# Patient Record
Sex: MALE | Race: BLACK OR AFRICAN AMERICAN | Employment: OTHER | URBAN - METROPOLITAN AREA
[De-identification: names, ages, dates, MRNs, and addresses within clinical notes are randomized per-mention and may not be internally consistent; named-entity substitution may affect disease eponyms.]

---

## 2017-03-11 ENCOUNTER — HOSPITAL ENCOUNTER (EMERGENCY)
Age: 72
Discharge: HOME | End: 2017-03-11
Attending: EMERGENCY MEDICINE
Payer: MEDICARE

## 2017-03-11 VITALS
HEIGHT: 70 IN | TEMPERATURE: 97.7 F | DIASTOLIC BLOOD PRESSURE: 82 MMHG | RESPIRATION RATE: 18 BRPM | BODY MASS INDEX: 30.35 KG/M2 | OXYGEN SATURATION: 100 % | SYSTOLIC BLOOD PRESSURE: 166 MMHG | WEIGHT: 212 LBS | HEART RATE: 73 BPM

## 2017-03-11 DIAGNOSIS — S80.811A ABRASION OF RIGHT LOWER LEG, INITIAL ENCOUNTER: Primary | ICD-10-CM

## 2017-03-11 PROCEDURE — 6370000000 HC RX 637 (ALT 250 FOR IP): Performed by: EMERGENCY MEDICINE

## 2017-03-11 PROCEDURE — 99282 EMERGENCY DEPT VISIT SF MDM: CPT

## 2017-03-11 RX ORDER — CEPHALEXIN 500 MG/1
500 CAPSULE ORAL 4 TIMES DAILY
Qty: 28 CAPSULE | Refills: 0 | Status: ON HOLD | OUTPATIENT
Start: 2017-03-11 | End: 2017-10-06

## 2017-03-11 RX ORDER — GINSENG 100 MG
CAPSULE ORAL ONCE
Status: COMPLETED | OUTPATIENT
Start: 2017-03-11 | End: 2017-03-11

## 2017-03-11 RX ORDER — CEPHALEXIN 500 MG/1
500 CAPSULE ORAL ONCE
Status: COMPLETED | OUTPATIENT
Start: 2017-03-11 | End: 2017-03-11

## 2017-03-11 RX ADMIN — CEPHALEXIN 500 MG: 500 CAPSULE ORAL at 02:55

## 2017-03-11 RX ADMIN — BACITRACIN: 500 OINTMENT TOPICAL at 02:54

## 2017-06-17 LAB
ALBUMIN SERPL-MCNC: 4.5 G/DL (ref 3.9–4.9)
ALP BLD-CCNC: 95 U/L (ref 35–104)
ALT SERPL-CCNC: 28 U/L (ref 0–41)
ANION GAP SERPL CALCULATED.3IONS-SCNC: 12 MEQ/L (ref 7–13)
AST SERPL-CCNC: 20 U/L (ref 0–40)
BILIRUB SERPL-MCNC: 0.4 MG/DL (ref 0–1.2)
BUN BLDV-MCNC: 22 MG/DL (ref 8–23)
CALCIUM SERPL-MCNC: 8.8 MG/DL (ref 8.6–10.2)
CHLORIDE BLD-SCNC: 104 MEQ/L (ref 98–107)
CO2: 23 MEQ/L (ref 22–29)
CREAT SERPL-MCNC: 1.06 MG/DL (ref 0.7–1.2)
GFR AFRICAN AMERICAN: >60
GFR NON-AFRICAN AMERICAN: >60
GLOBULIN: 2.6 G/DL (ref 2.3–3.5)
GLUCOSE BLD-MCNC: 121 MG/DL (ref 74–109)
HCT VFR BLD CALC: 36.8 % (ref 42–52)
HEMOGLOBIN: 11.9 G/DL (ref 14–18)
MCH RBC QN AUTO: 28.3 PG (ref 27–31.3)
MCHC RBC AUTO-ENTMCNC: 32.4 % (ref 33–37)
MCV RBC AUTO: 87.4 FL (ref 80–100)
PDW BLD-RTO: 16.9 % (ref 11.5–14.5)
PLATELET # BLD: 128 K/UL (ref 130–400)
POTASSIUM SERPL-SCNC: 4.4 MEQ/L (ref 3.5–5.1)
RBC # BLD: 4.21 M/UL (ref 4.7–6.1)
SODIUM BLD-SCNC: 139 MEQ/L (ref 132–144)
T4 FREE: 0.86 NG/DL (ref 0.93–1.7)
TOTAL CK: 169 U/L (ref 0–190)
TOTAL PROTEIN: 7.1 G/DL (ref 6.4–8.1)
TROPONIN: <0.01 NG/ML (ref 0–0.01)
TSH SERPL DL<=0.05 MIU/L-ACNC: 1.57 UIU/ML (ref 0.27–4.2)
VITAMIN B-12: 363 PG/ML (ref 211–946)
WBC # BLD: 3.9 K/UL (ref 4.8–10.8)

## 2017-06-19 ENCOUNTER — HOSPITAL ENCOUNTER (OUTPATIENT)
Dept: NON INVASIVE DIAGNOSTICS | Age: 72
Discharge: HOME | End: 2017-06-19
Payer: MEDICARE

## 2017-06-19 PROCEDURE — 93005 ELECTROCARDIOGRAM TRACING: CPT

## 2017-06-21 LAB
EKG ATRIAL RATE: 66 BPM
EKG P AXIS: 40 DEGREES
EKG P-R INTERVAL: 156 MS
EKG Q-T INTERVAL: 408 MS
EKG QRS DURATION: 104 MS
EKG QTC CALCULATION (BAZETT): 427 MS
EKG R AXIS: 23 DEGREES
EKG T AXIS: 16 DEGREES
EKG VENTRICULAR RATE: 66 BPM

## 2017-06-27 ENCOUNTER — OFFICE VISIT (OUTPATIENT)
Dept: CARDIOLOGY | Age: 72
End: 2017-06-27

## 2017-06-27 VITALS
OXYGEN SATURATION: 99 % | HEIGHT: 70 IN | RESPIRATION RATE: 18 BRPM | BODY MASS INDEX: 29.78 KG/M2 | SYSTOLIC BLOOD PRESSURE: 124 MMHG | HEART RATE: 68 BPM | DIASTOLIC BLOOD PRESSURE: 72 MMHG | WEIGHT: 208 LBS | TEMPERATURE: 98.3 F

## 2017-06-27 DIAGNOSIS — I73.9 PERIPHERAL VASCULAR DISEASE (HCC): ICD-10-CM

## 2017-06-27 DIAGNOSIS — I25.119 CORONARY ARTERY DISEASE INVOLVING NATIVE CORONARY ARTERY OF NATIVE HEART WITH ANGINA PECTORIS (HCC): ICD-10-CM

## 2017-06-27 DIAGNOSIS — Z95.5 HISTORY OF CORONARY ARTERY STENT PLACEMENT: ICD-10-CM

## 2017-06-27 DIAGNOSIS — R07.89 OTHER CHEST PAIN: Primary | ICD-10-CM

## 2017-06-27 PROCEDURE — 99214 OFFICE O/P EST MOD 30 MIN: CPT | Performed by: INTERNAL MEDICINE

## 2017-06-27 PROCEDURE — 93000 ELECTROCARDIOGRAM COMPLETE: CPT | Performed by: INTERNAL MEDICINE

## 2017-06-28 ENCOUNTER — HOSPITAL ENCOUNTER (OUTPATIENT)
Dept: NUCLEAR MEDICINE | Age: 72
Discharge: HOME | End: 2017-06-28
Payer: MEDICARE

## 2017-06-28 DIAGNOSIS — I25.119 CORONARY ARTERY DISEASE INVOLVING NATIVE CORONARY ARTERY OF NATIVE HEART WITH ANGINA PECTORIS (HCC): ICD-10-CM

## 2017-06-28 DIAGNOSIS — R07.89 OTHER CHEST PAIN: ICD-10-CM

## 2017-06-28 DIAGNOSIS — Z95.5 HISTORY OF CORONARY ARTERY STENT PLACEMENT: ICD-10-CM

## 2017-06-28 PROCEDURE — 2580000003 HC RX 258: Performed by: INTERNAL MEDICINE

## 2017-06-28 PROCEDURE — 3430000000 HC RX DIAGNOSTIC RADIOPHARMACEUTICAL: Performed by: INTERNAL MEDICINE

## 2017-06-28 PROCEDURE — A9502 TC99M TETROFOSMIN: HCPCS | Performed by: INTERNAL MEDICINE

## 2017-06-28 PROCEDURE — 93017 CV STRESS TEST TRACING ONLY: CPT

## 2017-06-28 PROCEDURE — 78452 HT MUSCLE IMAGE SPECT MULT: CPT

## 2017-06-28 PROCEDURE — 6360000002 HC RX W HCPCS: Performed by: INTERNAL MEDICINE

## 2017-06-28 RX ORDER — SODIUM CHLORIDE 0.9 % (FLUSH) 0.9 %
10 SYRINGE (ML) INJECTION PRN
Status: DISCONTINUED | OUTPATIENT
Start: 2017-06-28 | End: 2017-07-01 | Stop reason: HOSPADM

## 2017-06-28 RX ADMIN — Medication 20 ML: at 11:10

## 2017-06-28 RX ADMIN — Medication 10 ML: at 09:35

## 2017-06-28 RX ADMIN — REGADENOSON 0.4 MG: 0.08 INJECTION, SOLUTION INTRAVENOUS at 11:10

## 2017-06-28 RX ADMIN — TETROFOSMIN 11.1 MILLICURIE: 0.23 INJECTION, POWDER, LYOPHILIZED, FOR SOLUTION INTRAVENOUS at 09:35

## 2017-06-28 RX ADMIN — TETROFOSMIN 28.4 MILLICURIE: 0.23 INJECTION, POWDER, LYOPHILIZED, FOR SOLUTION INTRAVENOUS at 11:10

## 2017-07-24 ASSESSMENT — ENCOUNTER SYMPTOMS
ALLERGIC/IMMUNOLOGIC NEGATIVE: 1
GASTROINTESTINAL NEGATIVE: 1
SHORTNESS OF BREATH: 1
EYES NEGATIVE: 1
CHEST TIGHTNESS: 0

## 2017-09-02 ENCOUNTER — HOSPITAL ENCOUNTER (OUTPATIENT)
Dept: GENERAL RADIOLOGY | Age: 72
Discharge: HOME | End: 2017-09-02
Payer: MEDICARE

## 2017-09-02 DIAGNOSIS — M54.5 LOW BACK PAIN, UNSPECIFIED BACK PAIN LATERALITY, UNSPECIFIED CHRONICITY, WITH SCIATICA PRESENCE UNSPECIFIED: ICD-10-CM

## 2017-09-02 PROCEDURE — 72170 X-RAY EXAM OF PELVIS: CPT

## 2017-09-02 PROCEDURE — 72110 X-RAY EXAM L-2 SPINE 4/>VWS: CPT

## 2017-10-02 ENCOUNTER — HOSPITAL ENCOUNTER (OUTPATIENT)
Dept: PREADMISSION TESTING | Age: 72
Discharge: HOME | End: 2017-10-02
Payer: MEDICARE

## 2017-10-02 VITALS
HEART RATE: 76 BPM | HEIGHT: 69 IN | OXYGEN SATURATION: 98 % | DIASTOLIC BLOOD PRESSURE: 50 MMHG | BODY MASS INDEX: 31.01 KG/M2 | WEIGHT: 209.4 LBS | TEMPERATURE: 99.3 F | SYSTOLIC BLOOD PRESSURE: 114 MMHG | RESPIRATION RATE: 18 BRPM

## 2017-10-02 DIAGNOSIS — I70.209 OCCLUSIVE DISEASE OF ARTERY OF LOWER EXTREMITY (HCC): Chronic | ICD-10-CM

## 2017-10-02 PROBLEM — M10.9 GOUT: Chronic | Status: ACTIVE | Noted: 2017-10-02

## 2017-10-02 PROBLEM — I25.10 CAD (CORONARY ARTERY DISEASE): Chronic | Status: ACTIVE | Noted: 2017-10-02

## 2017-10-02 LAB
ABO/RH: NORMAL
ALBUMIN SERPL-MCNC: 4.4 G/DL (ref 3.9–4.9)
ALP BLD-CCNC: 90 U/L (ref 35–104)
ALT SERPL-CCNC: 34 U/L (ref 0–41)
ANION GAP SERPL CALCULATED.3IONS-SCNC: 16 MEQ/L (ref 7–13)
ANTIBODY SCREEN: NORMAL
AST SERPL-CCNC: 27 U/L (ref 0–40)
BILIRUB SERPL-MCNC: 0.5 MG/DL (ref 0–1.2)
BUN BLDV-MCNC: 18 MG/DL (ref 8–23)
CALCIUM SERPL-MCNC: 9.1 MG/DL (ref 8.6–10.2)
CHLORIDE BLD-SCNC: 104 MEQ/L (ref 98–107)
CO2: 24 MEQ/L (ref 22–29)
CREAT SERPL-MCNC: 1.09 MG/DL (ref 0.7–1.2)
GFR AFRICAN AMERICAN: >60
GFR NON-AFRICAN AMERICAN: >60
GLOBULIN: 3 G/DL (ref 2.3–3.5)
GLUCOSE BLD-MCNC: 135 MG/DL (ref 74–109)
HCT VFR BLD CALC: 36.2 % (ref 42–52)
HEMOGLOBIN: 12 G/DL (ref 14–18)
MCH RBC QN AUTO: 28.7 PG (ref 27–31.3)
MCHC RBC AUTO-ENTMCNC: 33.1 % (ref 33–37)
MCV RBC AUTO: 86.6 FL (ref 80–100)
PDW BLD-RTO: 17 % (ref 11.5–14.5)
PLATELET # BLD: 153 K/UL (ref 130–400)
POTASSIUM SERPL-SCNC: 5 MEQ/L (ref 3.5–5.1)
RBC # BLD: 4.18 M/UL (ref 4.7–6.1)
SODIUM BLD-SCNC: 144 MEQ/L (ref 132–144)
TOTAL PROTEIN: 7.4 G/DL (ref 6.4–8.1)
WBC # BLD: 5.2 K/UL (ref 4.8–10.8)

## 2017-10-02 PROCEDURE — 86850 RBC ANTIBODY SCREEN: CPT

## 2017-10-02 PROCEDURE — 86900 BLOOD TYPING SEROLOGIC ABO: CPT

## 2017-10-02 PROCEDURE — 85027 COMPLETE CBC AUTOMATED: CPT

## 2017-10-02 PROCEDURE — 86901 BLOOD TYPING SEROLOGIC RH(D): CPT

## 2017-10-02 PROCEDURE — 80053 COMPREHEN METABOLIC PANEL: CPT

## 2017-10-02 RX ORDER — LIDOCAINE HYDROCHLORIDE 10 MG/ML
1 INJECTION, SOLUTION EPIDURAL; INFILTRATION; INTRACAUDAL; PERINEURAL
Status: CANCELLED | OUTPATIENT
Start: 2017-10-02 | End: 2017-10-02

## 2017-10-02 RX ORDER — SODIUM CHLORIDE 0.9 % (FLUSH) 0.9 %
10 SYRINGE (ML) INJECTION PRN
Status: CANCELLED | OUTPATIENT
Start: 2017-10-02

## 2017-10-02 RX ORDER — TIZANIDINE 2 MG/1
2 TABLET ORAL EVERY 6 HOURS PRN
COMMUNITY

## 2017-10-02 RX ORDER — SODIUM CHLORIDE 0.9 % (FLUSH) 0.9 %
10 SYRINGE (ML) INJECTION EVERY 12 HOURS SCHEDULED
Status: CANCELLED | OUTPATIENT
Start: 2017-10-02

## 2017-10-02 RX ORDER — SODIUM CHLORIDE, SODIUM LACTATE, POTASSIUM CHLORIDE, CALCIUM CHLORIDE 600; 310; 30; 20 MG/100ML; MG/100ML; MG/100ML; MG/100ML
INJECTION, SOLUTION INTRAVENOUS CONTINUOUS
Status: CANCELLED | OUTPATIENT
Start: 2017-10-02

## 2017-10-02 RX ORDER — NITROGLYCERIN 0.4 MG/1
0.4 TABLET SUBLINGUAL EVERY 5 MIN PRN
COMMUNITY
End: 2019-02-13 | Stop reason: SDUPTHER

## 2017-10-02 RX ORDER — INDOMETHACIN 50 MG/1
50 CAPSULE ORAL 2 TIMES DAILY WITH MEALS
Status: ON HOLD | COMMUNITY
End: 2017-10-29 | Stop reason: HOSPADM

## 2017-10-06 ENCOUNTER — ANESTHESIA EVENT (OUTPATIENT)
Dept: OPERATING ROOM | Age: 72
End: 2017-10-06
Payer: MEDICARE

## 2017-10-06 ENCOUNTER — ANESTHESIA (OUTPATIENT)
Dept: OPERATING ROOM | Age: 72
End: 2017-10-06
Payer: MEDICARE

## 2017-10-06 ENCOUNTER — HOSPITAL ENCOUNTER (OUTPATIENT)
Age: 72
Setting detail: OUTPATIENT SURGERY
Discharge: HOME | End: 2017-10-06
Attending: THORACIC SURGERY (CARDIOTHORACIC VASCULAR SURGERY) | Admitting: THORACIC SURGERY (CARDIOTHORACIC VASCULAR SURGERY)
Payer: MEDICARE

## 2017-10-06 ENCOUNTER — HOSPITAL ENCOUNTER (OUTPATIENT)
Dept: GENERAL RADIOLOGY | Age: 72
Setting detail: OUTPATIENT SURGERY
Discharge: HOME | End: 2017-10-06
Attending: THORACIC SURGERY (CARDIOTHORACIC VASCULAR SURGERY)
Payer: MEDICARE

## 2017-10-06 VITALS
DIASTOLIC BLOOD PRESSURE: 77 MMHG | HEIGHT: 71 IN | TEMPERATURE: 97 F | RESPIRATION RATE: 16 BRPM | SYSTOLIC BLOOD PRESSURE: 159 MMHG | WEIGHT: 209 LBS | HEART RATE: 51 BPM | OXYGEN SATURATION: 100 % | BODY MASS INDEX: 29.26 KG/M2

## 2017-10-06 VITALS
OXYGEN SATURATION: 100 % | RESPIRATION RATE: 11 BRPM | DIASTOLIC BLOOD PRESSURE: 63 MMHG | SYSTOLIC BLOOD PRESSURE: 114 MMHG

## 2017-10-06 DIAGNOSIS — R52 PAIN: ICD-10-CM

## 2017-10-06 LAB
GLUCOSE BLD-MCNC: 112 MG/DL (ref 60–115)
GLUCOSE BLD-MCNC: 133 MG/DL (ref 60–115)
PERFORMED ON: ABNORMAL
PERFORMED ON: NORMAL

## 2017-10-06 PROCEDURE — 3700000001 HC ADD 15 MINUTES (ANESTHESIA): Performed by: THORACIC SURGERY (CARDIOTHORACIC VASCULAR SURGERY)

## 2017-10-06 PROCEDURE — 7100000011 HC PHASE II RECOVERY - ADDTL 15 MIN: Performed by: THORACIC SURGERY (CARDIOTHORACIC VASCULAR SURGERY)

## 2017-10-06 PROCEDURE — 6360000002 HC RX W HCPCS: Performed by: THORACIC SURGERY (CARDIOTHORACIC VASCULAR SURGERY)

## 2017-10-06 PROCEDURE — 2500000003 HC RX 250 WO HCPCS: Performed by: NURSE ANESTHETIST, CERTIFIED REGISTERED

## 2017-10-06 PROCEDURE — C1894 INTRO/SHEATH, NON-LASER: HCPCS | Performed by: THORACIC SURGERY (CARDIOTHORACIC VASCULAR SURGERY)

## 2017-10-06 PROCEDURE — 6360000002 HC RX W HCPCS: Performed by: NURSE ANESTHETIST, CERTIFIED REGISTERED

## 2017-10-06 PROCEDURE — 7100000000 HC PACU RECOVERY - FIRST 15 MIN: Performed by: THORACIC SURGERY (CARDIOTHORACIC VASCULAR SURGERY)

## 2017-10-06 PROCEDURE — 6360000004 HC RX CONTRAST MEDICATION: Performed by: THORACIC SURGERY (CARDIOTHORACIC VASCULAR SURGERY)

## 2017-10-06 PROCEDURE — 6370000000 HC RX 637 (ALT 250 FOR IP): Performed by: STUDENT IN AN ORGANIZED HEALTH CARE EDUCATION/TRAINING PROGRAM

## 2017-10-06 PROCEDURE — 76000 FLUOROSCOPY <1 HR PHYS/QHP: CPT

## 2017-10-06 PROCEDURE — 7100000001 HC PACU RECOVERY - ADDTL 15 MIN: Performed by: THORACIC SURGERY (CARDIOTHORACIC VASCULAR SURGERY)

## 2017-10-06 PROCEDURE — 75710 ARTERY X-RAYS ARM/LEG: CPT

## 2017-10-06 PROCEDURE — 2500000003 HC RX 250 WO HCPCS: Performed by: THORACIC SURGERY (CARDIOTHORACIC VASCULAR SURGERY)

## 2017-10-06 PROCEDURE — 2580000003 HC RX 258: Performed by: STUDENT IN AN ORGANIZED HEALTH CARE EDUCATION/TRAINING PROGRAM

## 2017-10-06 PROCEDURE — 3600000002 HC SURGERY LEVEL 2 BASE: Performed by: THORACIC SURGERY (CARDIOTHORACIC VASCULAR SURGERY)

## 2017-10-06 PROCEDURE — 75625 CONTRAST EXAM ABDOMINL AORTA: CPT

## 2017-10-06 PROCEDURE — 3700000000 HC ANESTHESIA ATTENDED CARE: Performed by: THORACIC SURGERY (CARDIOTHORACIC VASCULAR SURGERY)

## 2017-10-06 PROCEDURE — 2580000003 HC RX 258: Performed by: THORACIC SURGERY (CARDIOTHORACIC VASCULAR SURGERY)

## 2017-10-06 PROCEDURE — C1769 GUIDE WIRE: HCPCS | Performed by: THORACIC SURGERY (CARDIOTHORACIC VASCULAR SURGERY)

## 2017-10-06 PROCEDURE — 3600000012 HC SURGERY LEVEL 2 ADDTL 15MIN: Performed by: THORACIC SURGERY (CARDIOTHORACIC VASCULAR SURGERY)

## 2017-10-06 PROCEDURE — 2720000010 HC SURG SUPPLY STERILE: Performed by: THORACIC SURGERY (CARDIOTHORACIC VASCULAR SURGERY)

## 2017-10-06 PROCEDURE — 2500000003 HC RX 250 WO HCPCS: Performed by: STUDENT IN AN ORGANIZED HEALTH CARE EDUCATION/TRAINING PROGRAM

## 2017-10-06 PROCEDURE — 7100000010 HC PHASE II RECOVERY - FIRST 15 MIN: Performed by: THORACIC SURGERY (CARDIOTHORACIC VASCULAR SURGERY)

## 2017-10-06 RX ORDER — HYDROCODONE BITARTRATE AND ACETAMINOPHEN 5; 325 MG/1; MG/1
1 TABLET ORAL PRN
Status: COMPLETED | OUTPATIENT
Start: 2017-10-06 | End: 2017-10-06

## 2017-10-06 RX ORDER — MEPERIDINE HYDROCHLORIDE 25 MG/ML
12.5 INJECTION INTRAMUSCULAR; INTRAVENOUS; SUBCUTANEOUS EVERY 5 MIN PRN
Status: DISCONTINUED | OUTPATIENT
Start: 2017-10-06 | End: 2017-10-06 | Stop reason: HOSPADM

## 2017-10-06 RX ORDER — HYDROCODONE BITARTRATE AND ACETAMINOPHEN 5; 325 MG/1; MG/1
2 TABLET ORAL PRN
Status: COMPLETED | OUTPATIENT
Start: 2017-10-06 | End: 2017-10-06

## 2017-10-06 RX ORDER — ACETAMINOPHEN 325 MG/1
650 TABLET ORAL EVERY 4 HOURS PRN
Status: DISCONTINUED | OUTPATIENT
Start: 2017-10-06 | End: 2017-10-06 | Stop reason: HOSPADM

## 2017-10-06 RX ORDER — METOCLOPRAMIDE HYDROCHLORIDE 5 MG/ML
10 INJECTION INTRAMUSCULAR; INTRAVENOUS
Status: DISCONTINUED | OUTPATIENT
Start: 2017-10-06 | End: 2017-10-06 | Stop reason: HOSPADM

## 2017-10-06 RX ORDER — SODIUM CHLORIDE, SODIUM LACTATE, POTASSIUM CHLORIDE, CALCIUM CHLORIDE 600; 310; 30; 20 MG/100ML; MG/100ML; MG/100ML; MG/100ML
INJECTION, SOLUTION INTRAVENOUS CONTINUOUS
Status: DISCONTINUED | OUTPATIENT
Start: 2017-10-06 | End: 2017-10-06 | Stop reason: HOSPADM

## 2017-10-06 RX ORDER — ONDANSETRON 2 MG/ML
INJECTION INTRAMUSCULAR; INTRAVENOUS PRN
Status: DISCONTINUED | OUTPATIENT
Start: 2017-10-06 | End: 2017-10-06 | Stop reason: SDUPTHER

## 2017-10-06 RX ORDER — DIPHENHYDRAMINE HYDROCHLORIDE 50 MG/ML
12.5 INJECTION INTRAMUSCULAR; INTRAVENOUS
Status: DISCONTINUED | OUTPATIENT
Start: 2017-10-06 | End: 2017-10-06 | Stop reason: HOSPADM

## 2017-10-06 RX ORDER — ONDANSETRON 2 MG/ML
4 INJECTION INTRAMUSCULAR; INTRAVENOUS EVERY 6 HOURS PRN
Status: DISCONTINUED | OUTPATIENT
Start: 2017-10-06 | End: 2017-10-06 | Stop reason: HOSPADM

## 2017-10-06 RX ORDER — LIDOCAINE HYDROCHLORIDE 10 MG/ML
1 INJECTION, SOLUTION EPIDURAL; INFILTRATION; INTRACAUDAL; PERINEURAL
Status: COMPLETED | OUTPATIENT
Start: 2017-10-06 | End: 2017-10-06

## 2017-10-06 RX ORDER — FENTANYL CITRATE 50 UG/ML
INJECTION, SOLUTION INTRAMUSCULAR; INTRAVENOUS PRN
Status: DISCONTINUED | OUTPATIENT
Start: 2017-10-06 | End: 2017-10-06 | Stop reason: SDUPTHER

## 2017-10-06 RX ORDER — SODIUM CHLORIDE 450 MG/100ML
INJECTION, SOLUTION INTRAVENOUS CONTINUOUS
Status: DISCONTINUED | OUTPATIENT
Start: 2017-10-06 | End: 2017-10-06 | Stop reason: HOSPADM

## 2017-10-06 RX ORDER — SODIUM CHLORIDE 0.9 % (FLUSH) 0.9 %
10 SYRINGE (ML) INJECTION EVERY 12 HOURS SCHEDULED
Status: DISCONTINUED | OUTPATIENT
Start: 2017-10-06 | End: 2017-10-06 | Stop reason: HOSPADM

## 2017-10-06 RX ORDER — MIDAZOLAM HYDROCHLORIDE 1 MG/ML
INJECTION INTRAMUSCULAR; INTRAVENOUS PRN
Status: DISCONTINUED | OUTPATIENT
Start: 2017-10-06 | End: 2017-10-06 | Stop reason: SDUPTHER

## 2017-10-06 RX ORDER — SODIUM CHLORIDE 0.9 % (FLUSH) 0.9 %
10 SYRINGE (ML) INJECTION PRN
Status: DISCONTINUED | OUTPATIENT
Start: 2017-10-06 | End: 2017-10-06 | Stop reason: HOSPADM

## 2017-10-06 RX ORDER — PROPOFOL 10 MG/ML
INJECTION, EMULSION INTRAVENOUS PRN
Status: DISCONTINUED | OUTPATIENT
Start: 2017-10-06 | End: 2017-10-06 | Stop reason: SDUPTHER

## 2017-10-06 RX ORDER — FENTANYL CITRATE 50 UG/ML
50 INJECTION, SOLUTION INTRAMUSCULAR; INTRAVENOUS EVERY 10 MIN PRN
Status: DISCONTINUED | OUTPATIENT
Start: 2017-10-06 | End: 2017-10-06 | Stop reason: HOSPADM

## 2017-10-06 RX ORDER — PROPOFOL 10 MG/ML
INJECTION, EMULSION INTRAVENOUS CONTINUOUS PRN
Status: DISCONTINUED | OUTPATIENT
Start: 2017-10-06 | End: 2017-10-06 | Stop reason: SDUPTHER

## 2017-10-06 RX ORDER — ONDANSETRON 2 MG/ML
4 INJECTION INTRAMUSCULAR; INTRAVENOUS
Status: DISCONTINUED | OUTPATIENT
Start: 2017-10-06 | End: 2017-10-06 | Stop reason: HOSPADM

## 2017-10-06 RX ORDER — LIDOCAINE HYDROCHLORIDE 20 MG/ML
INJECTION, SOLUTION INFILTRATION; PERINEURAL PRN
Status: DISCONTINUED | OUTPATIENT
Start: 2017-10-06 | End: 2017-10-06 | Stop reason: SDUPTHER

## 2017-10-06 RX ORDER — MAGNESIUM HYDROXIDE 1200 MG/15ML
LIQUID ORAL CONTINUOUS PRN
Status: DISCONTINUED | OUTPATIENT
Start: 2017-10-06 | End: 2017-10-06 | Stop reason: HOSPADM

## 2017-10-06 RX ADMIN — FENTANYL CITRATE 25 MCG: 50 INJECTION, SOLUTION INTRAMUSCULAR; INTRAVENOUS at 12:35

## 2017-10-06 RX ADMIN — ONDANSETRON 4 MG: 2 INJECTION INTRAMUSCULAR; INTRAVENOUS at 12:26

## 2017-10-06 RX ADMIN — SODIUM CHLORIDE, POTASSIUM CHLORIDE, SODIUM LACTATE AND CALCIUM CHLORIDE: 600; 310; 30; 20 INJECTION, SOLUTION INTRAVENOUS at 10:00

## 2017-10-06 RX ADMIN — PROPOFOL 30 MG: 10 INJECTION, EMULSION INTRAVENOUS at 11:08

## 2017-10-06 RX ADMIN — CEFAZOLIN SODIUM 2 G: 2 SOLUTION INTRAVENOUS at 11:02

## 2017-10-06 RX ADMIN — LIDOCAINE HYDROCHLORIDE 0.1 ML: 10 INJECTION, SOLUTION EPIDURAL; INFILTRATION; INTRACAUDAL; PERINEURAL at 10:00

## 2017-10-06 RX ADMIN — MIDAZOLAM HYDROCHLORIDE 1 MG: 1 INJECTION, SOLUTION INTRAMUSCULAR; INTRAVENOUS at 11:02

## 2017-10-06 RX ADMIN — MIDAZOLAM HYDROCHLORIDE 1 MG: 1 INJECTION, SOLUTION INTRAMUSCULAR; INTRAVENOUS at 11:01

## 2017-10-06 RX ADMIN — LIDOCAINE HYDROCHLORIDE 40 MG: 20 INJECTION, SOLUTION INFILTRATION; PERINEURAL at 11:08

## 2017-10-06 RX ADMIN — PROPOFOL 100 MCG/KG/MIN: 10 INJECTION, EMULSION INTRAVENOUS at 11:08

## 2017-10-06 RX ADMIN — FENTANYL CITRATE 25 MCG: 50 INJECTION, SOLUTION INTRAMUSCULAR; INTRAVENOUS at 12:25

## 2017-10-06 RX ADMIN — PROPOFOL 30 MG: 10 INJECTION, EMULSION INTRAVENOUS at 11:20

## 2017-10-06 RX ADMIN — FENTANYL CITRATE 50 MCG: 50 INJECTION, SOLUTION INTRAMUSCULAR; INTRAVENOUS at 11:21

## 2017-10-06 RX ADMIN — HYDROCODONE BITARTRATE AND ACETAMINOPHEN 1 TABLET: 5; 325 TABLET ORAL at 15:08

## 2017-10-06 ASSESSMENT — PAIN SCALES - GENERAL
PAINLEVEL_OUTOF10: 4
PAINLEVEL_OUTOF10: 4

## 2017-10-06 ASSESSMENT — PAIN DESCRIPTION - DESCRIPTORS: DESCRIPTORS: ACHING

## 2017-10-06 ASSESSMENT — PAIN - FUNCTIONAL ASSESSMENT: PAIN_FUNCTIONAL_ASSESSMENT: 0-10

## 2017-10-06 NOTE — ANESTHESIA POSTPROCEDURE EVALUATION
Department of Anesthesiology  Postprocedure Note    Patient: Lenore Orozco  MRN: [id number]  YOB: 1945  Date of evaluation: 10/6/2017  Time:  12:48 PM     Procedure Summary     Date Anesthesia Start Anesthesia Stop Room / Location    10/06/17 1102 [id number] [id number] [facility] / Vivek Guzman OR       Procedure Diagnosis Surgeon Responsible Provider    RIGHT ARM AORTO-FEMORAL DIGITAL SUBTRACTION ARTERIOGRAPHY (Right ) (OCCLUSIVE DISEASE) Tayler Pryor MD [facility],           Anesthesia Type: MAC    Raymundo Phase I:      Raymundo Phase II:      Last vitals:  BP  130/71(95)   Temp   97.2   Pulse 55   Resp 20   SpO2 100% 6L     Anesthesia Post Evaluation    Patient location during evaluation: bedside  Patient participation: complete - patient participated  Level of consciousness: sleepy but conscious  Pain score: 0  Airway patency: patent  Nausea & Vomiting: no nausea and no vomiting  Complications: no  Cardiovascular status: blood pressure returned to baseline and hemodynamically stable  Respiratory status: acceptable  Hydration status: euvolemic

## 2017-10-27 ENCOUNTER — HOSPITAL ENCOUNTER (OUTPATIENT)
Dept: GENERAL RADIOLOGY | Age: 72
Setting detail: SURGERY ADMIT
Discharge: HOME | DRG: 253 | End: 2017-10-27
Attending: THORACIC SURGERY (CARDIOTHORACIC VASCULAR SURGERY)
Payer: MEDICARE

## 2017-10-27 ENCOUNTER — ANESTHESIA EVENT (OUTPATIENT)
Dept: OPERATING ROOM | Age: 72
DRG: 253 | End: 2017-10-27
Payer: MEDICARE

## 2017-10-27 ENCOUNTER — HOSPITAL ENCOUNTER (INPATIENT)
Age: 72
LOS: 2 days | Discharge: HOME | DRG: 253 | End: 2017-10-29
Attending: THORACIC SURGERY (CARDIOTHORACIC VASCULAR SURGERY) | Admitting: THORACIC SURGERY (CARDIOTHORACIC VASCULAR SURGERY)
Payer: MEDICARE

## 2017-10-27 ENCOUNTER — ANESTHESIA (OUTPATIENT)
Dept: OPERATING ROOM | Age: 72
DRG: 253 | End: 2017-10-27
Payer: MEDICARE

## 2017-10-27 VITALS — TEMPERATURE: 95.9 F | OXYGEN SATURATION: 100 %

## 2017-10-27 DIAGNOSIS — Z98.890 S/P POPLITEAL-DISTAL BYPASS: ICD-10-CM

## 2017-10-27 LAB
ABO/RH: NORMAL
ANTIBODY SCREEN: NORMAL
GLUCOSE BLD-MCNC: 124 MG/DL (ref 60–115)
GLUCOSE BLD-MCNC: 228 MG/DL (ref 60–115)
HCT VFR BLD CALC: 28.5 % (ref 42–52)
HEMOGLOBIN: 9.2 G/DL (ref 14–18)
MCH RBC QN AUTO: 28.2 PG (ref 27–31.3)
MCHC RBC AUTO-ENTMCNC: 32.1 % (ref 33–37)
MCV RBC AUTO: 87.8 FL (ref 80–100)
PDW BLD-RTO: 16.8 % (ref 11.5–14.5)
PERFORMED ON: ABNORMAL
PERFORMED ON: ABNORMAL
PLATELET # BLD: 132 K/UL (ref 130–400)
RBC # BLD: 3.25 M/UL (ref 4.7–6.1)
WBC # BLD: 7.9 K/UL (ref 4.8–10.8)

## 2017-10-27 PROCEDURE — 2000000000 HC ICU R&B

## 2017-10-27 PROCEDURE — 6370000000 HC RX 637 (ALT 250 FOR IP): Performed by: INTERNAL MEDICINE

## 2017-10-27 PROCEDURE — 36415 COLL VENOUS BLD VENIPUNCTURE: CPT

## 2017-10-27 PROCEDURE — 2580000003 HC RX 258: Performed by: ANESTHESIOLOGY

## 2017-10-27 PROCEDURE — 2580000003 HC RX 258: Performed by: THORACIC SURGERY (CARDIOTHORACIC VASCULAR SURGERY)

## 2017-10-27 PROCEDURE — 6360000004 HC RX CONTRAST MEDICATION: Performed by: THORACIC SURGERY (CARDIOTHORACIC VASCULAR SURGERY)

## 2017-10-27 PROCEDURE — 047N0DZ DILATION OF LEFT POPLITEAL ARTERY WITH INTRALUMINAL DEVICE, OPEN APPROACH: ICD-10-PCS | Performed by: THORACIC SURGERY (CARDIOTHORACIC VASCULAR SURGERY)

## 2017-10-27 PROCEDURE — C1757 CATH, THROMBECTOMY/EMBOLECT: HCPCS | Performed by: THORACIC SURGERY (CARDIOTHORACIC VASCULAR SURGERY)

## 2017-10-27 PROCEDURE — C1725 CATH, TRANSLUMIN NON-LASER: HCPCS | Performed by: THORACIC SURGERY (CARDIOTHORACIC VASCULAR SURGERY)

## 2017-10-27 PROCEDURE — 041L0JL BYPASS LEFT FEMORAL ARTERY TO POPLITEAL ARTERY WITH SYNTHETIC SUBSTITUTE, OPEN APPROACH: ICD-10-PCS | Performed by: THORACIC SURGERY (CARDIOTHORACIC VASCULAR SURGERY)

## 2017-10-27 PROCEDURE — 6360000002 HC RX W HCPCS: Performed by: NURSE ANESTHETIST, CERTIFIED REGISTERED

## 2017-10-27 PROCEDURE — 6360000002 HC RX W HCPCS: Performed by: THORACIC SURGERY (CARDIOTHORACIC VASCULAR SURGERY)

## 2017-10-27 PROCEDURE — 6370000000 HC RX 637 (ALT 250 FOR IP): Performed by: THORACIC SURGERY (CARDIOTHORACIC VASCULAR SURGERY)

## 2017-10-27 PROCEDURE — 6360000002 HC RX W HCPCS: Performed by: ANESTHESIOLOGY

## 2017-10-27 PROCEDURE — 88304 TISSUE EXAM BY PATHOLOGIST: CPT

## 2017-10-27 PROCEDURE — C1894 INTRO/SHEATH, NON-LASER: HCPCS | Performed by: THORACIC SURGERY (CARDIOTHORACIC VASCULAR SURGERY)

## 2017-10-27 PROCEDURE — C1876 STENT, NON-COA/NON-COV W/DEL: HCPCS | Performed by: THORACIC SURGERY (CARDIOTHORACIC VASCULAR SURGERY)

## 2017-10-27 PROCEDURE — 85027 COMPLETE CBC AUTOMATED: CPT

## 2017-10-27 PROCEDURE — C1769 GUIDE WIRE: HCPCS | Performed by: THORACIC SURGERY (CARDIOTHORACIC VASCULAR SURGERY)

## 2017-10-27 PROCEDURE — C1768 GRAFT, VASCULAR: HCPCS | Performed by: THORACIC SURGERY (CARDIOTHORACIC VASCULAR SURGERY)

## 2017-10-27 PROCEDURE — 04CN0ZZ EXTIRPATION OF MATTER FROM LEFT POPLITEAL ARTERY, OPEN APPROACH: ICD-10-PCS | Performed by: THORACIC SURGERY (CARDIOTHORACIC VASCULAR SURGERY)

## 2017-10-27 PROCEDURE — 2580000003 HC RX 258: Performed by: STUDENT IN AN ORGANIZED HEALTH CARE EDUCATION/TRAINING PROGRAM

## 2017-10-27 PROCEDURE — 86900 BLOOD TYPING SEROLOGIC ABO: CPT

## 2017-10-27 PROCEDURE — 76000 FLUOROSCOPY <1 HR PHYS/QHP: CPT

## 2017-10-27 PROCEDURE — 2500000003 HC RX 250 WO HCPCS: Performed by: NURSE ANESTHETIST, CERTIFIED REGISTERED

## 2017-10-27 PROCEDURE — 3700000000 HC ANESTHESIA ATTENDED CARE: Performed by: THORACIC SURGERY (CARDIOTHORACIC VASCULAR SURGERY)

## 2017-10-27 PROCEDURE — 86901 BLOOD TYPING SEROLOGIC RH(D): CPT

## 2017-10-27 PROCEDURE — 3600000015 HC SURGERY LEVEL 5 ADDTL 15MIN: Performed by: THORACIC SURGERY (CARDIOTHORACIC VASCULAR SURGERY)

## 2017-10-27 PROCEDURE — 3700000001 HC ADD 15 MINUTES (ANESTHESIA): Performed by: THORACIC SURGERY (CARDIOTHORACIC VASCULAR SURGERY)

## 2017-10-27 PROCEDURE — 3600000005 HC SURGERY LEVEL 5 BASE: Performed by: THORACIC SURGERY (CARDIOTHORACIC VASCULAR SURGERY)

## 2017-10-27 PROCEDURE — 86850 RBC ANTIBODY SCREEN: CPT

## 2017-10-27 DEVICE — GRAFT VASC L50CM DIA6MM PTFE CBAS HEP SURF THN WALLED REM: Type: IMPLANTABLE DEVICE | Site: LEG | Status: FUNCTIONAL

## 2017-10-27 DEVICE — SELF-EXPANDING STENT SYSTEM
Type: IMPLANTABLE DEVICE | Site: LEG | Status: FUNCTIONAL
Brand: INNOVA™ VASCULAR

## 2017-10-27 RX ORDER — MEPERIDINE HYDROCHLORIDE 25 MG/ML
12.5 INJECTION INTRAMUSCULAR; INTRAVENOUS; SUBCUTANEOUS EVERY 5 MIN PRN
Status: DISCONTINUED | OUTPATIENT
Start: 2017-10-27 | End: 2017-10-27 | Stop reason: HOSPADM

## 2017-10-27 RX ORDER — SODIUM CHLORIDE 0.9 % (FLUSH) 0.9 %
10 SYRINGE (ML) INJECTION EVERY 12 HOURS SCHEDULED
Status: DISCONTINUED | OUTPATIENT
Start: 2017-10-27 | End: 2017-10-27 | Stop reason: HOSPADM

## 2017-10-27 RX ORDER — DEXTROSE AND SODIUM CHLORIDE 5; .45 G/100ML; G/100ML
INJECTION, SOLUTION INTRAVENOUS CONTINUOUS
Status: DISCONTINUED | OUTPATIENT
Start: 2017-10-27 | End: 2017-10-28

## 2017-10-27 RX ORDER — DEXAMETHASONE SODIUM PHOSPHATE 4 MG/ML
INJECTION, SOLUTION INTRA-ARTICULAR; INTRALESIONAL; INTRAMUSCULAR; INTRAVENOUS; SOFT TISSUE PRN
Status: DISCONTINUED | OUTPATIENT
Start: 2017-10-27 | End: 2017-10-27 | Stop reason: SDUPTHER

## 2017-10-27 RX ORDER — PROPOFOL 10 MG/ML
INJECTION, EMULSION INTRAVENOUS PRN
Status: DISCONTINUED | OUTPATIENT
Start: 2017-10-27 | End: 2017-10-27 | Stop reason: SDUPTHER

## 2017-10-27 RX ORDER — SODIUM CHLORIDE, SODIUM LACTATE, POTASSIUM CHLORIDE, CALCIUM CHLORIDE 600; 310; 30; 20 MG/100ML; MG/100ML; MG/100ML; MG/100ML
INJECTION, SOLUTION INTRAVENOUS CONTINUOUS
Status: DISCONTINUED | OUTPATIENT
Start: 2017-10-27 | End: 2017-10-27

## 2017-10-27 RX ORDER — ONDANSETRON 2 MG/ML
4 INJECTION INTRAMUSCULAR; INTRAVENOUS
Status: DISCONTINUED | OUTPATIENT
Start: 2017-10-27 | End: 2017-10-27 | Stop reason: HOSPADM

## 2017-10-27 RX ORDER — DEXTROSE MONOHYDRATE 50 MG/ML
100 INJECTION, SOLUTION INTRAVENOUS PRN
Status: DISCONTINUED | OUTPATIENT
Start: 2017-10-27 | End: 2017-10-29 | Stop reason: HOSPADM

## 2017-10-27 RX ORDER — ONDANSETRON 2 MG/ML
4 INJECTION INTRAMUSCULAR; INTRAVENOUS EVERY 6 HOURS PRN
Status: DISCONTINUED | OUTPATIENT
Start: 2017-10-27 | End: 2017-10-29 | Stop reason: HOSPADM

## 2017-10-27 RX ORDER — ONDANSETRON 2 MG/ML
INJECTION INTRAMUSCULAR; INTRAVENOUS PRN
Status: DISCONTINUED | OUTPATIENT
Start: 2017-10-27 | End: 2017-10-27 | Stop reason: SDUPTHER

## 2017-10-27 RX ORDER — ROCURONIUM BROMIDE 10 MG/ML
INJECTION, SOLUTION INTRAVENOUS PRN
Status: DISCONTINUED | OUTPATIENT
Start: 2017-10-27 | End: 2017-10-27 | Stop reason: SDUPTHER

## 2017-10-27 RX ORDER — DOCUSATE SODIUM 100 MG/1
100 CAPSULE, LIQUID FILLED ORAL 2 TIMES DAILY
Status: DISCONTINUED | OUTPATIENT
Start: 2017-10-27 | End: 2017-10-29 | Stop reason: HOSPADM

## 2017-10-27 RX ORDER — MORPHINE SULFATE 2 MG/ML
2 INJECTION, SOLUTION INTRAMUSCULAR; INTRAVENOUS
Status: DISCONTINUED | OUTPATIENT
Start: 2017-10-27 | End: 2017-10-28

## 2017-10-27 RX ORDER — 0.9 % SODIUM CHLORIDE 0.9 %
500 INTRAVENOUS SOLUTION INTRAVENOUS ONCE
Status: DISCONTINUED | OUTPATIENT
Start: 2017-10-27 | End: 2017-10-27

## 2017-10-27 RX ORDER — EPHEDRINE SULFATE 50 MG/ML
INJECTION, SOLUTION INTRAVENOUS PRN
Status: DISCONTINUED | OUTPATIENT
Start: 2017-10-27 | End: 2017-10-27 | Stop reason: SDUPTHER

## 2017-10-27 RX ORDER — MORPHINE SULFATE 4 MG/ML
4 INJECTION, SOLUTION INTRAMUSCULAR; INTRAVENOUS
Status: DISCONTINUED | OUTPATIENT
Start: 2017-10-27 | End: 2017-10-28

## 2017-10-27 RX ORDER — METOCLOPRAMIDE HYDROCHLORIDE 5 MG/ML
10 INJECTION INTRAMUSCULAR; INTRAVENOUS
Status: DISCONTINUED | OUTPATIENT
Start: 2017-10-27 | End: 2017-10-27 | Stop reason: HOSPADM

## 2017-10-27 RX ORDER — FENTANYL CITRATE 50 UG/ML
INJECTION, SOLUTION INTRAMUSCULAR; INTRAVENOUS PRN
Status: DISCONTINUED | OUTPATIENT
Start: 2017-10-27 | End: 2017-10-27 | Stop reason: SDUPTHER

## 2017-10-27 RX ORDER — LIDOCAINE HYDROCHLORIDE 20 MG/ML
INJECTION, SOLUTION INFILTRATION; PERINEURAL PRN
Status: DISCONTINUED | OUTPATIENT
Start: 2017-10-27 | End: 2017-10-27 | Stop reason: SDUPTHER

## 2017-10-27 RX ORDER — HEPARIN SODIUM 5000 [USP'U]/ML
INJECTION, SOLUTION INTRAVENOUS; SUBCUTANEOUS PRN
Status: DISCONTINUED | OUTPATIENT
Start: 2017-10-27 | End: 2017-10-27 | Stop reason: SDUPTHER

## 2017-10-27 RX ORDER — DIPHENHYDRAMINE HYDROCHLORIDE 50 MG/ML
12.5 INJECTION INTRAMUSCULAR; INTRAVENOUS
Status: DISCONTINUED | OUTPATIENT
Start: 2017-10-27 | End: 2017-10-27 | Stop reason: HOSPADM

## 2017-10-27 RX ORDER — HYDROCODONE BITARTRATE AND ACETAMINOPHEN 5; 325 MG/1; MG/1
1 TABLET ORAL PRN
Status: DISCONTINUED | OUTPATIENT
Start: 2017-10-27 | End: 2017-10-27 | Stop reason: HOSPADM

## 2017-10-27 RX ORDER — MIDAZOLAM HYDROCHLORIDE 1 MG/ML
INJECTION INTRAMUSCULAR; INTRAVENOUS PRN
Status: DISCONTINUED | OUTPATIENT
Start: 2017-10-27 | End: 2017-10-27 | Stop reason: SDUPTHER

## 2017-10-27 RX ORDER — FENTANYL CITRATE 50 UG/ML
50 INJECTION, SOLUTION INTRAMUSCULAR; INTRAVENOUS EVERY 10 MIN PRN
Status: DISCONTINUED | OUTPATIENT
Start: 2017-10-27 | End: 2017-10-27 | Stop reason: HOSPADM

## 2017-10-27 RX ORDER — GLYCOPYRROLATE 0.2 MG/ML
INJECTION INTRAMUSCULAR; INTRAVENOUS PRN
Status: DISCONTINUED | OUTPATIENT
Start: 2017-10-27 | End: 2017-10-27 | Stop reason: SDUPTHER

## 2017-10-27 RX ORDER — HYDROCODONE BITARTRATE AND ACETAMINOPHEN 5; 325 MG/1; MG/1
2 TABLET ORAL PRN
Status: DISCONTINUED | OUTPATIENT
Start: 2017-10-27 | End: 2017-10-27 | Stop reason: HOSPADM

## 2017-10-27 RX ORDER — LIDOCAINE HYDROCHLORIDE 10 MG/ML
1 INJECTION, SOLUTION EPIDURAL; INFILTRATION; INTRACAUDAL; PERINEURAL
Status: DISCONTINUED | OUTPATIENT
Start: 2017-10-27 | End: 2017-10-27 | Stop reason: HOSPADM

## 2017-10-27 RX ORDER — NICOTINE POLACRILEX 4 MG
15 LOZENGE BUCCAL PRN
Status: DISCONTINUED | OUTPATIENT
Start: 2017-10-27 | End: 2017-10-29 | Stop reason: HOSPADM

## 2017-10-27 RX ORDER — SODIUM CHLORIDE 0.9 % (FLUSH) 0.9 %
10 SYRINGE (ML) INJECTION PRN
Status: DISCONTINUED | OUTPATIENT
Start: 2017-10-27 | End: 2017-10-27 | Stop reason: HOSPADM

## 2017-10-27 RX ORDER — MAGNESIUM HYDROXIDE 1200 MG/15ML
LIQUID ORAL CONTINUOUS PRN
Status: DISCONTINUED | OUTPATIENT
Start: 2017-10-27 | End: 2017-10-27 | Stop reason: HOSPADM

## 2017-10-27 RX ORDER — DEXTROSE MONOHYDRATE 25 G/50ML
12.5 INJECTION, SOLUTION INTRAVENOUS PRN
Status: DISCONTINUED | OUTPATIENT
Start: 2017-10-27 | End: 2017-10-29 | Stop reason: HOSPADM

## 2017-10-27 RX ADMIN — EPHEDRINE SULFATE 5 MG: 50 INJECTION, SOLUTION INTRAMUSCULAR; INTRAVENOUS; SUBCUTANEOUS at 12:10

## 2017-10-27 RX ADMIN — EPHEDRINE SULFATE 5 MG: 50 INJECTION, SOLUTION INTRAMUSCULAR; INTRAVENOUS; SUBCUTANEOUS at 14:38

## 2017-10-27 RX ADMIN — DEXAMETHASONE SODIUM PHOSPHATE 4 MG: 4 INJECTION INTRA-ARTICULAR; INTRALESIONAL; INTRAMUSCULAR; INTRAVENOUS; SOFT TISSUE at 12:25

## 2017-10-27 RX ADMIN — EPHEDRINE SULFATE 5 MG: 50 INJECTION, SOLUTION INTRAMUSCULAR; INTRAVENOUS; SUBCUTANEOUS at 12:04

## 2017-10-27 RX ADMIN — EPHEDRINE SULFATE 5 MG: 50 INJECTION, SOLUTION INTRAMUSCULAR; INTRAVENOUS; SUBCUTANEOUS at 14:40

## 2017-10-27 RX ADMIN — FENTANYL CITRATE 25 MCG: 50 INJECTION, SOLUTION INTRAMUSCULAR; INTRAVENOUS at 15:21

## 2017-10-27 RX ADMIN — ROCURONIUM BROMIDE 50 MG: 10 INJECTION, SOLUTION INTRAVENOUS at 11:53

## 2017-10-27 RX ADMIN — INSULIN LISPRO 2 UNITS: 100 INJECTION, SOLUTION INTRAVENOUS; SUBCUTANEOUS at 23:16

## 2017-10-27 RX ADMIN — LIDOCAINE HYDROCHLORIDE 60 MG: 20 INJECTION, SOLUTION INFILTRATION; PERINEURAL at 11:53

## 2017-10-27 RX ADMIN — EPHEDRINE SULFATE 2.5 MG: 50 INJECTION, SOLUTION INTRAMUSCULAR; INTRAVENOUS; SUBCUTANEOUS at 14:10

## 2017-10-27 RX ADMIN — GLYCOPYRROLATE 0.2 MG: 0.2 INJECTION INTRAMUSCULAR; INTRAVENOUS at 12:05

## 2017-10-27 RX ADMIN — MIDAZOLAM HYDROCHLORIDE 3 MG: 1 INJECTION, SOLUTION INTRAMUSCULAR; INTRAVENOUS at 09:50

## 2017-10-27 RX ADMIN — EPHEDRINE SULFATE 2.5 MG: 50 INJECTION, SOLUTION INTRAMUSCULAR; INTRAVENOUS; SUBCUTANEOUS at 14:13

## 2017-10-27 RX ADMIN — CEFAZOLIN SODIUM 2 G: 2 SOLUTION INTRAVENOUS at 12:01

## 2017-10-27 RX ADMIN — FENTANYL CITRATE 50 MCG: 50 INJECTION, SOLUTION INTRAMUSCULAR; INTRAVENOUS at 11:53

## 2017-10-27 RX ADMIN — EPHEDRINE SULFATE 5 MG: 50 INJECTION, SOLUTION INTRAMUSCULAR; INTRAVENOUS; SUBCUTANEOUS at 12:08

## 2017-10-27 RX ADMIN — SODIUM CHLORIDE, POTASSIUM CHLORIDE, SODIUM LACTATE AND CALCIUM CHLORIDE: 600; 310; 30; 20 INJECTION, SOLUTION INTRAVENOUS at 10:10

## 2017-10-27 RX ADMIN — EPHEDRINE SULFATE 5 MG: 50 INJECTION, SOLUTION INTRAMUSCULAR; INTRAVENOUS; SUBCUTANEOUS at 13:55

## 2017-10-27 RX ADMIN — ROCURONIUM BROMIDE 10 MG: 10 INJECTION, SOLUTION INTRAVENOUS at 14:06

## 2017-10-27 RX ADMIN — EPHEDRINE SULFATE 5 MG: 50 INJECTION, SOLUTION INTRAMUSCULAR; INTRAVENOUS; SUBCUTANEOUS at 14:42

## 2017-10-27 RX ADMIN — SUGAMMADEX 190 MG: 100 INJECTION, SOLUTION INTRAVENOUS at 15:01

## 2017-10-27 RX ADMIN — Medication 4 MG: at 21:21

## 2017-10-27 RX ADMIN — DEXTROSE AND SODIUM CHLORIDE: 5; 450 INJECTION, SOLUTION INTRAVENOUS at 16:28

## 2017-10-27 RX ADMIN — EPHEDRINE SULFATE 5 MG: 50 INJECTION, SOLUTION INTRAMUSCULAR; INTRAVENOUS; SUBCUTANEOUS at 12:06

## 2017-10-27 RX ADMIN — ROCURONIUM BROMIDE 10 MG: 10 INJECTION, SOLUTION INTRAVENOUS at 13:11

## 2017-10-27 RX ADMIN — FENTANYL CITRATE 25 MCG: 50 INJECTION, SOLUTION INTRAMUSCULAR; INTRAVENOUS at 15:25

## 2017-10-27 RX ADMIN — ONDANSETRON 4 MG: 2 INJECTION INTRAMUSCULAR; INTRAVENOUS at 14:34

## 2017-10-27 RX ADMIN — PROPOFOL 150 MG: 10 INJECTION, EMULSION INTRAVENOUS at 11:53

## 2017-10-27 RX ADMIN — HEPARIN SODIUM 7500 UNITS: 5000 INJECTION, SOLUTION INTRAVENOUS; SUBCUTANEOUS at 12:50

## 2017-10-27 RX ADMIN — DOCUSATE SODIUM 100 MG: 100 CAPSULE, LIQUID FILLED ORAL at 21:20

## 2017-10-27 RX ADMIN — EPHEDRINE SULFATE 2.5 MG: 50 INJECTION, SOLUTION INTRAMUSCULAR; INTRAVENOUS; SUBCUTANEOUS at 13:02

## 2017-10-27 RX ADMIN — EPHEDRINE SULFATE 2.5 MG: 50 INJECTION, SOLUTION INTRAMUSCULAR; INTRAVENOUS; SUBCUTANEOUS at 13:04

## 2017-10-27 RX ADMIN — SODIUM CHLORIDE, POTASSIUM CHLORIDE, SODIUM LACTATE AND CALCIUM CHLORIDE: 600; 310; 30; 20 INJECTION, SOLUTION INTRAVENOUS at 09:35

## 2017-10-27 RX ADMIN — Medication 2 MG: at 16:33

## 2017-10-27 ASSESSMENT — PAIN DESCRIPTION - PROGRESSION
CLINICAL_PROGRESSION: GRADUALLY WORSENING
CLINICAL_PROGRESSION: GRADUALLY IMPROVING
CLINICAL_PROGRESSION: GRADUALLY IMPROVING

## 2017-10-27 ASSESSMENT — PAIN SCALES - GENERAL
PAINLEVEL_OUTOF10: 0
PAINLEVEL_OUTOF10: 7
PAINLEVEL_OUTOF10: 0
PAINLEVEL_OUTOF10: 5
PAINLEVEL_OUTOF10: 0
PAINLEVEL_OUTOF10: 0
PAINLEVEL_OUTOF10: 7
PAINLEVEL_OUTOF10: 8

## 2017-10-27 ASSESSMENT — PAIN DESCRIPTION - ORIENTATION
ORIENTATION: LEFT

## 2017-10-27 ASSESSMENT — PAIN - FUNCTIONAL ASSESSMENT: PAIN_FUNCTIONAL_ASSESSMENT: 0-10

## 2017-10-27 ASSESSMENT — PAIN DESCRIPTION - LOCATION
LOCATION: LEG

## 2017-10-27 ASSESSMENT — PAIN DESCRIPTION - PAIN TYPE
TYPE: SURGICAL PAIN

## 2017-10-27 NOTE — ANESTHESIA PRE PROCEDURE
Department of Anesthesiology  Preprocedure Note       Name:  Maria De Jesus Montero   Age:  67 y.o.  :  1945                                          MRN:  [id number]         Date:  10/27/2017      Surgeon: July Garza):  Anamika Smith MD    Procedure: Procedure(s):  LEFT FEMORAL DISTAL BYPASS, (PAT DONE 10-2-17)    Medications prior to admission:   Prior to Admission medications    Medication Sig Start Date End Date Taking? Authorizing Provider   nitroGLYCERIN (NITROSTAT) 0.4 MG SL tablet Place 0.4 mg under the tongue every 5 minutes as needed for Chest pain up to max of 3 total doses. If no relief after 1 dose, call 911. Historical Provider, MD   NAPROXEN EX Apply 1 tablet topically    Historical Provider, MD   tiZANidine (ZANAFLEX) 2 MG tablet Take 2 mg by mouth every 6 hours as needed    Historical Provider, MD   indomethacin (INDOCIN) 50 MG capsule Take 50 mg by mouth 2 times daily (with meals)    Historical Provider, MD   atenolol (TENORMIN) 50 MG tablet TAKE ONE TABLET BY MOUTH TWICE DAILY 10/26/15   Briana Smith MD   aspirin 81 MG tablet Take one(1) tablet daily. 03   Historical Provider, MD   Urea (CARMOL) 40 % cream AS DIRECTED 10/20/03   Historical Provider, MD   clopidogrel (PLAVIX) 75 MG tablet Take 75 mg by mouth 3/6/13   Historical Provider, MD   ferrous sulfate 325 (65 FE) MG tablet Take 325 mg by mouth 3/6/13   Historical Provider, MD   gabapentin (NEURONTIN) 300 MG capsule Take 300 mg by mouth 3/6/13   Historical Provider, MD   triamcinolone (KENALOG) 0.1 % cream Use as directed.  03   Historical Provider, MD   pioglitazone (ACTOS) 45 MG tablet Take 45 mg by mouth daily Takes 1/2 tab daily 3/6/13   Historical Provider, MD   pitavastatin (LIVALO) 2 MG TABS tablet Take by mouth 3/6/13   Historical Provider, MD   amLODIPine-benazepril (LOTREL) 10-20 MG per capsule  5/11/15   Historical Provider, MD   ULORIC 80 MG TABS  5/11/15   Historical Provider, MD   potassium chloride (MICRO-K) 10 MEQ GRAFT      HERNIA REPAIR      as child       Social History:    Social History   Substance Use Topics    Smoking status: Former Smoker     Start date: 8/11/1960     Quit date: 8/11/2000    Smokeless tobacco: Not on file    Alcohol use No                                Counseling given: Not Answered      Vital Signs (Current): There were no vitals filed for this visit. BP Readings from Last 3 Encounters:   10/06/17 (!) 159/77   10/06/17 114/63   10/02/17 (!) 114/50       NPO Status:                                                                                 BMI:   Wt Readings from Last 3 Encounters:   10/06/17 209 lb (94.8 kg)   10/02/17 209 lb 6.4 oz (95 kg)   06/27/17 208 lb (94.3 kg)     There is no height or weight on file to calculate BMI.    CBC:   Lab Results   Component Value Date    WBC 5.2 10/02/2017    RBC 4.18 10/02/2017    RBC 4.07 02/03/2012    HGB 12.0 10/02/2017    HCT 36.2 10/02/2017    MCV 86.6 10/02/2017    RDW 17.0 10/02/2017     10/02/2017       CMP:   Lab Results   Component Value Date     10/02/2017    K 5.0 10/02/2017     10/02/2017    CO2 24 10/02/2017    BUN 18 10/02/2017    CREATININE 1.09 10/02/2017    GFRAA >60.0 10/02/2017    LABGLOM >60.0 10/02/2017    GLUCOSE 135 10/02/2017    GLUCOSE 111 11/08/2011    PROT 7.4 10/02/2017    CALCIUM 9.1 10/02/2017    BILITOT 0.5 10/02/2017    ALKPHOS 90 10/02/2017    AST 27 10/02/2017    ALT 34 10/02/2017       POC Tests: No results for input(s): POCGLU, POCNA, POCK, POCCL, POCBUN, POCHEMO, POCHCT in the last 72 hours.     Coags:   Lab Results   Component Value Date    PROTIME 10.3 09/19/2015    PROTIME 10.7 11/04/2011    INR 1.0 09/19/2015    APTT 29.5 09/19/2015       HCG (If Applicable): No results found for: PREGTESTUR, PREGSERUM, HCG, HCGQUANT     ABGs: No results found for: PHART, PO2ART, WBZ7IGH, FKS5NSV, BEART, O1WTISHJ     Type & Screen (If Applicable):  No results found for: [facility]    Anesthesia Evaluation  Patient summary reviewed and Nursing notes reviewed no history of anesthetic complications:   Airway: Mallampati: II  TM distance: >3 FB   Neck ROM: full  Mouth opening: > = 3 FB Dental:    (+) upper dentures      Pulmonary:negative ROS                           ROS comment: Reformed smoker   Cardiovascular:    (+) hypertension:, CAD:, CABG/stent:, hyperlipidemia      ECG reviewed               Beta Blocker:  Dose within 24 Hrs      ROS comment: Multiple PCIs     Neuro/Psych:   {neg ROS              GI/Hepatic/Renal: neg ROS            Endo/Other: negative ROS   (+) blood dyscrasia: anemia:. Comments: LLE occlusive disease   Abdominal:           Vascular:   + PVD, aortic or cerebral, . Anesthesia Plan      general     ASA 3       Induction: intravenous. arterial line  MIPS: Postoperative opioids intended and Prophylactic antiemetics administered. Anesthetic plan and risks discussed with patient. Plan discussed with CRNA.     Attending anesthesiologist reviewed and agrees with Noelle Colvin MD   10/27/2017

## 2017-10-27 NOTE — BRIEF OP NOTE
Brief Postoperative Note  ______________________________________________________________    Patient: Wild Hyman  YOB: 1945  MRN: [id number]  Date of Procedure: 10/27/2017    Pre-Op Diagnosis: OCCLUSIVE DISEASE     Post-Op Diagnosis: Same       Procedure(s):  LEFT FEMORAL DISTAL BYPASS, (PAT DONE 10-2-17)    Anesthesia: General    Surgeon(s):  Hien Torres MD    Staff:  First Assistant: Alicia Sesay  Scrub Person First: Tegan Barahona  Scrub Person Second: Carrie Lawrence     Estimated Blood Loss: 600 cc approx    Complications: none    Specimens:   ID Type Source Tests Collected by Time Destination   A : LEFT POPLITEAL PLAQUE Tissue Leg SURGICAL PATHOLOGY Hien Torres MD 10/27/2017 1303        Implants:    Implant Name Type Inv.  Item Serial No.  Lot No. LRB No. Used   GRAFT VASC PROPATEN THN WALLED REMOV [id number] - [id number] Vascular/Graft/Patch/Filter GRAFT VASC PROPATEN THN WALLED REMOV [id number]  [employer]  Left 1   IMPL STENT INNOVA [id number] Stent:Coronary:Bare Metal IMPL STENT INNOVA [id number]   [place] SCI: INTERVENTIONAL CARDIO [id number] Left 1         Drains:      Findings: dictated    Hien Torres MD  Date: 10/27/2017  Time: 2:57 PM

## 2017-10-27 NOTE — ANESTHESIA POSTPROCEDURE EVALUATION
Department of Anesthesiology  Postprocedure Note    Patient: Christine Nevarez  MRN: [id number]  YOB: 1945  Date of evaluation: 10/27/2017  Time:  3:48 PM     Procedure Summary     Date:  10/27/17 Room / Location:  [address] OR    Anesthesia Start:  8602 Anesthesia Stop:  5683    Procedure:  LEFT FEMORAL DISTAL BYPASS, (PAT DONE 10-2-17) (Left Leg Upper) Diagnosis:  (OCCLUSIVE DISEASE )    Surgeon:  Leandro Del Cid MD Responsible Provider:  Rich Lipscomb MD    Anesthesia Type:  general ASA Status:  3          Anesthesia Type: general    Raymundo Phase I:      Raymundo Phase II:      Last vitals: Reviewed and per EMR flowsheets.        Anesthesia Post Evaluation    Patient location during evaluation: ICU  Patient participation: complete - patient participated  Level of consciousness: awake and awake and alert  Airway patency: patent  Nausea & Vomiting: no nausea and no vomiting  Complications: no  Cardiovascular status: blood pressure returned to baseline and hemodynamically stable (137/68, hr 62, rr16, spo2 100%)  Respiratory status: acceptable  Hydration status: euvolemic

## 2017-10-27 NOTE — ANESTHESIA PROCEDURE NOTES
Arterial Line:    An arterial line was placed using surface landmarks, in the holding area for the following indication(s): continuous blood pressure monitoring and blood sampling needed. A 20 gauge (size), 12 cm (length), Arrow (type) catheter was placed, Seldinger technique used, into the left brachial artery, secured by tape and Tegaderm. Anesthesia type: Local  Local infiltration: Injection    Events:  patient tolerated procedure well with no complications and attempts X2 via L radial approach, unable to advance wire. Site aborted. First attempt successful via L brachial approach.   10/27/2017 9:50 AM10/27/2017 10:08 AM  Anesthesiologist: Max Turner  Performed: Anesthesiologist   Preanesthetic Checklist  Completed: patient identified, site marked, surgical consent, pre-op evaluation, timeout performed, IV checked, risks and benefits discussed, monitors and equipment checked, anesthesia consent given, oxygen available and patient being monitored

## 2017-10-27 NOTE — H&P
[address]                     [address], [address] [facility]                             HISTORY AND PHYSICAL    PATIENT NAME: Randy Dave                     :             1945  MED REC NO:   [id number]                           ROOM:  ACCOUNT NO:   [de-identified]                          ADMISSION DATE:  10/27/2017  PROVIDER:     Kevin Soulier, MD    HISTORY OF PRESENT ILLNESS:  Left femoral distal bypass is planned for  this 39-year-old pleasant gentleman. He had undergone a cryoplasty of  his left femoral artery in 2010, followed by the angioplasty and  stenting of the right femoral artery in 2011. He returns now to  the operating room following evaluation, which has included an  ultrasound testing performed in July as well as a right arm approach  AFDSA, which was performed on 10/06/2017. He is in need of a left  femoral to distal bypass to obviate the [place] Class IV disease  that he has in his lower extremity. He is status post a remotely  performed femoral-femoral bypass graft many years ago by another  surgeon. The patient is still active, golfing daily, and doing  activities of daily living, but this issue is now impacting the  quality of life he enjoys. Both he and his wife are in agreement that  this needs to be completed. 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 REVIEW OF SYSTEMS:  The 12-point review of systems is unremarkable for  other issues other than what has been stated above. PAST SURGICAL HISTORY:  He did undergo cardiac stenting in 2013. ALLERGIES:  He has no known allergies. MEDICATIONS:  His anticoagulation was stopped on the . He also  takes 81 mg of aspirin. PHYSICAL EXAMINATION:  GENERAL:  He is a very pleasant, jovial gentleman, who 5 feet 10  inches tall, 208 pounds.   VITAL SIGNS:  His pulse is 68, he is afebrile,

## 2017-10-28 LAB
ANION GAP SERPL CALCULATED.3IONS-SCNC: 12 MEQ/L (ref 7–13)
BUN BLDV-MCNC: 18 MG/DL (ref 8–23)
CALCIUM SERPL-MCNC: 8.1 MG/DL (ref 8.6–10.2)
CHLORIDE BLD-SCNC: 100 MEQ/L (ref 98–107)
CO2: 23 MEQ/L (ref 22–29)
CREAT SERPL-MCNC: 0.99 MG/DL (ref 0.7–1.2)
GFR AFRICAN AMERICAN: >60
GFR NON-AFRICAN AMERICAN: >60
GLUCOSE BLD-MCNC: 149 MG/DL (ref 60–115)
GLUCOSE BLD-MCNC: 162 MG/DL (ref 60–115)
GLUCOSE BLD-MCNC: 182 MG/DL (ref 60–115)
GLUCOSE BLD-MCNC: 189 MG/DL (ref 60–115)
GLUCOSE BLD-MCNC: 191 MG/DL (ref 74–109)
HCT VFR BLD CALC: 24.6 % (ref 42–52)
HEMOGLOBIN: 8.2 G/DL (ref 14–18)
PERFORMED ON: ABNORMAL
POTASSIUM SERPL-SCNC: 4.6 MEQ/L (ref 3.5–5.1)
SODIUM BLD-SCNC: 135 MEQ/L (ref 132–144)

## 2017-10-28 PROCEDURE — 2580000003 HC RX 258: Performed by: THORACIC SURGERY (CARDIOTHORACIC VASCULAR SURGERY)

## 2017-10-28 PROCEDURE — 2000000000 HC ICU R&B

## 2017-10-28 PROCEDURE — 6370000000 HC RX 637 (ALT 250 FOR IP): Performed by: THORACIC SURGERY (CARDIOTHORACIC VASCULAR SURGERY)

## 2017-10-28 PROCEDURE — 2700000000 HC OXYGEN THERAPY PER DAY

## 2017-10-28 PROCEDURE — 85018 HEMOGLOBIN: CPT

## 2017-10-28 PROCEDURE — 80048 BASIC METABOLIC PNL TOTAL CA: CPT

## 2017-10-28 PROCEDURE — 36415 COLL VENOUS BLD VENIPUNCTURE: CPT

## 2017-10-28 PROCEDURE — 85014 HEMATOCRIT: CPT

## 2017-10-28 PROCEDURE — 6370000000 HC RX 637 (ALT 250 FOR IP): Performed by: INTERNAL MEDICINE

## 2017-10-28 PROCEDURE — 6360000002 HC RX W HCPCS: Performed by: THORACIC SURGERY (CARDIOTHORACIC VASCULAR SURGERY)

## 2017-10-28 RX ORDER — ATENOLOL 50 MG/1
50 TABLET ORAL 2 TIMES DAILY
Status: DISCONTINUED | OUTPATIENT
Start: 2017-10-28 | End: 2017-10-29 | Stop reason: HOSPADM

## 2017-10-28 RX ORDER — SIMVASTATIN 20 MG
20 TABLET ORAL NIGHTLY
Status: DISCONTINUED | OUTPATIENT
Start: 2017-10-28 | End: 2017-10-29 | Stop reason: HOSPADM

## 2017-10-28 RX ORDER — FERROUS SULFATE 325(65) MG
325 TABLET ORAL
Status: DISCONTINUED | OUTPATIENT
Start: 2017-10-28 | End: 2017-10-29 | Stop reason: HOSPADM

## 2017-10-28 RX ORDER — OXYCODONE AND ACETAMINOPHEN 10; 325 MG/1; MG/1
1 TABLET ORAL EVERY 4 HOURS PRN
Status: DISCONTINUED | OUTPATIENT
Start: 2017-10-28 | End: 2017-10-29 | Stop reason: HOSPADM

## 2017-10-28 RX ADMIN — DEXTROSE AND SODIUM CHLORIDE: 5; 450 INJECTION, SOLUTION INTRAVENOUS at 00:19

## 2017-10-28 RX ADMIN — ATENOLOL 50 MG: 50 TABLET ORAL at 21:05

## 2017-10-28 RX ADMIN — INSULIN LISPRO 2 UNITS: 100 INJECTION, SOLUTION INTRAVENOUS; SUBCUTANEOUS at 21:08

## 2017-10-28 RX ADMIN — DOCUSATE SODIUM 100 MG: 100 CAPSULE, LIQUID FILLED ORAL at 10:19

## 2017-10-28 RX ADMIN — SIMVASTATIN 20 MG: 20 TABLET, FILM COATED ORAL at 21:06

## 2017-10-28 RX ADMIN — DOCUSATE SODIUM 100 MG: 100 CAPSULE, LIQUID FILLED ORAL at 21:05

## 2017-10-28 RX ADMIN — DEXTROSE AND SODIUM CHLORIDE: 5; 450 INJECTION, SOLUTION INTRAVENOUS at 08:27

## 2017-10-28 RX ADMIN — OXYCODONE HYDROCHLORIDE AND ACETAMINOPHEN 1 TABLET: 10; 325 TABLET ORAL at 21:07

## 2017-10-28 RX ADMIN — OXYCODONE HYDROCHLORIDE AND ACETAMINOPHEN 1 TABLET: 10; 325 TABLET ORAL at 15:35

## 2017-10-28 RX ADMIN — Medication 2 MG: at 05:30

## 2017-10-28 RX ADMIN — ATENOLOL 50 MG: 50 TABLET ORAL at 10:19

## 2017-10-28 RX ADMIN — FERROUS SULFATE TAB 325 MG (65 MG ELEMENTAL FE) 325 MG: 325 (65 FE) TAB at 10:19

## 2017-10-28 ASSESSMENT — PAIN SCALES - GENERAL
PAINLEVEL_OUTOF10: 7
PAINLEVEL_OUTOF10: 5
PAINLEVEL_OUTOF10: 0
PAINLEVEL_OUTOF10: 8
PAINLEVEL_OUTOF10: 0
PAINLEVEL_OUTOF10: 2
PAINLEVEL_OUTOF10: 5
PAINLEVEL_OUTOF10: 0
PAINLEVEL_OUTOF10: 7
PAINLEVEL_OUTOF10: 0

## 2017-10-28 ASSESSMENT — ENCOUNTER SYMPTOMS
COUGH: 0
HEARTBURN: 0
BLURRED VISION: 0

## 2017-10-28 ASSESSMENT — PAIN DESCRIPTION - LOCATION
LOCATION: LEG

## 2017-10-28 ASSESSMENT — PAIN DESCRIPTION - PAIN TYPE
TYPE: SURGICAL PAIN

## 2017-10-28 ASSESSMENT — PAIN DESCRIPTION - ORIENTATION
ORIENTATION: LEFT

## 2017-10-28 ASSESSMENT — PAIN DESCRIPTION - PROGRESSION
CLINICAL_PROGRESSION: GRADUALLY WORSENING

## 2017-10-28 NOTE — PROGRESS NOTES
Spoke with Dr. Malik Benedict. Aware of new consult and orders to be called tonight for continued hematuria. Aware of report from previous RN and that hematuria improving from previous shift. No new orders at this time.   Electronically signed by Elaine Glez RN on 19/66/0102 at 9:03 PM

## 2017-10-28 NOTE — PROGRESS NOTES
VascularProgress Note    POD # 1    Patient is Leah Mcmahan. Subjective:\" my leg feels better than before surgery\"    Objective: Doppler signals over graft in thigh. No overt hematoma. hgb is 8.2    Pulses: Strong dopplers on PT and DP    Incisions: Clean dry and intact. No hematoma or bleeding noted.   Assessment: Satisfactory left LE status post revasc      Plan: 1) D/C art line and moreau 2) recheck HGB in am 3) Progressive ambulation    Electronically signed by Timothy Nicole MD on 10/28/2017 at 1:32 PM

## 2017-10-28 NOTE — PROGRESS NOTES
Urology  Hematuria secondary to cath trauma  Urine tigre colored clearing  OK to remove moreau if OK with primary service  Will continue to monitor  Anne Avila MD

## 2017-10-28 NOTE — CONSULTS
Consults   Pt was seen and evalauted, vascular surgeon consulted me for medical management for his medical conditions. Pt denies any pain, tolerated the surgery well, s/p left femoral distal bypass    Past Medical History:   Diagnosis Date    CAD (coronary artery disease)     multiple PCI    Diabetes mellitus ([facility]     Gout 10/2/2017    Hyperlipidemia     Hypertension     Occlusive disease of artery of lower extremity ([facility] 10/2/2017     Past Surgical History:   Procedure Laterality Date    ARTERIOGRAM Right 10/6/2017    RIGHT ARM AORTO-FEMORAL DIGITAL SUBTRACTION ARTERIOGRAPHY performed by Anamika Smith MD at [address] N/A 11/7/11    xience stent to RAC and CIRC    CORONARY ANGIOPLASTY WITH STENT PLACEMENT Left 4/19/13    xience stent to LAD    CORONARY ANGIOPLASTY WITH STENT PLACEMENT Right 12/22/13    Promus stent to RCA    FEMORAL-FEMORAL BYPASS GRAFT      HERNIA REPAIR      as child    AL VEIN BYPASS GRAFT,FEM-POP Left 10/27/2017    LEFT FEMORAL DISTAL BYPASS, (PAT DONE 10-2-17) performed by Anamika Smith MD at [place] [address] Marital status:      Spouse name: N/A    Number of children: N/A    Years of education: N/A     Occupational History    Not on file. Social History Main Topics    Smoking status: Former Smoker     Start date: 8/11/1960     Quit date: 8/11/2000    Smokeless tobacco: Not on file    Alcohol use No    Drug use: No    Sexual activity: Not on file     Other Topics Concern    Not on file     Social History Narrative    No narrative on file     Family History   Problem Relation Age of Onset    Diabetes Mother     Heart Disease Father     Heart Attack Father     No Known Problems Son      Social History     Social History    Marital status:      Spouse name: N/A    Number of children: N/A    Years of education: N/A     Occupational History    Not on file.      Social History Main Topics    Smoking status: Former Smoker     Start date: 8/11/1960     Quit date: 8/11/2000    Smokeless tobacco: Not on file    Alcohol use No    Drug use: No    Sexual activity: Not on file     Other Topics Concern    Not on file     Social History Narrative    No narrative on file     Review of Systems   Constitutional: Negative for chills and fever. HENT: Negative for hearing loss. Eyes: Negative for blurred vision. Respiratory: Negative for cough. Cardiovascular: Negative for chest pain. Gastrointestinal: Negative for heartburn. Genitourinary: Negative for dysuria. Musculoskeletal: Negative for myalgias. Skin: Negative for rash. Neurological: Negative for dizziness, tingling and headaches. Endo/Heme/Allergies: Does not bruise/bleed easily. Physical Exam   Constitutional: He is oriented to person, place, and time. He appears well-developed and well-nourished. No distress. HENT:   Head: Normocephalic and atraumatic. Eyes: Pupils are equal, round, and reactive to light. Right eye exhibits no discharge. Left eye exhibits no discharge. Neck: No thyromegaly present. Cardiovascular: Normal rate and normal heart sounds. Exam reveals no friction rub. Pulmonary/Chest: No respiratory distress. Abdominal: He exhibits no distension. Musculoskeletal: Normal range of motion. Neurological: He is alert and oriented to person, place, and time. Skin: Skin is warm and dry. He is not diaphoretic. No erythema.      Lab Results   Component Value Date    WBC 7.9 10/27/2017    HGB 8.2 (L) 10/28/2017    HCT 24.6 (L) 10/28/2017    MCV 87.8 10/27/2017     10/27/2017     Lab Results   Component Value Date     10/28/2017    K 4.6 10/28/2017     10/28/2017    CO2 23 10/28/2017    BUN 18 10/28/2017    CREATININE 0.99 10/28/2017    GLUCOSE 191 10/28/2017    GLUCOSE 111 11/08/2011    CALCIUM 8.1 10/28/2017      1) acute post op anemia  Monitor, no bleeding

## 2017-10-28 NOTE — PROGRESS NOTES
PHARMACY NOTE  Sherwin Fontenot was ordered Livalo 2mg. Per the [address], this medication is non-formulary and not stocked by pharmacy. Zocor 20mg was substituted. The medication can be reordered at discharge.

## 2017-10-28 NOTE — CARE COORDINATION
MET WITH PATIENT AND WIFE KARLI. HE IS INDEPENDENT, AND DENIES ANY NEEDS AT DISCHARGE. PROBABLE D/C HOME TOMORROW OR Monday.   Electronically signed by Ant Bridges RN on 10/28/17 at 3:57 PM

## 2017-10-28 NOTE — OP NOTE
[address]                     [address], [address] [facility]                               OPERATIVE REPORT    PATIENT NAME: Alondra Ro                     :             1945  MED REC NO:   [id number]                           ROOM:           01  ACCOUNT NO:   [de-identified]                          ADMISSION DATE:  10/27/2017  PROVIDER:     Nate Conrad MD    DATE OF PROCEDURE:  10/27/2017    PREOPERATIVE DIAGNOSIS:  [place] class IV PAD, left lower  extremity, secondary to multilevel PAOD. POSTOPERATIVE DIAGNOSIS:  [employer] class IV PAD, left lower  extremity, secondary to multilevel PAOD. PROCEDURE:  1.  A left femoral above-the-knee popliteal bypass utilizing a 6 mm  PROPATEN reinforced graft. 2.  Left popliteal endarterectomy. 3.  Left popliteal angioplasty and stenting. 4.  Left lower extremity DSA with supervision and interpretation. SURGEON:  Nate Conrad MD    ANESTHESIA:  General.    FINDINGS:  A 67-year-old active gentleman who has been experiencing  considerable amount of pain with ambulation and at progressively  shorter distances. He had a remotely performed fem-fem bypass graft  as well as prior angioplasty and stenting of his lower extremities  years ago. Following ultrasonography in the office and then an AFDSA  performed on 10/06/2017, he was advised to undergo this procedure for  potential functional improvement. This was done in the method  described below restoring inline flow to the left leg and foot  manifested postoperatively by strong Doppler signals to the left  posterior tibial and dorsalis pedis areas. Surgery is as follows.     OPERATIVE PROCEDURE:  With the patient properly identified and  prepared, we opened the left groin to expose an area on intense scar  which we then avoided and dissected down more distally to the level of  the native femoral artery which we cannulated with our micropuncture  catheter system and used the first of our 100 mL of Optiray 320  contrast to delineate appropriate flow in this portion of the vessel. We then opened the medial distal left thigh to expose the popliteal  trunk which we then cannulated and performed a DSA study showing a  filling defect in the popliteal artery just above the knee. However,  the lumen proximal to this appeared to be a satisfactory quality. We  then gave the patient 7500 units of heparin in a systemic bolus and  then we controlled the popliteal artery from above and below with  vessel loops and in between, we opened with the 11 blade scalpel the  artery. There was a significant amount of atherosclerotic disease and  a partially obstructed lumen which we endarterectomized both  proximally and distally as well as within the opened arteriotomy space  and there was vigorous antegrade and retrograde blood flow. After the  endarterectomy was performed, we spatulated one end of our PROPATEN 6  mm graft and used parachute technique and sutured the graft spatulated  end to arteriotomy with 5-0 Prolene suture. We occluded both the  proximal and distal artery with thrombectomy catheters, inflated to  prevent blood loss and when we completed the anastomosis and removed  the catheters, flow readily filled the graft. We then performed a DSA  study down the graft showing an excellent flow at the anastomotic  area. We tunneled our graft into the left groin and measured it and  divided it appropriate to the length needed for the arteriotomy we had  made in the femoral artery between vascular clamps. We then sutured  the spatulated end of the graft here to the open femoral artery and  the groin. When this was completed and the clamps were released, we  now had flow down our newly created access.   We performed a DSA study  which showed a filling defect with residual plaque within the lumen of  the popliteal artery below the anastomosis and not wanting to accept  this as a final point, we then performed, using the liu of our graft  as the entrance site, placement of a guidewire, 0.035 inch, and using  a 6-Kaz[place] sheath, we angioplasties this native popliteal artery with  a [employer] balloon, inflating 6 mm balloon with a 40  mm deployment chamber for an inflation time of 1 minute at 19  atmospheres of pressure. This was followed by the placement of a  [employer] vascular stent, 7 mm in diameter x 40 mm in  length, and we deployed this across the area which was before  irregular in quality. This now looked like a very satisfactory  technical appearance with no residual stenosis. We removed the sheath  from the liu of our graft and with a pledgeted stitch, we secured  hemostasis. The fluoroscopy time was 3 minutes 52 seconds. The  wounds were irrigated and checked for hemostasis and then closed in  layers. The counts were correct x2. The estimated blood loss  approximated 600 mL and the patient, who was stable throughout, was  transferred directly from OR-2 to room 1 of CVICU. Of note, his urine  became hematuric after placement of the Aponte catheter at the  beginning of the case and when we spoke with his wife after completion  of the surgery in the consultation room and informed her of this, we  asked if there were any prior problems with his prostate and hearing  none, we told her we would obtain a Urology consult in the  postoperative period, although we felt that this hematuria would be  transient. The patient tolerated this procedure well.         Toño Andersen MD    D: 10/27/2017 15:37:54       T: 10/27/2017 19:59:10     [id number]  Job#: [id number]     Doc#: [id number]

## 2017-10-28 NOTE — PROGRESS NOTES
0800AM  A&0X3 VSS. L LEG DRESSINGS DRY AND INTACT. L GROIN SWELLING. SITE SOFT. NO HEMATOMA THRILL BRUIT. DOPPLED PULSES. L BRACHIAL A-LINE SITE DRY AND INTACT. NO COMPLAINTS AT THIS TIME.  STRICT BEDREST MAINTAINED

## 2017-10-29 VITALS
WEIGHT: 211.42 LBS | OXYGEN SATURATION: 99 % | TEMPERATURE: 96 F | RESPIRATION RATE: 16 BRPM | BODY MASS INDEX: 29.49 KG/M2 | HEART RATE: 74 BPM | SYSTOLIC BLOOD PRESSURE: 132 MMHG | DIASTOLIC BLOOD PRESSURE: 109 MMHG

## 2017-10-29 LAB
GLUCOSE BLD-MCNC: 134 MG/DL (ref 60–115)
GLUCOSE BLD-MCNC: 175 MG/DL (ref 60–115)
HCT VFR BLD CALC: 24.9 % (ref 42–52)
HEMOGLOBIN: 8.2 G/DL (ref 14–18)
MCH RBC QN AUTO: 28.8 PG (ref 27–31.3)
MCHC RBC AUTO-ENTMCNC: 33.1 % (ref 33–37)
MCV RBC AUTO: 87 FL (ref 80–100)
PDW BLD-RTO: 17.2 % (ref 11.5–14.5)
PERFORMED ON: ABNORMAL
PERFORMED ON: ABNORMAL
PLATELET # BLD: 124 K/UL (ref 130–400)
RBC # BLD: 2.86 M/UL (ref 4.7–6.1)
WBC # BLD: 8.3 K/UL (ref 4.8–10.8)

## 2017-10-29 PROCEDURE — 6370000000 HC RX 637 (ALT 250 FOR IP): Performed by: THORACIC SURGERY (CARDIOTHORACIC VASCULAR SURGERY)

## 2017-10-29 PROCEDURE — 85027 COMPLETE CBC AUTOMATED: CPT

## 2017-10-29 PROCEDURE — 6370000000 HC RX 637 (ALT 250 FOR IP): Performed by: INTERNAL MEDICINE

## 2017-10-29 PROCEDURE — 36415 COLL VENOUS BLD VENIPUNCTURE: CPT

## 2017-10-29 RX ORDER — LISINOPRIL 10 MG/1
10 TABLET ORAL DAILY
Status: DISCONTINUED | OUTPATIENT
Start: 2017-10-29 | End: 2017-10-29 | Stop reason: HOSPADM

## 2017-10-29 RX ADMIN — DOCUSATE SODIUM 100 MG: 100 CAPSULE, LIQUID FILLED ORAL at 07:20

## 2017-10-29 RX ADMIN — ATENOLOL 50 MG: 50 TABLET ORAL at 07:20

## 2017-10-29 RX ADMIN — OXYCODONE HYDROCHLORIDE AND ACETAMINOPHEN 1 TABLET: 10; 325 TABLET ORAL at 07:20

## 2017-10-29 RX ADMIN — FERROUS SULFATE TAB 325 MG (65 MG ELEMENTAL FE) 325 MG: 325 (65 FE) TAB at 07:20

## 2017-10-29 ASSESSMENT — PAIN DESCRIPTION - ORIENTATION
ORIENTATION: LEFT

## 2017-10-29 ASSESSMENT — PAIN SCALES - GENERAL
PAINLEVEL_OUTOF10: 0
PAINLEVEL_OUTOF10: 0
PAINLEVEL_OUTOF10: 7
PAINLEVEL_OUTOF10: 0

## 2017-10-29 ASSESSMENT — PAIN DESCRIPTION - LOCATION
LOCATION: LEG

## 2017-10-29 ASSESSMENT — PAIN DESCRIPTION - PAIN TYPE
TYPE: SURGICAL PAIN

## 2017-10-29 ASSESSMENT — ENCOUNTER SYMPTOMS
COUGH: 0
SHORTNESS OF BREATH: 0
VOMITING: 0
NAUSEA: 0
DIARRHEA: 0

## 2017-10-29 NOTE — PROGRESS NOTES
0800AM A&0X3 VSS MP-SR L LEG DRESSINGS DRY AND INTACT. DOPPLED PULSES. L GORIN SLIGHTLY EDEMATOUS. SOFT NONTENDER. NO THRILL BRUIT HEMATOMA.  AMBULATING WITH STEADY GAIT

## 2017-10-29 NOTE — PROGRESS NOTES
Orquidea Mccormick is a 67 y.o. male patient. Pt was seen and evaluated, no new complaints, no overnight events    Current Facility-Administered Medications   Medication Dose Route Frequency Provider Last Rate Last Dose    lisinopril (PRINIVIL;ZESTRIL) tablet 10 mg  10 mg Oral Daily Tyrese Guzman MD        ferrous sulfate tablet 325 mg  325 mg Oral Daily with breakfast Tyrese Guzman MD   325 mg at 10/29/17 0720    atenolol (TENORMIN) tablet 50 mg  50 mg Oral BID Tyrese Guzman MD   50 mg at 10/29/17 0720    simvastatin (ZOCOR) tablet 20 mg  20 mg Oral Nightly Tyrese Guzman MD   20 mg at 10/28/17 2106    oxyCODONE-acetaminophen (PERCOCET)  MG per tablet 1 tablet  1 tablet Oral Q4H PRN Tino Pompa MD   1 tablet at 10/29/17 0720    ondansetron (ZOFRAN) injection 4 mg  4 mg Intravenous Q6H PRN Tino Pompa MD        magnesium hydroxide (MILK OF MAGNESIA) 400 MG/5ML suspension 30 mL  30 mL Oral Daily PRN Tino Pompa MD        docusate sodium (COLACE) capsule 100 mg  100 mg Oral BID Tino Pompa MD   100 mg at 10/29/17 0720    insulin lispro (HUMALOG) injection vial 0-12 Units  0-12 Units Subcutaneous TID WC Rene Holt MD   2 Units at 10/29/17 1126    insulin lispro (HUMALOG) injection vial 0-6 Units  0-6 Units Subcutaneous Nightly Rene Holt MD   2 Units at 10/28/17 2108    glucose (GLUTOSE) 40 % oral gel 15 g  15 g Oral PRN Rene Holt MD        dextrose 50 % solution 12.5 g  12.5 g Intravenous PRN Rene Holt MD        glucagon (rDNA) injection 1 mg  1 mg Intramuscular PRN Rene Holt MD        dextrose 5 % solution  100 mL/hr Intravenous PRN Rene Holt MD         No Known Allergies  Active Problems:    * No active hospital problems. *    Blood pressure (!) 142/60, pulse 77, temperature 96 °F (35.6 °C), temperature source Temporal, resp. rate 15, weight 211 lb 6.7 oz (95.9 kg), SpO2 98 %.     Subjective:  Symptoms:  No shortness of breath, malaise, cough, chest pain,

## 2017-10-29 NOTE — PROGRESS NOTES
VascularProgress Note    POD # 2    Patient is Nino Orlando. Subjective: Wants to go home    Objective: Satisfactory Dopplers    Pulses: Via Doppler positive    Incisions: Clean dry and intact. No hematoma or bleeding noted.   Assessment: Ready for discharge      Plan: 1) Rx Ultram 2) Discharge    Electronically signed by Promise Mack MD on 10/29/2017 at 3:59 PM

## 2017-10-29 NOTE — PROGRESS NOTES
16:20pm a&0x3 VS stable. Discharge instructions given to patient and wife.  Verbalized understanding

## 2017-10-29 NOTE — PROGRESS NOTES
Rn shift assessment done. Patient got self back to bed with out assist. Patient on room air. Voids per urinal. Side rails up times 2. Call bell within reach.

## 2017-10-29 NOTE — PROGRESS NOTES
Patient up in room, to [facility] had bowel movement. Back to bed with no assist. Voices no complaints. Watching tv. Percocet one tab given for complaints of incisional pain to left leg. Pulses to Left and right leg easily doppled.

## 2017-10-29 NOTE — PLAN OF CARE
Problem: Risk for Impaired Skin Integrity  Goal: Tissue integrity - skin and mucous membranes  Structural intactness and normal physiological function of skin and  mucous membranes.    Outcome: Completed Date Met: 10/29/17

## 2017-10-29 NOTE — PROGRESS NOTES
VascularProgress Note    POD # 2    Patient is Sowmya Watt. Subjective: Asleep in chair    Objective:    Pulses: intact    Incisions: Clean dry and intact. No hematoma or bleeding noted.   Assessment:      Plan: Discharge    Electronically signed by Lissette Ingram MD on 10/29/2017 at 2:00 PM

## 2017-10-30 NOTE — DISCHARGE SUMMARY
number  with no refills, one to be taken every 6 hours p.r.n. for left lower  extremity postoperative pain. His condition on discharge is  satisfactory and prognosis is good.         Candace Dolan MD    D: 10/29/2017 16:06:50       T: 10/29/2017 21:18:12     [id number]  Job#: [id number]     Doc#: [id number]

## 2017-11-30 ENCOUNTER — HOSPITAL ENCOUNTER (OUTPATIENT)
Age: 72
Setting detail: OUTPATIENT SURGERY
Discharge: HOME | End: 2017-11-30
Attending: SPECIALIST | Admitting: SPECIALIST
Payer: MEDICARE

## 2017-11-30 ENCOUNTER — ANESTHESIA EVENT (OUTPATIENT)
Dept: ENDOSCOPY | Age: 72
End: 2017-11-30
Payer: MEDICARE

## 2017-11-30 ENCOUNTER — ANESTHESIA (OUTPATIENT)
Dept: ENDOSCOPY | Age: 72
End: 2017-11-30
Payer: MEDICARE

## 2017-11-30 VITALS
HEART RATE: 63 BPM | RESPIRATION RATE: 16 BRPM | DIASTOLIC BLOOD PRESSURE: 52 MMHG | TEMPERATURE: 97.8 F | SYSTOLIC BLOOD PRESSURE: 105 MMHG | WEIGHT: 209 LBS | BODY MASS INDEX: 29.26 KG/M2 | OXYGEN SATURATION: 98 % | HEIGHT: 71 IN

## 2017-11-30 VITALS
DIASTOLIC BLOOD PRESSURE: 64 MMHG | RESPIRATION RATE: 15 BRPM | SYSTOLIC BLOOD PRESSURE: 148 MMHG | OXYGEN SATURATION: 97 %

## 2017-11-30 PROCEDURE — 2580000003 HC RX 258: Performed by: SPECIALIST

## 2017-11-30 PROCEDURE — 7100000010 HC PHASE II RECOVERY - FIRST 15 MIN: Performed by: SPECIALIST

## 2017-11-30 PROCEDURE — 3700000000 HC ANESTHESIA ATTENDED CARE: Performed by: SPECIALIST

## 2017-11-30 PROCEDURE — 88305 TISSUE EXAM BY PATHOLOGIST: CPT

## 2017-11-30 PROCEDURE — 3609017100 HC EGD: Performed by: SPECIALIST

## 2017-11-30 PROCEDURE — 6360000002 HC RX W HCPCS: Performed by: NURSE ANESTHETIST, CERTIFIED REGISTERED

## 2017-11-30 PROCEDURE — 88313 SPECIAL STAINS GROUP 2: CPT

## 2017-11-30 PROCEDURE — 2500000003 HC RX 250 WO HCPCS: Performed by: NURSE ANESTHETIST, CERTIFIED REGISTERED

## 2017-11-30 RX ORDER — SODIUM CHLORIDE 9 MG/ML
INJECTION, SOLUTION INTRAVENOUS CONTINUOUS
Status: DISCONTINUED | OUTPATIENT
Start: 2017-11-30 | End: 2017-11-30 | Stop reason: HOSPADM

## 2017-11-30 RX ORDER — INDOMETHACIN 50 MG/1
50 CAPSULE ORAL 2 TIMES DAILY WITH MEALS
COMMUNITY
End: 2018-06-14

## 2017-11-30 RX ORDER — DEXTROSE MONOHYDRATE 50 MG/ML
INJECTION, SOLUTION INTRAVENOUS CONTINUOUS
Status: DISCONTINUED | OUTPATIENT
Start: 2017-11-30 | End: 2017-11-30

## 2017-11-30 RX ORDER — SODIUM CHLORIDE 0.9 % (FLUSH) 0.9 %
10 SYRINGE (ML) INJECTION PRN
Status: DISCONTINUED | OUTPATIENT
Start: 2017-11-30 | End: 2017-11-30 | Stop reason: HOSPADM

## 2017-11-30 RX ORDER — SODIUM CHLORIDE 0.9 % (FLUSH) 0.9 %
10 SYRINGE (ML) INJECTION EVERY 12 HOURS SCHEDULED
Status: DISCONTINUED | OUTPATIENT
Start: 2017-11-30 | End: 2017-11-30 | Stop reason: HOSPADM

## 2017-11-30 RX ORDER — PROPOFOL 10 MG/ML
INJECTION, EMULSION INTRAVENOUS PRN
Status: DISCONTINUED | OUTPATIENT
Start: 2017-11-30 | End: 2017-11-30 | Stop reason: SDUPTHER

## 2017-11-30 RX ORDER — HYDROCHLOROTHIAZIDE 25 MG/1
25 TABLET ORAL DAILY
COMMUNITY
End: 2019-02-13 | Stop reason: ALTCHOICE

## 2017-11-30 RX ORDER — LIDOCAINE HYDROCHLORIDE 20 MG/ML
INJECTION, SOLUTION INFILTRATION; PERINEURAL PRN
Status: DISCONTINUED | OUTPATIENT
Start: 2017-11-30 | End: 2017-11-30 | Stop reason: SDUPTHER

## 2017-11-30 RX ORDER — LIDOCAINE HYDROCHLORIDE 10 MG/ML
1 INJECTION, SOLUTION EPIDURAL; INFILTRATION; INTRACAUDAL; PERINEURAL
Status: DISCONTINUED | OUTPATIENT
Start: 2017-11-30 | End: 2017-11-30 | Stop reason: HOSPADM

## 2017-11-30 RX ADMIN — PROPOFOL 30 MG: 10 INJECTION, EMULSION INTRAVENOUS at 13:12

## 2017-11-30 RX ADMIN — PROPOFOL 20 MG: 10 INJECTION, EMULSION INTRAVENOUS at 13:15

## 2017-11-30 RX ADMIN — LIDOCAINE HYDROCHLORIDE 40 MG: 20 INJECTION, SOLUTION INFILTRATION; PERINEURAL at 13:09

## 2017-11-30 RX ADMIN — PROPOFOL 20 MG: 10 INJECTION, EMULSION INTRAVENOUS at 13:10

## 2017-11-30 RX ADMIN — PROPOFOL 80 MG: 10 INJECTION, EMULSION INTRAVENOUS at 13:09

## 2017-11-30 RX ADMIN — SODIUM CHLORIDE: 900 INJECTION, SOLUTION INTRAVENOUS at 12:55

## 2017-11-30 ASSESSMENT — PAIN - FUNCTIONAL ASSESSMENT: PAIN_FUNCTIONAL_ASSESSMENT: 0-10

## 2017-11-30 NOTE — ANESTHESIA PRE PROCEDURE
Results   Component Value Date    PROTIME 10.3 09/19/2015    PROTIME 10.7 11/04/2011    INR 1.0 09/19/2015    APTT 29.5 09/19/2015       HCG (If Applicable): No results found for: PREGTESTUR, PREGSERUM, HCG, HCGQUANT     ABGs: No results found for: PHART, PO2ART, YOH8TIQ, DAZ1TVP, BEART, Q3ORGFKA     Type & Screen (If Applicable):  No results found for: LABABO, [address]    Anesthesia Evaluation  Patient summary reviewed and Nursing notes reviewed  Airway: Mallampati: II  TM distance: >3 FB   Neck ROM: full  Mouth opening: > = 3 FB Dental: normal exam         Pulmonary: breath sounds clear to auscultation            Patient did not smoke on day of surgery. Cardiovascular:  Exercise tolerance: no interval change,   (+) hypertension: no interval change, CAD: no interval change and obstructive, CABG/stent:, hyperlipidemia      NYHA Classification: III    Rhythm: regular  Rate: normal           Beta Blocker:  Dose within 24 Hrs         Neuro/Psych:               GI/Hepatic/Renal:            ROS comment: Dysphagia, NPO>8HR. Endo/Other:    (+) Type II DM, no interval change, , .          Pt had no PAT visit       Abdominal:           Vascular:   + PVD, aortic or cerebral, . Anesthesia Plan      MAC     ASA 3             Anesthetic plan and risks discussed with patient.       Plan discussed with surgical team.                  Buck Rhodes CRNA   11/30/2017

## 2018-06-14 ENCOUNTER — OFFICE VISIT (OUTPATIENT)
Dept: CARDIOLOGY CLINIC | Age: 73
End: 2018-06-14
Payer: MEDICARE

## 2018-06-14 VITALS
HEIGHT: 71 IN | BODY MASS INDEX: 29.82 KG/M2 | SYSTOLIC BLOOD PRESSURE: 128 MMHG | RESPIRATION RATE: 16 BRPM | DIASTOLIC BLOOD PRESSURE: 60 MMHG | WEIGHT: 213 LBS | HEART RATE: 64 BPM | OXYGEN SATURATION: 98 %

## 2018-06-14 DIAGNOSIS — I20.9 ANGINA, CLASS III (HCC): Primary | ICD-10-CM

## 2018-06-14 DIAGNOSIS — I10 ESSENTIAL HYPERTENSION: ICD-10-CM

## 2018-06-14 DIAGNOSIS — I73.9 PERIPHERAL VASCULAR DISEASE (HCC): ICD-10-CM

## 2018-06-14 DIAGNOSIS — I70.209 OCCLUSIVE DISEASE OF ARTERY OF LOWER EXTREMITY (HCC): Chronic | ICD-10-CM

## 2018-06-14 DIAGNOSIS — I25.10 CORONARY ARTERY DISEASE INVOLVING NATIVE CORONARY ARTERY OF NATIVE HEART WITHOUT ANGINA PECTORIS: Chronic | ICD-10-CM

## 2018-06-14 DIAGNOSIS — E78.5 HYPERLIPIDEMIA, UNSPECIFIED HYPERLIPIDEMIA TYPE: ICD-10-CM

## 2018-06-14 LAB
ANION GAP SERPL CALCULATED.3IONS-SCNC: 13 MEQ/L (ref 7–13)
BUN BLDV-MCNC: 18 MG/DL (ref 8–23)
CALCIUM SERPL-MCNC: 9.2 MG/DL (ref 8.6–10.2)
CHLORIDE BLD-SCNC: 102 MEQ/L (ref 98–107)
CO2: 26 MEQ/L (ref 22–29)
CREAT SERPL-MCNC: 1.11 MG/DL (ref 0.7–1.2)
GFR AFRICAN AMERICAN: >60
GFR NON-AFRICAN AMERICAN: >60
GLUCOSE BLD-MCNC: 97 MG/DL (ref 74–109)
HCT VFR BLD CALC: 34.5 % (ref 42–52)
HEMOGLOBIN: 11.2 G/DL (ref 14–18)
MCH RBC QN AUTO: 28.7 PG (ref 27–31.3)
MCHC RBC AUTO-ENTMCNC: 32.6 % (ref 33–37)
MCV RBC AUTO: 87.9 FL (ref 80–100)
PDW BLD-RTO: 17.3 % (ref 11.5–14.5)
PLATELET # BLD: 151 K/UL (ref 130–400)
POTASSIUM SERPL-SCNC: 4 MEQ/L (ref 3.5–5.1)
RBC # BLD: 3.92 M/UL (ref 4.7–6.1)
SODIUM BLD-SCNC: 141 MEQ/L (ref 132–144)
WBC # BLD: 4.8 K/UL (ref 4.8–10.8)

## 2018-06-14 PROCEDURE — 93000 ELECTROCARDIOGRAM COMPLETE: CPT | Performed by: INTERNAL MEDICINE

## 2018-06-14 PROCEDURE — 99215 OFFICE O/P EST HI 40 MIN: CPT | Performed by: INTERNAL MEDICINE

## 2018-06-14 ASSESSMENT — ENCOUNTER SYMPTOMS
CHEST TIGHTNESS: 1
BLOOD IN STOOL: 0
WHEEZING: 0
NAUSEA: 0
STRIDOR: 0
SHORTNESS OF BREATH: 1
COUGH: 0
EYES NEGATIVE: 1
GASTROINTESTINAL NEGATIVE: 1

## 2018-06-19 ENCOUNTER — HOSPITAL ENCOUNTER (OUTPATIENT)
Dept: CARDIAC CATH/INVASIVE PROCEDURES | Age: 73
Setting detail: OUTPATIENT SURGERY
Discharge: HOME | End: 2018-06-19
Attending: INTERNAL MEDICINE | Admitting: INTERNAL MEDICINE
Payer: MEDICARE

## 2018-06-19 VITALS
SYSTOLIC BLOOD PRESSURE: 133 MMHG | DIASTOLIC BLOOD PRESSURE: 76 MMHG | RESPIRATION RATE: 13 BRPM | OXYGEN SATURATION: 96 % | HEART RATE: 58 BPM

## 2018-06-19 PROCEDURE — 93458 L HRT ARTERY/VENTRICLE ANGIO: CPT | Performed by: INTERNAL MEDICINE

## 2018-06-19 PROCEDURE — C1725 CATH, TRANSLUMIN NON-LASER: HCPCS

## 2018-06-19 PROCEDURE — 6360000002 HC RX W HCPCS

## 2018-06-19 PROCEDURE — 2500000003 HC RX 250 WO HCPCS

## 2018-06-19 PROCEDURE — C1894 INTRO/SHEATH, NON-LASER: HCPCS

## 2018-06-19 PROCEDURE — C1769 GUIDE WIRE: HCPCS

## 2018-06-19 RX ORDER — DIPHENHYDRAMINE HYDROCHLORIDE 50 MG/ML
50 INJECTION INTRAMUSCULAR; INTRAVENOUS ONCE
Status: DISCONTINUED | OUTPATIENT
Start: 2018-06-19 | End: 2018-06-19 | Stop reason: HOSPADM

## 2018-06-19 RX ORDER — ONDANSETRON 2 MG/ML
4 INJECTION INTRAMUSCULAR; INTRAVENOUS EVERY 6 HOURS PRN
Status: DISCONTINUED | OUTPATIENT
Start: 2018-06-19 | End: 2018-06-19 | Stop reason: HOSPADM

## 2018-06-19 RX ORDER — SODIUM CHLORIDE 450 MG/100ML
INJECTION, SOLUTION INTRAVENOUS CONTINUOUS
Status: DISCONTINUED | OUTPATIENT
Start: 2018-06-19 | End: 2018-06-19 | Stop reason: HOSPADM

## 2018-06-19 RX ORDER — ACETAMINOPHEN 325 MG/1
650 TABLET ORAL EVERY 4 HOURS PRN
Status: DISCONTINUED | OUTPATIENT
Start: 2018-06-19 | End: 2018-06-19 | Stop reason: HOSPADM

## 2018-06-19 RX ORDER — SODIUM CHLORIDE 0.9 % (FLUSH) 0.9 %
10 SYRINGE (ML) INJECTION EVERY 12 HOURS SCHEDULED
Status: DISCONTINUED | OUTPATIENT
Start: 2018-06-19 | End: 2018-06-19 | Stop reason: HOSPADM

## 2018-06-19 RX ORDER — NITROGLYCERIN 0.4 MG/1
0.4 TABLET SUBLINGUAL EVERY 5 MIN PRN
Status: DISCONTINUED | OUTPATIENT
Start: 2018-06-19 | End: 2018-06-19 | Stop reason: HOSPADM

## 2018-06-19 RX ORDER — SODIUM CHLORIDE 0.9 % (FLUSH) 0.9 %
10 SYRINGE (ML) INJECTION EVERY 12 HOURS SCHEDULED
Status: DISCONTINUED | OUTPATIENT
Start: 2018-06-19 | End: 2018-06-19 | Stop reason: SDUPTHER

## 2018-06-19 RX ORDER — SODIUM CHLORIDE 0.9 % (FLUSH) 0.9 %
10 SYRINGE (ML) INJECTION PRN
Status: DISCONTINUED | OUTPATIENT
Start: 2018-06-19 | End: 2018-06-19 | Stop reason: HOSPADM

## 2018-06-19 RX ORDER — SODIUM CHLORIDE 0.9 % (FLUSH) 0.9 %
10 SYRINGE (ML) INJECTION PRN
Status: DISCONTINUED | OUTPATIENT
Start: 2018-06-19 | End: 2018-06-19 | Stop reason: SDUPTHER

## 2018-06-19 RX ORDER — ONDANSETRON 2 MG/ML
4 INJECTION INTRAMUSCULAR; INTRAVENOUS EVERY 6 HOURS PRN
Status: DISCONTINUED | OUTPATIENT
Start: 2018-06-19 | End: 2018-06-19 | Stop reason: SDUPTHER

## 2018-06-19 RX ORDER — SODIUM CHLORIDE 450 MG/100ML
75 INJECTION, SOLUTION INTRAVENOUS CONTINUOUS
Status: DISCONTINUED | OUTPATIENT
Start: 2018-06-19 | End: 2018-06-19 | Stop reason: HOSPADM

## 2018-07-18 ENCOUNTER — OFFICE VISIT (OUTPATIENT)
Dept: CARDIOLOGY CLINIC | Age: 73
End: 2018-07-18
Payer: MEDICARE

## 2018-07-18 VITALS
HEART RATE: 55 BPM | RESPIRATION RATE: 16 BRPM | OXYGEN SATURATION: 99 % | SYSTOLIC BLOOD PRESSURE: 128 MMHG | WEIGHT: 213 LBS | BODY MASS INDEX: 29.82 KG/M2 | DIASTOLIC BLOOD PRESSURE: 63 MMHG | HEIGHT: 71 IN

## 2018-07-18 DIAGNOSIS — I20.9 ANGINA, CLASS III (HCC): ICD-10-CM

## 2018-07-18 DIAGNOSIS — E78.5 HYPERLIPIDEMIA, UNSPECIFIED HYPERLIPIDEMIA TYPE: ICD-10-CM

## 2018-07-18 DIAGNOSIS — I10 ESSENTIAL HYPERTENSION: ICD-10-CM

## 2018-07-18 DIAGNOSIS — I73.9 PERIPHERAL VASCULAR DISEASE (HCC): ICD-10-CM

## 2018-07-18 DIAGNOSIS — I25.10 CORONARY ARTERY DISEASE INVOLVING NATIVE CORONARY ARTERY OF NATIVE HEART WITHOUT ANGINA PECTORIS: Chronic | ICD-10-CM

## 2018-07-18 PROCEDURE — 99214 OFFICE O/P EST MOD 30 MIN: CPT | Performed by: INTERNAL MEDICINE

## 2018-07-18 ASSESSMENT — ENCOUNTER SYMPTOMS
RESPIRATORY NEGATIVE: 1
GASTROINTESTINAL NEGATIVE: 1
STRIDOR: 0
WHEEZING: 0
CHEST TIGHTNESS: 0
COUGH: 0
BLOOD IN STOOL: 0
SHORTNESS OF BREATH: 0
EYES NEGATIVE: 1
NAUSEA: 0

## 2018-07-18 NOTE — PROGRESS NOTES
Subsequent Progress Note  Patient: Jose Sierra  YOB: 1945  MRN: [id number]    Chief Complaint: CAD   Chief Complaint   Patient presents with    Coronary Artery Disease     f/u Post Cath    Hypertension       CV Data:  CAD 5-6 stents in total  10/2017 Left Fem Distal bypass- Dr. Juan Carlos Kent  Remote- RIGHT  LE bypass  6/19/2018 Cath:  LAD, CXand RCA stents all patent with Mild ISR EF 55  8/2015 CUS DARIEL 50-69- followed by Dr. Juan Carlos Kent  Subjective/HPI: no cp breathing good.  No bleed no falls      EKG:    Past Medical History:   Diagnosis Date    CAD (coronary artery disease)     multiple PCI    Diabetes mellitus ([facility]     Gout 10/2/2017    Hyperlipidemia     Hypertension     Occlusive disease of artery of lower extremity ([facility] 10/2/2017       Past Surgical History:   Procedure Laterality Date    ARTERIOGRAM Right 10/6/2017    RIGHT ARM AORTO-FEMORAL DIGITAL SUBTRACTION ARTERIOGRAPHY performed by Duglas Graves MD at [address] [facility] N/A 11/7/11    xience stent to RAC and CIRC    CORONARY ANGIOPLASTY WITH STENT PLACEMENT Left 4/19/13    xience stent to LAD    CORONARY ANGIOPLASTY WITH STENT PLACEMENT Right 12/22/13    Promus stent to RCA    FEMORAL-FEMORAL BYPASS GRAFT      HERNIA REPAIR      as child    MA ESOPHAGOGASTRODUODENOSCOPY TRANSORAL DIAGNOSTIC N/A 11/30/2017    EGD ESOPHAGOGASTRODUODENOSCOPY with biopsy performed by Jayleen Horowitz MD at [address] Left 10/27/2017    LEFT FEMORAL DISTAL BYPASS, (PAT DONE 10-2-17) performed by Duglas Graves MD at [address] [facility] History   Problem Relation Age of Onset    Diabetes Mother     Heart Disease Father     Heart Attack Father     No Known Problems Son        Social History     Social History    Marital status:      Spouse name: N/A    Number of children: N/A    Years of education: N/A     Social History Main Topics    Smoking status: Former Smoker

## 2018-08-02 RX ORDER — ATENOLOL 50 MG/1
TABLET ORAL
Qty: 180 TABLET | Refills: 0 | Status: SHIPPED | OUTPATIENT
Start: 2018-08-02 | End: 2019-01-31 | Stop reason: SDUPTHER

## 2018-09-26 PROBLEM — D46.0 REFRACTORY ANEMIA WITHOUT RING SIDEROBLASTS, SO STATED (HCC): Status: ACTIVE | Noted: 2018-09-26

## 2018-10-11 ENCOUNTER — HOSPITAL ENCOUNTER (OUTPATIENT)
Dept: ULTRASOUND IMAGING | Age: 73
Discharge: HOME | End: 2018-10-13
Payer: MEDICARE

## 2018-10-11 DIAGNOSIS — R10.11 RIGHT UPPER QUADRANT ABDOMINAL PAIN: ICD-10-CM

## 2018-10-11 PROCEDURE — 76705 ECHO EXAM OF ABDOMEN: CPT

## 2018-12-19 DIAGNOSIS — D46.0 REFRACTORY ANEMIA WITHOUT RING SIDEROBLASTS, SO STATED (HCC): ICD-10-CM

## 2018-12-19 LAB
ALBUMIN SERPL-MCNC: 4.3 G/DL (ref 3.9–4.9)
ALP BLD-CCNC: 101 U/L (ref 35–104)
ALT SERPL-CCNC: 19 U/L (ref 0–41)
ANION GAP SERPL CALCULATED.3IONS-SCNC: 14 MEQ/L (ref 7–13)
AST SERPL-CCNC: 29 U/L (ref 0–40)
BILIRUB SERPL-MCNC: 0.5 MG/DL (ref 0–1.2)
BUN BLDV-MCNC: 14 MG/DL (ref 8–23)
CALCIUM SERPL-MCNC: 8.7 MG/DL (ref 8.6–10.2)
CHLORIDE BLD-SCNC: 103 MEQ/L (ref 98–107)
CO2: 24 MEQ/L (ref 22–29)
CREAT SERPL-MCNC: 1.02 MG/DL (ref 0.7–1.2)
FERRITIN: 614.9 NG/ML (ref 30–400)
GFR AFRICAN AMERICAN: >60
GFR NON-AFRICAN AMERICAN: >60
GLOBULIN: 3 G/DL (ref 2.3–3.5)
GLUCOSE BLD-MCNC: 114 MG/DL (ref 74–109)
IRON SATURATION: 31 % (ref 11–46)
IRON: 89 UG/DL (ref 59–158)
POTASSIUM SERPL-SCNC: 4.3 MEQ/L (ref 3.5–5.1)
SODIUM BLD-SCNC: 141 MEQ/L (ref 132–144)
TOTAL IRON BINDING CAPACITY: 283 UG/DL (ref 178–450)
TOTAL PROTEIN: 7.3 G/DL (ref 6.4–8.1)

## 2019-01-12 ENCOUNTER — HOSPITAL ENCOUNTER (EMERGENCY)
Age: 74
Discharge: HOME | End: 2019-01-12
Payer: MEDICARE

## 2019-01-12 ENCOUNTER — APPOINTMENT (OUTPATIENT)
Dept: CT IMAGING | Age: 74
End: 2019-01-12
Payer: MEDICARE

## 2019-01-12 VITALS
OXYGEN SATURATION: 98 % | TEMPERATURE: 97.9 F | HEIGHT: 70 IN | DIASTOLIC BLOOD PRESSURE: 83 MMHG | BODY MASS INDEX: 29.92 KG/M2 | WEIGHT: 209 LBS | HEART RATE: 61 BPM | SYSTOLIC BLOOD PRESSURE: 146 MMHG | RESPIRATION RATE: 18 BRPM

## 2019-01-12 DIAGNOSIS — I10 ESSENTIAL HYPERTENSION: Primary | ICD-10-CM

## 2019-01-12 DIAGNOSIS — R51.9 ACUTE NONINTRACTABLE HEADACHE, UNSPECIFIED HEADACHE TYPE: ICD-10-CM

## 2019-01-12 LAB
ANION GAP SERPL CALCULATED.3IONS-SCNC: 9 MEQ/L (ref 7–13)
BASOPHILS ABSOLUTE: 0.1 K/UL (ref 0–0.2)
BASOPHILS RELATIVE PERCENT: 1.3 %
BUN BLDV-MCNC: 16 MG/DL (ref 8–23)
CALCIUM SERPL-MCNC: 8.8 MG/DL (ref 8.6–10.2)
CHLORIDE BLD-SCNC: 103 MEQ/L (ref 98–107)
CO2: 27 MEQ/L (ref 22–29)
CREAT SERPL-MCNC: 0.87 MG/DL (ref 0.7–1.2)
EOSINOPHILS ABSOLUTE: 0.2 K/UL (ref 0–0.7)
EOSINOPHILS RELATIVE PERCENT: 5.5 %
GFR AFRICAN AMERICAN: >60
GFR NON-AFRICAN AMERICAN: >60
GLUCOSE BLD-MCNC: 124 MG/DL (ref 74–109)
HCT VFR BLD CALC: 34.2 % (ref 42–52)
HEMOGLOBIN: 11.8 G/DL (ref 14–18)
LYMPHOCYTES ABSOLUTE: 1.4 K/UL (ref 1–4.8)
LYMPHOCYTES RELATIVE PERCENT: 32.8 %
MCH RBC QN AUTO: 29.7 PG (ref 27–31.3)
MCHC RBC AUTO-ENTMCNC: 34.5 % (ref 33–37)
MCV RBC AUTO: 86.1 FL (ref 80–100)
MONOCYTES ABSOLUTE: 0.4 K/UL (ref 0.2–0.8)
MONOCYTES RELATIVE PERCENT: 9.3 %
NEUTROPHILS ABSOLUTE: 2.2 K/UL (ref 1.4–6.5)
NEUTROPHILS RELATIVE PERCENT: 51.1 %
PDW BLD-RTO: 17.9 % (ref 11.5–14.5)
PLATELET # BLD: 167 K/UL (ref 130–400)
PLATELET SLIDE REVIEW: ADEQUATE
POTASSIUM SERPL-SCNC: 5.3 MEQ/L (ref 3.5–5.1)
RBC # BLD: 3.98 M/UL (ref 4.7–6.1)
SLIDE REVIEW: ABNORMAL
SODIUM BLD-SCNC: 139 MEQ/L (ref 132–144)
WBC # BLD: 4.3 K/UL (ref 4.8–10.8)

## 2019-01-12 PROCEDURE — 96376 TX/PRO/DX INJ SAME DRUG ADON: CPT

## 2019-01-12 PROCEDURE — 85025 COMPLETE CBC W/AUTO DIFF WBC: CPT

## 2019-01-12 PROCEDURE — 6370000000 HC RX 637 (ALT 250 FOR IP): Performed by: PERSONAL EMERGENCY RESPONSE ATTENDANT

## 2019-01-12 PROCEDURE — 96374 THER/PROPH/DIAG INJ IV PUSH: CPT

## 2019-01-12 PROCEDURE — 2500000003 HC RX 250 WO HCPCS: Performed by: PERSONAL EMERGENCY RESPONSE ATTENDANT

## 2019-01-12 PROCEDURE — 70450 CT HEAD/BRAIN W/O DYE: CPT

## 2019-01-12 PROCEDURE — 99284 EMERGENCY DEPT VISIT MOD MDM: CPT

## 2019-01-12 PROCEDURE — 36415 COLL VENOUS BLD VENIPUNCTURE: CPT

## 2019-01-12 PROCEDURE — 80048 BASIC METABOLIC PNL TOTAL CA: CPT

## 2019-01-12 RX ORDER — LABETALOL HYDROCHLORIDE 5 MG/ML
20 INJECTION, SOLUTION INTRAVENOUS ONCE
Status: COMPLETED | OUTPATIENT
Start: 2019-01-12 | End: 2019-01-12

## 2019-01-12 RX ORDER — LABETALOL HYDROCHLORIDE 5 MG/ML
40 INJECTION, SOLUTION INTRAVENOUS ONCE
Status: COMPLETED | OUTPATIENT
Start: 2019-01-12 | End: 2019-01-12

## 2019-01-12 RX ORDER — ACETAMINOPHEN 500 MG
1000 TABLET ORAL ONCE
Status: COMPLETED | OUTPATIENT
Start: 2019-01-12 | End: 2019-01-12

## 2019-01-12 RX ADMIN — ACETAMINOPHEN 1000 MG: 500 TABLET ORAL at 04:18

## 2019-01-12 RX ADMIN — LABETALOL HYDROCHLORIDE 20 MG: 5 INJECTION, SOLUTION INTRAVENOUS at 04:27

## 2019-01-12 RX ADMIN — LABETALOL HYDROCHLORIDE 40 MG: 5 INJECTION, SOLUTION INTRAVENOUS at 05:26

## 2019-01-12 ASSESSMENT — PAIN DESCRIPTION - DESCRIPTORS
DESCRIPTORS: HEADACHE
DESCRIPTORS: THROBBING

## 2019-01-12 ASSESSMENT — PAIN DESCRIPTION - ORIENTATION: ORIENTATION: LEFT;UPPER

## 2019-01-12 ASSESSMENT — ENCOUNTER SYMPTOMS
RHINORRHEA: 0
BLOOD IN STOOL: 0
COLOR CHANGE: 0
DIARRHEA: 0
COUGH: 0
SORE THROAT: 0
ABDOMINAL PAIN: 0
NAUSEA: 0
SHORTNESS OF BREATH: 0
VOMITING: 0

## 2019-01-12 ASSESSMENT — PAIN SCALES - GENERAL
PAINLEVEL_OUTOF10: 8
PAINLEVEL_OUTOF10: 3

## 2019-01-12 ASSESSMENT — PAIN DESCRIPTION - PAIN TYPE
TYPE: ACUTE PAIN
TYPE: ACUTE PAIN

## 2019-01-12 ASSESSMENT — PAIN DESCRIPTION - LOCATION
LOCATION: HEAD
LOCATION: HEAD

## 2019-01-31 ENCOUNTER — OFFICE VISIT (OUTPATIENT)
Dept: CARDIOLOGY CLINIC | Age: 74
End: 2019-01-31
Payer: MEDICARE

## 2019-01-31 VITALS
RESPIRATION RATE: 16 BRPM | OXYGEN SATURATION: 100 % | BODY MASS INDEX: 30.35 KG/M2 | HEART RATE: 62 BPM | SYSTOLIC BLOOD PRESSURE: 112 MMHG | WEIGHT: 212 LBS | HEIGHT: 70 IN | DIASTOLIC BLOOD PRESSURE: 60 MMHG

## 2019-01-31 DIAGNOSIS — I10 ESSENTIAL HYPERTENSION: ICD-10-CM

## 2019-01-31 DIAGNOSIS — I20.9 ANGINA, CLASS III (HCC): ICD-10-CM

## 2019-01-31 DIAGNOSIS — I73.9 PERIPHERAL VASCULAR DISEASE (HCC): ICD-10-CM

## 2019-01-31 DIAGNOSIS — E78.5 HYPERLIPIDEMIA, UNSPECIFIED HYPERLIPIDEMIA TYPE: ICD-10-CM

## 2019-01-31 DIAGNOSIS — I25.10 CORONARY ARTERY DISEASE INVOLVING NATIVE CORONARY ARTERY OF NATIVE HEART WITHOUT ANGINA PECTORIS: Chronic | ICD-10-CM

## 2019-01-31 DIAGNOSIS — I65.23 BILATERAL CAROTID ARTERY STENOSIS: Primary | ICD-10-CM

## 2019-01-31 DIAGNOSIS — I70.209 OCCLUSIVE DISEASE OF ARTERY OF LOWER EXTREMITY (HCC): Chronic | ICD-10-CM

## 2019-01-31 PROCEDURE — 99214 OFFICE O/P EST MOD 30 MIN: CPT | Performed by: INTERNAL MEDICINE

## 2019-01-31 RX ORDER — ATENOLOL 50 MG/1
TABLET ORAL
Qty: 180 TABLET | Refills: 2 | Status: SHIPPED | OUTPATIENT
Start: 2019-01-31 | End: 2019-09-27 | Stop reason: SDUPTHER

## 2019-01-31 RX ORDER — ATENOLOL 50 MG/1
TABLET ORAL
Qty: 180 TABLET | Refills: 2 | Status: SHIPPED | OUTPATIENT
Start: 2019-01-31 | End: 2019-01-31 | Stop reason: SDUPTHER

## 2019-01-31 ASSESSMENT — ENCOUNTER SYMPTOMS
GASTROINTESTINAL NEGATIVE: 1
SHORTNESS OF BREATH: 0
EYES NEGATIVE: 1
WHEEZING: 0
COUGH: 0
STRIDOR: 0
NAUSEA: 0
BLOOD IN STOOL: 0
RESPIRATORY NEGATIVE: 1
CHEST TIGHTNESS: 0

## 2019-02-08 ENCOUNTER — HOSPITAL ENCOUNTER (OUTPATIENT)
Dept: NON INVASIVE DIAGNOSTICS | Age: 74
Discharge: HOME | End: 2019-02-08
Payer: MEDICARE

## 2019-02-08 ENCOUNTER — HOSPITAL ENCOUNTER (OUTPATIENT)
Dept: NUCLEAR MEDICINE | Age: 74
Discharge: HOME | End: 2019-02-10
Payer: MEDICARE

## 2019-02-08 DIAGNOSIS — I20.9 ANGINA, CLASS III (HCC): ICD-10-CM

## 2019-02-08 PROCEDURE — A9502 TC99M TETROFOSMIN: HCPCS | Performed by: INTERNAL MEDICINE

## 2019-02-08 PROCEDURE — 93306 TTE W/DOPPLER COMPLETE: CPT

## 2019-02-08 PROCEDURE — 2580000003 HC RX 258: Performed by: INTERNAL MEDICINE

## 2019-02-08 PROCEDURE — 78452 HT MUSCLE IMAGE SPECT MULT: CPT

## 2019-02-08 PROCEDURE — 93017 CV STRESS TEST TRACING ONLY: CPT

## 2019-02-08 PROCEDURE — 93306 TTE W/DOPPLER COMPLETE: CPT | Performed by: INTERNAL MEDICINE

## 2019-02-08 PROCEDURE — 6360000002 HC RX W HCPCS: Performed by: INTERNAL MEDICINE

## 2019-02-08 PROCEDURE — 3430000000 HC RX DIAGNOSTIC RADIOPHARMACEUTICAL: Performed by: INTERNAL MEDICINE

## 2019-02-08 RX ORDER — SODIUM CHLORIDE 0.9 % (FLUSH) 0.9 %
10 SYRINGE (ML) INJECTION PRN
Status: COMPLETED | OUTPATIENT
Start: 2019-02-08 | End: 2019-02-08

## 2019-02-08 RX ADMIN — TETROFOSMIN 11.9 MILLICURIE: 1.38 INJECTION, POWDER, LYOPHILIZED, FOR SOLUTION INTRAVENOUS at 08:00

## 2019-02-08 RX ADMIN — TETROFOSMIN 30.5 MILLICURIE: 1.38 INJECTION, POWDER, LYOPHILIZED, FOR SOLUTION INTRAVENOUS at 09:50

## 2019-02-08 RX ADMIN — Medication 10 ML: at 09:49

## 2019-02-08 RX ADMIN — Medication 10 ML: at 08:00

## 2019-02-08 RX ADMIN — REGADENOSON 0.4 MG: 0.08 INJECTION, SOLUTION INTRAVENOUS at 09:49

## 2019-02-08 RX ADMIN — Medication 10 ML: at 09:50

## 2019-02-13 ENCOUNTER — OFFICE VISIT (OUTPATIENT)
Dept: CARDIOLOGY CLINIC | Age: 74
End: 2019-02-13
Payer: MEDICARE

## 2019-02-13 VITALS
OXYGEN SATURATION: 98 % | RESPIRATION RATE: 16 BRPM | DIASTOLIC BLOOD PRESSURE: 66 MMHG | HEART RATE: 51 BPM | BODY MASS INDEX: 30.35 KG/M2 | WEIGHT: 212 LBS | HEIGHT: 70 IN | SYSTOLIC BLOOD PRESSURE: 128 MMHG

## 2019-02-13 DIAGNOSIS — I70.209 OCCLUSIVE DISEASE OF ARTERY OF LOWER EXTREMITY (HCC): Chronic | ICD-10-CM

## 2019-02-13 DIAGNOSIS — I10 ESSENTIAL HYPERTENSION: ICD-10-CM

## 2019-02-13 DIAGNOSIS — I73.9 PERIPHERAL VASCULAR DISEASE (HCC): ICD-10-CM

## 2019-02-13 DIAGNOSIS — I20.9 ANGINA, CLASS III (HCC): ICD-10-CM

## 2019-02-13 DIAGNOSIS — I25.10 CORONARY ARTERY DISEASE INVOLVING NATIVE CORONARY ARTERY OF NATIVE HEART WITHOUT ANGINA PECTORIS: Chronic | ICD-10-CM

## 2019-02-13 DIAGNOSIS — I65.23 BILATERAL CAROTID ARTERY STENOSIS: ICD-10-CM

## 2019-02-13 DIAGNOSIS — E78.5 HYPERLIPIDEMIA, UNSPECIFIED HYPERLIPIDEMIA TYPE: Primary | ICD-10-CM

## 2019-02-13 PROCEDURE — 99214 OFFICE O/P EST MOD 30 MIN: CPT | Performed by: INTERNAL MEDICINE

## 2019-02-13 RX ORDER — NITROGLYCERIN 0.4 MG/1
0.4 TABLET SUBLINGUAL EVERY 5 MIN PRN
Qty: 25 TABLET | Refills: 1 | Status: SHIPPED | OUTPATIENT
Start: 2019-02-13 | End: 2021-08-06 | Stop reason: SDUPTHER

## 2019-02-13 RX ORDER — ISOSORBIDE MONONITRATE 60 MG/1
60 TABLET, EXTENDED RELEASE ORAL DAILY
Qty: 90 TABLET | Refills: 3 | Status: SHIPPED | OUTPATIENT
Start: 2019-02-13 | End: 2019-04-04

## 2019-02-13 RX ORDER — ISOSORBIDE MONONITRATE 60 MG/1
60 TABLET, EXTENDED RELEASE ORAL DAILY
Qty: 30 TABLET | Refills: 3 | Status: SHIPPED | OUTPATIENT
Start: 2019-02-13 | End: 2019-02-13 | Stop reason: SDUPTHER

## 2019-02-13 ASSESSMENT — ENCOUNTER SYMPTOMS
STRIDOR: 0
NAUSEA: 0
SHORTNESS OF BREATH: 1
WHEEZING: 0
GASTROINTESTINAL NEGATIVE: 1
COUGH: 0
CHEST TIGHTNESS: 1
BLOOD IN STOOL: 0
EYES NEGATIVE: 1

## 2019-04-04 ENCOUNTER — OFFICE VISIT (OUTPATIENT)
Dept: CARDIOLOGY CLINIC | Age: 74
End: 2019-04-04
Payer: MEDICARE

## 2019-04-04 VITALS
BODY MASS INDEX: 30.78 KG/M2 | HEART RATE: 69 BPM | WEIGHT: 215 LBS | OXYGEN SATURATION: 96 % | SYSTOLIC BLOOD PRESSURE: 128 MMHG | RESPIRATION RATE: 16 BRPM | DIASTOLIC BLOOD PRESSURE: 64 MMHG | HEIGHT: 70 IN

## 2019-04-04 DIAGNOSIS — I65.23 BILATERAL CAROTID ARTERY STENOSIS: ICD-10-CM

## 2019-04-04 DIAGNOSIS — I73.9 PERIPHERAL VASCULAR DISEASE (HCC): ICD-10-CM

## 2019-04-04 DIAGNOSIS — I70.209 OCCLUSIVE DISEASE OF ARTERY OF LOWER EXTREMITY (HCC): Primary | Chronic | ICD-10-CM

## 2019-04-04 DIAGNOSIS — I25.10 CORONARY ARTERY DISEASE INVOLVING NATIVE CORONARY ARTERY OF NATIVE HEART WITHOUT ANGINA PECTORIS: Chronic | ICD-10-CM

## 2019-04-04 DIAGNOSIS — E78.5 HYPERLIPIDEMIA, UNSPECIFIED HYPERLIPIDEMIA TYPE: ICD-10-CM

## 2019-04-04 DIAGNOSIS — I10 ESSENTIAL HYPERTENSION: ICD-10-CM

## 2019-04-04 DIAGNOSIS — I20.9 ANGINA, CLASS III (HCC): ICD-10-CM

## 2019-04-04 PROCEDURE — 99214 OFFICE O/P EST MOD 30 MIN: CPT | Performed by: INTERNAL MEDICINE

## 2019-04-04 RX ORDER — RANOLAZINE 500 MG/1
500 TABLET, EXTENDED RELEASE ORAL 2 TIMES DAILY
Qty: 60 TABLET | Refills: 3 | Status: SHIPPED | OUTPATIENT
Start: 2019-04-04 | End: 2019-04-04 | Stop reason: SDUPTHER

## 2019-04-04 ASSESSMENT — ENCOUNTER SYMPTOMS
STRIDOR: 0
CHEST TIGHTNESS: 1
NAUSEA: 0
SHORTNESS OF BREATH: 1
EYES NEGATIVE: 1
COUGH: 0
WHEEZING: 0
BLOOD IN STOOL: 0
GASTROINTESTINAL NEGATIVE: 1

## 2019-04-04 NOTE — PROGRESS NOTES
Subsequent Progress Note  Patient: Shen Smith  YOB: 1945  MRN: [id number]    Chief Complaint: CAD HTn   Chief Complaint   Patient presents with    Chest Pain     1 m f/u    Coronary Artery Disease    Hypertension       CV Data:  CAD 5-6 stents in total  10/2017 Left Fem Distal bypass- Dr. Merlinda Babe  Remote- RIGHT  LE bypass  6/19/2018 Cath: Cherry Ivan RCA stents all patent with Mild ISR EF 55  8/2015 CUS DARIEL 50-69- followed by Dr. Merlinda Babe  2/2018 echo ef 65  2/2019 spect negative    Subjective/HPI: could not take Imdur. Stopped after 2 days. Did not te4ll us. Had to take 1 SL NTG since last encounter for angina.       Works on [employer]    EKG:    Past Medical History:   Diagnosis Date    Anemia, normocytic normochromic     CAD (coronary artery disease)     multiple PCI    Diabetes mellitus ([facility]     Gout 10/2/2017    Hyperlipidemia     Hypertension     Occlusive disease of artery of lower extremity ([facility] 10/2/2017       Past Surgical History:   Procedure Laterality Date    ARTERIOGRAM Right 10/6/2017    RIGHT ARM AORTO-FEMORAL DIGITAL SUBTRACTION ARTERIOGRAPHY performed by Ruben Saldivar MD at [address] [facility] N/A 11/7/11    xience stent to RAC and CIRC    CORONARY ANGIOPLASTY WITH STENT PLACEMENT Left 4/19/13    xience stent to LAD    CORONARY ANGIOPLASTY WITH STENT PLACEMENT Right 12/22/13    Promus stent to RCA    FEMORAL-FEMORAL BYPASS GRAFT      HERNIA REPAIR      as child    DC ESOPHAGOGASTRODUODENOSCOPY TRANSORAL DIAGNOSTIC N/A 11/30/2017    EGD ESOPHAGOGASTRODUODENOSCOPY with biopsy performed by Micha Watkins MD at [address] Left 10/27/2017    LEFT FEMORAL DISTAL BYPASS, (PAT DONE 10-2-17) performed by Ruben Saldivar MD at [address] [facility] History   Problem Relation Age of Onset    Diabetes Mother     Heart Disease Father     Heart Attack Father     No Known Problems Son    [facility] Piles Cancer Other         lung    Prostate Cancer Other        Social History     Socioeconomic History    Marital status:      Spouse name: None    Number of children: None    Years of education: None    Highest education level: None   Occupational History    None   Social Needs    Financial resource strain: None    Food insecurity:     Worry: None     Inability: None    Transportation needs:     Medical: None     Non-medical: None   Tobacco Use    Smoking status: Former Smoker     Start date: 1960     Last attempt to quit:      Years since quittin.2    Smokeless tobacco: Never Used   Substance and Sexual Activity    Alcohol use: No     Alcohol/week: 0.0 oz    Drug use: No    Sexual activity: None   Lifestyle    Physical activity:     Days per week: None     Minutes per session: None    Stress: None   Relationships    Social connections:     Talks on phone: None     Gets together: None     Attends Baptism service: None     Active member of club or organization: None     Attends meetings of clubs or organizations: None     Relationship status: None    Intimate partner violence:     Fear of current or ex partner: None     Emotionally abused: None     Physically abused: None     Forced sexual activity: None   Other Topics Concern    None   Social History Narrative    None       No Known Allergies    Current Outpatient Medications   Medication Sig Dispense Refill    ranolazine (RANEXA) 500 MG extended release tablet Take 1 tablet by mouth 2 times daily 60 tablet 3    nitroGLYCERIN (NITROSTAT) 0.4 MG SL tablet Place 1 tablet under the tongue every 5 minutes as needed for Chest pain up to max of 3 total doses.  If no relief after 1 dose, call 911. 25 tablet 1    atenolol (TENORMIN) 50 MG tablet TAKE ONE TABLET BY MOUTH TWICE DAILY 180 tablet 2    ammonium lactate (LAC-HYDRIN) 12 % lotion 1 applicator      aspirin 81 MG EC tablet Take 81 mg by mouth      clopidogrel (PLAVIX) 75 MG tablet Take 75 mg by mouth      gabapentin (NEURONTIN) 100 MG capsule Take 300 mg by mouth 2 times daily. Parag Motto  tiZANidine (ZANAFLEX) 2 MG tablet Take 2 mg by mouth every 6 hours as needed      ferrous sulfate 325 (65 FE) MG tablet Take 325 mg by mouth      pioglitazone (ACTOS) 45 MG tablet Take 45 mg by mouth daily       amLODIPine-benazepril (LOTREL) 10-20 MG per capsule Take 1 capsule by mouth daily       ULORIC 80 MG TABS Take 80 mg by mouth daily        No current facility-administered medications for this visit. Review of Systems:   Review of Systems   Constitutional: Negative. Negative for diaphoresis and fatigue. HENT: Negative. Eyes: Negative. Respiratory: Positive for chest tightness and shortness of breath. Negative for cough, wheezing and stridor. Cardiovascular: Negative. Negative for chest pain, palpitations and leg swelling. Gastrointestinal: Negative. Negative for blood in stool and nausea. Genitourinary: Negative. Musculoskeletal: Negative. Skin: Negative. Neurological: Negative. Negative for dizziness, syncope, weakness and light-headedness. Hematological: Negative. Psychiatric/Behavioral: Negative. Physical Examination:    /64 (Site: Left Upper Arm, Position: Sitting, Cuff Size: Large Adult)   Pulse 69   Resp 16   Ht 5' 10\" (1.778 m)   Wt 215 lb (97.5 kg)   SpO2 96%   BMI 30.85 kg/m²    Physical Exam   Constitutional: No distress. He appears chronically ill. HENT:   Normal cephalic and Atraumatic   Eyes: Pupils are equal, round, and reactive to light. Neck: Normal range of motion and thyroid normal. Neck supple. No JVD present. No neck adenopathy. No thyromegaly present. Cardiovascular: Normal rate and regular rhythm. Exam reveals decreased pulses. Murmur heard. Pulses:       Carotid pulses are on the right side with bruit, and on the left side with bruit.   Pulmonary/Chest: Effort normal and breath sounds normal. He has 01/12/2019    CALCIUM 8.8 01/12/2019    GFRAA >60.0 01/12/2019    LABGLOM >60.0 01/12/2019    GLUCOSE 124 01/12/2019    GLUCOSE 111 11/08/2011     Magnesium:    Lab Results   Component Value Date    MG 2.1 09/19/2015    MG 2.5 11/04/2011     TSH:  Lab Results   Component Value Date    TSH 1.570 06/17/2017       Patient Active Problem List   Diagnosis    Hyperlipidemia    Peripheral vascular disease ([facility]    Essential hypertension    Occlusive disease of artery of lower extremity (HCC)    Gout    CAD (coronary artery disease)    Angina, class III (HCC)    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 Occlusive disease of artery of lower extremity (HCC)   see Dr. Bertrand Winslow     2. Coronary artery disease involving native coronary artery of native heart without angina pectoris  Still has angina- will add Ranexa     3. Hyperlipidemia, unspecified hyperlipidemia type   statin     4. Peripheral vascular disease (HCC)   Stable clausidcation     5. Essential hypertension   stable     6. Angina, class III ([facility]       7. Bilateral carotid artery stenosis   f/u Dr. Bertrand Winslow        Counseling:  Heart Healthy Lifestyle, Low Salt Diet, Take Precautions to Prevent Falls and Walk Daily    Return in about 2 months (around 6/4/2019) for Cardiovascular care. .      Electronically signed by Miranda Salmeron MD on 4/4/2019 at 4:20 PM

## 2019-04-05 RX ORDER — RANOLAZINE 500 MG/1
TABLET, EXTENDED RELEASE ORAL
Qty: 180 TABLET | Refills: 3 | Status: SHIPPED | OUTPATIENT
Start: 2019-04-05 | End: 2021-08-06

## 2019-06-05 ENCOUNTER — OFFICE VISIT (OUTPATIENT)
Dept: CARDIOLOGY CLINIC | Age: 74
End: 2019-06-05
Payer: MEDICARE

## 2019-06-05 VITALS
SYSTOLIC BLOOD PRESSURE: 116 MMHG | TEMPERATURE: 98.6 F | RESPIRATION RATE: 18 BRPM | OXYGEN SATURATION: 98 % | WEIGHT: 210.4 LBS | HEART RATE: 59 BPM | HEIGHT: 70 IN | DIASTOLIC BLOOD PRESSURE: 62 MMHG | BODY MASS INDEX: 30.12 KG/M2

## 2019-06-05 DIAGNOSIS — I10 ESSENTIAL HYPERTENSION: ICD-10-CM

## 2019-06-05 DIAGNOSIS — I25.10 CORONARY ARTERY DISEASE INVOLVING NATIVE CORONARY ARTERY OF NATIVE HEART WITHOUT ANGINA PECTORIS: Primary | Chronic | ICD-10-CM

## 2019-06-05 DIAGNOSIS — E78.5 HYPERLIPIDEMIA, UNSPECIFIED HYPERLIPIDEMIA TYPE: ICD-10-CM

## 2019-06-05 DIAGNOSIS — I70.209 OCCLUSIVE DISEASE OF ARTERY OF LOWER EXTREMITY (HCC): Chronic | ICD-10-CM

## 2019-06-05 DIAGNOSIS — I73.9 PERIPHERAL VASCULAR DISEASE (HCC): ICD-10-CM

## 2019-06-05 DIAGNOSIS — I65.23 BILATERAL CAROTID ARTERY STENOSIS: ICD-10-CM

## 2019-06-05 PROCEDURE — 99214 OFFICE O/P EST MOD 30 MIN: CPT | Performed by: INTERNAL MEDICINE

## 2019-06-05 PROCEDURE — 93000 ELECTROCARDIOGRAM COMPLETE: CPT | Performed by: INTERNAL MEDICINE

## 2019-06-05 RX ORDER — ATORVASTATIN CALCIUM 20 MG/1
20 TABLET, FILM COATED ORAL DAILY
Qty: 30 TABLET | Refills: 5 | Status: SHIPPED | OUTPATIENT
Start: 2019-06-05 | End: 2019-06-05 | Stop reason: SDUPTHER

## 2019-06-05 ASSESSMENT — ENCOUNTER SYMPTOMS
GASTROINTESTINAL NEGATIVE: 1
RESPIRATORY NEGATIVE: 1
WHEEZING: 0
NAUSEA: 0
EYES NEGATIVE: 1
BLOOD IN STOOL: 0
SHORTNESS OF BREATH: 0
CHEST TIGHTNESS: 0
STRIDOR: 0
COUGH: 0

## 2019-06-05 NOTE — PROGRESS NOTES
Subsequent Progress Note  Patient: Yumiko Gilbert  YOB: 1945  MRN: [id number]    Chief Complaint:  Chief Complaint   Patient presents with    Coronary Artery Disease     HISTORY OF PCI     Hypertension    Claudication     S/P LE BYPASS        CV Data:  CAD 5-6 stents in total  10/2017 Left Fem Distal bypass- Dr. Arsalan Albright  Remote- RIGHT  LE bypass  6/19/2018 Cath: Nevillestephanie Freirezing RCA stents all patent with Mild ISR EF 55  8/2015 CUS DARIEL 50-69- followed by Dr. Arsalan Albright  2/2018 echo ef 65  2/2019 spect negative    Subjective/HPI:no cp no sob no falls no bleed + claudication, takes meds.      Works on [email or web address]    EKG:    Past Medical History:   Diagnosis Date    Anemia, normocytic normochromic     CAD (coronary artery disease)     multiple PCI    Diabetes mellitus ([facility]     Gout 10/2/2017    Hyperlipidemia     Hypertension     Occlusive disease of artery of lower extremity ([facility] 10/2/2017       Past Surgical History:   Procedure Laterality Date    ARTERIOGRAM Right 10/6/2017    RIGHT ARM AORTO-FEMORAL DIGITAL SUBTRACTION ARTERIOGRAPHY performed by Dwain Street MD at [address] [facility] N/A 11/7/11    xience stent to RAC and CIRC    CORONARY ANGIOPLASTY WITH STENT PLACEMENT Left 4/19/13    xience stent to LAD    CORONARY ANGIOPLASTY WITH STENT PLACEMENT Right 12/22/13    Promus stent to RCA    FEMORAL-FEMORAL BYPASS GRAFT      HERNIA REPAIR      as child    PA ESOPHAGOGASTRODUODENOSCOPY TRANSORAL DIAGNOSTIC N/A 11/30/2017    EGD ESOPHAGOGASTRODUODENOSCOPY with biopsy performed by Amish Comer MD at [address] Left 10/27/2017    LEFT FEMORAL DISTAL BYPASS, (PAT DONE 10-2-17) performed by Dwain Street MD at [address] [facility] History   Problem Relation Age of Onset    Diabetes Mother     Heart Disease Father     Heart Attack Father     No Known Problems Son     Cancer Other         lung    Prostate Cancer (NEURONTIN) 100 MG capsule Take 300 mg by mouth 2 times daily. Sandra Lu  tiZANidine (ZANAFLEX) 2 MG tablet Take 2 mg by mouth every 6 hours as needed      ferrous sulfate 325 (65 FE) MG tablet Take 325 mg by mouth      pioglitazone (ACTOS) 45 MG tablet Take 45 mg by mouth daily       amLODIPine-benazepril (LOTREL) 10-20 MG per capsule Take 1 capsule by mouth daily       ULORIC 80 MG TABS Take 80 mg by mouth daily        No current facility-administered medications for this visit. Review of Systems:   Review of Systems   Constitutional: Negative. Negative for diaphoresis and fatigue. HENT: Negative. Eyes: Negative. Respiratory: Negative. Negative for cough, chest tightness, shortness of breath, wheezing and stridor. Cardiovascular: Negative. Negative for chest pain, palpitations and leg swelling. Gastrointestinal: Negative. Negative for blood in stool and nausea. Genitourinary: Negative. Musculoskeletal: Negative. Skin: Negative. Neurological: Negative. Negative for dizziness, syncope, weakness and light-headedness. Hematological: Negative. Psychiatric/Behavioral: Negative. Physical Examination:    /62 (Site: Left Upper Arm, Position: Sitting, Cuff Size: Large Adult)   Pulse 59   Temp 98.6 °F (37 °C)   Resp 18   Ht 5' 10\" (1.778 m)   Wt 210 lb 6.4 oz (95.4 kg)   SpO2 98%   BMI 30.19 kg/m²    Physical Exam   Constitutional: He appears healthy. No distress. HENT:   Normal cephalic and Atraumatic   Eyes: Pupils are equal, round, and reactive to light. Neck: Normal range of motion and thyroid normal. Neck supple. No JVD present. No neck adenopathy. No thyromegaly present. Cardiovascular: Normal rate and regular rhythm. Exam reveals decreased pulses. Murmur heard. Pulmonary/Chest: Effort normal and breath sounds normal. He has no wheezes. He has no rales. He exhibits no tenderness. Abdominal: Soft. Bowel sounds are normal. There is no tenderness. Musculoskeletal: Normal range of motion. He exhibits no edema or tenderness. Neurological: He is alert and oriented to person, place, and time. Skin: Skin is warm. No cyanosis. Nails show no clubbing.        LABS:  CBC:   Lab Results   Component Value Date    WBC 5.0 05/30/2019    RBC 3.80 05/30/2019    RBC 4.07 02/03/2012    HGB 11.3 05/30/2019    HCT 33.7 05/30/2019    MCV 88.6 05/30/2019    MCH 29.8 05/30/2019    MCHC 33.6 05/30/2019    RDW 18.1 05/30/2019     05/30/2019    MPV 10.4 09/24/2015     Lipids:  Lab Results   Component Value Date    CHOL 191 05/30/2019    CHOL 178 03/11/2015    CHOL 198 12/09/2014     Lab Results   Component Value Date    TRIG 84 05/30/2019    TRIG 85 03/11/2015    TRIG 227 (H) 12/09/2014     Lab Results   Component Value Date    HDL 61 (H) 05/30/2019    HDL 71 (H) 03/11/2015    HDL 60 (H) 12/09/2014     Lab Results   Component Value Date    LDLCALC 113 05/30/2019    LDLCALC 90 03/11/2015    LDLCALC 93 12/09/2014     No results found for: LABVLDL, VLDL  Lab Results   Component Value Date    CHOLHDLRATIO 3.4 03/05/2013    CHOLHDLRATIO 3.1 11/04/2011     CMP:    Lab Results   Component Value Date     05/30/2019    K 4.6 05/30/2019     05/30/2019    CO2 25 05/30/2019    BUN 20 05/30/2019    CREATININE 1.28 05/30/2019    GFRAA >60.0 05/30/2019    LABGLOM 54.8 05/30/2019    GLUCOSE 95 05/30/2019    GLUCOSE 111 11/08/2011    PROT 7.5 05/30/2019    LABALBU 4.4 05/30/2019    LABALBU 5.0 11/04/2011    CALCIUM 9.2 05/30/2019    BILITOT 0.5 05/30/2019    ALKPHOS 90 05/30/2019    AST 26 05/30/2019    ALT 17 05/30/2019     BMP:    Lab Results   Component Value Date     05/30/2019    K 4.6 05/30/2019     05/30/2019    CO2 25 05/30/2019    BUN 20 05/30/2019    LABALBU 4.4 05/30/2019    LABALBU 5.0 11/04/2011    CREATININE 1.28 05/30/2019    CALCIUM 9.2 05/30/2019    GFRAA >60.0 05/30/2019    LABGLOM 54.8 05/30/2019    GLUCOSE 95 05/30/2019    GLUCOSE 111 11/08/2011

## 2019-06-06 RX ORDER — ATORVASTATIN CALCIUM 20 MG/1
20 TABLET, FILM COATED ORAL DAILY
Qty: 90 TABLET | Refills: 1 | Status: SHIPPED | OUTPATIENT
Start: 2019-06-06 | End: 2019-09-13

## 2019-09-13 ENCOUNTER — OFFICE VISIT (OUTPATIENT)
Dept: CARDIOLOGY CLINIC | Age: 74
End: 2019-09-13
Payer: MEDICARE

## 2019-09-13 VITALS
WEIGHT: 208.4 LBS | DIASTOLIC BLOOD PRESSURE: 60 MMHG | SYSTOLIC BLOOD PRESSURE: 118 MMHG | RESPIRATION RATE: 16 BRPM | HEART RATE: 63 BPM | OXYGEN SATURATION: 98 % | BODY MASS INDEX: 29.9 KG/M2

## 2019-09-13 DIAGNOSIS — E78.5 DYSLIPIDEMIA: ICD-10-CM

## 2019-09-13 DIAGNOSIS — I25.10 CORONARY ARTERY DISEASE INVOLVING NATIVE CORONARY ARTERY OF NATIVE HEART WITHOUT ANGINA PECTORIS: Primary | Chronic | ICD-10-CM

## 2019-09-13 DIAGNOSIS — I20.9 ANGINA, CLASS III (HCC): ICD-10-CM

## 2019-09-13 DIAGNOSIS — I70.209 OCCLUSIVE DISEASE OF ARTERY OF LOWER EXTREMITY (HCC): ICD-10-CM

## 2019-09-13 DIAGNOSIS — I73.9 PERIPHERAL VASCULAR DISEASE (HCC): ICD-10-CM

## 2019-09-13 DIAGNOSIS — I10 ESSENTIAL HYPERTENSION: ICD-10-CM

## 2019-09-13 DIAGNOSIS — I20.9 ANGINA, CLASS III (HCC): Primary | ICD-10-CM

## 2019-09-13 DIAGNOSIS — I65.23 BILATERAL CAROTID ARTERY STENOSIS: ICD-10-CM

## 2019-09-13 DIAGNOSIS — E78.5 DYSLIPIDEMIA: Primary | ICD-10-CM

## 2019-09-13 LAB
ANION GAP SERPL CALCULATED.3IONS-SCNC: 11 MEQ/L (ref 9–15)
BUN BLDV-MCNC: 16 MG/DL (ref 8–23)
CALCIUM SERPL-MCNC: 9.2 MG/DL (ref 8.5–9.9)
CHLORIDE BLD-SCNC: 101 MEQ/L (ref 95–107)
CO2: 27 MEQ/L (ref 20–31)
CREAT SERPL-MCNC: 1.63 MG/DL (ref 0.7–1.2)
GFR AFRICAN AMERICAN: 50.2
GFR NON-AFRICAN AMERICAN: 41.5
GLUCOSE BLD-MCNC: 117 MG/DL (ref 70–99)
HCT VFR BLD CALC: 34.1 % (ref 42–52)
HEMOGLOBIN: 11.4 G/DL (ref 14–18)
MCH RBC QN AUTO: 29.5 PG (ref 27–31.3)
MCHC RBC AUTO-ENTMCNC: 33.3 % (ref 33–37)
MCV RBC AUTO: 88.7 FL (ref 80–100)
PDW BLD-RTO: 18.3 % (ref 11.5–14.5)
PLATELET # BLD: 168 K/UL (ref 130–400)
POTASSIUM SERPL-SCNC: 4 MEQ/L (ref 3.4–4.9)
RBC # BLD: 3.85 M/UL (ref 4.7–6.1)
SODIUM BLD-SCNC: 139 MEQ/L (ref 135–144)
WBC # BLD: 4.6 K/UL (ref 4.8–10.8)

## 2019-09-13 PROCEDURE — 99215 OFFICE O/P EST HI 40 MIN: CPT | Performed by: INTERNAL MEDICINE

## 2019-09-13 RX ORDER — ATORVASTATIN CALCIUM 40 MG/1
40 TABLET, FILM COATED ORAL DAILY
Qty: 30 TABLET | Refills: 5 | Status: SHIPPED | OUTPATIENT
Start: 2019-09-13 | End: 2019-09-13 | Stop reason: SDUPTHER

## 2019-09-13 RX ORDER — ATORVASTATIN CALCIUM 40 MG/1
40 TABLET, FILM COATED ORAL DAILY
Qty: 90 TABLET | Refills: 2 | Status: SHIPPED | OUTPATIENT
Start: 2019-09-13 | End: 2019-09-27 | Stop reason: SDUPTHER

## 2019-09-13 ASSESSMENT — ENCOUNTER SYMPTOMS
NAUSEA: 0
GASTROINTESTINAL NEGATIVE: 1
SHORTNESS OF BREATH: 0
STRIDOR: 0
BLOOD IN STOOL: 0
WHEEZING: 0
COUGH: 0
EYES NEGATIVE: 1
CHEST TIGHTNESS: 1

## 2019-09-13 NOTE — PROGRESS NOTES
reveals decreased pulses. Murmur heard. Pulmonary/Chest: Effort normal and breath sounds normal. He has no wheezes. He has no rales. He exhibits no tenderness. Abdominal: Soft. Bowel sounds are normal. There is no tenderness. Musculoskeletal: Normal range of motion. He exhibits no edema or tenderness. Neurological: He is alert and oriented to person, place, and time. Skin: Skin is warm. No cyanosis. Nails show no clubbing.        LABS:  CBC:   Lab Results   Component Value Date    WBC 4.1 06/19/2019    RBC 3.80 05/30/2019    RBC 4.07 02/03/2012    HGB 11.1 06/19/2019    HCT 33.9 06/19/2019    MCV 88.6 05/30/2019    MCH 29.8 05/30/2019    MCHC 33.6 05/30/2019    RDW 18.1 05/30/2019     06/19/2019    MPV 10.4 09/24/2015     Lipids:  Lab Results   Component Value Date    CHOL 191 05/30/2019    CHOL 178 03/11/2015    CHOL 198 12/09/2014     Lab Results   Component Value Date    TRIG 84 05/30/2019    TRIG 85 03/11/2015    TRIG 227 (H) 12/09/2014     Lab Results   Component Value Date    HDL 61 (H) 05/30/2019    HDL 71 (H) 03/11/2015    HDL 60 (H) 12/09/2014     Lab Results   Component Value Date    LDLCALC 113 05/30/2019    LDLCALC 90 03/11/2015    LDLCALC 93 12/09/2014     No results found for: LABVLDL, VLDL  Lab Results   Component Value Date    CHOLHDLRATIO 3.4 03/05/2013    CHOLHDLRATIO 3.1 11/04/2011     CMP:    Lab Results   Component Value Date     05/30/2019    K 4.6 05/30/2019     05/30/2019    CO2 25 05/30/2019    BUN 20 05/30/2019    CREATININE 1.28 05/30/2019    GFRAA >60.0 05/30/2019    LABGLOM 54.8 05/30/2019    GLUCOSE 95 05/30/2019    GLUCOSE 111 11/08/2011    PROT 7.5 05/30/2019    LABALBU 4.4 05/30/2019    LABALBU 5.0 11/04/2011    CALCIUM 9.2 05/30/2019    BILITOT 0.5 05/30/2019    ALKPHOS 90 05/30/2019    AST 26 05/30/2019    ALT 17 05/30/2019     BMP:    Lab Results   Component Value Date     05/30/2019    K 4.6 05/30/2019     05/30/2019    CO2 25 05/30/2019

## 2019-09-19 DIAGNOSIS — I73.9 PERIPHERAL VASCULAR DISEASE (HCC): ICD-10-CM

## 2019-09-19 DIAGNOSIS — I10 ESSENTIAL HYPERTENSION: ICD-10-CM

## 2019-09-19 DIAGNOSIS — I20.9 ANGINA, CLASS III (HCC): Primary | ICD-10-CM

## 2019-09-19 DIAGNOSIS — E78.49 OTHER HYPERLIPIDEMIA: ICD-10-CM

## 2019-09-19 DIAGNOSIS — I25.119 CORONARY ARTERY DISEASE INVOLVING NATIVE CORONARY ARTERY OF NATIVE HEART WITH ANGINA PECTORIS (HCC): Chronic | ICD-10-CM

## 2019-09-19 DIAGNOSIS — I70.209 OCCLUSIVE DISEASE OF ARTERY OF LOWER EXTREMITY (HCC): Chronic | ICD-10-CM

## 2019-09-20 ENCOUNTER — HOSPITAL ENCOUNTER (OUTPATIENT)
Dept: CARDIAC CATH/INVASIVE PROCEDURES | Age: 74
Discharge: HOME | End: 2019-09-20
Attending: INTERNAL MEDICINE | Admitting: INTERNAL MEDICINE
Payer: MEDICARE

## 2019-09-20 VITALS
TEMPERATURE: 33.8 F | SYSTOLIC BLOOD PRESSURE: 125 MMHG | RESPIRATION RATE: 15 BRPM | HEART RATE: 48 BPM | DIASTOLIC BLOOD PRESSURE: 65 MMHG | OXYGEN SATURATION: 99 %

## 2019-09-20 LAB
ANION GAP SERPL CALCULATED.3IONS-SCNC: 9 MEQ/L (ref 9–15)
BUN BLDV-MCNC: 13 MG/DL (ref 8–23)
CALCIUM SERPL-MCNC: 9.1 MG/DL (ref 8.5–9.9)
CHLORIDE BLD-SCNC: 106 MEQ/L (ref 95–107)
CO2: 26 MEQ/L (ref 20–31)
CREAT SERPL-MCNC: 0.92 MG/DL (ref 0.7–1.2)
EKG ATRIAL RATE: 60 BPM
EKG P AXIS: 40 DEGREES
EKG P-R INTERVAL: 182 MS
EKG Q-T INTERVAL: 436 MS
EKG QRS DURATION: 110 MS
EKG QTC CALCULATION (BAZETT): 436 MS
EKG R AXIS: 14 DEGREES
EKG T AXIS: 11 DEGREES
EKG VENTRICULAR RATE: 60 BPM
GFR AFRICAN AMERICAN: >60
GFR NON-AFRICAN AMERICAN: >60
GLUCOSE BLD-MCNC: 126 MG/DL (ref 70–99)
POTASSIUM SERPL-SCNC: 5 MEQ/L (ref 3.4–4.9)
SODIUM BLD-SCNC: 141 MEQ/L (ref 135–144)

## 2019-09-20 PROCEDURE — 93005 ELECTROCARDIOGRAM TRACING: CPT | Performed by: INTERNAL MEDICINE

## 2019-09-20 PROCEDURE — 6360000002 HC RX W HCPCS

## 2019-09-20 PROCEDURE — 2500000003 HC RX 250 WO HCPCS

## 2019-09-20 PROCEDURE — C1769 GUIDE WIRE: HCPCS

## 2019-09-20 PROCEDURE — C1725 CATH, TRANSLUMIN NON-LASER: HCPCS

## 2019-09-20 PROCEDURE — 93458 L HRT ARTERY/VENTRICLE ANGIO: CPT | Performed by: INTERNAL MEDICINE

## 2019-09-20 PROCEDURE — 2709999900 HC NON-CHARGEABLE SUPPLY

## 2019-09-20 PROCEDURE — C1894 INTRO/SHEATH, NON-LASER: HCPCS

## 2019-09-20 PROCEDURE — 6360000004 HC RX CONTRAST MEDICATION: Performed by: INTERNAL MEDICINE

## 2019-09-20 PROCEDURE — 2580000003 HC RX 258: Performed by: INTERNAL MEDICINE

## 2019-09-20 PROCEDURE — 2580000003 HC RX 258

## 2019-09-20 PROCEDURE — 80048 BASIC METABOLIC PNL TOTAL CA: CPT

## 2019-09-20 PROCEDURE — C1887 CATHETER, GUIDING: HCPCS

## 2019-09-20 RX ORDER — SODIUM CHLORIDE 450 MG/100ML
INJECTION, SOLUTION INTRAVENOUS CONTINUOUS
Status: CANCELLED | OUTPATIENT
Start: 2019-09-20

## 2019-09-20 RX ORDER — ACETAMINOPHEN 325 MG/1
650 TABLET ORAL EVERY 4 HOURS PRN
Status: CANCELLED | OUTPATIENT
Start: 2019-09-20

## 2019-09-20 RX ORDER — SODIUM CHLORIDE 0.9 % (FLUSH) 0.9 %
10 SYRINGE (ML) INJECTION EVERY 12 HOURS SCHEDULED
Status: CANCELLED | OUTPATIENT
Start: 2019-09-20

## 2019-09-20 RX ORDER — SODIUM CHLORIDE 9 MG/ML
INJECTION, SOLUTION INTRAVENOUS CONTINUOUS
Status: DISCONTINUED | OUTPATIENT
Start: 2019-09-20 | End: 2019-09-20 | Stop reason: HOSPADM

## 2019-09-20 RX ORDER — SODIUM CHLORIDE 0.9 % (FLUSH) 0.9 %
10 SYRINGE (ML) INJECTION EVERY 12 HOURS SCHEDULED
Status: DISCONTINUED | OUTPATIENT
Start: 2019-09-20 | End: 2019-09-20 | Stop reason: HOSPADM

## 2019-09-20 RX ORDER — SODIUM CHLORIDE 0.9 % (FLUSH) 0.9 %
10 SYRINGE (ML) INJECTION PRN
Status: CANCELLED | OUTPATIENT
Start: 2019-09-20

## 2019-09-20 RX ORDER — MECLIZINE HCL 12.5 MG/1
12.5 TABLET ORAL 3 TIMES DAILY PRN
COMMUNITY

## 2019-09-20 RX ORDER — ONDANSETRON 2 MG/ML
4 INJECTION INTRAMUSCULAR; INTRAVENOUS EVERY 6 HOURS PRN
Status: CANCELLED | OUTPATIENT
Start: 2019-09-20

## 2019-09-20 RX ORDER — SODIUM CHLORIDE 0.9 % (FLUSH) 0.9 %
10 SYRINGE (ML) INJECTION PRN
Status: DISCONTINUED | OUTPATIENT
Start: 2019-09-20 | End: 2019-09-20 | Stop reason: HOSPADM

## 2019-09-20 RX ADMIN — IOPAMIDOL 105 ML: 612 INJECTION, SOLUTION INTRAVENOUS at 11:44

## 2019-09-20 RX ADMIN — SODIUM CHLORIDE 75 ML/HR: 9 INJECTION, SOLUTION INTRAVENOUS at 10:00

## 2019-09-20 NOTE — PROGRESS NOTES
Patient discharged in a wheelchair to wife in a private car. Patient had all belongings and survey along with discharge instructions.

## 2019-09-22 PROCEDURE — 93010 ELECTROCARDIOGRAM REPORT: CPT | Performed by: INTERNAL MEDICINE

## 2019-09-27 ENCOUNTER — OFFICE VISIT (OUTPATIENT)
Dept: CARDIOLOGY CLINIC | Age: 74
End: 2019-09-27
Payer: MEDICARE

## 2019-09-27 VITALS
BODY MASS INDEX: 29.9 KG/M2 | WEIGHT: 208.4 LBS | HEART RATE: 56 BPM | DIASTOLIC BLOOD PRESSURE: 60 MMHG | SYSTOLIC BLOOD PRESSURE: 128 MMHG | RESPIRATION RATE: 14 BRPM | OXYGEN SATURATION: 98 %

## 2019-09-27 DIAGNOSIS — I73.9 PERIPHERAL VASCULAR DISEASE (HCC): ICD-10-CM

## 2019-09-27 DIAGNOSIS — E78.5 DYSLIPIDEMIA: ICD-10-CM

## 2019-09-27 DIAGNOSIS — I47.29 NSVT (NONSUSTAINED VENTRICULAR TACHYCARDIA): ICD-10-CM

## 2019-09-27 DIAGNOSIS — I65.23 BILATERAL CAROTID ARTERY STENOSIS: ICD-10-CM

## 2019-09-27 DIAGNOSIS — I25.119 CORONARY ARTERY DISEASE INVOLVING NATIVE CORONARY ARTERY OF NATIVE HEART WITH ANGINA PECTORIS (HCC): Primary | Chronic | ICD-10-CM

## 2019-09-27 DIAGNOSIS — I10 ESSENTIAL HYPERTENSION: ICD-10-CM

## 2019-09-27 PROCEDURE — 99214 OFFICE O/P EST MOD 30 MIN: CPT | Performed by: INTERNAL MEDICINE

## 2019-09-27 RX ORDER — ATENOLOL 50 MG/1
TABLET ORAL
Qty: 180 TABLET | Refills: 2 | Status: SHIPPED | OUTPATIENT
Start: 2019-09-27 | End: 2020-07-13

## 2019-09-27 RX ORDER — ATORVASTATIN CALCIUM 40 MG/1
40 TABLET, FILM COATED ORAL DAILY
Qty: 90 TABLET | Refills: 2 | Status: SHIPPED | OUTPATIENT
Start: 2019-09-27

## 2019-09-27 ASSESSMENT — ENCOUNTER SYMPTOMS
COUGH: 0
CHEST TIGHTNESS: 1
GASTROINTESTINAL NEGATIVE: 1
SHORTNESS OF BREATH: 0
STRIDOR: 0
WHEEZING: 0
NAUSEA: 0
BLOOD IN STOOL: 0
EYES NEGATIVE: 1

## 2019-09-27 NOTE — PROGRESS NOTES
Subsequent Progress Note  Patient: Seema Begum  YOB: 1945  MRN: [id number]    Chief Complaint: CP cad pad htn Dizzy   Chief Complaint   Patient presents with    Follow Up After Procedure     S/P CATH 9/20/19    Coronary Artery Disease    Hypertension       CV Data:  CAD 5-6 stents in total  10/2017 Left Fem Distal bypass- Dr. Leonides Cooper  Remote- RIGHT  LE bypass  6/19/2018 Cath: Radha Click RCA stents all patent with Mild ISR EF 55  8/2015 CUS DARIEL 50-69- followed by Dr. Leonides Cooper  2/2018 echo ef 65  2/2019 spect negative  9/20/2019: cath LAD CX and RCA prior stents mild ISR but patent. Septal  occluded and fills from RCA. EF 60 EDP 9    Subjective/HPI: recently had 2 angina ( chest pressure heavy no radiation no sob but + diaphoresis moderate) took NTG w relief. Also having dizzy spells     9/27/2019: no cp no osb no falls no bleed takes meds.  Had Pig tail catheter induced VT causing CP during cath      Works on [employer]    EKG:    Past Medical History:   Diagnosis Date    Anemia, normocytic normochromic     CAD (coronary artery disease)     multiple PCI    Diabetes mellitus ([facility]     Gout 10/2/2017    Hyperlipidemia     Hypertension     Occlusive disease of artery of lower extremity ([facility] 10/2/2017       Past Surgical History:   Procedure Laterality Date    ARTERIOGRAM Right 10/6/2017    RIGHT ARM AORTO-FEMORAL DIGITAL SUBTRACTION ARTERIOGRAPHY performed by Mark Barnett MD at [address] [facility] N/A 11/7/11    xience stent to RAC and CIRC    CORONARY ANGIOPLASTY WITH STENT PLACEMENT Left 4/19/13    xience stent to LAD    CORONARY ANGIOPLASTY WITH STENT PLACEMENT Right 12/22/13    Promus stent to RCA    DIAGNOSTIC CARDIAC CATH LAB PROCEDURE  09/20/2019    FEMORAL-FEMORAL BYPASS GRAFT      HERNIA REPAIR      as child    [place] ESOPHAGOGASTRODUODENOSCOPY TRANSORAL DIAGNOSTIC N/A 11/30/2017    EGD ESOPHAGOGASTRODUODENOSCOPY with biopsy performed by Claudetta Fielding, MD at [address] [place] Left 10/27/2017    LEFT FEMORAL DISTAL BYPASS, (PAT DONE 10-2-17) performed by Connie Kilgore MD at [address] [facility] History   Problem Relation Age of Onset    Diabetes Mother     Heart Disease Father     Heart Attack Father     No Known Problems Son     Cancer Other         lung    Prostate Cancer Other        Social History     Socioeconomic History    Marital status:      Spouse name: None    Number of children: None    Years of education: None    Highest education level: None   Occupational History    None   Social Needs    Financial resource strain: None    Food insecurity:     Worry: None     Inability: None    Transportation needs:     Medical: None     Non-medical: None   Tobacco Use    Smoking status: Former Smoker     Start date: 1960     Last attempt to quit:      Years since quittin.7    Smokeless tobacco: Never Used   Substance and Sexual Activity    Alcohol use: No     Alcohol/week: 0.0 standard drinks    Drug use: No    Sexual activity: None   Lifestyle    Physical activity:     Days per week: None     Minutes per session: None    Stress: None   Relationships    Social connections:     Talks on phone: None     Gets together: None     Attends Hindu service: None     Active member of club or organization: None     Attends meetings of clubs or organizations: None     Relationship status: None    Intimate partner violence:     Fear of current or ex partner: None     Emotionally abused: None     Physically abused: None     Forced sexual activity: None   Other Topics Concern    None   Social History Narrative    None       No Known Allergies    Current Outpatient Medications   Medication Sig Dispense Refill    atenolol (TENORMIN) 50 MG tablet TAKE ONE TABLET BY MOUTH TWICE DAILY 180 tablet 2    atorvastatin (LIPITOR) 40 MG tablet Take 1 tablet by mouth daily 90

## 2019-11-08 ENCOUNTER — HOSPITAL ENCOUNTER (OUTPATIENT)
Dept: NON INVASIVE DIAGNOSTICS | Age: 74
Discharge: HOME | End: 2019-11-08
Payer: MEDICARE

## 2019-11-08 DIAGNOSIS — I47.29 NSVT (NONSUSTAINED VENTRICULAR TACHYCARDIA): ICD-10-CM

## 2019-11-08 PROCEDURE — 93226 XTRNL ECG REC<48 HR SCAN A/R: CPT

## 2019-11-08 PROCEDURE — 93225 XTRNL ECG REC<48 HRS REC: CPT

## 2019-12-30 ENCOUNTER — OFFICE VISIT (OUTPATIENT)
Dept: CARDIOLOGY CLINIC | Age: 74
End: 2019-12-30
Payer: MEDICARE

## 2019-12-30 VITALS
HEART RATE: 71 BPM | BODY MASS INDEX: 30.64 KG/M2 | WEIGHT: 214 LBS | OXYGEN SATURATION: 99 % | SYSTOLIC BLOOD PRESSURE: 125 MMHG | DIASTOLIC BLOOD PRESSURE: 62 MMHG | HEIGHT: 70 IN | RESPIRATION RATE: 18 BRPM

## 2019-12-30 DIAGNOSIS — I10 ESSENTIAL HYPERTENSION: ICD-10-CM

## 2019-12-30 DIAGNOSIS — E78.49 OTHER HYPERLIPIDEMIA: ICD-10-CM

## 2019-12-30 DIAGNOSIS — I73.9 PERIPHERAL VASCULAR DISEASE (HCC): ICD-10-CM

## 2019-12-30 DIAGNOSIS — I25.119 CORONARY ARTERY DISEASE INVOLVING NATIVE CORONARY ARTERY OF NATIVE HEART WITH ANGINA PECTORIS (HCC): Primary | Chronic | ICD-10-CM

## 2019-12-30 PROCEDURE — 99214 OFFICE O/P EST MOD 30 MIN: CPT | Performed by: INTERNAL MEDICINE

## 2019-12-30 RX ORDER — HYDROCHLOROTHIAZIDE 25 MG/1
25 TABLET ORAL DAILY
COMMUNITY

## 2019-12-30 ASSESSMENT — ENCOUNTER SYMPTOMS
WHEEZING: 0
NAUSEA: 0
GASTROINTESTINAL NEGATIVE: 1
BLOOD IN STOOL: 0
STRIDOR: 0
CHEST TIGHTNESS: 1
COUGH: 0
EYES NEGATIVE: 1
SHORTNESS OF BREATH: 0

## 2020-07-10 ENCOUNTER — OFFICE VISIT (OUTPATIENT)
Dept: CARDIOLOGY CLINIC | Age: 75
End: 2020-07-10
Payer: MEDICARE

## 2020-07-10 VITALS
RESPIRATION RATE: 16 BRPM | OXYGEN SATURATION: 99 % | SYSTOLIC BLOOD PRESSURE: 122 MMHG | DIASTOLIC BLOOD PRESSURE: 62 MMHG | HEART RATE: 58 BPM | BODY MASS INDEX: 30.56 KG/M2 | WEIGHT: 213 LBS

## 2020-07-10 PROBLEM — I25.119 CORONARY ARTERY DISEASE INVOLVING NATIVE CORONARY ARTERY OF NATIVE HEART WITH ANGINA PECTORIS (HCC): Status: ACTIVE | Noted: 2020-07-10

## 2020-07-10 PROBLEM — I25.10 CORONARY ARTERY DISEASE INVOLVING NATIVE CORONARY ARTERY OF NATIVE HEART WITHOUT ANGINA PECTORIS: Status: ACTIVE | Noted: 2020-07-10

## 2020-07-10 PROBLEM — E78.49 OTHER HYPERLIPIDEMIA: Status: ACTIVE | Noted: 2020-07-10

## 2020-07-10 PROCEDURE — 99214 OFFICE O/P EST MOD 30 MIN: CPT | Performed by: INTERNAL MEDICINE

## 2020-07-10 ASSESSMENT — ENCOUNTER SYMPTOMS
CHEST TIGHTNESS: 1
BLOOD IN STOOL: 0
COUGH: 0
WHEEZING: 0
STRIDOR: 0
EYES NEGATIVE: 1
NAUSEA: 0
SHORTNESS OF BREATH: 0
GASTROINTESTINAL NEGATIVE: 1

## 2020-07-10 NOTE — PROGRESS NOTES
 DIAGNOSTIC CARDIAC CATH LAB PROCEDURE  2019    FEMORAL-FEMORAL BYPASS GRAFT      HERNIA REPAIR      as child    [place] ESOPHAGOGASTRODUODENOSCOPY TRANSORAL DIAGNOSTIC N/A 2017    EGD ESOPHAGOGASTRODUODENOSCOPY with biopsy performed by Christiano Butler MD at [address] Left 10/27/2017    LEFT FEMORAL DISTAL BYPASS, (PAT DONE 10-2-17) performed by Moises Guerrero MD at [address] [facility] History   Problem Relation Age of Onset    Diabetes Mother     Heart Disease Father     Heart Attack Father     No Known Problems Son     Cancer Other         lung    Prostate Cancer Other        Social History     Socioeconomic History    Marital status:      Spouse name: None    Number of children: None    Years of education: None    Highest education level: None   Occupational History    None   Social Needs    Financial resource strain: None    Food insecurity     Worry: None     Inability: None    Transportation needs     Medical: None     Non-medical: None   Tobacco Use    Smoking status: Former Smoker     Start date: 1960     Last attempt to quit:      Years since quittin.5    Smokeless tobacco: Never Used   Substance and Sexual Activity    Alcohol use: No     Alcohol/week: 0.0 standard drinks    Drug use: No    Sexual activity: None   Lifestyle    Physical activity     Days per week: None     Minutes per session: None    Stress: None   Relationships    Social connections     Talks on phone: None     Gets together: None     Attends Denominational service: None     Active member of club or organization: None     Attends meetings of clubs or organizations: None     Relationship status: None    Intimate partner violence     Fear of current or ex partner: None     Emotionally abused: None     Physically abused: None     Forced sexual activity: None   Other Topics Concern    None   Social History Narrative    None       No Known Allergies    Current Outpatient Medications   Medication Sig Dispense Refill    hydrochlorothiazide (HYDRODIURIL) 25 MG tablet Take 25 mg by mouth daily      atenolol (TENORMIN) 50 MG tablet TAKE ONE TABLET BY MOUTH TWICE DAILY 180 tablet 2    atorvastatin (LIPITOR) 40 MG tablet Take 1 tablet by mouth daily 90 tablet 2    meclizine (ANTIVERT) 12.5 MG tablet Take 12.5 mg by mouth 3 times daily as needed      ranolazine (RANEXA) 500 MG extended release tablet TAKE 1 TABLET BY MOUTH TWICE DAILY 180 tablet 3    nitroGLYCERIN (NITROSTAT) 0.4 MG SL tablet Place 1 tablet under the tongue every 5 minutes as needed for Chest pain up to max of 3 total doses. If no relief after 1 dose, call 911. 25 tablet 1    ammonium lactate (LAC-HYDRIN) 12 % lotion 1 applicator      aspirin 81 MG EC tablet Take 81 mg by mouth      clopidogrel (PLAVIX) 75 MG tablet Take 75 mg by mouth      gabapentin (NEURONTIN) 100 MG capsule Take 300 mg by mouth 2 times daily. Carolynn Messing  tiZANidine (ZANAFLEX) 2 MG tablet Take 2 mg by mouth every 6 hours as needed      ferrous sulfate 325 (65 FE) MG tablet Take 325 mg by mouth 2 times daily       pioglitazone (ACTOS) 45 MG tablet Take 45 mg by mouth daily       amLODIPine-benazepril (LOTREL) 10-20 MG per capsule Take 1 capsule by mouth daily       ULORIC 80 MG TABS Take 80 mg by mouth daily        No current facility-administered medications for this visit. Review of Systems:   Review of Systems   Constitutional: Negative. Negative for diaphoresis and fatigue. HENT: Negative. Eyes: Negative. Respiratory: Positive for chest tightness. Negative for cough, shortness of breath, wheezing and stridor. Cardiovascular: Negative. Negative for chest pain, palpitations and leg swelling. Gastrointestinal: Negative. Negative for blood in stool and nausea. Genitourinary: Negative. Musculoskeletal: Negative. Skin: Negative. Neurological: Positive for dizziness.  Negative for 03/05/2013    CHOLHDLRATIO 3.1 11/04/2011     CMP:    Lab Results   Component Value Date     09/20/2019    K 5.0 09/20/2019     09/20/2019    CO2 26 09/20/2019    BUN 13 09/20/2019    CREATININE 0.92 09/20/2019    GFRAA >60.0 09/20/2019    LABGLOM >60.0 09/20/2019    GLUCOSE 126 09/20/2019    GLUCOSE 111 11/08/2011    PROT 7.5 05/30/2019    LABALBU 4.4 05/30/2019    LABALBU 5.0 11/04/2011    CALCIUM 9.1 09/20/2019    BILITOT 0.5 05/30/2019    ALKPHOS 90 05/30/2019    AST 26 05/30/2019    ALT 17 05/30/2019     BMP:    Lab Results   Component Value Date     09/20/2019    K 5.0 09/20/2019     09/20/2019    CO2 26 09/20/2019    BUN 13 09/20/2019    LABALBU 4.4 05/30/2019    LABALBU 5.0 11/04/2011    CREATININE 0.92 09/20/2019    CALCIUM 9.1 09/20/2019    GFRAA >60.0 09/20/2019    LABGLOM >60.0 09/20/2019    GLUCOSE 126 09/20/2019    GLUCOSE 111 11/08/2011     Magnesium:    Lab Results   Component Value Date    MG 2.2 05/30/2019    MG 2.5 11/04/2011     TSH:  Lab Results   Component Value Date    TSH 3.490 05/30/2019       Patient Active Problem List   Diagnosis    Peripheral vascular disease ([facility]    Essential hypertension    Occlusive disease of artery of lower extremity (HCC)    Gout    CAD (coronary artery disease)    [facility], [facility]    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 Modified Medications    No medications on file       No orders of the defined types were placed in this encounter. Assessment/Plan:    1. Coronary artery disease involving native coronary artery of native heart without angina pectoris    2. Essential hypertension   stble     3. Peripheral vascular disease ([facility]   Dr. Lainey Whyte     4. Angina, class III ([address]    5.  Bilateral carotid artery stenosis    Erica     6. Occlusive disease of artery of lower extremity ([address]     7. Dyslipidemia  Advance Atorva to 40     8. NSVT ( cath related casuing CP) - HM negative. 9. LE Edema- probably related to him sleeping in recliner as his legs are  Free of edema when he sleeps in bed. Counseling:  Heart Healthy Lifestyle, Low Salt Diet, Take Precautions to Prevent Falls and Walk Daily    Return in about 6 months (around 1/10/2021) for Cardiovascular care. .      Electronically signed by Tyra Quinonez MD on 7/10/2020 at 2:30 PM

## 2020-07-13 RX ORDER — ATENOLOL 50 MG/1
TABLET ORAL
Qty: 180 TABLET | Refills: 2 | Status: SHIPPED | OUTPATIENT
Start: 2020-07-13

## 2020-07-27 ENCOUNTER — HOSPITAL ENCOUNTER (OUTPATIENT)
Dept: ULTRASOUND IMAGING | Age: 75
Discharge: HOME | End: 2020-07-29
Payer: MEDICARE

## 2020-07-27 ENCOUNTER — HOSPITAL ENCOUNTER (OUTPATIENT)
Dept: GENERAL RADIOLOGY | Age: 75
Discharge: HOME | End: 2020-07-29
Payer: MEDICARE

## 2020-07-27 PROCEDURE — 72050 X-RAY EXAM NECK SPINE 4/5VWS: CPT

## 2020-07-27 PROCEDURE — 93880 EXTRACRANIAL BILAT STUDY: CPT

## 2020-08-24 ENCOUNTER — OFFICE VISIT (OUTPATIENT)
Dept: NEUROLOGY | Age: 75
End: 2020-08-24
Payer: MEDICARE

## 2020-08-24 VITALS
BODY MASS INDEX: 29.77 KG/M2 | SYSTOLIC BLOOD PRESSURE: 115 MMHG | HEART RATE: 72 BPM | DIASTOLIC BLOOD PRESSURE: 53 MMHG | WEIGHT: 207.5 LBS

## 2020-08-24 PROBLEM — R55 SYNCOPE AND COLLAPSE: Status: ACTIVE | Noted: 2018-09-30

## 2020-08-24 PROBLEM — D64.9 ANEMIA OF UNKNOWN ETIOLOGY: Status: ACTIVE | Noted: 2018-09-30

## 2020-08-24 PROBLEM — I65.22 STENOSIS OF LEFT CAROTID ARTERY: Status: ACTIVE | Noted: 2019-01-31

## 2020-08-24 PROBLEM — M17.11 UNILATERAL PRIMARY OSTEOARTHRITIS, RIGHT KNEE: Status: ACTIVE | Noted: 2018-06-20

## 2020-08-24 PROBLEM — M25.561 PAIN IN RIGHT KNEE: Status: ACTIVE | Noted: 2018-06-20

## 2020-08-24 PROBLEM — R00.1 BRADYCARDIA: Status: ACTIVE | Noted: 2018-09-30

## 2020-08-24 PROBLEM — R10.9 RIGHT FLANK PAIN: Status: ACTIVE | Noted: 2018-09-30

## 2020-08-24 PROCEDURE — 99204 OFFICE O/P NEW MOD 45 MIN: CPT | Performed by: PSYCHIATRY & NEUROLOGY

## 2020-08-24 RX ORDER — GABAPENTIN 300 MG/1
300 CAPSULE ORAL 2 TIMES DAILY
COMMUNITY
Start: 2020-08-07

## 2020-08-24 RX ORDER — BLOOD-GLUCOSE METER
KIT MISCELLANEOUS
COMMUNITY
Start: 2020-08-08 | End: 2022-03-05

## 2020-08-24 RX ORDER — APIXABAN 5 MG/1
5 TABLET, FILM COATED ORAL 2 TIMES DAILY
Status: ON HOLD | COMMUNITY
Start: 2020-08-13 | End: 2022-03-06

## 2020-08-24 ASSESSMENT — ENCOUNTER SYMPTOMS
NAUSEA: 0
PHOTOPHOBIA: 0
SHORTNESS OF BREATH: 0
TROUBLE SWALLOWING: 0
CHOKING: 0
BACK PAIN: 0
VOMITING: 0
COLOR CHANGE: 0

## 2020-08-24 NOTE — PROGRESS NOTES
Subjective:      Patient ID: Fay Milton is a 75 y.o. male who presents today for:  Chief Complaint   Patient presents with    New Patient     Patient is in today to get medical clearance for surgery on his carotid artery. Patient does not have surgery scheduled yet has to go back to the drTremaine on the 31st of this month.  Results     patient had US done here at [facility] of the carotid artery. HPI 75 right-handed gentleman who we are seeing for carotid stenosis patient was referred to us by Dr. Mitesh Brenner. Patient is a 70% stenosis in the internal carotid artery which is progressed since 2015  Is here for preop assessment. W gone through his details and is never had a TIA or strokelike symptoms. He has significant peripheral vascular and cardiovascular disease amounting to 12-13 stents. Patient appears to be asymptomatic at this time. He denies any falls injuries trauma no bleeding bruising choking drooling his blood sugars are somewhat better control he does not have renal dysfunction actually his last creatinine is much better he had mild elevated creatinine a few weeks ago but he has improved slightly secondary dehydration. Patient denies any memory issues either.     Past Medical History:   Diagnosis Date    Anemia, normocytic normochromic     CAD (coronary artery disease)     multiple PCI    Diabetes mellitus ([address]     Gout 10/2/2017    Hyperlipidemia     Hypertension     Occlusive disease of artery of lower extremity ([address] 10/2/2017     Past Surgical History:   Procedure Laterality Date    ARTERIOGRAM Right 10/6/2017    RIGHT ARM AORTO-FEMORAL DIGITAL SUBTRACTION ARTERIOGRAPHY performed by Terrell Joaquin MD at [address] [facility] N/A 11/7/11    xience stent to RAC and CIRC    CORONARY ANGIOPLASTY WITH STENT PLACEMENT Left 4/19/13    xience stent to LAD    CORONARY ANGIOPLASTY WITH STENT PLACEMENT Right 12/22/13    Promus stent to RCA   [address] CATH LAB PROCEDURE  2019    FEMORAL-FEMORAL BYPASS GRAFT      HERNIA REPAIR      as child    [place] ESOPHAGOGASTRODUODENOSCOPY TRANSORAL DIAGNOSTIC N/A 2017    EGD ESOPHAGOGASTRODUODENOSCOPY with biopsy performed by Carolee Langston MD at [address] Left 10/27/2017    LEFT FEMORAL DISTAL BYPASS, (PAT DONE 10-2-17) performed by Terrell Joaquin MD at [facility] Marital status:      Spouse name: Not on file    Number of children: Not on file    Years of education: Not on file    Highest education level: Not on file   Occupational History    Not on file   Social Needs    Financial resource strain: Not on file    Food insecurity     Worry: Not on file     Inability: Not on file    Transportation needs     Medical: Not on file     Non-medical: Not on file   Tobacco Use    Smoking status: Former Smoker     Start date: 1960     Last attempt to quit: 2008     Years since quittin.6    Smokeless tobacco: Never Used   Substance and Sexual Activity    Alcohol use: No     Alcohol/week: 0.0 standard drinks    Drug use: No    Sexual activity: Not on file   Lifestyle    Physical activity     Days per week: Not on file     Minutes per session: Not on file    Stress: Not on file   Relationships    Social connections     Talks on phone: Not on file     Gets together: Not on file     Attends Baptism service: Not on file     Active member of club or organization: Not on file     Attends meetings of clubs or organizations: Not on file     Relationship status: Not on file    Intimate partner violence     Fear of current or ex partner: Not on file     Emotionally abused: Not on file     Physically abused: Not on file     Forced sexual activity: Not on file   Other Topics Concern    Not on file   Social History Narrative    Not on file     Family History   Problem Relation Age of Onset    Diabetes Mother  Heart Disease Father     Heart Attack Father     No Known Problems Son     Cancer Other         lung    Prostate Cancer Other      No Known Allergies    Current Outpatient Medications   Medication Sig Dispense Refill    atenolol (TENORMIN) 50 MG tablet TAKE 1 TABLET BY MOUTH TWICE DAILY 180 tablet 2    hydrochlorothiazide (HYDRODIURIL) 25 MG tablet Take 25 mg by mouth daily      atorvastatin (LIPITOR) 40 MG tablet Take 1 tablet by mouth daily 90 tablet 2    meclizine (ANTIVERT) 12.5 MG tablet Take 12.5 mg by mouth 3 times daily as needed      ranolazine (RANEXA) 500 MG extended release tablet TAKE 1 TABLET BY MOUTH TWICE DAILY 180 tablet 3    nitroGLYCERIN (NITROSTAT) 0.4 MG SL tablet Place 1 tablet under the tongue every 5 minutes as needed for Chest pain up to max of 3 total doses. If no relief after 1 dose, call 911. 25 tablet 1    ammonium lactate (LAC-HYDRIN) 12 % lotion 1 applicator      aspirin 81 MG EC tablet Take 81 mg by mouth      clopidogrel (PLAVIX) 75 MG tablet Take 75 mg by mouth      tiZANidine (ZANAFLEX) 2 MG tablet Take 2 mg by mouth every 6 hours as needed      ferrous sulfate 325 (65 FE) MG tablet Take 325 mg by mouth 2 times daily       pioglitazone (ACTOS) 45 MG tablet Take 45 mg by mouth daily       amLODIPine-benazepril (LOTREL) 10-20 MG per capsule Take 1 capsule by mouth daily       ULORIC 80 MG TABS Take 80 mg by mouth daily       ELIQUIS 5 MG TABS tablet Take 5 mg by mouth 2 times daily      gabapentin (NEURONTIN) 300 MG capsule Take 300 mg by mouth 2 times daily.  FREESTYLE LITE strip TEST ONCE DAILY UTD       No current facility-administered medications for this visit. Review of Systems   Constitutional: Negative for fever. HENT: Negative for ear pain, tinnitus and trouble swallowing. Eyes: Negative for photophobia and visual disturbance. Respiratory: Negative for choking and shortness of breath.     Cardiovascular: Negative for chest pain and palpitations. Gastrointestinal: Negative for nausea and vomiting. Musculoskeletal: Negative for back pain, gait problem, joint swelling, myalgias, neck pain and neck stiffness. Skin: Negative for color change. Allergic/Immunologic: Negative for food allergies. Neurological: Negative for dizziness, tremors, seizures, syncope, facial asymmetry, speech difficulty, weakness, light-headedness, numbness and headaches. Psychiatric/Behavioral: Negative for behavioral problems, confusion, hallucinations and sleep disturbance. Objective:   BP (!) 115/53 (Site: Left Upper Arm, Position: Sitting, Cuff Size: Medium Adult)   Pulse 72   Wt 207 lb 8 oz (94.1 kg)   BMI 29.77 kg/m²     Physical Exam  Vitals signs reviewed. Eyes:      Pupils: Pupils are equal, round, and reactive to light. Neck:      Musculoskeletal: Normal range of motion. Cardiovascular:      Rate and Rhythm: Normal rate and regular rhythm. Heart sounds: No murmur. Pulmonary:      Effort: Pulmonary effort is normal.      Breath sounds: Normal breath sounds. Abdominal:      General: Bowel sounds are normal.   Musculoskeletal: Normal range of motion. Skin:     General: Skin is warm. Neurological:      Mental Status: He is alert and oriented to person, place, and time. Cranial Nerves: No cranial nerve deficit. Sensory: No sensory deficit. Motor: No abnormal muscle tone. Coordination: Coordination normal.      Deep Tendon Reflexes: Reflexes are normal and symmetric. Babinski sign absent on the right side. Babinski sign absent on the left side. Psychiatric:         Mood and Affect: Mood normal.         Xr Cervical Spine (4-5 Views)    Result Date: 2020  Patient MRN: [id number] : 1945 Age:  75 years Gender: Male Order Date: 2020 10:36 AM. Exam: XR CERVICAL SPINE (4-5 VIEWS) Number of Views: 5 Indication:  Cervicalgia left upper extremity pain Comparison: None. Findings:  Moderate facet osteoarthropathy C3-C6. Normal sagittal alignment cervical. 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 Mild to moderate neural foraminal narrowing C3-C6 bilaterally. Vascular calcifications bilateral carotid vasculature within the neck soft tissues. Hyperdense nodule right upper lung. Impression:  1. 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 2. Mild to moderate bilateral neural foraminal narrowing C3-C6 with moderate facet osteoarthropathy C3-C6. 3. Vascular calcifications bilateral carotid arterial vasculature neck soft tissues, further evaluation with dedicated carotid artery ultrasound recommended. 4. Hyperdense nodule right upper lung is age indeterminate, but is seen dating back to at least the 9/19/2015 chest x-ray. Us Carotid Artery Bilateral    Result Date: 7/27/2020  Study: Carotid artery duplex Reason for exam: Atherosclerotic disease. Left arm numbness. Technique: Imaging and analysis of flow which incorporated color and spectral Doppler waveform performed bilaterally. Today's study compared to previous study of August 14, 2015. Findings: Imaging and analysis of flow revealed significant stenosis in the proximal left internal carotid artery with abnormal waveform and elevated systolic velocity. This has progressed since 2015 and now is felt to be greater than 70%. Stenosis of the right internal carotid artery felt to be less than 50% and unchanged. There is atherosclerotic plaque at the carotid bifurcations bilaterally. The external carotid arteries are patent. Antegrade vertebral flow noted bilaterally. Velocities in centimeters per second recorded as follows: Right side: Common carotid artery 124, proximal internal carotid 44, mid internal carotid 84, distal internal carotid 96, external carotid artery 77, ICA/CCA ratio 0.7.  Left side:  Common carotid artery 100, proximal internal carotid 522, mid internal carotid 183, distal internal carotid 163, external carotid artery 139, ICA/CCA ratio 5.2. PROGRESSIVE ATHEROSCLEROTIC DISEASE ON THE LEFT SIDE WITH STENOSIS GREATER THAN 70% IN THE LEFT INTERNAL CAROTID ARTERY WHICH IS INCREASED SINCE 2015. RIGHT INTERNAL CAROTID ARTERY STENOSIS LESS THAN 50% AND UNCHANGED. This reading has been conveyed to Dr. Jessica Kelsey via telephone by ultrasound tech Stenosis based on consensus criteria of Society of Ultrasound Radiologists. Lab Results   Component Value Date    WBC 4.6 09/13/2019    RBC 3.85 09/13/2019    RBC 4.07 02/03/2012    HGB 11.4 09/13/2019    HCT 34.1 09/13/2019    MCV 88.7 09/13/2019    MCH 29.5 09/13/2019    MCHC 33.3 09/13/2019    RDW 18.3 09/13/2019     09/13/2019    MPV 10.4 09/24/2015     Lab Results   Component Value Date     09/20/2019    K 5.0 09/20/2019     09/20/2019    CO2 26 09/20/2019    BUN 13 09/20/2019    CREATININE 0.92 09/20/2019    GFRAA >60.0 09/20/2019    LABGLOM >60.0 09/20/2019    GLUCOSE 126 09/20/2019    GLUCOSE 111 11/08/2011    PROT 7.5 05/30/2019    LABALBU 4.4 05/30/2019    LABALBU 5.0 11/04/2011    CALCIUM 9.1 09/20/2019    BILITOT 0.5 05/30/2019    ALKPHOS 90 05/30/2019    AST 26 05/30/2019    ALT 17 05/30/2019     Lab Results   Component Value Date    PROTIME 10.3 09/19/2015    PROTIME 10.7 11/04/2011    INR 1.0 09/19/2015     Lab Results   Component Value Date    TSH 3.490 05/30/2019    XYENJHVD04 363 11/15/2017    FOLATE 8.6 11/15/2017    FERRITIN 652.3 04/12/2019    IRON 78 04/12/2019    TIBC 284 04/12/2019     Lab Results   Component Value Date    TRIG 84 05/30/2019    HDL 61 05/30/2019    LDLCALC 113 05/30/2019     No results found for: Viann Rock, LABBENZ, CANNAB, COCAINESCRN, LABMETH, OPIATESCREENURINE, PHENCYCLIDINESCREENURINE, PPXUR, ETOH  No results found for: LITHIUM, DILFRTOT, VALPROATE    Assessment:       Diagnosis Orders   1.  Stenosis of left carotid artery  CTA HEAD W WO CONTRAST

## 2020-08-28 ENCOUNTER — HOSPITAL ENCOUNTER (OUTPATIENT)
Dept: CT IMAGING | Age: 75
Discharge: HOME | End: 2020-08-30
Payer: MEDICARE

## 2020-08-28 VITALS
HEART RATE: 54 BPM | WEIGHT: 211 LBS | DIASTOLIC BLOOD PRESSURE: 73 MMHG | BODY MASS INDEX: 30.21 KG/M2 | HEIGHT: 70 IN | SYSTOLIC BLOOD PRESSURE: 139 MMHG | RESPIRATION RATE: 16 BRPM

## 2020-08-28 LAB
GFR AFRICAN AMERICAN: 55
GFR NON-AFRICAN AMERICAN: 46
PERFORMED ON: ABNORMAL
POC CREATININE: 1.5 MG/DL (ref 0.8–1.3)
POC SAMPLE TYPE: ABNORMAL

## 2020-08-28 PROCEDURE — 70498 CT ANGIOGRAPHY NECK: CPT

## 2020-08-28 PROCEDURE — 70450 CT HEAD/BRAIN W/O DYE: CPT

## 2020-08-28 PROCEDURE — 70496 CT ANGIOGRAPHY HEAD: CPT

## 2020-08-28 PROCEDURE — 6360000004 HC RX CONTRAST MEDICATION: Performed by: PSYCHIATRY & NEUROLOGY

## 2020-08-28 RX ORDER — SODIUM CHLORIDE 0.9 % (FLUSH) 0.9 %
10 SYRINGE (ML) INJECTION
Status: DISPENSED | OUTPATIENT
Start: 2020-08-28 | End: 2020-08-28

## 2020-08-28 RX ADMIN — IOPAMIDOL 100 ML: 612 INJECTION, SOLUTION INTRAVENOUS at 17:28

## 2020-08-28 ASSESSMENT — PAIN - FUNCTIONAL ASSESSMENT: PAIN_FUNCTIONAL_ASSESSMENT: 0-10

## 2020-09-01 ENCOUNTER — NURSE ONLY (OUTPATIENT)
Dept: PRIMARY CARE CLINIC | Age: 75
End: 2020-09-01

## 2020-09-01 ENCOUNTER — HOSPITAL ENCOUNTER (OUTPATIENT)
Dept: PREADMISSION TESTING | Age: 75
Discharge: HOME | End: 2020-09-05
Payer: MEDICARE

## 2020-09-01 VITALS
DIASTOLIC BLOOD PRESSURE: 56 MMHG | BODY MASS INDEX: 31.04 KG/M2 | HEART RATE: 55 BPM | SYSTOLIC BLOOD PRESSURE: 114 MMHG | RESPIRATION RATE: 16 BRPM | WEIGHT: 209.6 LBS | HEIGHT: 69 IN | TEMPERATURE: 98.5 F | OXYGEN SATURATION: 98 %

## 2020-09-01 LAB
ALBUMIN SERPL-MCNC: 4.1 G/DL (ref 3.5–4.6)
ALP BLD-CCNC: 73 U/L (ref 35–104)
ALT SERPL-CCNC: 16 U/L (ref 0–41)
ANION GAP SERPL CALCULATED.3IONS-SCNC: 8 MEQ/L (ref 9–15)
AST SERPL-CCNC: 27 U/L (ref 0–40)
BASE EXCESS ARTERIAL: 5 (ref -3–3)
BILIRUB SERPL-MCNC: 0.5 MG/DL (ref 0.2–0.7)
BUN BLDV-MCNC: 24 MG/DL (ref 8–23)
CALCIUM SERPL-MCNC: 8.6 MG/DL (ref 8.5–9.9)
CHLORIDE BLD-SCNC: 106 MEQ/L (ref 95–107)
CO2: 26 MEQ/L (ref 20–31)
CREAT SERPL-MCNC: 1.23 MG/DL (ref 0.7–1.2)
EKG ATRIAL RATE: 54 BPM
EKG P AXIS: 47 DEGREES
EKG P-R INTERVAL: 164 MS
EKG Q-T INTERVAL: 452 MS
EKG QRS DURATION: 102 MS
EKG QTC CALCULATION (BAZETT): 428 MS
EKG R AXIS: 35 DEGREES
EKG T AXIS: 37 DEGREES
EKG VENTRICULAR RATE: 54 BPM
GFR AFRICAN AMERICAN: >60
GFR NON-AFRICAN AMERICAN: 57.2
GLOBULIN: 2.6 G/DL (ref 2.3–3.5)
GLUCOSE BLD-MCNC: 83 MG/DL (ref 70–99)
HCO3 ARTERIAL: 29.3 MMOL/L (ref 21–29)
HCT VFR BLD CALC: 30.7 % (ref 42–52)
HEMOGLOBIN: 10 G/DL (ref 14–18)
LACTATE: <0.3 MMOL/L (ref 0.4–2)
MCH RBC QN AUTO: 29.3 PG (ref 27–31.3)
MCHC RBC AUTO-ENTMCNC: 32.5 % (ref 33–37)
MCV RBC AUTO: 90.1 FL (ref 80–100)
O2 SAT, ARTERIAL: 96 % (ref 93–100)
PCO2 ARTERIAL: 47 MM HG (ref 35–45)
PDW BLD-RTO: 17.6 % (ref 11.5–14.5)
PERFORMED ON: ABNORMAL
PH ARTERIAL: 7.4 (ref 7.35–7.45)
PLATELET # BLD: 150 K/UL (ref 130–400)
PO2 ARTERIAL: 86 MM HG (ref 75–108)
POC SAMPLE TYPE: ABNORMAL
POTASSIUM SERPL-SCNC: 5.7 MEQ/L (ref 3.4–4.9)
RBC # BLD: 3.41 M/UL (ref 4.7–6.1)
SODIUM BLD-SCNC: 140 MEQ/L (ref 135–144)
TCO2 ARTERIAL: 31 (ref 22–29)
TOTAL PROTEIN: 6.7 G/DL (ref 6.3–8)
WBC # BLD: 5 K/UL (ref 4.8–10.8)

## 2020-09-01 PROCEDURE — 86901 BLOOD TYPING SEROLOGIC RH(D): CPT

## 2020-09-01 PROCEDURE — 82803 BLOOD GASES ANY COMBINATION: CPT

## 2020-09-01 PROCEDURE — 85027 COMPLETE CBC AUTOMATED: CPT

## 2020-09-01 PROCEDURE — 80053 COMPREHEN METABOLIC PANEL: CPT

## 2020-09-01 PROCEDURE — 36600 WITHDRAWAL OF ARTERIAL BLOOD: CPT

## 2020-09-01 PROCEDURE — 86850 RBC ANTIBODY SCREEN: CPT

## 2020-09-01 PROCEDURE — U0003 INFECTIOUS AGENT DETECTION BY NUCLEIC ACID (DNA OR RNA); SEVERE ACUTE RESPIRATORY SYNDROME CORONAVIRUS 2 (SARS-COV-2) (CORONAVIRUS DISEASE [COVID-19]), AMPLIFIED PROBE TECHNIQUE, MAKING USE OF HIGH THROUGHPUT TECHNOLOGIES AS DESCRIBED BY CMS-2020-01-R: HCPCS

## 2020-09-01 PROCEDURE — 83605 ASSAY OF LACTIC ACID: CPT

## 2020-09-01 PROCEDURE — 93005 ELECTROCARDIOGRAM TRACING: CPT | Performed by: THORACIC SURGERY (CARDIOTHORACIC VASCULAR SURGERY)

## 2020-09-01 PROCEDURE — 86900 BLOOD TYPING SEROLOGIC ABO: CPT

## 2020-09-01 RX ORDER — CEFAZOLIN SODIUM 2 G/50ML
2 SOLUTION INTRAVENOUS ONCE
Status: CANCELLED | OUTPATIENT
Start: 2020-09-08

## 2020-09-01 RX ORDER — DEXAMETHASONE SODIUM PHOSPHATE 4 MG/ML
4 INJECTION, SOLUTION INTRA-ARTICULAR; INTRALESIONAL; INTRAMUSCULAR; INTRAVENOUS; SOFT TISSUE ONCE
Status: CANCELLED | OUTPATIENT
Start: 2020-09-08

## 2020-09-01 RX ORDER — SODIUM CHLORIDE, SODIUM LACTATE, POTASSIUM CHLORIDE, CALCIUM CHLORIDE 600; 310; 30; 20 MG/100ML; MG/100ML; MG/100ML; MG/100ML
INJECTION, SOLUTION INTRAVENOUS CONTINUOUS
Status: CANCELLED | OUTPATIENT
Start: 2020-09-08

## 2020-09-01 RX ORDER — SODIUM CHLORIDE 0.9 % (FLUSH) 0.9 %
10 SYRINGE (ML) INJECTION EVERY 12 HOURS SCHEDULED
Status: CANCELLED | OUTPATIENT
Start: 2020-09-08

## 2020-09-01 RX ORDER — LIDOCAINE HYDROCHLORIDE 10 MG/ML
1 INJECTION, SOLUTION EPIDURAL; INFILTRATION; INTRACAUDAL; PERINEURAL
Status: CANCELLED | OUTPATIENT
Start: 2020-09-08 | End: 2020-09-08

## 2020-09-01 RX ORDER — SODIUM CHLORIDE 0.9 % (FLUSH) 0.9 %
10 SYRINGE (ML) INJECTION PRN
Status: CANCELLED | OUTPATIENT
Start: 2020-09-08

## 2020-09-02 LAB
ABO/RH: NORMAL
ANTIBODY SCREEN: NORMAL

## 2020-09-04 PROCEDURE — 93010 ELECTROCARDIOGRAM REPORT: CPT | Performed by: INTERNAL MEDICINE

## 2020-09-07 ENCOUNTER — ANESTHESIA EVENT (OUTPATIENT)
Dept: OPERATING ROOM | Age: 75
DRG: 039 | End: 2020-09-07
Payer: MEDICARE

## 2020-09-07 NOTE — ANESTHESIA PRE PROCEDURE
Department of Anesthesiology  Preprocedure Note       Name:  Svetlana Rich   Age:  75 y.o.  :  1945                                          MRN:  [id number]         Date:  2020      Surgeon: Hanna Roberson):  Dayna Ivy MD    Procedure: Procedure(s):  LEFT CAROTID ENDARTERECTOMY    Medications prior to admission:   Prior to Admission medications    Medication Sig Start Date End Date Taking? Authorizing Provider   gabapentin (NEURONTIN) 300 MG capsule Take 300 mg by mouth 2 times daily.  20  Yes Historical Provider, MD   atenolol (TENORMIN) 50 MG tablet TAKE 1 TABLET BY MOUTH TWICE DAILY 20  Yes Sherri Chawla MD   hydrochlorothiazide (HYDRODIURIL) 25 MG tablet Take 25 mg by mouth daily   Yes Historical Provider, MD   atorvastatin (LIPITOR) 40 MG tablet Take 1 tablet by mouth daily 19  Yes Sherri Chawla MD   ranolazine (RANEXA) 500 MG extended release tablet TAKE 1 TABLET BY MOUTH TWICE DAILY 19  Yes Sherri Chawla MD   ammonium lactate (LAC-HYDRIN) 12 % lotion 1 applicator   Yes Historical Provider, MD   aspirin 81 MG EC tablet Take 81 mg by mouth   Yes Historical Provider, MD   tiZANidine (ZANAFLEX) 2 MG tablet Take 2 mg by mouth every 6 hours as needed   Yes Historical Provider, MD   ferrous sulfate 325 (65 FE) MG tablet Take 325 mg by mouth 2 times daily  3/6/13  Yes Historical Provider, MD   pioglitazone (ACTOS) 45 MG tablet Take 45 mg by mouth daily  3/6/13  Yes Historical Provider, MD   amLODIPine-benazepril (LOTREL) 10-20 MG per capsule Take 1 capsule by mouth daily  5/11/15  Yes Historical Provider, MD   ULORIC 80 MG TABS Take 80 mg by mouth daily  5/11/15  Yes Historical Provider, MD   ELIQUIS 5 MG TABS tablet Take 5 mg by mouth 2 times daily 20   Historical Provider, MD   FREESTYLE LITE strip TEST ONCE DAILY UTD 20   Historical Provider, MD   meclizine (ANTIVERT) 12.5 MG tablet Take 12.5 mg by mouth 3 times daily as needed    Historical Provider, MD   nitroGLYCERIN (NITROSTAT) 0.4 MG SL tablet Place 1 tablet under the tongue every 5 minutes as needed for Chest pain up to max of 3 total doses.  If no relief after 1 dose, call 911. 2/13/19   Katelynn Graves MD       Current medications:    Current Facility-Administered Medications   Medication Dose Route Frequency Provider Last Rate Last Dose    lactated ringers infusion   Intravenous Continuous Robert Musater, APRN -  mL/hr at 09/08/20 1007      sodium chloride flush 0.9 % injection 10 mL  10 mL Intravenous 2 times per day Robert Bennett APRN - CNP        sodium chloride flush 0.9 % injection 10 mL  10 mL Intravenous PRN Robert Bennett APRN - CNP        ceFAZolin (ANCEF) 2 g in dextrose 3 % 50 mL IVPB (duplex)  2 g Intravenous Once Moises Guerrero MD        dexamethasone (DECADRON) injection 4 mg  4 mg Intravenous Once Moises Guerrero MD        lactated ringers infusion   Intravenous Continuous Haroldo Paulino,            Allergies:  No Known Allergies    Problem List:    Patient Active Problem List   Diagnosis Code    Peripheral vascular disease, unspecified ([facility] [address] I73.9    Essential hypertension I10    Occlusive disease of artery of lower extremity ([address] I70.209    Gout M10.9    CAD (coronary artery disease) I25.10    Angina, class III ([address] I20.9    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, 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 ([address] D46.0    Stenosis of left carotid artery I65.22    Dyslipidemia E78.5    Angina of effort ([address] I20.8    NSVT (nonsustained ventricular tachycardia) ([facility]) I47.2    Coronary artery disease involving native coronary artery of native heart with angina pectoris (formerly [facility]) I25.119    Other hyperlipidemia E78.49    Coronary artery disease involving native coronary artery of native heart without angina pectoris I25.10    Pain in right knee M25.561    Unilateral primary osteoarthritis, right knee M17.11    Unilateral primary osteoarthritis, right knee M17.11    Anemia of unknown etiology D64.9    Bradycardia R00.1    Right flank pain R10.9    Syncope and collapse R55       Past Medical History:        Diagnosis Date    Anemia, normocytic normochromic     CAD (coronary artery disease)     multiple PCI    Diabetes mellitus ([facility] [address]     Gout 10/2/2017    Hyperlipidemia     Hypertension     Occlusive disease of artery of lower extremity ([facility] [address] 10/2/2017       Past Surgical History:        Procedure Laterality Date    ARTERIOGRAM Right 10/6/2017    RIGHT ARM AORTO-FEMORAL DIGITAL SUBTRACTION ARTERIOGRAPHY performed by Moises Guerrero MD at [address] [facility] N/A 11    xience stent to RAC and CIRC    CORONARY ANGIOPLASTY WITH STENT PLACEMENT Left 13    xience stent to LAD    CORONARY ANGIOPLASTY WITH STENT PLACEMENT Right 13    Promus stent to RCA    DIAGNOSTIC CARDIAC CATH LAB PROCEDURE  2019    FEMORAL-FEMORAL BYPASS GRAFT      HERNIA REPAIR      as child    ND ESOPHAGOGASTRODUODENOSCOPY TRANSORAL DIAGNOSTIC N/A 2017    EGD ESOPHAGOGASTRODUODENOSCOPY with biopsy performed by Christiano Butler MD at [address] Left 10/27/2017    LEFT FEMORAL DISTAL BYPASS, (PAT DONE 10-2-17) performed by Moises Guerrero MD at [place] [address] History:    Social History     Tobacco Use    Smoking status: Former Smoker     Start date: 1960     Last attempt to quit: 2008     Years since quittin.6    Smokeless tobacco: Never Used   Substance Use Topics    Alcohol use: No     Alcohol/week: 0.0 standard drinks                                Counseling given: Not Answered      Vital Signs (Current):   Vitals:    20 0941   BP: (!) 187/85   Pulse: 63   Resp: 16   Temp: 97.8 °F (36.6 °C)   TempSrc: Temporal   SpO2: 99%   Weight: 209 lb (94.8 kg)   Height: 5' 8.5\" (1.74 m)                                              BP Readings from Last 3 Encounters:   20 (!) 187/85   20 (!) 114/56   08/28/20 139/73       NPO Status: Time of last liquid consumption: 1500                        Time of last solid consumption: 1500                        Date of last liquid consumption: 09/07/20                        Date of last solid food consumption: 09/07/20    BMI:   Wt Readings from Last 3 Encounters:   09/08/20 209 lb (94.8 kg)   09/01/20 209 lb 9.6 oz (95.1 kg)   08/28/20 211 lb (95.7 kg)     Body mass index is 31.32 kg/m². CBC:   Lab Results   Component Value Date    WBC 5.0 09/01/2020    RBC 3.41 09/01/2020    RBC 4.07 02/03/2012    HGB 10.0 09/01/2020    HCT 30.7 09/01/2020    MCV 90.1 09/01/2020    RDW 17.6 09/01/2020     09/01/2020       CMP:   Lab Results   Component Value Date     09/01/2020    K 5.7 09/01/2020     09/01/2020    CO2 26 09/01/2020    BUN 24 09/01/2020    CREATININE 1.23 09/01/2020    GFRAA >60.0 09/01/2020    LABGLOM 57.2 09/01/2020    GLUCOSE 83 09/01/2020    GLUCOSE 111 11/08/2011    PROT 6.7 09/01/2020    CALCIUM 8.6 09/01/2020    BILITOT 0.5 09/01/2020    ALKPHOS 73 09/01/2020    AST 27 09/01/2020    ALT 16 09/01/2020       POC Tests: No results for input(s): POCGLU, POCNA, POCK, POCCL, POCBUN, POCHEMO, POCHCT in the last 72 hours.     Coags:   Lab Results   Component Value Date    PROTIME 10.3 09/19/2015    PROTIME 10.7 11/04/2011    INR 1.0 09/19/2015    APTT 29.5 09/19/2015       HCG (If Applicable): No results found for: PREGTESTUR, PREGSERUM, HCG, HCGQUANT     ABGs:   Lab Results   Component Value Date    PHART 7.404 09/01/2020    PO2ART 86 09/01/2020    LXI9HUF 47 09/01/2020    THL1RBC 29.3 09/01/2020    BEART 5 09/01/2020    Q2ZTJWKZ 96 09/01/2020        Type & Screen (If Applicable):  No results found for: LABABO, LABRH    Drug/Infectious Status (If Applicable):  No results found for: HIV, HEPCAB    COVID-19 Screening (If Applicable):   Lab Results   Component Value Date    COVID19 Not Detected 09/01/2020         Anesthesia Evaluation  Patient summary reviewed and Nursing notes reviewed no history of anesthetic complications:   Airway: Mallampati: II  TM distance: >3 FB   Neck ROM: full  Mouth opening: > = 3 FB Dental: normal exam         Pulmonary:Negative Pulmonary ROS and normal exam  breath sounds clear to auscultation                             Cardiovascular:  Exercise tolerance: good (>4 METS),   (+) hypertension:, angina:, CAD:, hyperlipidemia      ECG reviewed  Rhythm: regular  Rate: normal           Beta Blocker:  Not on Beta Blocker      ROS comment: Sinus bradycardia  Otherwise normal ECG  When compared with ECG of 20-SEP-2019 09:29,  No significant change was found    2019  IMPRESSION:  1. No evidence of stress-induced myocardial ischemia. 2.  Normal left ventricular systolic function     Neuro/Psych:   Negative Neuro/Psych ROS              GI/Hepatic/Renal: Neg GI/Hepatic/Renal ROS            Endo/Other:    (+) Diabetes, blood dyscrasia: anticoagulation therapy and anemia:., .          Pt had PAT visit. Abdominal:           Vascular:   + PVD, aortic or cerebral, . Anesthesia Plan      general     ASA 4     (ETT)  Induction: intravenous. arterial line  MIPS: Postoperative opioids intended and Prophylactic antiemetics administered. Anesthetic plan and risks discussed with patient. Plan discussed with CRNA.     Attending anesthesiologist reviewed and agrees with Hunter Barber DO   9/8/2020

## 2020-09-08 ENCOUNTER — ANESTHESIA (OUTPATIENT)
Dept: OPERATING ROOM | Age: 75
DRG: 039 | End: 2020-09-08
Payer: MEDICARE

## 2020-09-08 ENCOUNTER — HOSPITAL ENCOUNTER (INPATIENT)
Age: 75
LOS: 1 days | Discharge: HOME | DRG: 039 | End: 2020-09-09
Attending: THORACIC SURGERY (CARDIOTHORACIC VASCULAR SURGERY) | Admitting: THORACIC SURGERY (CARDIOTHORACIC VASCULAR SURGERY)
Payer: MEDICARE

## 2020-09-08 ENCOUNTER — HOSPITAL ENCOUNTER (OUTPATIENT)
Dept: GENERAL RADIOLOGY | Age: 75
Discharge: HOME | DRG: 039 | End: 2020-09-10
Attending: THORACIC SURGERY (CARDIOTHORACIC VASCULAR SURGERY)
Payer: MEDICARE

## 2020-09-08 VITALS — DIASTOLIC BLOOD PRESSURE: 63 MMHG | SYSTOLIC BLOOD PRESSURE: 128 MMHG | OXYGEN SATURATION: 100 % | TEMPERATURE: 79 F

## 2020-09-08 PROBLEM — I65.22 CAROTID STENOSIS, LEFT: Status: ACTIVE | Noted: 2020-09-08

## 2020-09-08 LAB
ANION GAP SERPL CALCULATED.3IONS-SCNC: 4 MEQ/L (ref 9–15)
BUN BLDV-MCNC: 21 MG/DL (ref 8–23)
CALCIUM SERPL-MCNC: 8.8 MG/DL (ref 8.5–9.9)
CHLORIDE BLD-SCNC: 101 MEQ/L (ref 95–107)
CO2: 30 MEQ/L (ref 20–31)
CREAT SERPL-MCNC: 1.07 MG/DL (ref 0.7–1.2)
GFR AFRICAN AMERICAN: >60
GFR NON-AFRICAN AMERICAN: >60
GLUCOSE BLD-MCNC: 105 MG/DL (ref 70–99)
GLUCOSE BLD-MCNC: 107 MG/DL (ref 60–115)
GLUCOSE BLD-MCNC: 129 MG/DL (ref 60–115)
PERFORMED ON: ABNORMAL
PERFORMED ON: NORMAL
POTASSIUM SERPL-SCNC: 4.8 MEQ/L (ref 3.4–4.9)
SODIUM BLD-SCNC: 135 MEQ/L (ref 135–144)

## 2020-09-08 PROCEDURE — 3600000015 HC SURGERY LEVEL 5 ADDTL 15MIN: Performed by: THORACIC SURGERY (CARDIOTHORACIC VASCULAR SURGERY)

## 2020-09-08 PROCEDURE — 6370000000 HC RX 637 (ALT 250 FOR IP): Performed by: THORACIC SURGERY (CARDIOTHORACIC VASCULAR SURGERY)

## 2020-09-08 PROCEDURE — 03UJ0JZ SUPPLEMENT LEFT COMMON CAROTID ARTERY WITH SYNTHETIC SUBSTITUTE, OPEN APPROACH: ICD-10-PCS | Performed by: THORACIC SURGERY (CARDIOTHORACIC VASCULAR SURGERY)

## 2020-09-08 PROCEDURE — 03CL0ZZ EXTIRPATION OF MATTER FROM LEFT INTERNAL CAROTID ARTERY, OPEN APPROACH: ICD-10-PCS | Performed by: THORACIC SURGERY (CARDIOTHORACIC VASCULAR SURGERY)

## 2020-09-08 PROCEDURE — 76000 FLUOROSCOPY <1 HR PHYS/QHP: CPT

## 2020-09-08 PROCEDURE — 2580000003 HC RX 258: Performed by: THORACIC SURGERY (CARDIOTHORACIC VASCULAR SURGERY)

## 2020-09-08 PROCEDURE — 2000000000 HC ICU R&B

## 2020-09-08 PROCEDURE — 6360000002 HC RX W HCPCS: Performed by: THORACIC SURGERY (CARDIOTHORACIC VASCULAR SURGERY)

## 2020-09-08 PROCEDURE — 88304 TISSUE EXAM BY PATHOLOGIST: CPT

## 2020-09-08 PROCEDURE — 03CJ0ZZ EXTIRPATION OF MATTER FROM LEFT COMMON CAROTID ARTERY, OPEN APPROACH: ICD-10-PCS | Performed by: THORACIC SURGERY (CARDIOTHORACIC VASCULAR SURGERY)

## 2020-09-08 PROCEDURE — 6360000002 HC RX W HCPCS

## 2020-09-08 PROCEDURE — 2500000003 HC RX 250 WO HCPCS: Performed by: STUDENT IN AN ORGANIZED HEALTH CARE EDUCATION/TRAINING PROGRAM

## 2020-09-08 PROCEDURE — 2709999900 HC NON-CHARGEABLE SUPPLY: Performed by: THORACIC SURGERY (CARDIOTHORACIC VASCULAR SURGERY)

## 2020-09-08 PROCEDURE — 80048 BASIC METABOLIC PNL TOTAL CA: CPT

## 2020-09-08 PROCEDURE — 6360000002 HC RX W HCPCS: Performed by: STUDENT IN AN ORGANIZED HEALTH CARE EDUCATION/TRAINING PROGRAM

## 2020-09-08 PROCEDURE — 2720000010 HC SURG SUPPLY STERILE: Performed by: THORACIC SURGERY (CARDIOTHORACIC VASCULAR SURGERY)

## 2020-09-08 PROCEDURE — 03CN0ZZ EXTIRPATION OF MATTER FROM LEFT EXTERNAL CAROTID ARTERY, OPEN APPROACH: ICD-10-PCS | Performed by: THORACIC SURGERY (CARDIOTHORACIC VASCULAR SURGERY)

## 2020-09-08 PROCEDURE — 2580000003 HC RX 258

## 2020-09-08 PROCEDURE — 03UL0JZ SUPPLEMENT LEFT INTERNAL CAROTID ARTERY WITH SYNTHETIC SUBSTITUTE, OPEN APPROACH: ICD-10-PCS | Performed by: THORACIC SURGERY (CARDIOTHORACIC VASCULAR SURGERY)

## 2020-09-08 PROCEDURE — 2580000003 HC RX 258: Performed by: STUDENT IN AN ORGANIZED HEALTH CARE EDUCATION/TRAINING PROGRAM

## 2020-09-08 PROCEDURE — 88311 DECALCIFY TISSUE: CPT

## 2020-09-08 PROCEDURE — 3700000000 HC ANESTHESIA ATTENDED CARE: Performed by: THORACIC SURGERY (CARDIOTHORACIC VASCULAR SURGERY)

## 2020-09-08 PROCEDURE — 7100000000 HC PACU RECOVERY - FIRST 15 MIN: Performed by: THORACIC SURGERY (CARDIOTHORACIC VASCULAR SURGERY)

## 2020-09-08 PROCEDURE — 6370000000 HC RX 637 (ALT 250 FOR IP)

## 2020-09-08 PROCEDURE — 7100000001 HC PACU RECOVERY - ADDTL 15 MIN: Performed by: THORACIC SURGERY (CARDIOTHORACIC VASCULAR SURGERY)

## 2020-09-08 PROCEDURE — 2500000003 HC RX 250 WO HCPCS

## 2020-09-08 PROCEDURE — 03UN0JZ SUPPLEMENT LEFT EXTERNAL CAROTID ARTERY WITH SYNTHETIC SUBSTITUTE, OPEN APPROACH: ICD-10-PCS | Performed by: THORACIC SURGERY (CARDIOTHORACIC VASCULAR SURGERY)

## 2020-09-08 PROCEDURE — 2580000003 HC RX 258: Performed by: NURSE PRACTITIONER

## 2020-09-08 PROCEDURE — 3600000005 HC SURGERY LEVEL 5 BASE: Performed by: THORACIC SURGERY (CARDIOTHORACIC VASCULAR SURGERY)

## 2020-09-08 PROCEDURE — 6360000004 HC RX CONTRAST MEDICATION: Performed by: THORACIC SURGERY (CARDIOTHORACIC VASCULAR SURGERY)

## 2020-09-08 PROCEDURE — 2500000003 HC RX 250 WO HCPCS: Performed by: THORACIC SURGERY (CARDIOTHORACIC VASCULAR SURGERY)

## 2020-09-08 PROCEDURE — 3700000001 HC ADD 15 MINUTES (ANESTHESIA): Performed by: THORACIC SURGERY (CARDIOTHORACIC VASCULAR SURGERY)

## 2020-09-08 PROCEDURE — C1768 GRAFT, VASCULAR: HCPCS | Performed by: THORACIC SURGERY (CARDIOTHORACIC VASCULAR SURGERY)

## 2020-09-08 DEVICE — GRAFT VSCLR L3XW1N THCKNSS 76MM TPRD END KNTTD DBLE VLR ST I: Type: IMPLANTABLE DEVICE | Site: CAROTID | Status: FUNCTIONAL

## 2020-09-08 RX ORDER — SODIUM CHLORIDE 0.9 % (FLUSH) 0.9 %
10 SYRINGE (ML) INJECTION PRN
Status: DISCONTINUED | OUTPATIENT
Start: 2020-09-08 | End: 2020-09-08 | Stop reason: HOSPADM

## 2020-09-08 RX ORDER — MEPERIDINE HYDROCHLORIDE 25 MG/ML
12.5 INJECTION INTRAMUSCULAR; INTRAVENOUS; SUBCUTANEOUS EVERY 5 MIN PRN
Status: DISCONTINUED | OUTPATIENT
Start: 2020-09-08 | End: 2020-09-08 | Stop reason: HOSPADM

## 2020-09-08 RX ORDER — GLYCOPYRROLATE 1 MG/5 ML
SYRINGE (ML) INTRAVENOUS PRN
Status: DISCONTINUED | OUTPATIENT
Start: 2020-09-08 | End: 2020-09-08 | Stop reason: SDUPTHER

## 2020-09-08 RX ORDER — PROPOFOL 10 MG/ML
INJECTION, EMULSION INTRAVENOUS PRN
Status: DISCONTINUED | OUTPATIENT
Start: 2020-09-08 | End: 2020-09-08 | Stop reason: SDUPTHER

## 2020-09-08 RX ORDER — LIDOCAINE HYDROCHLORIDE 10 MG/ML
1 INJECTION, SOLUTION EPIDURAL; INFILTRATION; INTRACAUDAL; PERINEURAL
Status: COMPLETED | OUTPATIENT
Start: 2020-09-08 | End: 2020-09-08

## 2020-09-08 RX ORDER — ROCURONIUM BROMIDE 10 MG/ML
INJECTION, SOLUTION INTRAVENOUS PRN
Status: DISCONTINUED | OUTPATIENT
Start: 2020-09-08 | End: 2020-09-08 | Stop reason: SDUPTHER

## 2020-09-08 RX ORDER — DEXAMETHASONE SODIUM PHOSPHATE 4 MG/ML
4 INJECTION, SOLUTION INTRA-ARTICULAR; INTRALESIONAL; INTRAMUSCULAR; INTRAVENOUS; SOFT TISSUE EVERY 6 HOURS
Status: COMPLETED | OUTPATIENT
Start: 2020-09-08 | End: 2020-09-09

## 2020-09-08 RX ORDER — SODIUM CHLORIDE, SODIUM LACTATE, POTASSIUM CHLORIDE, CALCIUM CHLORIDE 600; 310; 30; 20 MG/100ML; MG/100ML; MG/100ML; MG/100ML
INJECTION, SOLUTION INTRAVENOUS CONTINUOUS
Status: DISCONTINUED | OUTPATIENT
Start: 2020-09-08 | End: 2020-09-08

## 2020-09-08 RX ORDER — EPHEDRINE SULFATE/0.9% NACL/PF 50 MG/5 ML
SYRINGE (ML) INTRAVENOUS PRN
Status: DISCONTINUED | OUTPATIENT
Start: 2020-09-08 | End: 2020-09-08 | Stop reason: SDUPTHER

## 2020-09-08 RX ORDER — SODIUM CHLORIDE 0.9 % (FLUSH) 0.9 %
10 SYRINGE (ML) INJECTION EVERY 12 HOURS SCHEDULED
Status: DISCONTINUED | OUTPATIENT
Start: 2020-09-08 | End: 2020-09-09 | Stop reason: HOSPADM

## 2020-09-08 RX ORDER — DEXAMETHASONE SODIUM PHOSPHATE 4 MG/ML
4 INJECTION, SOLUTION INTRA-ARTICULAR; INTRALESIONAL; INTRAMUSCULAR; INTRAVENOUS; SOFT TISSUE ONCE
Status: COMPLETED | OUTPATIENT
Start: 2020-09-08 | End: 2020-09-08

## 2020-09-08 RX ORDER — SODIUM CHLORIDE 0.9 % (FLUSH) 0.9 %
10 SYRINGE (ML) INJECTION EVERY 12 HOURS SCHEDULED
Status: DISCONTINUED | OUTPATIENT
Start: 2020-09-08 | End: 2020-09-08 | Stop reason: HOSPADM

## 2020-09-08 RX ORDER — ONDANSETRON 2 MG/ML
INJECTION INTRAMUSCULAR; INTRAVENOUS PRN
Status: DISCONTINUED | OUTPATIENT
Start: 2020-09-08 | End: 2020-09-08 | Stop reason: SDUPTHER

## 2020-09-08 RX ORDER — LIDOCAINE HYDROCHLORIDE 10 MG/ML
1 INJECTION, SOLUTION EPIDURAL; INFILTRATION; INTRACAUDAL; PERINEURAL
Status: DISCONTINUED | OUTPATIENT
Start: 2020-09-08 | End: 2020-09-08 | Stop reason: HOSPADM

## 2020-09-08 RX ORDER — DEXAMETHASONE SODIUM PHOSPHATE 4 MG/ML
INJECTION, SOLUTION INTRA-ARTICULAR; INTRALESIONAL; INTRAMUSCULAR; INTRAVENOUS; SOFT TISSUE PRN
Status: DISCONTINUED | OUTPATIENT
Start: 2020-09-08 | End: 2020-09-08 | Stop reason: SDUPTHER

## 2020-09-08 RX ORDER — MAGNESIUM HYDROXIDE 1200 MG/15ML
LIQUID ORAL CONTINUOUS PRN
Status: COMPLETED | OUTPATIENT
Start: 2020-09-08 | End: 2020-09-08

## 2020-09-08 RX ORDER — SODIUM CHLORIDE 450 MG/100ML
INJECTION, SOLUTION INTRAVENOUS
Status: COMPLETED
Start: 2020-09-08 | End: 2020-09-08

## 2020-09-08 RX ORDER — DIPHENHYDRAMINE HYDROCHLORIDE 50 MG/ML
12.5 INJECTION INTRAMUSCULAR; INTRAVENOUS
Status: DISCONTINUED | OUTPATIENT
Start: 2020-09-08 | End: 2020-09-08 | Stop reason: HOSPADM

## 2020-09-08 RX ORDER — METOCLOPRAMIDE HYDROCHLORIDE 5 MG/ML
10 INJECTION INTRAMUSCULAR; INTRAVENOUS
Status: DISCONTINUED | OUTPATIENT
Start: 2020-09-08 | End: 2020-09-08 | Stop reason: HOSPADM

## 2020-09-08 RX ORDER — ONDANSETRON 2 MG/ML
4 INJECTION INTRAMUSCULAR; INTRAVENOUS
Status: DISCONTINUED | OUTPATIENT
Start: 2020-09-08 | End: 2020-09-08 | Stop reason: HOSPADM

## 2020-09-08 RX ORDER — LABETALOL 20 MG/4 ML (5 MG/ML) INTRAVENOUS SYRINGE
PRN
Status: DISCONTINUED | OUTPATIENT
Start: 2020-09-08 | End: 2020-09-08 | Stop reason: SDUPTHER

## 2020-09-08 RX ORDER — MIDAZOLAM HYDROCHLORIDE 1 MG/ML
INJECTION INTRAMUSCULAR; INTRAVENOUS PRN
Status: DISCONTINUED | OUTPATIENT
Start: 2020-09-08 | End: 2020-09-08 | Stop reason: SDUPTHER

## 2020-09-08 RX ORDER — LIDOCAINE HYDROCHLORIDE 20 MG/ML
INJECTION, SOLUTION INTRAVENOUS PRN
Status: DISCONTINUED | OUTPATIENT
Start: 2020-09-08 | End: 2020-09-08 | Stop reason: SDUPTHER

## 2020-09-08 RX ORDER — FENTANYL CITRATE 50 UG/ML
50 INJECTION, SOLUTION INTRAMUSCULAR; INTRAVENOUS EVERY 10 MIN PRN
Status: DISCONTINUED | OUTPATIENT
Start: 2020-09-08 | End: 2020-09-08 | Stop reason: HOSPADM

## 2020-09-08 RX ORDER — HEPARIN SODIUM 1000 [USP'U]/ML
INJECTION, SOLUTION INTRAVENOUS; SUBCUTANEOUS PRN
Status: DISCONTINUED | OUTPATIENT
Start: 2020-09-08 | End: 2020-09-08 | Stop reason: SDUPTHER

## 2020-09-08 RX ORDER — SODIUM CHLORIDE 0.9 % (FLUSH) 0.9 %
10 SYRINGE (ML) INJECTION PRN
Status: DISCONTINUED | OUTPATIENT
Start: 2020-09-08 | End: 2020-09-09 | Stop reason: HOSPADM

## 2020-09-08 RX ORDER — HYDROCODONE BITARTRATE AND ACETAMINOPHEN 5; 325 MG/1; MG/1
1 TABLET ORAL PRN
Status: DISCONTINUED | OUTPATIENT
Start: 2020-09-08 | End: 2020-09-08 | Stop reason: HOSPADM

## 2020-09-08 RX ORDER — SODIUM CHLORIDE 450 MG/100ML
INJECTION, SOLUTION INTRAVENOUS CONTINUOUS
Status: ACTIVE | OUTPATIENT
Start: 2020-09-08 | End: 2020-09-09

## 2020-09-08 RX ORDER — CEFAZOLIN SODIUM 2 G/50ML
2 SOLUTION INTRAVENOUS ONCE
Status: DISCONTINUED | OUTPATIENT
Start: 2020-09-08 | End: 2020-09-08 | Stop reason: HOSPADM

## 2020-09-08 RX ORDER — ASPIRIN 81 MG/1
81 TABLET ORAL DAILY
Status: DISCONTINUED | OUTPATIENT
Start: 2020-09-08 | End: 2020-09-09 | Stop reason: HOSPADM

## 2020-09-08 RX ORDER — HYDROCODONE BITARTRATE AND ACETAMINOPHEN 5; 325 MG/1; MG/1
2 TABLET ORAL PRN
Status: DISCONTINUED | OUTPATIENT
Start: 2020-09-08 | End: 2020-09-08 | Stop reason: HOSPADM

## 2020-09-08 RX ORDER — FENTANYL CITRATE 50 UG/ML
INJECTION, SOLUTION INTRAMUSCULAR; INTRAVENOUS PRN
Status: DISCONTINUED | OUTPATIENT
Start: 2020-09-08 | End: 2020-09-08 | Stop reason: SDUPTHER

## 2020-09-08 RX ORDER — HYDRALAZINE HYDROCHLORIDE 20 MG/ML
INJECTION INTRAMUSCULAR; INTRAVENOUS PRN
Status: DISCONTINUED | OUTPATIENT
Start: 2020-09-08 | End: 2020-09-08 | Stop reason: SDUPTHER

## 2020-09-08 RX ADMIN — Medication 0.2 MG: at 12:01

## 2020-09-08 RX ADMIN — SODIUM CHLORIDE 1000 ML: 4.5 INJECTION, SOLUTION INTRAVENOUS at 16:26

## 2020-09-08 RX ADMIN — ONDANSETRON 4 MG: 2 INJECTION INTRAMUSCULAR; INTRAVENOUS at 13:40

## 2020-09-08 RX ADMIN — Medication 5 MG: at 13:10

## 2020-09-08 RX ADMIN — FENTANYL CITRATE 25 MCG: 50 INJECTION, SOLUTION INTRAMUSCULAR; INTRAVENOUS at 14:55

## 2020-09-08 RX ADMIN — LABETALOL 20 MG/4 ML (5 MG/ML) INTRAVENOUS SYRINGE 2.5 MG: at 14:22

## 2020-09-08 RX ADMIN — FENTANYL CITRATE 25 MCG: 50 INJECTION, SOLUTION INTRAMUSCULAR; INTRAVENOUS at 14:41

## 2020-09-08 RX ADMIN — LIDOCAINE HYDROCHLORIDE 80 MG: 20 INJECTION, SOLUTION INTRAVENOUS at 11:46

## 2020-09-08 RX ADMIN — HYDRALAZINE HYDROCHLORIDE 5 MG: 20 INJECTION INTRAMUSCULAR; INTRAVENOUS at 13:45

## 2020-09-08 RX ADMIN — HYDRALAZINE HYDROCHLORIDE 5 MG: 20 INJECTION INTRAMUSCULAR; INTRAVENOUS at 14:02

## 2020-09-08 RX ADMIN — SODIUM CHLORIDE, POTASSIUM CHLORIDE, SODIUM LACTATE AND CALCIUM CHLORIDE: 600; 310; 30; 20 INJECTION, SOLUTION INTRAVENOUS at 11:42

## 2020-09-08 RX ADMIN — LIDOCAINE HYDROCHLORIDE 0.2 ML: 10 INJECTION, SOLUTION EPIDURAL; INFILTRATION; INTRACAUDAL; PERINEURAL at 10:06

## 2020-09-08 RX ADMIN — HYDRALAZINE HYDROCHLORIDE 10 MG: 20 INJECTION INTRAMUSCULAR; INTRAVENOUS at 14:07

## 2020-09-08 RX ADMIN — ROCURONIUM BROMIDE 10 MG: 10 INJECTION INTRAVENOUS at 12:42

## 2020-09-08 RX ADMIN — FENTANYL CITRATE 50 MCG: 50 INJECTION, SOLUTION INTRAMUSCULAR; INTRAVENOUS at 11:46

## 2020-09-08 RX ADMIN — SODIUM CHLORIDE, POTASSIUM CHLORIDE, SODIUM LACTATE AND CALCIUM CHLORIDE: 600; 310; 30; 20 INJECTION, SOLUTION INTRAVENOUS at 10:48

## 2020-09-08 RX ADMIN — Medication 10 MG: at 12:05

## 2020-09-08 RX ADMIN — PROPOFOL 130 MG: 10 INJECTION, EMULSION INTRAVENOUS at 11:46

## 2020-09-08 RX ADMIN — Medication 5 MG: at 12:24

## 2020-09-08 RX ADMIN — PROPOFOL 20 MG: 10 INJECTION, EMULSION INTRAVENOUS at 14:00

## 2020-09-08 RX ADMIN — ROCURONIUM BROMIDE 50 MG: 10 INJECTION INTRAVENOUS at 11:46

## 2020-09-08 RX ADMIN — MIDAZOLAM HYDROCHLORIDE 2 MG: 2 INJECTION, SOLUTION INTRAMUSCULAR; INTRAVENOUS at 11:42

## 2020-09-08 RX ADMIN — SUGAMMADEX 200 MG: 100 INJECTION, SOLUTION INTRAVENOUS at 14:00

## 2020-09-08 RX ADMIN — Medication 5 MG: at 12:35

## 2020-09-08 RX ADMIN — DEXAMETHASONE SODIUM PHOSPHATE 4 MG: 4 INJECTION, SOLUTION INTRAMUSCULAR; INTRAVENOUS at 11:40

## 2020-09-08 RX ADMIN — LABETALOL 20 MG/4 ML (5 MG/ML) INTRAVENOUS SYRINGE 2.5 MG: at 14:12

## 2020-09-08 RX ADMIN — HYDROMORPHONE HYDROCHLORIDE: 1 INJECTION, SOLUTION INTRAMUSCULAR; INTRAVENOUS; SUBCUTANEOUS at 15:33

## 2020-09-08 RX ADMIN — ASPIRIN 81 MG: 81 TABLET, COATED ORAL at 20:41

## 2020-09-08 RX ADMIN — DEXAMETHASONE SODIUM PHOSPHATE 4 MG: 4 INJECTION INTRA-ARTICULAR; INTRALESIONAL; INTRAMUSCULAR; INTRAVENOUS; SOFT TISSUE at 11:46

## 2020-09-08 RX ADMIN — HYDROMORPHONE HYDROCHLORIDE 1 MG: 1 INJECTION, SOLUTION INTRAMUSCULAR; INTRAVENOUS; SUBCUTANEOUS at 15:44

## 2020-09-08 RX ADMIN — Medication 10 ML: at 20:52

## 2020-09-08 RX ADMIN — FENTANYL CITRATE 25 MCG: 50 INJECTION, SOLUTION INTRAMUSCULAR; INTRAVENOUS at 12:13

## 2020-09-08 RX ADMIN — HEPARIN SODIUM 7500 UNITS: 1000 INJECTION INTRAVENOUS; SUBCUTANEOUS at 12:35

## 2020-09-08 RX ADMIN — DEXAMETHASONE SODIUM PHOSPHATE 4 MG: 4 INJECTION, SOLUTION INTRAMUSCULAR; INTRAVENOUS at 20:42

## 2020-09-08 RX ADMIN — FENTANYL CITRATE 25 MCG: 50 INJECTION, SOLUTION INTRAMUSCULAR; INTRAVENOUS at 12:44

## 2020-09-08 RX ADMIN — SODIUM CHLORIDE, POTASSIUM CHLORIDE, SODIUM LACTATE AND CALCIUM CHLORIDE: 600; 310; 30; 20 INJECTION, SOLUTION INTRAVENOUS at 10:07

## 2020-09-08 RX ADMIN — SODIUM CHLORIDE 1000 ML: 450 INJECTION, SOLUTION INTRAVENOUS at 16:26

## 2020-09-08 RX ADMIN — DEXAMETHASONE SODIUM PHOSPHATE 4 MG: 4 INJECTION, SOLUTION INTRAMUSCULAR; INTRAVENOUS at 16:26

## 2020-09-08 ASSESSMENT — PULMONARY FUNCTION TESTS
PIF_VALUE: 21
PIF_VALUE: 21
PIF_VALUE: 19
PIF_VALUE: 22
PIF_VALUE: 20
PIF_VALUE: 21
PIF_VALUE: 1
PIF_VALUE: 14
PIF_VALUE: 19
PIF_VALUE: 21
PIF_VALUE: 21
PIF_VALUE: 2
PIF_VALUE: 3
PIF_VALUE: 21
PIF_VALUE: 19
PIF_VALUE: 14
PIF_VALUE: 16
PIF_VALUE: 24
PIF_VALUE: 21
PIF_VALUE: 1
PIF_VALUE: 21
PIF_VALUE: 20
PIF_VALUE: 21
PIF_VALUE: 21
PIF_VALUE: 2
PIF_VALUE: 21
PIF_VALUE: 20
PIF_VALUE: 19
PIF_VALUE: 2
PIF_VALUE: 16
PIF_VALUE: 18
PIF_VALUE: 20
PIF_VALUE: 20
PIF_VALUE: 14
PIF_VALUE: 21
PIF_VALUE: 20
PIF_VALUE: 20
PIF_VALUE: 21
PIF_VALUE: 20
PIF_VALUE: 18
PIF_VALUE: 2
PIF_VALUE: 20
PIF_VALUE: 20
PIF_VALUE: 21
PIF_VALUE: 20
PIF_VALUE: 16
PIF_VALUE: 19
PIF_VALUE: 20
PIF_VALUE: 18
PIF_VALUE: 20
PIF_VALUE: 21
PIF_VALUE: 18
PIF_VALUE: 2
PIF_VALUE: 2
PIF_VALUE: 20
PIF_VALUE: 20
PIF_VALUE: 21
PIF_VALUE: 20
PIF_VALUE: 19
PIF_VALUE: 20
PIF_VALUE: 1
PIF_VALUE: 16
PIF_VALUE: 1
PIF_VALUE: 0
PIF_VALUE: 0
PIF_VALUE: 18
PIF_VALUE: 20
PIF_VALUE: 20
PIF_VALUE: 16
PIF_VALUE: 20
PIF_VALUE: 21
PIF_VALUE: 0
PIF_VALUE: 20
PIF_VALUE: 22
PIF_VALUE: 21
PIF_VALUE: 20
PIF_VALUE: 21
PIF_VALUE: 19
PIF_VALUE: 20
PIF_VALUE: 16
PIF_VALUE: 19
PIF_VALUE: 1
PIF_VALUE: 2
PIF_VALUE: 21
PIF_VALUE: 2
PIF_VALUE: 20
PIF_VALUE: 20
PIF_VALUE: 21
PIF_VALUE: 20
PIF_VALUE: 0
PIF_VALUE: 21
PIF_VALUE: 20
PIF_VALUE: 19
PIF_VALUE: 16
PIF_VALUE: 20
PIF_VALUE: 19
PIF_VALUE: 19
PIF_VALUE: 20
PIF_VALUE: 21
PIF_VALUE: 21
PIF_VALUE: 19
PIF_VALUE: 20
PIF_VALUE: 16
PIF_VALUE: 21
PIF_VALUE: 19
PIF_VALUE: 18
PIF_VALUE: 20
PIF_VALUE: 20
PIF_VALUE: 1
PIF_VALUE: 20
PIF_VALUE: 20
PIF_VALUE: 19
PIF_VALUE: 16
PIF_VALUE: 19
PIF_VALUE: 19
PIF_VALUE: 23
PIF_VALUE: 20
PIF_VALUE: 16
PIF_VALUE: 1
PIF_VALUE: 20
PIF_VALUE: 23
PIF_VALUE: 21
PIF_VALUE: 2
PIF_VALUE: 20
PIF_VALUE: 18
PIF_VALUE: 21
PIF_VALUE: 20
PIF_VALUE: 20
PIF_VALUE: 18
PIF_VALUE: 20
PIF_VALUE: 21
PIF_VALUE: 18
PIF_VALUE: 21
PIF_VALUE: 19
PIF_VALUE: 18
PIF_VALUE: 33
PIF_VALUE: 21
PIF_VALUE: 1
PIF_VALUE: 18
PIF_VALUE: 21
PIF_VALUE: 19
PIF_VALUE: 20
PIF_VALUE: 2
PIF_VALUE: 20
PIF_VALUE: 19
PIF_VALUE: 2

## 2020-09-08 ASSESSMENT — PAIN DESCRIPTION - ORIENTATION
ORIENTATION: LEFT

## 2020-09-08 ASSESSMENT — PAIN SCALES - GENERAL
PAINLEVEL_OUTOF10: 6
PAINLEVEL_OUTOF10: 0
PAINLEVEL_OUTOF10: 9
PAINLEVEL_OUTOF10: 8
PAINLEVEL_OUTOF10: 0
PAINLEVEL_OUTOF10: 8
PAINLEVEL_OUTOF10: 0
PAINLEVEL_OUTOF10: 10
PAINLEVEL_OUTOF10: 0

## 2020-09-08 ASSESSMENT — PAIN DESCRIPTION - PAIN TYPE
TYPE: SURGICAL PAIN

## 2020-09-08 ASSESSMENT — PAIN DESCRIPTION - LOCATION
LOCATION: NECK

## 2020-09-08 NOTE — BRIEF OP NOTE
Brief Postoperative Note      Patient: Wisam Avendano  YOB: 1945  MRN: [id number]    Date of Procedure: 9/8/2020    Pre-Op Diagnosis: CAROTID STENOSIS LEFT > 70%    Post-Op Diagnosis: LEFT CAROTID STENOSIS 90%       Procedure(s):  LEFT CAROTID ENDARTERECTOMY    Surgeon(s):  Magnus Marie MD    Assistant:  First Assistant: Gely Chicas    Anesthesia: General    Estimated Blood Loss (mL): < 187 cc    Complications: none    Specimens:   ID Type Source Tests Collected by Time Destination   A : plaque Tissue Carotid Arteries SURGICAL PATHOLOGY Magnus Marie MD 9/8/2020 1230        Implants:  Implant Name Type Inv. Item Serial No.  Lot No. LRB No. Used Action   GRAFT PATCH VASC KNT DBL VELR 1.0X3.0IN Vascular/Graft/Patch/Filter GRAFT PATCH VASC KNT DBL VELR 1.0X3.0IN  MAQUET [id number] Left 1 Implanted         Drains:   Open Drain Left; Anterior Neck (Active)       Findings: dictated    Electronically signed by Magnus Marie MD on 9/8/2020 at 2:05 PM

## 2020-09-08 NOTE — PROGRESS NOTES
Dr Eric Artis at bedside and evaluated pt. Pt orinted and follows commands. States c/o pain lt neck.

## 2020-09-08 NOTE — OP NOTE
[address]                      [address], [address] [facility]                                OPERATIVE REPORT    PATIENT NAME: Glen Douglass                      :        1945  MED REC NO:   [id number]                            ROOM:  ACCOUNT NO:   [de-identified]                           ADMIT DATE: 2020  PROVIDER:     Obinna Steve MD    DATE OF PROCEDURE:  2020    PREOPERATIVE DIAGNOSIS:  Progressive stenosis of left internal carotid  artery 70% via ultrasound. POSTOPERATIVE DIAGNOSIS:  90% stenosis confirmed, left internal carotid  artery. OPERATION:  1. Left carotid endarterectomy with intraoperative shunt and patch  angioplasty. 2.  Confirmatory and completion selective carotid DSA with supervision  interpretation. SURGEON:  Obinna Steve MD    ANESTHESIA:  General.    FINDINGS:  A 27-year-old gentleman with progressive stenosis of his left  internal carotid artery as detected by ultrasound. Clinically, he had  bilateral carotid bruits and he is a vasculopath with diabetes and prior  multiple interventions of his lower extremities. He was prepared for  surgery and underwent this procedure, which was done without  complication in the method described below. At the conclusion of  surgery, the patient was neurologically assessed and was able to move  his upper and lower extremities in appropriate fashion to verbal  command. OPERATIVE PROCEDURE:  The operation is as follows. With the patient  properly prepared and identified using loupes magnification throughout,  we made an incision standard on the left neck paralleling the anterior  border of the sternocleidomastoid muscle. We deepened our dissection  down through the soft tissue and skeletonized the common left carotid  artery deep in the neck. We used electrocautery for hemostasis of soft  tissue as well as ligatures of silk to divide the venous branches as  needed.   We then cannulated the common left carotid artery with a  microcatheter system and used the first of our 12 mL of contrast to  delineate in lateral fashion the pathology of the left internal carotid  artery showing the 90% stenosis at the origin of the ECA. We continued  our dissection to skeletonize the carotid bifurcation. We identified  the hypoglossal nerve and the ansa hypoglossal and did not violate their  anatomic integrity. We administered 7500 units of heparin as a systemic  IV bolus and then we clamped the arteries for 4 minutes and 30 seconds  and opened into a heavily calcified vessel in which it was difficult to  divide through, but we did so using a 5-cm long arteriotomy starting at  the common carotid artery and extending cephalad into the ICA. We then  placed our intraoperative shunt to perfuse the left cerebral hemisphere  and then we performed the endarterectomy coring out the heavily  calcified plaque from the common carotid artery, the bifurcation and  then lifting it from the internal carotid artery as a fine endpoint. We  also then withdrew the plaque from the origin of the ECA and once we did  all of this, we then used our forceps to clean the fibers from the  posterior arterial wall creating as smooth a tissue plane as possible. We closed the arteriotomy with a patch elliptically shaping a Hemashield  material and using 6-0 Prolene suture. We continued the suture line on  either mediolateral side and then a second clamp time of 2 minutes and  58 seconds was employed during which we removed our shunt and purchased  the final sutures as we were ready to tie our suture line. We irrigated  the lumen to remove any air and debris and we allowed backflow from the  external carotid artery to fill the vessel.   When we tied off our suture  line and released our clamps and restored flow to left cerebral  hemisphere, there were two needed repair sutures placed on the medial  side and lateral side for hemostasis. We recannulated the common  carotid artery with a micropuncture catheter system and performed the  DSA completion study, which showed excellent result. We then irrigated  the wound. We checked and secured hemostasis and we closed in layers. Sponge, instruments and needle counts were correct x2. The estimated  blood loss was less than 150 mL. The patient was awakened, assessed  neurologically and then transferred from [address] to PACU in stable  condition. We telephoned his wife, Sarita Adame and informed her of the  surgery and results and answered her questions.         Bernardo Ulloa MD    D: 09/08/2020 14:37:11       T: 09/08/2020 14:42:48     [email or web address]  Job#: [id number]     Doc#: [id number]    CC:

## 2020-09-08 NOTE — PROGRESS NOTES
Pt resting comfortably, dr hernandez aware of K+ redraw of 4.8, pt denies needs at this time, awaiting to go to the OR

## 2020-09-08 NOTE — FLOWSHEET NOTE
9929-2021 pt from PACU via bed. Drowsy from pain medicine but easily awakens, denies pain. A&ox4, RUGGIERO, follows commands, PERRL 3 b/l. Hand grasps and foot pumps equal. L neck drsg shows scant amt dark red drainage. MP SR. Admission process completed. Pt wife Dino in to visit, updated, given HIPAA code. On 3L will wean as pt awakens. Pt drifts back to sleep easily. meds per MAR. A-line dc'd per order, pt tolerated, pressure held x 5 minutes and pressure dressing applied. 1800 pt more awake, remains a&ox4, denies pain. Bedside swallow screening done, pt passed. Eating dinner. O2 to 2L NC    1830 assisted to stand at bedside to void, 350 cc yellow urine noted. Back to bed. Pt remains slightly drowsy and a bit unsteady. Placed on RA, will monitor.      1845 pt asleep RR even/unlab, vss _Electronically signed by Juani Berry RN on 9/8/2020 at 6:49 PM

## 2020-09-08 NOTE — ANESTHESIA POSTPROCEDURE EVALUATION
Department of Anesthesiology  Postprocedure Note    Patient: Tanna Pickard  MRN: [id number]  YOB: 1945  Date of evaluation: 9/8/2020  Time:  4:12 PM     Procedure Summary     Date:  09/08/20 Room / Location:  [address] [place] [address]    Anesthesia Start:  5428 Anesthesia Stop:  7664    Procedure:  LEFT CAROTID ENDARTERECTOMY (Left Neck) Diagnosis:  (CAROTID STENOSIS)    Surgeon:  Kalli Isabel MD Responsible Provider:  Kavin Dakins, APRN - CRNA    Anesthesia Type:  general ASA Status:  4          Anesthesia Type: general    Raymundo Phase I: Raymundo Score: 8    Raymundo Phase II:      Last vitals: Reviewed and per EMR flowsheets.        Anesthesia Post Evaluation    Patient location during evaluation: bedside  Patient participation: complete - patient participated  Level of consciousness: awake and awake and alert  Pain score: 0  Airway patency: patent  Nausea & Vomiting: no nausea and no vomiting  Complications: no  Cardiovascular status: blood pressure returned to baseline and hemodynamically stable  Respiratory status: acceptable  Hydration status: euvolemic

## 2020-09-08 NOTE — H&P
[address]                      [address], [address] [facility]                              HISTORY AND PHYSICAL    PATIENT NAME: Ruth Burnett                      :        1945  MED REC NO:   [id number]                            ROOM:  ACCOUNT NO:   [de-identified]                           ADMIT DATE: 2020  PROVIDER:     Lux Cid MD    HISTORY OF PRESENT ILLNESS:  Left carotid endarterectomy is scheduled  for this 77-year-old gentleman as an elective procedure. He is well  known to me for lower extremity peripheral arterial disease with  intervention, surgeries ranging from  through 2017. He remains  totally asymptomatic in terms of his lower extremities and he maintains  a very active activity profile, golfing multiple times a week, and doing  well enjoying his halfway. He was found recently to have by  ultrasound of his carotid arteries a stenosis of progression in the left  side, which is over 70%. In , which was his last imaging, it was  50%. This was ordered by his FMD, Dr. Remi Dey. He in turn went doing  preparation for the surgery to Dr. Orlando  as well as to Dr. Fritz Tyson. The  patient is ready for the surgery and it is an elective operation. PAST MEDICAL HISTORY:  Features hypertension, arthritis, diabetes. PAST SURGICAL HISTORY:  Includes the remote surgeries from  through  , a cardiac stent in . REVIEW OF SYSTEMS:  His 12-point review of systems features no  additional issues. ALLERGIES:  He has no known allergies. HOME MEDICATIONS:  Include Eliquis, which was stopped on Saturday. SOCIAL HISTORY:  He is , lives with his wife, Ngoc López. They  have five children, two biological.  He is retired as an electronic  repairman. He smoked one pack of cigarettes a day for 35 years. He  stopped three years ago. He does not use ethanol and he denies illicit  drug use.     PHYSICAL EXAMINATION:  GENERAL:  He

## 2020-09-08 NOTE — ANESTHESIA PROCEDURE NOTES
Arterial Line:    An arterial line was placed using surface landmarks, in the pre-op for the following indication(s): continuous blood pressure monitoring and blood sampling needed. A 20 gauge (size), 1 and 3/4 inch (length), Arrow (type) catheter was placed, Seldinger technique used, into the left radial artery, secured by tape and Tegaderm. Anesthesia type: Local  Local infiltration: Injection    Events:  patient tolerated procedure well with no complications.   9/8/2020 11:48 AM9/8/2020 11:52 AM  Anesthesiologist: Chava Galeano DO  Performed: Anesthesiologist   Preanesthetic Checklist  Completed: patient identified, site marked, surgical consent, pre-op evaluation, timeout performed, IV checked, risks and benefits discussed, monitors and equipment checked, anesthesia consent given, oxygen available and patient being monitored

## 2020-09-09 VITALS
WEIGHT: 212.52 LBS | HEIGHT: 69 IN | RESPIRATION RATE: 21 BRPM | BODY MASS INDEX: 31.48 KG/M2 | TEMPERATURE: 97.9 F | DIASTOLIC BLOOD PRESSURE: 57 MMHG | HEART RATE: 85 BPM | OXYGEN SATURATION: 100 % | SYSTOLIC BLOOD PRESSURE: 163 MMHG

## 2020-09-09 LAB
SARS-COV-2: NOT DETECTED
SOURCE: NORMAL

## 2020-09-09 PROCEDURE — 6370000000 HC RX 637 (ALT 250 FOR IP): Performed by: THORACIC SURGERY (CARDIOTHORACIC VASCULAR SURGERY)

## 2020-09-09 PROCEDURE — 2700000000 HC OXYGEN THERAPY PER DAY

## 2020-09-09 PROCEDURE — 6360000002 HC RX W HCPCS: Performed by: THORACIC SURGERY (CARDIOTHORACIC VASCULAR SURGERY)

## 2020-09-09 PROCEDURE — 99221 1ST HOSP IP/OBS SF/LOW 40: CPT | Performed by: PSYCHIATRY & NEUROLOGY

## 2020-09-09 PROCEDURE — 2580000003 HC RX 258: Performed by: THORACIC SURGERY (CARDIOTHORACIC VASCULAR SURGERY)

## 2020-09-09 RX ORDER — LISINOPRIL 20 MG/1
20 TABLET ORAL DAILY
Status: DISCONTINUED | OUTPATIENT
Start: 2020-09-09 | End: 2020-09-09 | Stop reason: CLARIF

## 2020-09-09 RX ORDER — AMLODIPINE BESYLATE AND BENAZEPRIL HYDROCHLORIDE 10; 20 MG/1; MG/1
1 CAPSULE ORAL DAILY
Status: DISCONTINUED | OUTPATIENT
Start: 2020-09-09 | End: 2020-09-09 | Stop reason: CLARIF

## 2020-09-09 RX ORDER — AMLODIPINE BESYLATE 10 MG/1
10 TABLET ORAL DAILY
Status: DISCONTINUED | OUTPATIENT
Start: 2020-09-09 | End: 2020-09-09 | Stop reason: HOSPADM

## 2020-09-09 RX ORDER — AMLODIPINE BESYLATE 10 MG/1
10 TABLET ORAL DAILY
Status: DISCONTINUED | OUTPATIENT
Start: 2020-09-09 | End: 2020-09-09 | Stop reason: CLARIF

## 2020-09-09 RX ORDER — HYDROCHLOROTHIAZIDE 25 MG/1
25 TABLET ORAL DAILY
Status: DISCONTINUED | OUTPATIENT
Start: 2020-09-09 | End: 2020-09-09 | Stop reason: HOSPADM

## 2020-09-09 RX ORDER — ATENOLOL 50 MG/1
50 TABLET ORAL 2 TIMES DAILY
Status: DISCONTINUED | OUTPATIENT
Start: 2020-09-09 | End: 2020-09-09 | Stop reason: HOSPADM

## 2020-09-09 RX ORDER — LISINOPRIL 20 MG/1
20 TABLET ORAL DAILY
Status: DISCONTINUED | OUTPATIENT
Start: 2020-09-09 | End: 2020-09-09 | Stop reason: HOSPADM

## 2020-09-09 RX ADMIN — ASPIRIN 81 MG: 81 TABLET, COATED ORAL at 08:35

## 2020-09-09 RX ADMIN — DEXAMETHASONE SODIUM PHOSPHATE 4 MG: 4 INJECTION, SOLUTION INTRAMUSCULAR; INTRAVENOUS at 11:52

## 2020-09-09 RX ADMIN — Medication 10 ML: at 08:35

## 2020-09-09 RX ADMIN — DEXAMETHASONE SODIUM PHOSPHATE 4 MG: 4 INJECTION, SOLUTION INTRAMUSCULAR; INTRAVENOUS at 04:24

## 2020-09-09 RX ADMIN — HYDROMORPHONE HYDROCHLORIDE 1 MG: 1 INJECTION, SOLUTION INTRAMUSCULAR; INTRAVENOUS; SUBCUTANEOUS at 03:33

## 2020-09-09 ASSESSMENT — ENCOUNTER SYMPTOMS
SHORTNESS OF BREATH: 0
PHOTOPHOBIA: 0
CHOKING: 0
COLOR CHANGE: 0
BACK PAIN: 0
TROUBLE SWALLOWING: 0
NAUSEA: 0
VOMITING: 0

## 2020-09-09 ASSESSMENT — PAIN SCALES - GENERAL
PAINLEVEL_OUTOF10: 0
PAINLEVEL_OUTOF10: 8

## 2020-09-09 NOTE — CONSULTS
Naun Rhodes MD at [address] [facility] Left 2020    LEFT CAROTID ENDARTERECTOMY performed by Naun Rhodes MD at [address] N/A 11    xience stent to RAC and CIRC    CORONARY ANGIOPLASTY WITH STENT PLACEMENT Left 13    xience stent to LAD    CORONARY ANGIOPLASTY WITH STENT PLACEMENT Right 13    Promus stent to RCA    DIAGNOSTIC CARDIAC CATH LAB PROCEDURE  2019    FEMORAL-FEMORAL BYPASS GRAFT      HERNIA REPAIR      as child    MI ESOPHAGOGASTRODUODENOSCOPY TRANSORAL DIAGNOSTIC N/A 2017    EGD ESOPHAGOGASTRODUODENOSCOPY with biopsy performed by Kaveh Jack MD at [address] Left 10/27/2017    LEFT FEMORAL DISTAL BYPASS, (PAT DONE 10-2-17) performed by Naun Rhodes MD at [address] [facility] Marital status:      Spouse name: Not on file    Number of children: Not on file    Years of education: Not on file    Highest education level: Not on file   Occupational History    Not on file   Social Needs    Financial resource strain: Not on file    Food insecurity     Worry: Not on file     Inability: Not on file    Transportation needs     Medical: Not on file     Non-medical: Not on file   Tobacco Use    Smoking status: Former Smoker     Start date: 1960     Last attempt to quit: 2008     Years since quittin.6    Smokeless tobacco: Never Used   Substance and Sexual Activity    Alcohol use: No     Alcohol/week: 0.0 standard drinks    Drug use: No    Sexual activity: Not on file   Lifestyle    Physical activity     Days per week: Not on file     Minutes per session: Not on file    Stress: Not on file   Relationships    Social connections     Talks on phone: Not on file     Gets together: Not on file     Attends Mandaeism service: Not on file     Active member of club or organization: Not on file     Attends meetings of clubs or organizations: Not on file     Relationship status: Not on file    Intimate partner violence     Fear of current or ex partner: Not on file     Emotionally abused: Not on file     Physically abused: Not on file     Forced sexual activity: Not on file   Other Topics Concern    Not on file   Social History Narrative    Not on file     Family History   Problem Relation Age of Onset    Diabetes Mother     Heart Disease Father     Heart Attack Father     No Known Problems Son     Cancer Other         lung    Prostate Cancer Other      No Known Allergies  Current Facility-Administered Medications   Medication Dose Route Frequency Provider Last Rate Last Dose    sodium chloride flush 0.9 % injection 10 mL  10 mL Intravenous 2 times per day Wilfredo Aguilar MD   10 mL at 09/09/20 0835    sodium chloride flush 0.9 % injection 10 mL  10 mL Intravenous PRN Wilfredo Aguilar MD        aspirin EC tablet 81 mg  81 mg Oral Daily Wilfredo Aguilar MD   81 mg at 09/09/20 0835    HYDROmorphone (DILAUDID) injection 1 mg  1 mg Intravenous Q4H PRN Wilfredo Aguilar MD   1 mg at 09/09/20 0333    dexamethasone (DECADRON) injection 4 mg  4 mg Intravenous Q6H Wilfredo Aguilar MD   4 mg at 09/09/20 0424        Objective:   BP (!) 169/63   Pulse 73   Temp 98.2 °F (36.8 °C) (Oral)   Resp 14   Ht 5' 8.5\" (1.74 m)   Wt 212 lb 8.4 oz (96.4 kg)   SpO2 99%   BMI 31.84 kg/m²     Physical Exam  Vitals signs reviewed. Eyes:      Pupils: Pupils are equal, round, and reactive to light. Neck:      Musculoskeletal: Normal range of motion. Cardiovascular:      Rate and Rhythm: Normal rate and regular rhythm. Heart sounds: No murmur. Pulmonary:      Effort: Pulmonary effort is normal.      Breath sounds: Normal breath sounds. Abdominal:      General: Bowel sounds are normal.   Musculoskeletal: Normal range of motion. Skin:     General: Skin is warm.    Neurological:      Mental Status: He is alert and oriented to person, place, and time. Cranial Nerves: No cranial nerve deficit. Sensory: No sensory deficit. Motor: No abnormal muscle tone. Coordination: Coordination normal.      Deep Tendon Reflexes: Reflexes are normal and symmetric. Babinski sign absent on the right side. Babinski sign absent on the left side. Psychiatric:         Mood and Affect: Mood normal.         No results found. Lab Results   Component Value Date    WBC 5.0 09/01/2020    RBC 3.41 09/01/2020    RBC 4.07 02/03/2012    HGB 10.0 09/01/2020    HCT 30.7 09/01/2020    MCV 90.1 09/01/2020    MCH 29.3 09/01/2020    MCHC 32.5 09/01/2020    RDW 17.6 09/01/2020     09/01/2020    MPV 10.4 09/24/2015     Lab Results   Component Value Date     09/08/2020    K 4.8 09/08/2020     09/08/2020    CO2 30 09/08/2020    BUN 21 09/08/2020    CREATININE 1.07 09/08/2020    GFRAA >60.0 09/08/2020    LABGLOM >60.0 09/08/2020    GLUCOSE 105 09/08/2020    GLUCOSE 111 11/08/2011    PROT 6.7 09/01/2020    LABALBU 4.1 09/01/2020    LABALBU 5.0 11/04/2011    CALCIUM 8.8 09/08/2020    BILITOT 0.5 09/01/2020    ALKPHOS 73 09/01/2020    AST 27 09/01/2020    ALT 16 09/01/2020     Lab Results   Component Value Date    PROTIME 10.3 09/19/2015    PROTIME 10.7 11/04/2011    INR 1.0 09/19/2015     Lab Results   Component Value Date    TSH 3.490 05/30/2019    BRFTSRTF91 363 11/15/2017    FOLATE 8.6 11/15/2017    FERRITIN 652.3 04/12/2019    IRON 78 04/12/2019    TIBC 284 04/12/2019     Lab Results   Component Value Date    TRIG 84 05/30/2019    HDL 61 05/30/2019    LDLCALC 113 05/30/2019     No results found for: LABAMPH, BARBSCNU, LABBENZ, CANNAB, COCAINESCRN, LABMETH, OPIATESCREENURINE, PHENCYCLIDINESCREENURINE, PPXUR, ETOH  No results found for: LITHIUM, DILFRTOT, VALPROATE    Assessment:   Carotid stenosis of the left internal carotid 70%.   Patient is multiple risk factors for some vascular disease and therefore operative procedure was recommended. We will follow this up as an outpatient with a CT angiogram showing the same findings. His angiogram shows more than 80% stenosis. He had a successful endarterectomy yesterday and is doing very well. He does not have any residuals noted endarterectomy. Patient may be discharged from neurological standpoint. Sid Carmona MD, Patricia Ledezma, American Board of Psychiatry & Neurology  Board Certified in Vascular Neurology  Board Certified in Neuromuscular Medicine  Certified in [facility]:

## 2020-09-09 NOTE — FLOWSHEET NOTE
Received patient from [address] [place] [address] at change of shift. Patient alert and oriented. Neuro assessment within normal limits. VSS. Patient recovering from a L carotid surgery yesterday. L neck incision intact- surgical glued. Pulled penrose drain at 0750 per Dr Jeanette Bell order. Patient tolerated well. Site well approximated. Surgical glued. Well approximated. Scant [place] drainage. Bandaid placed on drain site. Patient states it is tender but no c/o pain. Patient up to chair for breakfast.   Patient ate roughly 50% of breakfast.  Urinated via urinal. No issues. Urine assessment negative. Medicated with morning meds. Incision site negative. Patient did well with walk (walked beds 1-8 with no assistance besides standby) and movement to chair. Dr Rohit Toledo rounded briefly. Discussed with patient that he would be by this afternoon to discharge the patient. Patient aware and thankful. Dr Carmelina Gama rounded. [id number] [address] per his standpoint to discharge the patient. Wife visited this morning. No needs. Updated on patient care and impending discharge- thankful. Dr Rohit Toledo rounded at [id number]. Patient discharged home. Patient to resume eliquis and previous home meds. Script for tramadol for pain copied and given to patient. Dr Rohit Toledo instructions copied and given to patient. Dr Annabel Mcleod reconciled medications- patient refused because he's going home and can take them there. AVS and summary of care printed, reviewed, and signed by the patient. Patient aware to take eliquis and resume his meds when he gets home. Removed IVs.   Patient got dressed. Wife arrived. Reviewed paperwork. Thankful. Belongings taken with patient. Transport left with patient at [phone].

## 2020-09-09 NOTE — DISCHARGE INSTR - ACTIVITY
Resume activity as tolerated. Beware of neck! Avoid whiplash type movements. Avoid heavy lifting or strenuous activity until cleared per Dr Jossue Mcnulty. Avoid driving until cleared by Dr Jossue Mcnulty. Any concerns call Dr Ashley Isabel office.

## 2020-09-09 NOTE — CARE COORDINATION
[facility] [place] Case Management Initial Discharge Assessment    Met with Patient to discuss discharge plan. Risk of readmission is 8%. PCP: Fausto Lopez MD                                Date of Last Visit: does not recall    If no PCP, list provided? N/A    Discharge Planning    Living Arrangements: independently at home    Who do you live with? WIFE    Who helps you with your care:  self    If lives at home:     Do you have any barriers navigating in your home? no    Patient can perform ADL? Yes    Current Services (outpatient and in home) :  None    Dialysis: No    Is transportation available to get to your appointments? Yes    DME Equipment:  yes - access to shower chair    Respiratory equipment: None    Respiratory provider:  no     Pharmacy:  yes - [address] with Medication Assistance Program?  No      Patient agreeable to [address]? N/A    Patient agreeable to SNF/Rehab? N/A    Other discharge needs identified? N/A    Freedom of choice list provided with basic dialogue that supports the patient's individualized plan of care/goals and shares the quality data associated with the providers. Yes    The plan for Transition of Care is related to the following treatment goals: Await orders for a quick d/c. Initial Discharge Plan? (Note: please see concurrent daily documentation for any updates after initial note). Met with patient who reports he was independent prior to hospitalization. He reports he was told he'd be DC this date. Per patient, he feels he has no needs concerning his DC plans.      The Patient and/or patient representative: Patient was provided with choice of any post-acute providers for care and equipment and agrees with discharge plan  Yes    Electronically signed by PHIL Serrano on 9/9/2020 at 10:00 AM   Electronically signed by PHIL Serrano on 9/9/2020 at 10:04 AM

## 2020-09-09 NOTE — PROGRESS NOTES
PHARMACY NOTE:    Amlodipine-benazepril (Lotrel) 10-20 mg daily changed to amlodipine 10 mg + lisinopril 20 mg daily per [facility].     Nancy Pathak, PharmD   9/9/2020 12:56 PM

## 2020-09-09 NOTE — PROGRESS NOTES
Spiritual Care Services     Summary of Visit:      Spiritual Assessment/Intervention/Outcomes:    Encounter Summary  Services provided to[de-identified] Patient and family together  Referral/Consult From[de-identified] Rounding  Support System: Spouse, Children, Family members  Continue Visiting: No  Complexity of Encounter: Moderate  Length of Encounter: 15 minutes  Spiritual Assessment Completed: Yes  Routine  Type: Initial  Assessment: Calm, Approachable  Intervention: Prayer, [place], Sustaining presence/ Ministry of presence  Outcome: Receptive              Advance Directives (For Healthcare)  Healthcare Directive: Yes, patient has an advance directive for healthcare treatment  Type of Healthcare Directive: Durable power of  for health care, Living will  Copy in Chart: No, copy requested from family  Healthcare Agent Appointed: Spouse           Values / Beliefs  Do you have any ethnic, cultural, sacramental, or spiritual Nondenominational needs you would like us to be aware of while you are in the hospital?: No    Care Plan:        [id number]   Electronically signed by Yris Barillas on 9/9/20 at 11:04 AM EDT     To reach a  for emotional and spiritual support, place an [facility] consult request.   If a  is needed immediately, dial 0 and ask to page the on-call .

## 2020-09-09 NOTE — PROGRESS NOTES
1. Do not shave around the incision area for 3 weeks. 2. Do not use cologne, perfume, aftershave, creams or powders near the incision. 3. You may shower 3 days after the day of your surgery. 4. Keep the incision area clean and dry. 5. Avoid heavy lifting (over 10 pounds) for 3-4 weeks. 6. Avoid driving a car until approved by Dr. Deandra Valenzuela after first postoperative appointment. 7. Walk as much as you like. Remember, you will tire more easily in the first few weeks following surgery, guide yourself accordingly. 8. If you're incision site develops a large amount of swelling, redness and/or drainage, or if you begin to run a high fever (102° or higher), call the office. 9. Take ONE coated aspirin (such as Ecotrin) every day. If your stomach is sensitive, you may take ONE  baby aspirin. 10. If you need medication for discomfort, take extra strength Tylenol, as directed on the bottle. 11. Dr. Julius Flanagan office will call you to schedule a follow-up appointment. 12. If you have any questions or problems, call the office. Dr. Julius Flanagan office: [phone].

## 2020-11-03 PROBLEM — I25.10 CAD (CORONARY ARTERY DISEASE): Status: RESOLVED | Noted: 2017-10-02 | Resolved: 2020-11-03

## 2021-01-15 ENCOUNTER — OFFICE VISIT (OUTPATIENT)
Dept: CARDIOLOGY CLINIC | Age: 76
End: 2021-01-15
Payer: MEDICARE

## 2021-01-15 VITALS
HEIGHT: 70 IN | BODY MASS INDEX: 30.21 KG/M2 | OXYGEN SATURATION: 99 % | RESPIRATION RATE: 16 BRPM | WEIGHT: 211 LBS | HEART RATE: 58 BPM | DIASTOLIC BLOOD PRESSURE: 62 MMHG | SYSTOLIC BLOOD PRESSURE: 130 MMHG

## 2021-01-15 DIAGNOSIS — I47.29 NSVT (NONSUSTAINED VENTRICULAR TACHYCARDIA): ICD-10-CM

## 2021-01-15 DIAGNOSIS — I25.119 CORONARY ARTERY DISEASE INVOLVING NATIVE CORONARY ARTERY OF NATIVE HEART WITH ANGINA PECTORIS (HCC): Primary | ICD-10-CM

## 2021-01-15 DIAGNOSIS — E78.49 OTHER HYPERLIPIDEMIA: ICD-10-CM

## 2021-01-15 DIAGNOSIS — I10 ESSENTIAL HYPERTENSION: ICD-10-CM

## 2021-01-15 PROCEDURE — 99214 OFFICE O/P EST MOD 30 MIN: CPT | Performed by: INTERNAL MEDICINE

## 2021-01-15 ASSESSMENT — ENCOUNTER SYMPTOMS
WHEEZING: 0
CHEST TIGHTNESS: 1
GASTROINTESTINAL NEGATIVE: 1
BLOOD IN STOOL: 0
NAUSEA: 0
COUGH: 0
STRIDOR: 0
EYES NEGATIVE: 1
SHORTNESS OF BREATH: 0

## 2021-01-15 NOTE — PROGRESS NOTES
N/A 11    xience stent to RAC and CIRC    CORONARY ANGIOPLASTY WITH STENT PLACEMENT Left 13    xience stent to LAD    CORONARY ANGIOPLASTY WITH STENT PLACEMENT Right 13    Promus stent to RCA    DIAGNOSTIC CARDIAC CATH LAB PROCEDURE  2019    FEMORAL-FEMORAL BYPASS GRAFT      HERNIA REPAIR      as child    [place] ESOPHAGOGASTRODUODENOSCOPY TRANSORAL DIAGNOSTIC N/A 2017    EGD ESOPHAGOGASTRODUODENOSCOPY with biopsy performed by Ameena Polk MD at [address] Left 10/27/2017    LEFT FEMORAL DISTAL BYPASS, (PAT DONE 10-2-17) performed by Omer Dawkins MD at [facility] History   Problem Relation Age of Onset    Diabetes Mother     Heart Disease Father     Heart Attack Father     No Known Problems Son     Cancer Other         lung    Prostate Cancer Other        Social History     Socioeconomic History    Marital status:      Spouse name: None    Number of children: None    Years of education: None    Highest education level: None   Occupational History    None   Social Needs    Financial resource strain: None    Food insecurity     Worry: None     Inability: None    Transportation needs     Medical: None     Non-medical: None   Tobacco Use    Smoking status: Former Smoker     Start date: 1960     Quit date:      Years since quittin.0    Smokeless tobacco: Never Used   Substance and Sexual Activity    Alcohol use: No     Alcohol/week: 0.0 standard drinks    Drug use: No    Sexual activity: None   Lifestyle    Physical activity     Days per week: None     Minutes per session: None    Stress: None   Relationships    Social connections     Talks on phone: None     Gets together: None     Attends Yazdanism service: None     Active member of club or organization: None     Attends meetings of clubs or organizations: None     Relationship status: None    Intimate partner violence     Fear of current or ex partner: None     Emotionally abused: None     Physically abused: None     Forced sexual activity: None   Other Topics Concern    None   Social History Narrative    None       No Known Allergies    Current Outpatient Medications   Medication Sig Dispense Refill    ELIQUIS 5 MG TABS tablet Take 5 mg by mouth 2 times daily      gabapentin (NEURONTIN) 300 MG capsule Take 300 mg by mouth 2 times daily.  FREESTYLE LITE strip TEST ONCE DAILY UTD      atenolol (TENORMIN) 50 MG tablet TAKE 1 TABLET BY MOUTH TWICE DAILY 180 tablet 2    hydrochlorothiazide (HYDRODIURIL) 25 MG tablet Take 25 mg by mouth daily      atorvastatin (LIPITOR) 40 MG tablet Take 1 tablet by mouth daily 90 tablet 2    meclizine (ANTIVERT) 12.5 MG tablet Take 12.5 mg by mouth 3 times daily as needed      nitroGLYCERIN (NITROSTAT) 0.4 MG SL tablet Place 1 tablet under the tongue every 5 minutes as needed for Chest pain up to max of 3 total doses. If no relief after 1 dose, call 911. 25 tablet 1    ammonium lactate (LAC-HYDRIN) 12 % lotion 1 applicator      aspirin 81 MG EC tablet Take 81 mg by mouth      tiZANidine (ZANAFLEX) 2 MG tablet Take 2 mg by mouth every 6 hours as needed      ferrous sulfate 325 (65 FE) MG tablet Take 325 mg by mouth 2 times daily       pioglitazone (ACTOS) 45 MG tablet Take 45 mg by mouth daily       amLODIPine-benazepril (LOTREL) 10-20 MG per capsule Take 1 capsule by mouth daily       ULORIC 80 MG TABS Take 80 mg by mouth daily       ranolazine (RANEXA) 500 MG extended release tablet TAKE 1 TABLET BY MOUTH TWICE DAILY (Patient not taking: Reported on 1/15/2021) 180 tablet 3     No current facility-administered medications for this visit. Review of Systems:   Review of Systems   Constitutional: Negative. Negative for diaphoresis and fatigue. HENT: Negative. Eyes: Negative. Respiratory: Positive for chest tightness. Negative for cough, shortness of breath, wheezing and stridor. Cardiovascular: Negative. Negative for chest pain, palpitations and leg swelling. Gastrointestinal: Negative. Negative for blood in stool and nausea. Genitourinary: Negative. Musculoskeletal: Negative. Skin: Negative. Neurological: Positive for dizziness. Negative for syncope, weakness and light-headedness. Hematological: Negative. Psychiatric/Behavioral: Negative. Physical Examination:    /62 (Site: Left Upper Arm, Position: Sitting, Cuff Size: Large Adult)   Pulse 58   Resp 16   Ht 5' 10\" (1.778 m)   Wt 211 lb (95.7 kg)   SpO2 99%   BMI 30.28 kg/m²    Physical Exam   Constitutional: He appears healthy. No distress. HENT:   Normal cephalic and Atraumatic   Eyes: Pupils are equal, round, and reactive to light. Neck: Normal range of motion and thyroid normal. Neck supple. No JVD present. No neck adenopathy. No thyromegaly present. Cardiovascular: Normal rate and regular rhythm. Exam reveals decreased pulses. Murmur heard. Pulmonary/Chest: Effort normal and breath sounds normal. He has no wheezes. He has no rales. He exhibits no tenderness. Abdominal: Soft. Bowel sounds are normal. There is no abdominal tenderness. Musculoskeletal: Normal range of motion. General: No tenderness or edema. Neurological: He is alert and oriented to person, place, and time. Skin: Skin is warm. No cyanosis. Nails show no clubbing.        LABS:  CBC:   Lab Results   Component Value Date    WBC 5.0 09/01/2020    RBC 3.41 09/01/2020    RBC 4.07 02/03/2012    HGB 10.0 09/01/2020    HCT 30.7 09/01/2020    MCV 90.1 09/01/2020    MCH 29.3 09/01/2020    MCHC 32.5 09/01/2020    RDW 17.6 09/01/2020     09/01/2020    MPV 10.4 09/24/2015     Lipids:  Lab Results   Component Value Date    CHOL 191 05/30/2019    CHOL 178 03/11/2015    CHOL 198 12/09/2014     Lab Results   Component Value Date    TRIG 84 05/30/2019    TRIG 85 03/11/2015    TRIG 227 (H) 12/09/2014     Lab Results Component Value Date    HDL 61 (H) 05/30/2019    HDL 71 (H) 03/11/2015    HDL 60 (H) 12/09/2014     Lab Results   Component Value Date    LDLCALC 113 05/30/2019    LDLCALC 90 03/11/2015    LDLCALC 93 12/09/2014     No results found for: LABVLDL, VLDL  Lab Results   Component Value Date    CHOLHDLRATIO 3.4 03/05/2013    CHOLHDLRATIO 3.1 11/04/2011     CMP:    Lab Results   Component Value Date     09/08/2020    K 4.8 09/08/2020     09/08/2020    CO2 30 09/08/2020    BUN 21 09/08/2020    CREATININE 1.07 09/08/2020    GFRAA >60.0 09/08/2020    LABGLOM >60.0 09/08/2020    GLUCOSE 105 09/08/2020    GLUCOSE 111 11/08/2011    PROT 6.7 09/01/2020    LABALBU 4.1 09/01/2020    LABALBU 5.0 11/04/2011    CALCIUM 8.8 09/08/2020    BILITOT 0.5 09/01/2020    ALKPHOS 73 09/01/2020    AST 27 09/01/2020    ALT 16 09/01/2020     BMP:    Lab Results   Component Value Date     09/08/2020    K 4.8 09/08/2020     09/08/2020    CO2 30 09/08/2020    BUN 21 09/08/2020    LABALBU 4.1 09/01/2020    LABALBU 5.0 11/04/2011    CREATININE 1.07 09/08/2020    CALCIUM 8.8 09/08/2020    GFRAA >60.0 09/08/2020    LABGLOM >60.0 09/08/2020    GLUCOSE 105 09/08/2020    GLUCOSE 111 11/08/2011     Magnesium:    Lab Results   Component Value Date    MG 2.2 05/30/2019    MG 2.5 11/04/2011     TSH:  Lab Results   Component Value Date    TSH 3.490 05/30/2019       Patient Active Problem List   Diagnosis    Peripheral vascular disease, unspecified ([facility]    Essential hypertension    Occlusive disease of artery of lower extremity (HCC)    Gout    Angina, class III ([facility])    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, 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 ([facility]    Stenosis of left carotid artery    Dyslipidemia    Angina of effort ([facility]    NSVT (nonsustained ventricular tachycardia) ([address]    Coronary artery disease involving native coronary artery of native heart with angina pectoris ([address]    Other hyperlipidemia    Coronary artery disease involving native coronary artery of native heart without angina pectoris    Pain in right knee    Unilateral primary osteoarthritis, right knee    Unilateral primary osteoarthritis, right knee    Anemia of unknown etiology    Bradycardia    Right flank pain    Syncope and collapse    Carotid stenosis, left       There are no discontinued medications. Modified Medications    No medications on file       No orders of the defined types were placed in this encounter. Assessment/Plan:    1. Coronary artery disease involving native coronary artery of native heart without angina pectoris    2. Essential hypertension   stble     3. Peripheral vascular disease ([address]   Dr. Nafisa Thompson     4. Angina, class III ([address]    5. Bilateral carotid artery stenosis   Dr. Nafisa Thompson     6. Occlusive disease of artery of lower extremity ([address]     7. Dyslipidemia  Advance Atorva to 40     8. NSVT ( cath related casuing CP) - HM negative. 9. LE Edema- probably related to him sleeping in recliner as his legs are  Free of edema when he sleeps in bed. Counseling:  Heart Healthy Lifestyle, Low Salt Diet, Take Precautions to Prevent Falls and Walk Daily    Return in about 6 months (around 7/15/2021).       Electronically signed by Jon Rivero MD on 1/15/2021 at 2:13 PM

## 2021-08-06 ENCOUNTER — OFFICE VISIT (OUTPATIENT)
Dept: CARDIOLOGY CLINIC | Age: 76
End: 2021-08-06
Payer: MEDICARE

## 2021-08-06 VITALS
OXYGEN SATURATION: 97 % | WEIGHT: 210 LBS | HEART RATE: 61 BPM | BODY MASS INDEX: 30.13 KG/M2 | DIASTOLIC BLOOD PRESSURE: 68 MMHG | SYSTOLIC BLOOD PRESSURE: 110 MMHG

## 2021-08-06 DIAGNOSIS — I20.8 ANGINA OF EFFORT (HCC): ICD-10-CM

## 2021-08-06 DIAGNOSIS — I25.10 CORONARY ARTERY DISEASE INVOLVING NATIVE CORONARY ARTERY OF NATIVE HEART WITHOUT ANGINA PECTORIS: ICD-10-CM

## 2021-08-06 DIAGNOSIS — I70.209 OCCLUSIVE DISEASE OF ARTERY OF LOWER EXTREMITY (HCC): ICD-10-CM

## 2021-08-06 DIAGNOSIS — E78.5 DYSLIPIDEMIA: ICD-10-CM

## 2021-08-06 DIAGNOSIS — I73.9 PERIPHERAL VASCULAR DISEASE (HCC): ICD-10-CM

## 2021-08-06 DIAGNOSIS — I65.23 BILATERAL CAROTID ARTERY STENOSIS: ICD-10-CM

## 2021-08-06 DIAGNOSIS — I10 ESSENTIAL HYPERTENSION: ICD-10-CM

## 2021-08-06 DIAGNOSIS — I47.29 NSVT (NONSUSTAINED VENTRICULAR TACHYCARDIA): ICD-10-CM

## 2021-08-06 DIAGNOSIS — I25.119 CORONARY ARTERY DISEASE INVOLVING NATIVE CORONARY ARTERY OF NATIVE HEART WITH ANGINA PECTORIS (HCC): Primary | ICD-10-CM

## 2021-08-06 DIAGNOSIS — E78.49 OTHER HYPERLIPIDEMIA: ICD-10-CM

## 2021-08-06 PROCEDURE — 99214 OFFICE O/P EST MOD 30 MIN: CPT | Performed by: INTERNAL MEDICINE

## 2021-08-06 RX ORDER — ISOSORBIDE MONONITRATE 30 MG/1
30 TABLET, EXTENDED RELEASE ORAL DAILY
Qty: 30 TABLET | Refills: 3 | Status: SHIPPED | OUTPATIENT
Start: 2021-08-06 | End: 2021-08-06 | Stop reason: SDUPTHER

## 2021-08-06 RX ORDER — ISOSORBIDE MONONITRATE 30 MG/1
30 TABLET, EXTENDED RELEASE ORAL DAILY
Qty: 90 TABLET | Refills: 2 | Status: SHIPPED | OUTPATIENT
Start: 2021-08-06 | End: 2021-12-03

## 2021-08-06 RX ORDER — NITROGLYCERIN 0.4 MG/1
0.4 TABLET SUBLINGUAL EVERY 5 MIN PRN
Qty: 25 TABLET | Refills: 1 | Status: SHIPPED | OUTPATIENT
Start: 2021-08-06 | End: 2021-09-30

## 2021-08-06 ASSESSMENT — ENCOUNTER SYMPTOMS
GASTROINTESTINAL NEGATIVE: 1
SHORTNESS OF BREATH: 0
COUGH: 0
WHEEZING: 0
CHEST TIGHTNESS: 1
BLOOD IN STOOL: 0
STRIDOR: 0
EYES NEGATIVE: 1
NAUSEA: 0

## 2021-08-06 NOTE — PROGRESS NOTES
Subsequent Progress Note  Patient: Sudhakar Merino  YOB: 1945  MRN: [id number]    Chief Complaint: CP cad pad htn Dizzy   Chief Complaint   Patient presents with    Coronary Artery Disease    Hypertension    6 Month Follow-Up       CV Data:  CAD 5-6 stents in total  10/2017 Left Fem Distal bypass- Dr. Constance Partida  Remote- RIGHT  LE bypass  6/19/2018 Cath: Fabens Lick RCA stents all patent with Mild ISR EF 55  8/2015 CUS DARIEL 50-69- followed by Dr. Constance Partida  2/2018 echo ef 65  2/2019 spect negative  2019 LCEA  9/20/2019: cath LAD CX and RCA prior stents mild ISR but patent. Septal  occluded and fills from RCA. EF 60 EDP 9  11/2019 HM negative. 10/2020 CUS - Zolli. -LCEA patent RCIA - mild     Subjective/HPI: recently had 2 angina ( chest pressure heavy no radiation no sob but + diaphoresis moderate) took NTG w relief. Also having dizzy spells     9/27/2019: no cp no osb no falls no bleed takes meds. Had Pig tail catheter induced VT causing CP during cath     12/30/2019 no cp no sob no falls no bleed occ leg edema due to sleeping in a recliner. He falls a sleep every night wahching tv.     7/10/2020 no cp no sob no falls no bleed. Eats well. Sleep well. 1/15/21  Doing well no cp no sob some edema no falls n bleed. coouple months ago started Eliquis 5 bid by Dr. Zhane Arvizu( Etiology unclear)     8/6/21 recent 3 episodes of angina ( heavy pressure) requiring NTG with relief. Sits down and relieved. No SOB but little nausea and diaphoresis.       Works on [employer]    EKG:    Past Medical History:   Diagnosis Date    Anemia, normocytic normochromic     CAD (coronary artery disease)     multiple PCI    Diabetes mellitus ([address]     Gout 10/2/2017    Hyperlipidemia     Hypertension     Occlusive disease of artery of lower extremity ([facility] 10/2/2017       Past Surgical History:   Procedure Laterality Date    ARTERIOGRAM Right 10/6/2017    RIGHT ARM AORTO-FEMORAL DIGITAL SUBTRACTION ARTERIOGRAPHY performed by Brijesh Ma MD at [facility] Left 2020    LEFT CAROTID ENDARTERECTOMY performed by Brijesh Ma MD at [address] [facility] N/A 11    xience stent to RAC and CIRC    CORONARY ANGIOPLASTY WITH STENT PLACEMENT Left 13    xience stent to LAD    CORONARY ANGIOPLASTY WITH STENT PLACEMENT Right 13    Promus stent to RCA    DIAGNOSTIC CARDIAC CATH LAB PROCEDURE  2019    FEMORAL-FEMORAL BYPASS GRAFT      HERNIA REPAIR      as child    [place] ESOPHAGOGASTRODUODENOSCOPY TRANSORAL DIAGNOSTIC N/A 2017    EGD ESOPHAGOGASTRODUODENOSCOPY with biopsy performed by Ritu Landon MD at [address] Left 10/27/2017    LEFT FEMORAL DISTAL BYPASS, (PAT DONE 10-2-17) performed by Brijesh Ma MD at [facility] History   Problem Relation Age of Onset    Diabetes Mother     Heart Disease Father     Heart Attack Father     No Known Problems Son     Cancer Other         lung    Prostate Cancer Other        Social History     Socioeconomic History    Marital status:      Spouse name: Not on file    Number of children: Not on file    Years of education: Not on file    Highest education level: Not on file   Occupational History    Not on file   Tobacco Use    Smoking status: Former Smoker     Start date: 1960     Quit date:      Years since quittin.6    Smokeless tobacco: Never Used   Substance and Sexual Activity    Alcohol use: No     Alcohol/week: 0.0 standard drinks    Drug use: No    Sexual activity: Not on file   Other Topics Concern    Not on file   Social History Narrative    Not on file     Social Determinants of Health     Financial Resource Strain:     Difficulty of Paying Living Expenses:    Food Insecurity:     Worried About Running Out of Food in the Last Year:     [address] in the Last Year: Transportation Needs:     Lack of Transportation (Medical):  Lack of Transportation (Non-Medical):    Physical Activity:     Days of Exercise per Week:     Minutes of Exercise per Session:    Stress:     Feeling of Stress :    Social Connections:     Frequency of Communication with Friends and Family:     Frequency of Social Gatherings with Friends and Family:     Attends Yarsani Services:     Active Member of Clubs or Organizations:     Attends Club or Organization Meetings:     Marital Status:    Intimate Partner Violence:     Fear of Current or Ex-Partner:     Emotionally Abused:     Physically Abused:     Sexually Abused:        No Known Allergies    Current Outpatient Medications   Medication Sig Dispense Refill    nitroGLYCERIN (NITROSTAT) 0.4 MG SL tablet Place 1 tablet under the tongue every 5 minutes as needed for Chest pain up to max of 3 total doses. If no relief after 1 dose, call 911. 25 tablet 1    isosorbide mononitrate (IMDUR) 30 MG extended release tablet Take 1 tablet by mouth daily 30 tablet 3    ELIQUIS 5 MG TABS tablet Take 5 mg by mouth 2 times daily      gabapentin (NEURONTIN) 300 MG capsule Take 300 mg by mouth 2 times daily.       FREESTYLE LITE strip TEST ONCE DAILY UTD      atenolol (TENORMIN) 50 MG tablet TAKE 1 TABLET BY MOUTH TWICE DAILY 180 tablet 2    hydrochlorothiazide (HYDRODIURIL) 25 MG tablet Take 25 mg by mouth daily      atorvastatin (LIPITOR) 40 MG tablet Take 1 tablet by mouth daily 90 tablet 2    meclizine (ANTIVERT) 12.5 MG tablet Take 12.5 mg by mouth 3 times daily as needed      ammonium lactate (LAC-HYDRIN) 12 % lotion 1 applicator      aspirin 81 MG EC tablet Take 81 mg by mouth      tiZANidine (ZANAFLEX) 2 MG tablet Take 2 mg by mouth every 6 hours as needed      ferrous sulfate 325 (65 FE) MG tablet Take 325 mg by mouth 2 times daily       pioglitazone (ACTOS) 45 MG tablet Take 45 mg by mouth daily       amLODIPine-benazepril HGB 10.7 05/27/2021    HCT 33.2 05/27/2021    MCV 88.1 05/27/2021    MCH 28.3 05/27/2021    MCHC 32.1 05/27/2021    RDW 17.3 05/27/2021     05/27/2021    MPV 10.4 09/24/2015     Lipids:  Lab Results   Component Value Date    CHOL 176 05/27/2021    CHOL 191 05/30/2019    CHOL 178 03/11/2015     Lab Results   Component Value Date    TRIG 101 05/27/2021    TRIG 84 05/30/2019    TRIG 85 03/11/2015     Lab Results   Component Value Date    HDL 58 05/27/2021    HDL 61 (H) 05/30/2019    HDL 71 (H) 03/11/2015     Lab Results   Component Value Date    LDLCALC 98 05/27/2021    LDLCALC 113 05/30/2019    LDLCALC 90 03/11/2015     No results found for: LABVLDL, VLDL  Lab Results   Component Value Date    CHOLHDLRATIO 3.4 03/05/2013    CHOLHDLRATIO 3.1 11/04/2011     CMP:    Lab Results   Component Value Date     05/27/2021    K 4.1 05/27/2021     05/27/2021    CO2 27 05/27/2021    BUN 28 05/27/2021    CREATININE 1.32 05/27/2021    GFRAA >60.0 05/27/2021    LABGLOM 52.6 05/27/2021    GLUCOSE 108 05/27/2021    GLUCOSE 111 11/08/2011    PROT 6.9 05/27/2021    LABALBU 4.5 05/27/2021    LABALBU 5.0 11/04/2011    CALCIUM 8.9 05/27/2021    BILITOT 0.5 05/27/2021    ALKPHOS 101 05/27/2021    AST 26 05/27/2021    ALT 21 05/27/2021     BMP:    Lab Results   Component Value Date     05/27/2021    K 4.1 05/27/2021     05/27/2021    CO2 27 05/27/2021    BUN 28 05/27/2021    LABALBU 4.5 05/27/2021    LABALBU 5.0 11/04/2011    CREATININE 1.32 05/27/2021    CALCIUM 8.9 05/27/2021    GFRAA >60.0 05/27/2021    LABGLOM 52.6 05/27/2021    GLUCOSE 108 05/27/2021    GLUCOSE 111 11/08/2011     Magnesium:    Lab Results   Component Value Date    MG 2.2 05/30/2019    MG 2.5 11/04/2011     TSH:  Lab Results   Component Value Date    TSH 3.490 05/30/2019       Patient Active Problem List   Diagnosis    Peripheral vascular disease, unspecified ([facility]    Essential hypertension    [facility]clusive [facility] lower 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    Stenosis of left carotid artery    Dyslipidemia    Angina of effort ([facility])    NSVT (nonsustained ventricular tachycardia) ([facility])    Coronary artery disease involving native coronary artery of native heart with angina pectoris ([facility])    Other hyperlipidemia    Coronary artery disease involving native coronary artery of native heart without angina pectoris    Pain in right knee    Unilateral primary osteoarthritis, right knee    Unilateral primary osteoarthritis, right knee    Anemia of unknown etiology    Bradycardia    Right flank pain    Syncope and collapse    Carotid stenosis, left       Medications Discontinued During This Encounter   Medication Reason    ranolazine (RANEXA) 500 MG extended release tablet LIST CLEANUP    nitroGLYCERIN (NITROSTAT) 0.4 MG SL tablet REORDER       Modified Medications    Modified Medication Previous Medication    NITROGLYCERIN (NITROSTAT) 0.4 MG SL TABLET nitroGLYCERIN (NITROSTAT) 0.4 MG SL tablet       Place 1 tablet under the tongue every 5 minutes as needed for Chest pain up to max of 3 total doses. If no relief after 1 dose, call 911. Place 1 tablet under the tongue every 5 minutes as needed for Chest pain up to max of 3 total doses. If no relief after 1 dose, call 911. Orders Placed This Encounter   Medications    nitroGLYCERIN (NITROSTAT) 0.4 MG SL tablet     Sig: Place 1 tablet under the tongue every 5 minutes as needed for Chest pain up to max of 3 total doses. If no relief after 1 dose, call 911. Dispense:  25 tablet     Refill:  1    isosorbide mononitrate (IMDUR) 30 MG extended release tablet     Sig: Take 1 tablet by mouth daily     Dispense:  30 tablet     Refill:  3       Assessment/Plan:    1.  Coronary artery disease involving native coronary artery of native heart without angina pectoris -- angina- SPECt and Echo - add

## 2021-09-02 ENCOUNTER — HOSPITAL ENCOUNTER (OUTPATIENT)
Dept: NUCLEAR MEDICINE | Age: 76
Discharge: HOME | End: 2021-09-04
Payer: MEDICARE

## 2021-09-02 ENCOUNTER — HOSPITAL ENCOUNTER (OUTPATIENT)
Dept: NON INVASIVE DIAGNOSTICS | Age: 76
Discharge: HOME | End: 2021-09-02
Payer: MEDICARE

## 2021-09-02 DIAGNOSIS — I20.8 ANGINA OF EFFORT (HCC): ICD-10-CM

## 2021-09-02 LAB
LV EF: 65 %
LVEF MODALITY: NORMAL

## 2021-09-02 PROCEDURE — 93306 TTE W/DOPPLER COMPLETE: CPT

## 2021-09-02 PROCEDURE — 78452 HT MUSCLE IMAGE SPECT MULT: CPT

## 2021-09-02 PROCEDURE — A9502 TC99M TETROFOSMIN: HCPCS | Performed by: INTERNAL MEDICINE

## 2021-09-02 PROCEDURE — 2580000003 HC RX 258: Performed by: INTERNAL MEDICINE

## 2021-09-02 PROCEDURE — 3430000000 HC RX DIAGNOSTIC RADIOPHARMACEUTICAL: Performed by: INTERNAL MEDICINE

## 2021-09-02 PROCEDURE — 6360000002 HC RX W HCPCS: Performed by: INTERNAL MEDICINE

## 2021-09-02 PROCEDURE — 93017 CV STRESS TEST TRACING ONLY: CPT

## 2021-09-02 PROCEDURE — 93018 CV STRESS TEST I&R ONLY: CPT | Performed by: INTERNAL MEDICINE

## 2021-09-02 RX ORDER — SODIUM CHLORIDE 0.9 % (FLUSH) 0.9 %
10 SYRINGE (ML) INJECTION PRN
Status: COMPLETED | OUTPATIENT
Start: 2021-09-02 | End: 2021-09-02

## 2021-09-02 RX ADMIN — REGADENOSON 0.4 MG: 0.08 INJECTION, SOLUTION INTRAVENOUS at 09:16

## 2021-09-02 RX ADMIN — SODIUM CHLORIDE, PRESERVATIVE FREE 10 ML: 5 INJECTION INTRAVENOUS at 08:09

## 2021-09-02 RX ADMIN — TETROFOSMIN 34.7 MILLICURIE: 1.38 INJECTION, POWDER, LYOPHILIZED, FOR SOLUTION INTRAVENOUS at 09:16

## 2021-09-02 RX ADMIN — SODIUM CHLORIDE, PRESERVATIVE FREE 10 ML: 5 INJECTION INTRAVENOUS at 09:17

## 2021-09-02 RX ADMIN — SODIUM CHLORIDE, PRESERVATIVE FREE 10 ML: 5 INJECTION INTRAVENOUS at 09:16

## 2021-09-02 RX ADMIN — TETROFOSMIN 11.7 MILLICURIE: 1.38 INJECTION, POWDER, LYOPHILIZED, FOR SOLUTION INTRAVENOUS at 08:09

## 2021-09-02 NOTE — PROGRESS NOTES
Reviewed history, allergies, and medications. Patient held his home medications prior to testing. Consent confirmed. Lexiscan exam explained. Placed patient on monitor. @944 Nuclear Medicine tech here to inject Delvin [employer]aning. SOB noted during recovery phase. Denied chest pain. No ectopy noted. Patient off monitor and instructed to eat, will have last part of exam in 1 hour.

## 2021-09-02 NOTE — PROCEDURES
Sisi Mckeon La Josh [address] [place] [address], [id number] [facility]                              CARDIAC STRESS TEST    PATIENT NAME: Wanda Shrestha                      :        1945  MED REC NO:   [id number]                            ROOM:  ACCOUNT NO:   [de-identified]                           ADMIT DATE: 2021  PROVIDER:     Yamileth Clemente MD    CARDIOVASCULAR DIAGNOSTIC DEPARTMENT    DATE OF STUDY:  2021    STRESS TEST    ORDERING PROVIDERS:  Jenelle Mcrae MD and Oralia Price MD    INDICATION:  Chest pain. TECHNIQUE:  At rest, the patient was injected with 11.7 mCi of Myoview. Resting images were obtained. The patient was then given 0.4 of  Lexiscan followed by administration of 34.7 mCi of Myoview. Stress  tomographic images were then obtained. Left ventricular ejection  fraction and gated wall motion were acquired. RESULTS:  Resting heart rate is 63 beats per minute. Maximum heart rate  achieved was 77 beats per minute. No diagnostic ST-segment changes were  noted. IMAGING RESULTS:  Review of rest and stress tomographic images revealed  homogenous myocardial perfusion with no evidence of prior myocardial  infarction or ischemia. Left ventricular ejection fraction is 63%. TID  ratio is 1.0, which is within normal limits. IMPRESSION:  1. Homogenous myocardial perfusion with no evidence of prior myocardial  infarction or ischemia. 2.  Normal left ventricular ejection fraction. 3.  Normal TID ratio.           Rommel Morin MD    D: 2021 #16:38:40       T: 2021 16:40:58     [email or web address]  Job#: [id number]     Doc#: [id number]    CC:

## 2021-09-03 ENCOUNTER — OFFICE VISIT (OUTPATIENT)
Dept: CARDIOLOGY CLINIC | Age: 76
End: 2021-09-03
Payer: MEDICARE

## 2021-09-03 VITALS
HEIGHT: 70 IN | HEART RATE: 55 BPM | SYSTOLIC BLOOD PRESSURE: 82 MMHG | DIASTOLIC BLOOD PRESSURE: 49 MMHG | BODY MASS INDEX: 29.35 KG/M2 | RESPIRATION RATE: 16 BRPM | OXYGEN SATURATION: 96 % | WEIGHT: 205 LBS

## 2021-09-03 DIAGNOSIS — I70.209 OCCLUSIVE DISEASE OF ARTERY OF LOWER EXTREMITY (HCC): ICD-10-CM

## 2021-09-03 DIAGNOSIS — I73.9 PERIPHERAL VASCULAR DISEASE (HCC): ICD-10-CM

## 2021-09-03 DIAGNOSIS — I25.10 CORONARY ARTERY DISEASE INVOLVING NATIVE CORONARY ARTERY OF NATIVE HEART WITHOUT ANGINA PECTORIS: ICD-10-CM

## 2021-09-03 DIAGNOSIS — I10 ESSENTIAL HYPERTENSION: ICD-10-CM

## 2021-09-03 DIAGNOSIS — E78.49 OTHER HYPERLIPIDEMIA: ICD-10-CM

## 2021-09-03 DIAGNOSIS — E78.5 DYSLIPIDEMIA: ICD-10-CM

## 2021-09-03 DIAGNOSIS — I25.119 CORONARY ARTERY DISEASE INVOLVING NATIVE CORONARY ARTERY OF NATIVE HEART WITH ANGINA PECTORIS (HCC): Primary | ICD-10-CM

## 2021-09-03 DIAGNOSIS — I65.23 BILATERAL CAROTID ARTERY STENOSIS: ICD-10-CM

## 2021-09-03 DIAGNOSIS — I47.29 NSVT (NONSUSTAINED VENTRICULAR TACHYCARDIA): ICD-10-CM

## 2021-09-03 DIAGNOSIS — I20.8 ANGINA OF EFFORT (HCC): ICD-10-CM

## 2021-09-03 PROCEDURE — 99214 OFFICE O/P EST MOD 30 MIN: CPT | Performed by: INTERNAL MEDICINE

## 2021-09-03 ASSESSMENT — ENCOUNTER SYMPTOMS
CHEST TIGHTNESS: 1
BLOOD IN STOOL: 0
GASTROINTESTINAL NEGATIVE: 1
SHORTNESS OF BREATH: 0
COUGH: 0
STRIDOR: 0
EYES NEGATIVE: 1
NAUSEA: 0
WHEEZING: 0

## 2021-09-03 NOTE — PROGRESS NOTES
Subsequent Progress Note  Patient: Andria Martinez  YOB: 1945  MRN: [id number]    Chief Complaint: CP cad pad htn Dizzy   Chief Complaint   Patient presents with    Results     ECHO, Stress Test    Coronary Artery Disease    Hypertension       CV Data:  CAD 5-6 stents in total  10/2017 Left Fem Distal bypass- Dr. Lola Cali  Remote- RIGHT  LE bypass  6/19/2018 Cath: Lacy Bushy RCA stents all patent with Mild ISR EF 55  8/2015 CUS DARIEL 50-69- followed by Dr. Lola Cali  2/2018 echo ef 65  2/2019 spect negative  2019 LCEA  9/20/2019: cath LAD CX and RCA prior stents mild ISR but patent. Septal  occluded and fills from RCA. EF 60 EDP 9  11/2019 HM negative. 10/2020 CUS - Zolli. -LCEA patent RCIA - mild   9/21 SPECT negative   9/21 Echo EF 65 1+ MR  RVSP 56     Subjective/HPI: recently had 2 angina ( chest pressure heavy no radiation no sob but + diaphoresis moderate) took NTG w relief. Also having dizzy spells     9/27/2019: no cp no osb no falls no bleed takes meds. Had Pig tail catheter induced VT causing CP during cath     12/30/2019 no cp no sob no falls no bleed occ leg edema due to sleeping in a recliner. He falls a sleep every night wahching tv.     7/10/2020 no cp no sob no falls no bleed. Eats well. Sleep well. 1/15/21  Doing well no cp no sob some edema no falls n bleed. coouple months ago started Eliquis 5 bid by Dr. Cristofer Mosley( Etiology unclear)     8/6/21 recent 3 episodes of angina ( heavy pressure) requiring NTG with relief. Sits down and relieved. No SOB but little nausea and diaphoresis. 9/3/21 no further CP no further SL NTG.  No SOB no bleed no falls     Works on [employer]    EKG:    Past Medical History:   Diagnosis Date    Anemia, normocytic normochromic     CAD (coronary artery disease)     multiple PCI    Diabetes mellitus ([facility]     Gout 10/2/2017    Hyperlipidemia     Hypertension     Occlusive disease of artery of lower extremity ([facility] [facility] 10/2/2017 Past Surgical History:   Procedure Laterality Date    ARTERIOGRAM Right 10/6/2017    RIGHT ARM AORTO-FEMORAL DIGITAL SUBTRACTION ARTERIOGRAPHY performed by Sonny Fisher MD at [address] [facility] Left 2020    LEFT CAROTID ENDARTERECTOMY performed by Sonny Fisher MD at [address] N/A 11    xience stent to RAC and CIRC    CORONARY ANGIOPLASTY WITH STENT PLACEMENT Left 13    xience stent to LAD    CORONARY ANGIOPLASTY WITH STENT PLACEMENT Right 13    Promus stent to RCA    DIAGNOSTIC CARDIAC CATH LAB PROCEDURE  2019    FEMORAL-FEMORAL BYPASS GRAFT      HERNIA REPAIR      as child    [place] ESOPHAGOGASTRODUODENOSCOPY TRANSORAL DIAGNOSTIC N/A 2017    EGD ESOPHAGOGASTRODUODENOSCOPY with biopsy performed by Rose Mary Moore MD at [address] [place] [address] Left 10/27/2017    LEFT FEMORAL DISTAL BYPASS, (PAT DONE 10-2-17) performed by Sonny Fisher MD at [address] [facility] History   Problem Relation Age of Onset    Diabetes Mother     Heart Disease Father     Heart Attack Father     No Known Problems Son     Cancer Other         lung    Prostate Cancer Other        Social History     Socioeconomic History    Marital status:      Spouse name: None    Number of children: None    Years of education: None    Highest education level: None   Occupational History    None   Tobacco Use    Smoking status: Former Smoker     Start date: 1960     Quit date:      Years since quittin.6    Smokeless tobacco: Never Used   Substance and Sexual Activity    Alcohol use: No     Alcohol/week: 0.0 standard drinks    Drug use: No    Sexual activity: None   Other Topics Concern    None   Social History Narrative    None     Social Determinants of Health     Financial Resource Strain:     Difficulty of Paying Living Expenses:    Food Insecurity:     Worried About Running Out tablet Take 45 mg by mouth daily       amLODIPine-benazepril (LOTREL) 10-20 MG per capsule Take 1 capsule by mouth daily       ULORIC 80 MG TABS Take 80 mg by mouth daily        No current facility-administered medications for this visit. Review of Systems:   Review of Systems   Constitutional: Negative. Negative for diaphoresis and fatigue. HENT: Negative. Eyes: Negative. Respiratory: Positive for chest tightness. Negative for cough, shortness of breath, wheezing and stridor. Cardiovascular: Negative. Negative for chest pain, palpitations and leg swelling. Gastrointestinal: Negative. Negative for blood in stool and nausea. Genitourinary: Negative. Musculoskeletal: Negative. Skin: Negative. Neurological: Positive for dizziness. Negative for syncope, weakness and light-headedness. Hematological: Negative. Psychiatric/Behavioral: Negative. Physical Examination:    BP (!) 82/49 (Site: Right Upper Arm, Position: Sitting, Cuff Size: Large Adult)   Pulse 55   Resp 16   Ht 5' 10\" (1.778 m)   Wt 205 lb (93 kg)   SpO2 96%   BMI 29.41 kg/m²    Physical Exam   Constitutional: He appears healthy. No distress. HENT:   Normal cephalic and Atraumatic   Eyes: Pupils are equal, round, and reactive to light. Neck: Thyroid normal. No JVD present. No neck adenopathy. No thyromegaly present. Cardiovascular: Normal rate and regular rhythm. Exam reveals decreased pulses. Murmur heard. Pulmonary/Chest: Effort normal and breath sounds normal. He has no wheezes. He has no rales. He exhibits no tenderness. Abdominal: Soft. Bowel sounds are normal. There is no abdominal tenderness. Musculoskeletal:         General: No tenderness or edema. Normal range of motion. Cervical back: Normal range of motion and neck supple. Neurological: He is alert and oriented to person, place, and time. Skin: Skin is warm. No cyanosis. Nails show no clubbing.        LABS:  CBC:   Lab Results   Component Value Date    WBC 4.0 05/27/2021    RBC 3.77 05/27/2021    RBC 4.07 02/03/2012    HGB 10.7 05/27/2021    HCT 33.2 05/27/2021    MCV 88.1 05/27/2021    MCH 28.3 05/27/2021    MCHC 32.1 05/27/2021    RDW 17.3 05/27/2021     05/27/2021    MPV 10.4 09/24/2015     Lipids:  Lab Results   Component Value Date    CHOL 176 05/27/2021    CHOL 191 05/30/2019    CHOL 178 03/11/2015     Lab Results   Component Value Date    TRIG 101 05/27/2021    TRIG 84 05/30/2019    TRIG 85 03/11/2015     Lab Results   Component Value Date    HDL 58 05/27/2021    HDL 61 (H) 05/30/2019    HDL 71 (H) 03/11/2015     Lab Results   Component Value Date    LDLCALC 98 05/27/2021    LDLCALC 113 05/30/2019    LDLCALC 90 03/11/2015     No results found for: LABVLDL, VLDL  Lab Results   Component Value Date    CHOLHDLRATIO 3.4 03/05/2013    CHOLHDLRATIO 3.1 11/04/2011     CMP:    Lab Results   Component Value Date     05/27/2021    K 4.1 05/27/2021     05/27/2021    CO2 27 05/27/2021    BUN 28 05/27/2021    CREATININE 1.32 05/27/2021    GFRAA >60.0 05/27/2021    LABGLOM 52.6 05/27/2021    GLUCOSE 108 05/27/2021    GLUCOSE 111 11/08/2011    PROT 6.9 05/27/2021    LABALBU 4.5 05/27/2021    LABALBU 5.0 11/04/2011    CALCIUM 8.9 05/27/2021    BILITOT 0.5 05/27/2021    ALKPHOS 101 05/27/2021    AST 26 05/27/2021    ALT 21 05/27/2021     BMP:    Lab Results   Component Value Date     05/27/2021    K 4.1 05/27/2021     05/27/2021    CO2 27 05/27/2021    BUN 28 05/27/2021    LABALBU 4.5 05/27/2021    LABALBU 5.0 11/04/2011    CREATININE 1.32 05/27/2021    CALCIUM 8.9 05/27/2021    GFRAA >60.0 05/27/2021    LABGLOM 52.6 05/27/2021    GLUCOSE 108 05/27/2021    GLUCOSE 111 11/08/2011     Magnesium:    Lab Results   Component Value Date    MG 2.2 05/30/2019    MG 2.5 11/04/2011     TSH:  Lab Results   Component Value Date    TSH 3.490 05/30/2019       Patient Active Problem List   Diagnosis    Peripheral [facility] disease, unspecified (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    Coronary artery disease involving native coronary artery of native heart with angina pectoris ([facility]    Other hyperlipidemia    Coronary artery disease involving native coronary artery of native heart without angina pectoris    Pain in right knee    Unilateral primary osteoarthritis, right knee    Unilateral primary osteoarthritis, right knee    Anemia of unknown etiology    Bradycardia    Right flank pain    Syncope and collapse    Carotid stenosis, left       There are no discontinued medications. Modified Medications    No medications on file       No orders of the defined types were placed in this encounter. Assessment/Plan:    1. Coronary artery disease involving native coronary artery of native heart without angina pectoris -- angina- SPECt and Echo - and echo are negative. conitnue CV mes. If further angina let me know ASAP. 2. Essential hypertension   BP low repeat better. Pt no tdizzy. Continue same low salt diet    3. Peripheral vascular disease ([facility] - on Eliquis. Dr. Millie Webb     4. Angina, class III ([facility]    5. Bilateral carotid artery stenosis   Dr. Millie Webb     6. Occlusive disease of artery of lower extremity ([facility]     7. Dyslipidemia  Advance Atorva to 40 - continue recheck labs     8. NSVT ( cath related casuing CP) - HM negative. 9. LE Edema- probably related to him sleeping in recliner as his legs are  Free of edema when he sleeps in bed. Counseling:  Heart Healthy Lifestyle, Low Salt Diet, Take Precautions to Prevent Falls and Walk Daily    Return in about 3 months (around 12/3/2021).       Electronically signed by Kimberli Neal

## 2021-09-20 ENCOUNTER — HOSPITAL ENCOUNTER (OUTPATIENT)
Dept: NON INVASIVE DIAGNOSTICS | Age: 76
Discharge: HOME | End: 2021-09-20
Payer: MEDICARE

## 2021-09-20 DIAGNOSIS — R53.83 FATIGUE, UNSPECIFIED TYPE: ICD-10-CM

## 2021-09-20 LAB
EKG ATRIAL RATE: 67 BPM
EKG P AXIS: 59 DEGREES
EKG P-R INTERVAL: 174 MS
EKG Q-T INTERVAL: 410 MS
EKG QRS DURATION: 110 MS
EKG QTC CALCULATION (BAZETT): 433 MS
EKG R AXIS: 35 DEGREES
EKG T AXIS: 36 DEGREES
EKG VENTRICULAR RATE: 67 BPM

## 2021-09-20 PROCEDURE — 93010 ELECTROCARDIOGRAM REPORT: CPT | Performed by: INTERNAL MEDICINE

## 2021-09-20 PROCEDURE — 93005 ELECTROCARDIOGRAM TRACING: CPT | Performed by: INTERNAL MEDICINE

## 2021-09-29 DIAGNOSIS — I25.119 CORONARY ARTERY DISEASE INVOLVING NATIVE CORONARY ARTERY OF NATIVE HEART WITH ANGINA PECTORIS (HCC): Primary | ICD-10-CM

## 2021-09-30 RX ORDER — NITROGLYCERIN 0.4 MG/1
TABLET SUBLINGUAL
Qty: 25 TABLET | Refills: 3 | Status: SHIPPED | OUTPATIENT
Start: 2021-09-30 | End: 2022-01-24

## 2021-09-30 NOTE — TELEPHONE ENCOUNTER
Please approve or deny this refill request. The order is pended. Thank you.     LOV 9/3/2021    Next Visit Date:  Future Appointments   Date Time Provider Bessie Gonzalez   12/3/2021  2:00 PM Claudio Burns MD [facility]

## 2021-12-03 ENCOUNTER — OFFICE VISIT (OUTPATIENT)
Dept: CARDIOLOGY CLINIC | Age: 76
End: 2021-12-03
Payer: MEDICARE

## 2021-12-03 VITALS
OXYGEN SATURATION: 97 % | SYSTOLIC BLOOD PRESSURE: 100 MMHG | DIASTOLIC BLOOD PRESSURE: 46 MMHG | HEIGHT: 70 IN | WEIGHT: 200 LBS | HEART RATE: 63 BPM | BODY MASS INDEX: 28.63 KG/M2

## 2021-12-03 DIAGNOSIS — I25.10 CORONARY ARTERY DISEASE INVOLVING NATIVE CORONARY ARTERY OF NATIVE HEART WITHOUT ANGINA PECTORIS: ICD-10-CM

## 2021-12-03 DIAGNOSIS — I10 ESSENTIAL HYPERTENSION: ICD-10-CM

## 2021-12-03 DIAGNOSIS — I47.29 NSVT (NONSUSTAINED VENTRICULAR TACHYCARDIA): ICD-10-CM

## 2021-12-03 DIAGNOSIS — I65.23 BILATERAL CAROTID ARTERY STENOSIS: ICD-10-CM

## 2021-12-03 DIAGNOSIS — I70.209 OCCLUSIVE DISEASE OF ARTERY OF LOWER EXTREMITY (HCC): ICD-10-CM

## 2021-12-03 DIAGNOSIS — I73.9 PERIPHERAL VASCULAR DISEASE (HCC): ICD-10-CM

## 2021-12-03 DIAGNOSIS — E78.49 OTHER HYPERLIPIDEMIA: ICD-10-CM

## 2021-12-03 DIAGNOSIS — E78.5 DYSLIPIDEMIA: ICD-10-CM

## 2021-12-03 DIAGNOSIS — I25.119 CORONARY ARTERY DISEASE INVOLVING NATIVE CORONARY ARTERY OF NATIVE HEART WITH ANGINA PECTORIS (HCC): Primary | ICD-10-CM

## 2021-12-03 PROCEDURE — 99214 OFFICE O/P EST MOD 30 MIN: CPT | Performed by: INTERNAL MEDICINE

## 2021-12-03 RX ORDER — CLOPIDOGREL BISULFATE 75 MG/1
75 TABLET ORAL DAILY
COMMUNITY
Start: 2021-12-02

## 2021-12-03 ASSESSMENT — ENCOUNTER SYMPTOMS
GASTROINTESTINAL NEGATIVE: 1
NAUSEA: 0
EYES NEGATIVE: 1
STRIDOR: 0
SHORTNESS OF BREATH: 0
CHEST TIGHTNESS: 1
BLOOD IN STOOL: 0
WHEEZING: 0
COUGH: 0

## 2021-12-03 NOTE — PROGRESS NOTES
Subsequent Progress Note  Patient: Priya Rome  YOB: 1945  MRN: [id number]    Chief Complaint: CP cad pad htn Dizzy   Chief Complaint   Patient presents with    3 Month Follow-Up     CAD, HTN, PVD       CV Data:  CAD 5-6 stents in total  10/2017 Left Fem Distal bypass- Dr. Buddy Moss  Remote- RIGHT  LE bypass  6/19/2018 Cath: Ramila Cortes RCA stents all patent with Mild ISR EF 55  8/2015 CUS DARIEL 50-69- followed by Dr. Buddy Moss  2/2018 echo ef 65  2/2019 spect negative  2019 LCEA  9/20/2019: cath LAD CX and RCA prior stents mild ISR but patent. Septal  occluded and fills from RCA. EF 60 EDP 9  11/2019 HM negative. 10/2020 CUS - Zolli. -LCEA patent RCIA - mild   9/21 SPECT negative   9/21 Echo EF 65 1+ MR  RVSP 56     Subjective/HPI: recently had 2 angina ( chest pressure heavy no radiation no sob but + diaphoresis moderate) took NTG w relief. Also having dizzy spells     9/27/2019: no cp no osb no falls no bleed takes meds. Had Pig tail catheter induced VT causing CP during cath     12/30/2019 no cp no sob no falls no bleed occ leg edema due to sleeping in a recliner. He falls a sleep every night wahching tv.     7/10/2020 no cp no sob no falls no bleed. Eats well. Sleep well. 1/15/21  Doing well no cp no sob some edema no falls n bleed. coouple months ago started Eliquis 5 bid by Dr. Basil Luke( Etiology unclear)     8/6/21 recent 3 episodes of angina ( heavy pressure) requiring NTG with relief. Sits down and relieved. No SOB but little nausea and diaphoresis. 9/3/21 no further CP no further SL NTG. No SOB no bleed no falls     12/3/21 doingw ell no cp no sob no falls no bleed little dizzy .      Works on [employer]    EKG:    Past Medical History:   Diagnosis Date    Anemia, normocytic normochromic     CAD (coronary artery disease)     multiple PCI    Diabetes mellitus ([facility] [address]     Gout 10/2/2017    Hyperlipidemia     Hypertension     Occlusive disease of artery of lower Insecurity:     Worried About Running Out of Food in the Last Year: Not on file    Zelda of Food in the Last Year: Not on file   Transportation Needs:     Lack of Transportation (Medical): Not on file    Lack of Transportation (Non-Medical): Not on file   Physical Activity:     Days of Exercise per Week: Not on file    Minutes of Exercise per Session: Not on file   Stress:     Feeling of Stress : Not on file   Social Connections:     Frequency of Communication with Friends and Family: Not on file    Frequency of Social Gatherings with Friends and Family: Not on file    Attends Evangelical Services: Not on file    Active Member of [address] [place] [address] [employer] or Organizations: Not on file    Attends Club or Organization Meetings: Not on file    Marital Status: Not on file   Intimate Partner Violence:     Fear of Current or Ex-Partner: Not on file    Emotionally Abused: Not on file    Physically Abused: Not on file    Sexually Abused: Not on file   Housing Stability:     Unable to Pay for Housing in the Last Year: Not on file    Number of [place] in the Last Year: Not on file    Unstable Housing in the Last Year: Not on file       No Known Allergies    Current Outpatient Medications   Medication Sig Dispense Refill    ciclopirox (PENLAC) 8 % solution APPLY TO TO THE AFFECTED AREA EVERY NIGHT AT BEDTIME. APPLY MORE THAN 8 HOURS PRIOR TO WASHING. CLEAN NAIL WITH RUBBING ALCOHOL EVERY 7 DAYS.  clopidogrel (PLAVIX) 75 MG tablet       nitroGLYCERIN (NITROSTAT) 0.4 MG SL tablet PLACE 1 TABLET UNDER THE TONGUE EVERY 5 MINUTES AS NEEDED FOR CHEST PAIN(MAX 3 DOSES). IF NO RELIEF AFTER FIRST DOSE, CALL 911 25 tablet 3    ELIQUIS 5 MG TABS tablet Take 5 mg by mouth 2 times daily      gabapentin (NEURONTIN) 300 MG capsule Take 300 mg by mouth 2 times daily.       FREESTYLE LITE strip TEST ONCE DAILY UTD      atenolol (TENORMIN) 50 MG tablet TAKE 1 TABLET BY MOUTH TWICE DAILY 180 tablet 2    hydrochlorothiazide (HYDRODIURIL) 25 MG tablet Take 25 mg by mouth daily      atorvastatin (LIPITOR) 40 MG tablet Take 1 tablet by mouth daily 90 tablet 2    meclizine (ANTIVERT) 12.5 MG tablet Take 12.5 mg by mouth 3 times daily as needed      ammonium lactate (LAC-HYDRIN) 12 % lotion 1 applicator      aspirin 81 MG EC tablet Take 81 mg by mouth      tiZANidine (ZANAFLEX) 2 MG tablet Take 2 mg by mouth every 6 hours as needed      ferrous sulfate 325 (65 FE) MG tablet Take 325 mg by mouth 2 times daily       pioglitazone (ACTOS) 45 MG tablet Take 45 mg by mouth daily       amLODIPine-benazepril (LOTREL) 10-20 MG per capsule Take 1 capsule by mouth daily       ULORIC 80 MG TABS Take 80 mg by mouth daily        No current facility-administered medications for this visit. Review of Systems:   Review of Systems   Constitutional: Negative. Negative for diaphoresis and fatigue. HENT: Negative. Eyes: Negative. Respiratory: Positive for chest tightness. Negative for cough, shortness of breath, wheezing and stridor. Cardiovascular: Negative. Negative for chest pain, palpitations and leg swelling. Gastrointestinal: Negative. Negative for blood in stool and nausea. Genitourinary: Negative. Musculoskeletal: Negative. Skin: Negative. Neurological: Positive for dizziness. Negative for syncope, weakness and light-headedness. Hematological: Negative. Psychiatric/Behavioral: Negative. Physical Examination:    BP (!) 100/46   Pulse 63   Ht 5' 10\" (1.778 m)   Wt 200 lb (90.7 kg)   SpO2 97%   BMI 28.70 kg/m²    Physical Exam   Constitutional: He appears healthy. No distress. HENT:   Normal cephalic and Atraumatic   Eyes: Pupils are equal, round, and reactive to light. Neck: Thyroid normal. No JVD present. No neck adenopathy. No thyromegaly present. Cardiovascular: Normal rate and regular rhythm. Exam reveals decreased pulses. Murmur heard.   Pulmonary/Chest: Effort normal and breath sounds normal. He has no wheezes. He has no rales. He exhibits no tenderness. Abdominal: Soft. Bowel sounds are normal. There is no abdominal tenderness. Musculoskeletal:         General: No tenderness or edema. Normal range of motion. Cervical back: Normal range of motion and neck supple. Neurological: He is alert and oriented to person, place, and time. Skin: Skin is warm. No cyanosis. Nails show no clubbing.        LABS:  CBC:   Lab Results   Component Value Date    WBC 4.7 09/20/2021    RBC 3.95 09/20/2021    RBC 4.07 02/03/2012    HGB 11.6 09/20/2021    HCT 34.3 09/20/2021    MCV 86.8 09/20/2021    MCH 29.4 09/20/2021    MCHC 33.9 09/20/2021    RDW 17.2 09/20/2021     09/20/2021    MPV 10.4 09/24/2015     Lipids:  Lab Results   Component Value Date    CHOL 190 09/20/2021    CHOL 176 05/27/2021    CHOL 191 05/30/2019     Lab Results   Component Value Date    TRIG 103 09/20/2021    TRIG 101 05/27/2021    TRIG 84 05/30/2019     Lab Results   Component Value Date    HDL 60 (H) 09/20/2021    HDL 58 05/27/2021    HDL 61 (H) 05/30/2019     Lab Results   Component Value Date    LDLCALC 109 09/20/2021    LDLCALC 98 05/27/2021    LDLCALC 113 05/30/2019     No results found for: LABVLDL, VLDL  Lab Results   Component Value Date    CHOLHDLRATIO 3.4 03/05/2013    CHOLHDLRATIO 3.1 11/04/2011     CMP:    Lab Results   Component Value Date     09/20/2021    K 4.4 09/20/2021     09/20/2021    CO2 25 09/20/2021    BUN 19 09/20/2021    CREATININE 1.05 09/20/2021    GFRAA >60.0 09/20/2021    LABGLOM >60.0 09/20/2021    GLUCOSE 112 09/20/2021    GLUCOSE 111 11/08/2011    PROT 6.9 09/20/2021    LABALBU 4.4 09/20/2021    LABALBU 5.0 11/04/2011    CALCIUM 9.2 09/20/2021    BILITOT 0.6 09/20/2021    ALKPHOS 92 09/20/2021    AST 22 09/20/2021    ALT 22 09/20/2021     BMP:    Lab Results   Component Value Date     09/20/2021    K 4.4 09/20/2021     09/20/2021    CO2 25 09/20/2021    BUN 19 09/20/2021    LABALBU 4.4 09/20/2021    LABALBU 5.0 11/04/2011    CREATININE 1.05 09/20/2021    CALCIUM 9.2 09/20/2021    GFRAA >60.0 09/20/2021    LABGLOM >60.0 09/20/2021    GLUCOSE 112 09/20/2021    GLUCOSE 111 11/08/2011     Magnesium:    Lab Results   Component Value Date    MG 2.2 05/30/2019    MG 2.5 11/04/2011     TSH:  Lab Results   Component Value Date    TSH 2.890 09/20/2021       Patient Active Problem List   Diagnosis    Peripheral vascular disease, unspecified ([facility] [address]    Essential hypertension    Occlusive disease of artery of lower extremity (HCC)    Gout    Angina, class III ([facility])    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, 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 ([address]    Stenosis of left carotid artery    Dyslipidemia    Angina of effort ([facility] [address]    NSVT (nonsustained ventricular tachycardia) ([facility] [address]    Coronary artery disease involving native coronary artery of native heart with angina pectoris ([facility] [address]    Other hyperlipidemia    Coronary artery disease involving native coronary artery of native heart without angina pectoris    Pain in right knee    Unilateral primary osteoarthritis, right knee    Unilateral primary osteoarthritis, right knee    Anemia of unknown etiology    Bradycardia    Right flank pain    Syncope and collapse    Carotid stenosis, left       Medications Discontinued During This Encounter   Medication Reason    isosorbide mononitrate (IMDUR) 30 MG extended release tablet        Modified Medications    No medications on file       No orders of the defined types were placed in this encounter. Assessment/Plan:    1. Coronary artery disease involving native coronary artery of native heart without angina pectoris -- angina- SPECt and Echo - and echo are negative. conitnue CV mes. If further angina let me know ASAP. 2. Essential hypertension   BP low repeat better. Pt little tdizzy. Dc Imdur. 3. Peripheral vascular disease ([facility] [address] - on Eliquis. Dr. Loco Calloway     4.  Angina, class III ([facility] [address]    5. Bilateral carotid artery stenosis   Dr. Nilsa Wan     6. Occlusive disease of artery of lower extremity ([facility] [address]     7. Dyslipidemia  Advance Atorva to 40 - continue recheck labs . Low fat deit. +    8. NSVT ( cath related casuing CP) - HM negative. 9. LE Edema- probably related to him sleeping in recliner as his legs are  Free of edema when he sleeps in bed. Counseling:  Heart Healthy Lifestyle, Low Salt Diet, Take Precautions to Prevent Falls and Walk Daily    Return in about 3 months (around 3/3/2022).       Electronically signed by Rickie Kuhn MD on 12/3/2021 at 2:55 PM

## 2021-12-25 ENCOUNTER — HOSPITAL ENCOUNTER (EMERGENCY)
Age: 76
Discharge: HOME | End: 2021-12-25
Attending: EMERGENCY MEDICINE
Payer: MEDICARE

## 2021-12-25 VITALS
TEMPERATURE: 98.9 F | DIASTOLIC BLOOD PRESSURE: 67 MMHG | HEART RATE: 97 BPM | BODY MASS INDEX: 30.28 KG/M2 | RESPIRATION RATE: 16 BRPM | WEIGHT: 211 LBS | OXYGEN SATURATION: 97 % | SYSTOLIC BLOOD PRESSURE: 138 MMHG

## 2021-12-25 DIAGNOSIS — U07.1 COVID-19: Primary | ICD-10-CM

## 2021-12-25 DIAGNOSIS — R51.9 ACUTE NONINTRACTABLE HEADACHE, UNSPECIFIED HEADACHE TYPE: ICD-10-CM

## 2021-12-25 LAB
INFLUENZA A BY PCR: NEGATIVE
INFLUENZA B BY PCR: NEGATIVE
SARS-COV-2, NAAT: DETECTED

## 2021-12-25 PROCEDURE — 6370000000 HC RX 637 (ALT 250 FOR IP): Performed by: EMERGENCY MEDICINE

## 2021-12-25 PROCEDURE — 87635 SARS-COV-2 COVID-19 AMP PRB: CPT

## 2021-12-25 PROCEDURE — 87502 INFLUENZA DNA AMP PROBE: CPT

## 2021-12-25 PROCEDURE — 6360000002 HC RX W HCPCS: Performed by: EMERGENCY MEDICINE

## 2021-12-25 PROCEDURE — 99283 EMERGENCY DEPT VISIT LOW MDM: CPT

## 2021-12-25 RX ORDER — DEXAMETHASONE 4 MG/1
6 TABLET ORAL ONCE
Status: COMPLETED | OUTPATIENT
Start: 2021-12-25 | End: 2021-12-25

## 2021-12-25 RX ORDER — DEXAMETHASONE 6 MG/1
6 TABLET ORAL
Qty: 10 TABLET | Refills: 0 | Status: SHIPPED | OUTPATIENT
Start: 2021-12-25 | End: 2022-01-04

## 2021-12-25 RX ORDER — ACETAMINOPHEN 500 MG
1000 TABLET ORAL ONCE
Status: COMPLETED | OUTPATIENT
Start: 2021-12-25 | End: 2021-12-25

## 2021-12-25 RX ADMIN — DEXAMETHASONE 6 MG: 4 TABLET ORAL at 04:21

## 2021-12-25 RX ADMIN — ACETAMINOPHEN 1000 MG: 500 TABLET ORAL at 03:42

## 2021-12-25 ASSESSMENT — ENCOUNTER SYMPTOMS
SORE THROAT: 0
NAUSEA: 0
SHORTNESS OF BREATH: 0
DIARRHEA: 0
COUGH: 0
BACK PAIN: 0
ABDOMINAL PAIN: 0
VOMITING: 0

## 2021-12-25 ASSESSMENT — PAIN SCALES - GENERAL
PAINLEVEL_OUTOF10: 5
PAINLEVEL_OUTOF10: 5

## 2021-12-25 ASSESSMENT — PAIN DESCRIPTION - LOCATION: LOCATION: HEAD

## 2021-12-25 ASSESSMENT — PAIN DESCRIPTION - PAIN TYPE: TYPE: ACUTE PAIN

## 2021-12-25 ASSESSMENT — PAIN DESCRIPTION - DESCRIPTORS: DESCRIPTORS: ACHING

## 2021-12-25 NOTE — ED PROVIDER NOTES
[facility] ED  eMERGENCYdEPARTMENT eNCOUnter      Pt Name: Katja Diop  MRN: [id number]  Keragfjulianne 1945  Date of evaluation: 12/25/2021  John Dunham MD    CHIEF COMPLAINT           HPI  Katja Diop is a 67 y.o. male per chart review has a h/o HTN, PAD, CAD, OA presents to the ED with chills, headache. Pt notes chills since last night. Pt notes mild, constant, throbbing diffuse headache. Pt denies fever, n/v, cp sob, ab pain, dysuria, diarrhea. Pt got vaccinated however wants to make sure he doesn't have covid. ROS  Review of Systems   Constitutional: Positive for chills. Negative for activity change and fever. HENT: Negative for ear pain and sore throat. Eyes: Negative for visual disturbance. Respiratory: Negative for cough and shortness of breath. Cardiovascular: Negative for chest pain, palpitations and leg swelling. Gastrointestinal: Negative for abdominal pain, diarrhea, nausea and vomiting. Genitourinary: Negative for dysuria. Musculoskeletal: Negative for back pain. Skin: Negative for rash. Neurological: Positive for headaches. Negative for dizziness and weakness. Except as noted above the remainder of the review of systems was reviewed and negative.        PAST MEDICAL HISTORY     Past Medical History:   Diagnosis Date    Anemia, normocytic normochromic     CAD (coronary artery disease)     multiple PCI    Diabetes mellitus ([facility] [address]     Gout 10/2/2017    Hyperlipidemia     Hypertension     Occlusive disease of artery of lower extremity ([facility] [address] 10/2/2017         SURGICAL HISTORY       Past Surgical History:   Procedure Laterality Date    ARTERIOGRAM Right 10/6/2017    RIGHT ARM AORTO-FEMORAL DIGITAL SUBTRACTION ARTERIOGRAPHY performed by Candance Quin, MD at [facility] Left 9/8/2020    LEFT CAROTID ENDARTERECTOMY performed by Candance Quin, MD at [address] N/A 11/7/11 xience stent to RAC and CIRC    CORONARY ANGIOPLASTY WITH STENT PLACEMENT Left 4/19/13    xience stent to LAD    CORONARY ANGIOPLASTY WITH STENT PLACEMENT Right 12/22/13    Promus stent to RCA    DIAGNOSTIC CARDIAC CATH LAB PROCEDURE  09/20/2019    FEMORAL-FEMORAL BYPASS GRAFT      HERNIA REPAIR      as child    MN ESOPHAGOGASTRODUODENOSCOPY TRANSORAL DIAGNOSTIC N/A 11/30/2017    EGD ESOPHAGOGASTRODUODENOSCOPY with biopsy performed by Asa Ormond, MD at [address] Left 10/27/2017    LEFT FEMORAL DISTAL BYPASS, (PAT DONE 10-2-17) performed by Kaylene Flores MD at [facility]       Previous Medications    AMLODIPINE-BENAZEPRIL (LOTREL) 10-20 MG PER CAPSULE    Take 1 capsule by mouth daily     AMMONIUM LACTATE (LAC-HYDRIN) 12 % LOTION    1 applicator    ASPIRIN 81 MG EC TABLET    Take 81 mg by mouth    ATENOLOL (TENORMIN) 50 MG TABLET    TAKE 1 TABLET BY MOUTH TWICE DAILY    ATORVASTATIN (LIPITOR) 40 MG TABLET    Take 1 tablet by mouth daily    CICLOPIROX (PENLAC) 8 % SOLUTION    APPLY TO TO THE AFFECTED AREA EVERY NIGHT AT BEDTIME. APPLY MORE THAN 8 HOURS PRIOR TO WASHING. CLEAN NAIL WITH RUBBING ALCOHOL EVERY 7 DAYS. CLOPIDOGREL (PLAVIX) 75 MG TABLET        ELIQUIS 5 MG TABS TABLET    Take 5 mg by mouth 2 times daily    FERROUS SULFATE 325 (65 FE) MG TABLET    Take 325 mg by mouth 2 times daily     FREESTYLE LITE STRIP    TEST ONCE DAILY UTD    GABAPENTIN (NEURONTIN) 300 MG CAPSULE    Take 300 mg by mouth 2 times daily. HYDROCHLOROTHIAZIDE (HYDRODIURIL) 25 MG TABLET    Take 25 mg by mouth daily    MECLIZINE (ANTIVERT) 12.5 MG TABLET    Take 12.5 mg by mouth 3 times daily as needed    NITROGLYCERIN (NITROSTAT) 0.4 MG SL TABLET    PLACE 1 TABLET UNDER THE TONGUE EVERY 5 MINUTES AS NEEDED FOR CHEST PAIN(MAX 3 DOSES).  IF NO RELIEF AFTER FIRST DOSE, CALL 911    PIOGLITAZONE (ACTOS) 45 MG TABLET    Take 45 mg by mouth daily     TIZANIDINE (ZANAFLEX) 2 MG TABLET    Take 2 mg by mouth every 6 hours as needed    ULORIC 80 MG TABS    Take 80 mg by mouth daily        ALLERGIES     Patient has no known allergies. FAMILY HISTORY       Family History   Problem Relation Age of Onset    Diabetes Mother     Heart Disease Father     Heart Attack Father     No Known Problems Son     Cancer Other         lung    Prostate Cancer Other           SOCIAL HISTORY       Social History     Socioeconomic History    Marital status:      Spouse name: Not on file    Number of children: Not on file    Years of education: Not on file    Highest education level: Not on file   Occupational History    Not on file   Tobacco Use    Smoking status: Former Smoker     Start date: 1960     Quit date:      Years since quittin.9    Smokeless tobacco: Never Used   Substance and Sexual Activity    Alcohol use: No     Alcohol/week: 0.0 standard drinks    Drug use: No    Sexual activity: Not on file   Other Topics Concern    Not on file   Social History Narrative    Not on file     Social Determinants of Health     Financial Resource Strain:     Difficulty of Paying Living Expenses: Not on file   Food Insecurity:     Worried About Running Out of Food in the Last Year: Not on file    Zelda of Food in the Last Year: Not on file   Transportation Needs:     Lack of Transportation (Medical): Not on file    Lack of Transportation (Non-Medical):  Not on file   Physical Activity:     Days of Exercise per Week: Not on file    Minutes of Exercise per Session: Not on file   Stress:     Feeling of Stress : Not on file   Social Connections:     Frequency of Communication with Friends and Family: Not on file    Frequency of Social Gatherings with Friends and Family: Not on file    Attends Pentecostalism Services: Not on file    Active Member of Clubs or Organizations: Not on file    Attends Club or Organization Meetings: Not on file    Marital Status:

## 2021-12-27 ENCOUNTER — CARE COORDINATION (OUTPATIENT)
Dept: CARE COORDINATION | Age: 76
End: 2021-12-27

## 2021-12-27 NOTE — CARE COORDINATION
Patient contacted regarding COVID-19 diagnosis. Discussed COVID-19 related testing which was available at this time. Test results were positive. Patient informed of results, if available? Yes. Ambulatory Care Manager contacted the patient by telephone to perform post discharge assessment. Call within 2 business days of discharge: Yes. Verified name and  with patient as identifiers. Provided introduction to self, and explanation of the CTN/ACM role, and reason for call due to risk factors for infection and/or exposure to COVID-19. Symptoms reviewed with patient who verbalized the following symptoms: headache. Due to no new or worsening symptoms encounter was not routed to provider for escalation. Discussed follow-up appointments. If no appointment was previously scheduled, appointment scheduling offered: No.  [facility] follow up appointment(s):   Future Appointments   Date Time Provider Bessie Gonzalez   3/4/2022  2:00 PM Boy Quezaad MD [facility]     Non-[facility] follow up appointment(s): Instructed to follow-up with Enrique Mcneil MD.      Non-face-to-face services provided:  Education of patient/family/caregiver/guardian to support self-management-Covid-19     Advance Care Planning:   Does patient have an Advance Directive:  not reviewed. Educated patient about risk for severe COVID-19 due to risk factors according to CDC guidelines. ACM reviewed discharge instructions, medical action plan and red flag symptoms with the patient who verbalized understanding. Discussed COVID vaccination status: Yes and patient fully vaccinated including booster. Education provided on COVID-19 vaccination as appropriate. Discussed exposure protocols and quarantine with CDC Guidelines. Patient was given an opportunity to verbalize any questions and concerns and agrees to contact ACM or health care provider for questions related to their healthcare.     Reviewed and educated patient on any new and changed medications related to discharge diagnosis     Was patient discharged with a pulse oximeter? No Discussed and confirmed pulse oximeter discharge instructions and when to notify provider or seek emergency care. ACM provided contact information. Plan for follow-up call in 5-7 days based on severity of symptoms and risk factors.

## 2022-01-04 ENCOUNTER — CARE COORDINATION (OUTPATIENT)
Dept: CARE COORDINATION | Age: 77
End: 2022-01-04

## 2022-01-05 ENCOUNTER — CARE COORDINATION (OUTPATIENT)
Dept: CARE COORDINATION | Age: 77
End: 2022-01-05

## 2022-01-05 NOTE — CARE COORDINATION
Second and final call placed to patient for COVID-19 Risk Monitoring Follow-Up. Unable to reach patient by phone. Message left regarding the purpose of the call. Requested call back. Provided call back number. No further out reach at this time. Episode resolved.

## 2022-01-22 DIAGNOSIS — I25.119 CORONARY ARTERY DISEASE INVOLVING NATIVE CORONARY ARTERY OF NATIVE HEART WITH ANGINA PECTORIS (HCC): ICD-10-CM

## 2022-01-24 RX ORDER — NITROGLYCERIN 0.4 MG/1
TABLET SUBLINGUAL
Qty: 25 TABLET | Refills: 5 | Status: SHIPPED | OUTPATIENT
Start: 2022-01-24 | End: 2022-09-02 | Stop reason: SDUPTHER

## 2022-01-24 NOTE — TELEPHONE ENCOUNTER
Please approve or deny this refill request. The order is pended. Thank you.     LOV 12/3/2021    Next Visit Date:  Future Appointments   Date Time Provider Bessie Gonzalez   3/4/2022  2:00 PM Chapis Castillo MD [facility]

## 2022-03-05 ENCOUNTER — APPOINTMENT (OUTPATIENT)
Dept: CT IMAGING | Age: 77
DRG: 287 | End: 2022-03-05
Payer: MEDICARE

## 2022-03-05 ENCOUNTER — APPOINTMENT (OUTPATIENT)
Dept: GENERAL RADIOLOGY | Age: 77
DRG: 287 | End: 2022-03-05
Payer: MEDICARE

## 2022-03-05 ENCOUNTER — HOSPITAL ENCOUNTER (INPATIENT)
Age: 77
LOS: 2 days | Discharge: HOME | DRG: 287 | End: 2022-03-07
Attending: EMERGENCY MEDICINE | Admitting: INTERNAL MEDICINE
Payer: MEDICARE

## 2022-03-05 DIAGNOSIS — R07.9 CHEST PAIN, UNSPECIFIED TYPE: Primary | ICD-10-CM

## 2022-03-05 LAB
ALBUMIN SERPL-MCNC: 4.6 G/DL (ref 3.5–4.6)
ALP BLD-CCNC: 113 U/L (ref 35–104)
ALT SERPL-CCNC: 29 U/L (ref 0–41)
ANION GAP SERPL CALCULATED.3IONS-SCNC: 16 MEQ/L (ref 9–15)
AST SERPL-CCNC: 28 U/L (ref 0–40)
BASOPHILS ABSOLUTE: 0.1 K/UL (ref 0–0.2)
BASOPHILS RELATIVE PERCENT: 2.2 %
BILIRUB SERPL-MCNC: 0.7 MG/DL (ref 0.2–0.7)
BUN BLDV-MCNC: 20 MG/DL (ref 8–23)
CALCIUM SERPL-MCNC: 9.3 MG/DL (ref 8.5–9.9)
CHLORIDE BLD-SCNC: 102 MEQ/L (ref 95–107)
CO2: 22 MEQ/L (ref 20–31)
CREAT SERPL-MCNC: 1.15 MG/DL (ref 0.7–1.2)
D DIMER: 2.67 MG/L FEU (ref 0–0.5)
EOSINOPHILS ABSOLUTE: 0.2 K/UL (ref 0–0.7)
EOSINOPHILS RELATIVE PERCENT: 3.4 %
GFR AFRICAN AMERICAN: >60
GFR NON-AFRICAN AMERICAN: >60
GLOBULIN: 3.2 G/DL (ref 2.3–3.5)
GLUCOSE BLD-MCNC: 110 MG/DL (ref 70–99)
GLUCOSE BLD-MCNC: 127 MG/DL (ref 70–99)
GLUCOSE BLD-MCNC: 166 MG/DL (ref 70–99)
GLUCOSE BLD-MCNC: 86 MG/DL (ref 70–99)
HCT VFR BLD CALC: 35.1 % (ref 42–52)
HEMOGLOBIN: 11.4 G/DL (ref 14–18)
LYMPHOCYTES ABSOLUTE: 1.3 K/UL (ref 1–4.8)
LYMPHOCYTES RELATIVE PERCENT: 23.5 %
MAGNESIUM: 2 MG/DL (ref 1.7–2.4)
MCH RBC QN AUTO: 27.7 PG (ref 27–31.3)
MCHC RBC AUTO-ENTMCNC: 32.3 % (ref 33–37)
MCV RBC AUTO: 85.6 FL (ref 80–100)
MONOCYTES ABSOLUTE: 0.4 K/UL (ref 0.2–0.8)
MONOCYTES RELATIVE PERCENT: 8 %
NEUTROPHILS ABSOLUTE: 3.4 K/UL (ref 1.4–6.5)
NEUTROPHILS RELATIVE PERCENT: 62.9 %
PDW BLD-RTO: 16.3 % (ref 11.5–14.5)
PERFORMED ON: ABNORMAL
PERFORMED ON: ABNORMAL
PERFORMED ON: NORMAL
PLATELET # BLD: 166 K/UL (ref 130–400)
POTASSIUM SERPL-SCNC: 4.6 MEQ/L (ref 3.4–4.9)
RBC # BLD: 4.1 M/UL (ref 4.7–6.1)
SODIUM BLD-SCNC: 140 MEQ/L (ref 135–144)
TOTAL PROTEIN: 7.8 G/DL (ref 6.3–8)
TROPONIN: <0.01 NG/ML (ref 0–0.01)
TROPONIN: <0.01 NG/ML (ref 0–0.01)
WBC # BLD: 5.4 K/UL (ref 4.8–10.8)

## 2022-03-05 PROCEDURE — 2580000003 HC RX 258: Performed by: EMERGENCY MEDICINE

## 2022-03-05 PROCEDURE — G0378 HOSPITAL OBSERVATION PER HR: HCPCS

## 2022-03-05 PROCEDURE — 2060000000 HC ICU INTERMEDIATE R&B

## 2022-03-05 PROCEDURE — 96375 TX/PRO/DX INJ NEW DRUG ADDON: CPT

## 2022-03-05 PROCEDURE — 99223 1ST HOSP IP/OBS HIGH 75: CPT | Performed by: INTERNAL MEDICINE

## 2022-03-05 PROCEDURE — 96374 THER/PROPH/DIAG INJ IV PUSH: CPT

## 2022-03-05 PROCEDURE — 93005 ELECTROCARDIOGRAM TRACING: CPT | Performed by: EMERGENCY MEDICINE

## 2022-03-05 PROCEDURE — 85379 FIBRIN DEGRADATION QUANT: CPT

## 2022-03-05 PROCEDURE — 80053 COMPREHEN METABOLIC PANEL: CPT

## 2022-03-05 PROCEDURE — 71275 CT ANGIOGRAPHY CHEST: CPT

## 2022-03-05 PROCEDURE — 2580000003 HC RX 258: Performed by: INTERNAL MEDICINE

## 2022-03-05 PROCEDURE — 85025 COMPLETE CBC W/AUTO DIFF WBC: CPT

## 2022-03-05 PROCEDURE — 99284 EMERGENCY DEPT VISIT MOD MDM: CPT

## 2022-03-05 PROCEDURE — 6370000000 HC RX 637 (ALT 250 FOR IP): Performed by: INTERNAL MEDICINE

## 2022-03-05 PROCEDURE — 83735 ASSAY OF MAGNESIUM: CPT

## 2022-03-05 PROCEDURE — 36415 COLL VENOUS BLD VENIPUNCTURE: CPT

## 2022-03-05 PROCEDURE — 71045 X-RAY EXAM CHEST 1 VIEW: CPT

## 2022-03-05 PROCEDURE — 6360000004 HC RX CONTRAST MEDICATION: Performed by: EMERGENCY MEDICINE

## 2022-03-05 PROCEDURE — 6360000002 HC RX W HCPCS: Performed by: EMERGENCY MEDICINE

## 2022-03-05 PROCEDURE — 84484 ASSAY OF TROPONIN QUANT: CPT

## 2022-03-05 RX ORDER — ASPIRIN 81 MG/1
81 TABLET ORAL DAILY
Status: DISCONTINUED | OUTPATIENT
Start: 2022-03-06 | End: 2022-03-07

## 2022-03-05 RX ORDER — MECLIZINE HYDROCHLORIDE 25 MG/1
25 TABLET ORAL 3 TIMES DAILY PRN
Status: DISCONTINUED | OUTPATIENT
Start: 2022-03-05 | End: 2022-03-07 | Stop reason: HOSPADM

## 2022-03-05 RX ORDER — AMLODIPINE BESYLATE 10 MG/1
10 TABLET ORAL DAILY
Status: DISCONTINUED | OUTPATIENT
Start: 2022-03-05 | End: 2022-03-07

## 2022-03-05 RX ORDER — MORPHINE SULFATE 4 MG/ML
4 INJECTION, SOLUTION INTRAMUSCULAR; INTRAVENOUS EVERY 4 HOURS PRN
Status: DISCONTINUED | OUTPATIENT
Start: 2022-03-05 | End: 2022-03-07 | Stop reason: HOSPADM

## 2022-03-05 RX ORDER — ATENOLOL 50 MG/1
50 TABLET ORAL DAILY
Status: DISCONTINUED | OUTPATIENT
Start: 2022-03-05 | End: 2022-03-07 | Stop reason: HOSPADM

## 2022-03-05 RX ORDER — ATORVASTATIN CALCIUM 40 MG/1
40 TABLET, FILM COATED ORAL DAILY
Status: DISCONTINUED | OUTPATIENT
Start: 2022-03-05 | End: 2022-03-07 | Stop reason: HOSPADM

## 2022-03-05 RX ORDER — PIOGLITAZONEHYDROCHLORIDE 15 MG/1
45 TABLET ORAL DAILY
Status: DISCONTINUED | OUTPATIENT
Start: 2022-03-05 | End: 2022-03-07 | Stop reason: HOSPADM

## 2022-03-05 RX ORDER — NITROGLYCERIN 0.4 MG/1
0.4 TABLET SUBLINGUAL EVERY 5 MIN PRN
Status: DISCONTINUED | OUTPATIENT
Start: 2022-03-05 | End: 2022-03-07 | Stop reason: HOSPADM

## 2022-03-05 RX ORDER — ONDANSETRON 2 MG/ML
4 INJECTION INTRAMUSCULAR; INTRAVENOUS EVERY 6 HOURS PRN
Status: DISCONTINUED | OUTPATIENT
Start: 2022-03-05 | End: 2022-03-07 | Stop reason: HOSPADM

## 2022-03-05 RX ORDER — SODIUM CHLORIDE 0.9 % (FLUSH) 0.9 %
10 SYRINGE (ML) INJECTION ONCE
Status: DISCONTINUED | OUTPATIENT
Start: 2022-03-05 | End: 2022-03-07 | Stop reason: HOSPADM

## 2022-03-05 RX ORDER — SODIUM CHLORIDE 0.9 % (FLUSH) 0.9 %
5-40 SYRINGE (ML) INJECTION EVERY 12 HOURS SCHEDULED
Status: DISCONTINUED | OUTPATIENT
Start: 2022-03-05 | End: 2022-03-07 | Stop reason: HOSPADM

## 2022-03-05 RX ORDER — ONDANSETRON 4 MG/1
4 TABLET, ORALLY DISINTEGRATING ORAL EVERY 8 HOURS PRN
Status: DISCONTINUED | OUTPATIENT
Start: 2022-03-05 | End: 2022-03-07 | Stop reason: HOSPADM

## 2022-03-05 RX ORDER — ONDANSETRON 2 MG/ML
4 INJECTION INTRAMUSCULAR; INTRAVENOUS ONCE
Status: COMPLETED | OUTPATIENT
Start: 2022-03-05 | End: 2022-03-05

## 2022-03-05 RX ORDER — KETOROLAC TROMETHAMINE 15 MG/ML
15 INJECTION, SOLUTION INTRAMUSCULAR; INTRAVENOUS ONCE
Status: COMPLETED | OUTPATIENT
Start: 2022-03-05 | End: 2022-03-05

## 2022-03-05 RX ORDER — SODIUM CHLORIDE 9 MG/ML
25 INJECTION, SOLUTION INTRAVENOUS PRN
Status: DISCONTINUED | OUTPATIENT
Start: 2022-03-05 | End: 2022-03-07 | Stop reason: HOSPADM

## 2022-03-05 RX ORDER — MORPHINE SULFATE 4 MG/ML
4 INJECTION, SOLUTION INTRAMUSCULAR; INTRAVENOUS
Status: DISCONTINUED | OUTPATIENT
Start: 2022-03-05 | End: 2022-03-07 | Stop reason: HOSPADM

## 2022-03-05 RX ORDER — CLOPIDOGREL BISULFATE 75 MG/1
75 TABLET ORAL DAILY
Status: DISCONTINUED | OUTPATIENT
Start: 2022-03-05 | End: 2022-03-07 | Stop reason: HOSPADM

## 2022-03-05 RX ORDER — 0.9 % SODIUM CHLORIDE 0.9 %
1000 INTRAVENOUS SOLUTION INTRAVENOUS ONCE
Status: COMPLETED | OUTPATIENT
Start: 2022-03-05 | End: 2022-03-05

## 2022-03-05 RX ORDER — FEBUXOSTAT 80 MG/1
80 TABLET, FILM COATED ORAL DAILY
Status: DISCONTINUED | OUTPATIENT
Start: 2022-03-05 | End: 2022-03-07 | Stop reason: HOSPADM

## 2022-03-05 RX ORDER — SODIUM CHLORIDE 0.9 % (FLUSH) 0.9 %
5-40 SYRINGE (ML) INJECTION PRN
Status: DISCONTINUED | OUTPATIENT
Start: 2022-03-05 | End: 2022-03-07 | Stop reason: HOSPADM

## 2022-03-05 RX ORDER — AMMONIUM LACTATE 12 G/100G
LOTION TOPICAL PRN
Status: DISCONTINUED | OUTPATIENT
Start: 2022-03-05 | End: 2022-03-07 | Stop reason: HOSPADM

## 2022-03-05 RX ORDER — GABAPENTIN 300 MG/1
300 CAPSULE ORAL 2 TIMES DAILY
Status: DISCONTINUED | OUTPATIENT
Start: 2022-03-05 | End: 2022-03-07 | Stop reason: HOSPADM

## 2022-03-05 RX ORDER — FERROUS SULFATE 325(65) MG
325 TABLET ORAL 2 TIMES DAILY
Status: DISCONTINUED | OUTPATIENT
Start: 2022-03-05 | End: 2022-03-07 | Stop reason: HOSPADM

## 2022-03-05 RX ORDER — HYDROCHLOROTHIAZIDE 25 MG/1
25 TABLET ORAL DAILY
Status: DISCONTINUED | OUTPATIENT
Start: 2022-03-05 | End: 2022-03-07 | Stop reason: HOSPADM

## 2022-03-05 RX ORDER — TIZANIDINE 2 MG/1
2 TABLET ORAL EVERY 6 HOURS PRN
Status: DISCONTINUED | OUTPATIENT
Start: 2022-03-05 | End: 2022-03-07 | Stop reason: HOSPADM

## 2022-03-05 RX ORDER — AMLODIPINE BESYLATE AND BENAZEPRIL HYDROCHLORIDE 10; 20 MG/1; MG/1
1 CAPSULE ORAL DAILY
Status: DISCONTINUED | OUTPATIENT
Start: 2022-03-05 | End: 2022-03-05 | Stop reason: CLARIF

## 2022-03-05 RX ORDER — LISINOPRIL 20 MG/1
20 TABLET ORAL DAILY
Status: DISCONTINUED | OUTPATIENT
Start: 2022-03-05 | End: 2022-03-07 | Stop reason: HOSPADM

## 2022-03-05 RX ORDER — ACETAMINOPHEN 325 MG/1
650 TABLET ORAL EVERY 4 HOURS PRN
Status: DISCONTINUED | OUTPATIENT
Start: 2022-03-05 | End: 2022-03-07 | Stop reason: HOSPADM

## 2022-03-05 RX ADMIN — FERROUS SULFATE TAB 325 MG (65 MG ELEMENTAL FE) 325 MG: 325 (65 FE) TAB at 21:26

## 2022-03-05 RX ADMIN — ONDANSETRON 4 MG: 2 INJECTION INTRAMUSCULAR; INTRAVENOUS at 08:32

## 2022-03-05 RX ADMIN — IOPAMIDOL 100 ML: 612 INJECTION, SOLUTION INTRAVENOUS at 10:28

## 2022-03-05 RX ADMIN — MORPHINE SULFATE 4 MG: 4 INJECTION, SOLUTION INTRAMUSCULAR; INTRAVENOUS at 08:33

## 2022-03-05 RX ADMIN — KETOROLAC TROMETHAMINE 15 MG: 15 INJECTION, SOLUTION INTRAMUSCULAR; INTRAVENOUS at 08:31

## 2022-03-05 RX ADMIN — GABAPENTIN 300 MG: 300 CAPSULE ORAL at 21:25

## 2022-03-05 RX ADMIN — SODIUM CHLORIDE, PRESERVATIVE FREE 10 ML: 5 INJECTION INTRAVENOUS at 21:26

## 2022-03-05 RX ADMIN — SODIUM CHLORIDE 1000 ML: 9 INJECTION, SOLUTION INTRAVENOUS at 08:30

## 2022-03-05 ASSESSMENT — ENCOUNTER SYMPTOMS
ABDOMINAL PAIN: 0
APNEA: 0
STRIDOR: 0
CHOKING: 0
SHORTNESS OF BREATH: 0
FACIAL SWELLING: 0
BACK PAIN: 0
COUGH: 0
DIARRHEA: 0
EYE DISCHARGE: 0
NAUSEA: 1
GASTROINTESTINAL NEGATIVE: 1
RESPIRATORY NEGATIVE: 1
WHEEZING: 0
SORE THROAT: 0
VOMITING: 0
TROUBLE SWALLOWING: 0
CHEST TIGHTNESS: 0
ALLERGIC/IMMUNOLOGIC NEGATIVE: 1
EYE REDNESS: 0

## 2022-03-05 ASSESSMENT — PAIN DESCRIPTION - DESCRIPTORS: DESCRIPTORS: HEAVINESS

## 2022-03-05 ASSESSMENT — PAIN DESCRIPTION - FREQUENCY: FREQUENCY: CONTINUOUS

## 2022-03-05 ASSESSMENT — PAIN SCALES - GENERAL
PAINLEVEL_OUTOF10: 1
PAINLEVEL_OUTOF10: 6
PAINLEVEL_OUTOF10: 0

## 2022-03-05 ASSESSMENT — PAIN DESCRIPTION - ORIENTATION: ORIENTATION: MID

## 2022-03-05 ASSESSMENT — PAIN DESCRIPTION - LOCATION
LOCATION: CHEST
LOCATION: CHEST

## 2022-03-05 ASSESSMENT — PAIN DESCRIPTION - PROGRESSION: CLINICAL_PROGRESSION: RESOLVED

## 2022-03-05 ASSESSMENT — PAIN DESCRIPTION - PAIN TYPE
TYPE: ACUTE PAIN
TYPE: ACUTE PAIN

## 2022-03-05 NOTE — PROGRESS NOTES
PHARMACY NOTE  Claudio Phillips was ordered Lotrel (amlodipine-benazepril) 10/20mg PO daily . Per the [address], this medication is non-formulary and not stocked by pharmacy. The medication can be reordered at discharge. While inpatient, medication has been changed to its components, per [facility] [employer] [facility] approved substitution for Lotrel.      Jozef Starr, PharmD   3/5/2022 1:05 PM

## 2022-03-05 NOTE — ED PROVIDER NOTES
[facility] ED  eMERGENCYdEPARTMENT eNCOUnter      Pt Name: Golden Paz  MRN: [id number]  Keragfjulianne 1945  Date of evaluation: 3/5/2022  Raji Mcintosh MD    CHIEF COMPLAINT           HPI  Golden Paz is a 67 y.o. male per chart review has a h/o CAD s/p PCI, HTN, Hpl, DM II, PAD presents to the ED with chest pain. Pt notes gradual onset, moderate, constant, substernal chest pain since last night.  +N/-v.  +Dizziness. Pt denies fever, sob, diaphoresis, ab pain, dysuria, diarrhea. Feels like previous MIs.     ROS  Review of Systems   Constitutional: Negative for activity change, chills and fever. HENT: Negative for ear pain and sore throat. Eyes: Negative for visual disturbance. Respiratory: Negative for cough and shortness of breath. Cardiovascular: Positive for chest pain. Negative for palpitations and leg swelling. Gastrointestinal: Positive for nausea. Negative for abdominal pain, diarrhea and vomiting. Genitourinary: Negative for dysuria. Musculoskeletal: Negative for back pain. Skin: Negative for rash. Neurological: Positive for dizziness. Negative for weakness. Except as noted above the remainder of the review of systems was reviewed and negative.        PAST MEDICAL HISTORY     Past Medical History:   Diagnosis Date    Anemia, normocytic normochromic     CAD (coronary artery disease)     multiple PCI    Diabetes mellitus ([facility]     Gout 10/2/2017    Hyperlipidemia     Hypertension     Occlusive disease of artery of lower extremity ([facility] 10/2/2017         SURGICAL HISTORY       Past Surgical History:   Procedure Laterality Date    ARTERIOGRAM Right 10/6/2017    RIGHT ARM AORTO-FEMORAL DIGITAL SUBTRACTION ARTERIOGRAPHY performed by Brittnee Hughes MD at [address] [facility] [address] Left 9/8/2020    LEFT CAROTID ENDARTERECTOMY performed by Brittnee Hughes MD at [address] N/A 11/7/11    xience stent to RAC and CIRC    CORONARY ANGIOPLASTY WITH STENT PLACEMENT Left 4/19/13    xience stent to LAD    CORONARY ANGIOPLASTY WITH STENT PLACEMENT Right 12/22/13    Promus stent to RCA    DIAGNOSTIC CARDIAC CATH LAB PROCEDURE  09/20/2019    FEMORAL-FEMORAL BYPASS GRAFT      HERNIA REPAIR      as child    SD ESOPHAGOGASTRODUODENOSCOPY TRANSORAL DIAGNOSTIC N/A 11/30/2017    EGD ESOPHAGOGASTRODUODENOSCOPY with biopsy performed by Galo Crawley MD at [address] Left 10/27/2017    LEFT FEMORAL DISTAL BYPASS, (PAT DONE 10-2-17) performed by Javier Mcginnis MD at [facility]       Previous Medications    AMLODIPINE-BENAZEPRIL (LOTREL) 10-20 MG PER CAPSULE    Take 1 capsule by mouth daily     AMMONIUM LACTATE (LAC-HYDRIN) 12 % LOTION    1 applicator    ASPIRIN 81 MG EC TABLET    Take 81 mg by mouth    ATENOLOL (TENORMIN) 50 MG TABLET    TAKE 1 TABLET BY MOUTH TWICE DAILY    ATORVASTATIN (LIPITOR) 40 MG TABLET    Take 1 tablet by mouth daily    CICLOPIROX (PENLAC) 8 % SOLUTION    APPLY TO TO THE AFFECTED AREA EVERY NIGHT AT BEDTIME. APPLY MORE THAN 8 HOURS PRIOR TO WASHING. CLEAN NAIL WITH RUBBING ALCOHOL EVERY 7 DAYS. CLOPIDOGREL (PLAVIX) 75 MG TABLET        ELIQUIS 5 MG TABS TABLET    Take 5 mg by mouth 2 times daily    FERROUS SULFATE 325 (65 FE) MG TABLET    Take 325 mg by mouth 2 times daily     FREESTYLE LITE STRIP    TEST ONCE DAILY UTD    GABAPENTIN (NEURONTIN) 300 MG CAPSULE    Take 300 mg by mouth 2 times daily. HYDROCHLOROTHIAZIDE (HYDRODIURIL) 25 MG TABLET    Take 25 mg by mouth daily    MECLIZINE (ANTIVERT) 12.5 MG TABLET    Take 12.5 mg by mouth 3 times daily as needed    NITROGLYCERIN (NITROSTAT) 0.4 MG SL TABLET    PLACE 1 TABLET UNDER THE TONGUE EVERY 5 MINUTES AS NEEDED FOR CHEST PAIN(MAX 3 DOSES).  IF NO RELIEF AFTER FIRST DOSE, CALL 911    PIOGLITAZONE (ACTOS) 45 MG TABLET    Take 45 mg by mouth daily     TIZANIDINE (ZANAFLEX) 2 MG TABLET Take 2 mg by mouth every 6 hours as needed    ULORIC 80 MG TABS    Take 80 mg by mouth daily        ALLERGIES     Patient has no known allergies. FAMILY HISTORY       Family History   Problem Relation Age of Onset    Diabetes Mother     Heart Disease Father     Heart Attack Father     No Known Problems Son     Cancer Other         lung    Prostate Cancer Other           SOCIAL HISTORY       Social History     Socioeconomic History    Marital status:      Spouse name: None    Number of children: None    Years of education: None    Highest education level: None   Occupational History    None   Tobacco Use    Smoking status: Former Smoker     Start date: 1960     Quit date:      Years since quittin.1    Smokeless tobacco: Never Used   Substance and Sexual Activity    Alcohol use: No     Alcohol/week: 0.0 standard drinks    Drug use: No    Sexual activity: None   Other Topics Concern    None   Social History Narrative    None     Social Determinants of Health     Financial Resource Strain:     Difficulty of Paying Living Expenses: Not on file   Food Insecurity:     Worried About Running Out of Food in the Last Year: Not on file    Zelda of Food in the Last Year: Not on file   Transportation Needs:     Lack of Transportation (Medical): Not on file    Lack of Transportation (Non-Medical):  Not on file   Physical Activity:     Days of Exercise per Week: Not on file    Minutes of Exercise per Session: Not on file   Stress:     Feeling of Stress : Not on file   Social Connections:     Frequency of Communication with Friends and Family: Not on file    Frequency of Social Gatherings with Friends and Family: Not on file    Attends Hinduism Services: Not on file    Active Member of Clubs or Organizations: Not on file    Attends Club or Organization Meetings: Not on file    Marital Status: Not on file   Intimate Partner Violence:     Fear of Current or Ex-Partner: Not on file    Emotionally Abused: Not on file    Physically Abused: Not on file    Sexually Abused: Not on file   Housing Stability:     Unable to Pay for Housing in the Last Year: Not on file    Number of [place] in the Last Year: Not on file    Unstable Housing in the Last Year: Not on file         PHYSICAL EXAM       ED Triage Vitals [03/05/22 0822]   BP Temp Temp Source Pulse Resp SpO2 Height Weight   (!) 155/78 98.7 °F (37.1 °C) Oral 90 24 97 % 5' 10\" (1.778 m) 210 lb (95.3 kg)       Physical Exam  Vitals and nursing note reviewed. Constitutional:       Appearance: He is well-developed. HENT:      Head: Normocephalic. Right Ear: External ear normal.      Left Ear: External ear normal.   Eyes:      Conjunctiva/sclera: Conjunctivae normal.      Pupils: Pupils are equal, round, and reactive to light. Cardiovascular:      Rate and Rhythm: Normal rate and regular rhythm. Heart sounds: Normal heart sounds. Pulmonary:      Effort: Pulmonary effort is normal.      Breath sounds: Normal breath sounds. Abdominal:      General: Bowel sounds are normal. There is no distension. Palpations: Abdomen is soft. Tenderness: There is no abdominal tenderness. Musculoskeletal:         General: Normal range of motion. Cervical back: Normal range of motion and neck supple. Skin:     General: Skin is warm and dry. Neurological:      Mental Status: He is alert and oriented to person, place, and time. Psychiatric:         Mood and Affect: Mood normal.           MDM  68 yo male presents to the ED with chest pain. Pt is afebrile, hemodynamically stable. Pt given 1 L NS, IV morphine, IV zofran, IV toradol in the ED. EKG shows NSR with HR 89, normal axis, normal intervals, no ST changes. Pt had a cath last year which showed patent LAD, L circumflex, RCA stent. Pt already took his asa today. Labs remarkable for glucose 166, Hb 11.4, d-dimer 2.67. CXR negative.   Given elevated d-dimer, CT PE done. CT PE negative. Pt reassessed and feels better however still has chest pain. Concern is for ACS vs stenosis/occlusion of stents. Case discussed with Dr. Shahnaz Max (cardiology) who recommended admission. Pt admitted to cardiology in stable condition. FINAL IMPRESSION      1.  Chest pain, unspecified type          DISPOSITION/PLAN   DISPOSITION Decision To Admit 03/05/2022 10:54:12 AM        DISCHARGE MEDICATIONS:  [unfilled]         Mell Baer MD(electronically signed)  Attending Emergency Physician            Mell Baer MD  03/05/22 3183

## 2022-03-05 NOTE — PROGRESS NOTES
PHARMACY NOTE  Georgian Schirmer was ordered febuxostat 80mg PO daily . Per the [address], this medication is non-formulary and not stocked by pharmacy. The medication can be reordered at discharge. While inpatient, medication has been changed to patient supplied on the [facility], as per [facility] [employer] [facility], there is no approved substitution for febuxostat. If approved by attending physician and medication able to be identified by pharmacy, patient can utilize their own supply while inpatient.       Jace Brandt, PharmD   3/5/2022 1:02 PM

## 2022-03-05 NOTE — PROGRESS NOTES
Per patient request, this family member is to be added to his list of contacts. This person will be his primary point of contact for information and updates.      [phone]

## 2022-03-05 NOTE — H&P
Chief Complaint   Patient presents with    Chest Pain        Patient is a 67 y.o. male who presents with a chief complaint of CP. Patient is followed on a regular basis by Dr. Mckay Goodman MD.   Presents with mid sternal chest pressure/heaviness. CP started yesterday and re occured with more intensity this am while patient was visiting his wife in ICU. Associated nausea and dizziness. Patient with hx of MV PCI, last LHC in 2017. Last stress test in 2021 which was negative. Patient with hx of DM, HTN HLD. Hx of Pafib on DOAC. Initial C.E is negative. CTA of chest is negative fo PE. EKG with no acute ischemic changes.        Past Medical History:   Diagnosis Date    Anemia, normocytic normochromic     CAD (coronary artery disease)     multiple PCI    Carotid artery stenosis     Diabetes mellitus ([address]     Gout 10/2/2017    Hyperlipidemia     Hypertension     Neuropathy     Occlusive disease of artery of lower extremity ([address] 10/2/2017    Type 2 diabetes mellitus without complication [facility])       Patient Active Problem List   Diagnosis    Peripheral vascular disease, unspecified ([address]    Essential hypertension    Occlusive disease of artery of lower extremity (HCC)    Gout    Angina, class III ([address]    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, 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 ([address]    Stenosis of left carotid artery    Dyslipidemia    Angina of effort ([address]    NSVT (nonsustained ventricular tachycardia) ([address]    Coronary artery disease involving native coronary artery of native heart with angina pectoris ([address]    Other hyperlipidemia    Coronary artery disease involving native coronary artery of native heart without angina pectoris    Pain in right knee    Unilateral primary osteoarthritis, right knee    Unilateral primary osteoarthritis, right knee    Anemia of unknown etiology    Bradycardia    Right flank pain    Syncope and collapse    Carotid stenosis, left    Chest pain       Past Surgical History: Procedure Laterality Date    ARTERIOGRAM Right 10/6/2017    RIGHT ARM AORTO-FEMORAL DIGITAL SUBTRACTION ARTERIOGRAPHY performed by Mira Riley MD at [facility] Left 2020    LEFT CAROTID ENDARTERECTOMY performed by Mira Riley MD at [place] [address] N/A 11    xience stent to RAC and CIRC    CORONARY ANGIOPLASTY WITH STENT PLACEMENT Left 13    xience stent to LAD    CORONARY ANGIOPLASTY WITH STENT PLACEMENT Right 13    Promus stent to RCA    DIAGNOSTIC CARDIAC CATH LAB PROCEDURE  2019    FEMORAL-FEMORAL BYPASS GRAFT      HERNIA REPAIR      as child    [place] ESOPHAGOGASTRODUODENOSCOPY TRANSORAL DIAGNOSTIC N/A 2017    EGD ESOPHAGOGASTRODUODENOSCOPY with biopsy performed by Erin Muñoz MD at [facility] Left 10/27/2017    LEFT FEMORAL DISTAL BYPASS, (PAT DONE 10-2-17) performed by Mira Riley MD at [facility] History     Socioeconomic History    Marital status:      Spouse name: None    Number of children: None    Years of education: None    Highest education level: None   Occupational History    None   Tobacco Use    Smoking status: Former Smoker     Start date: 1960     Quit date:      Years since quittin.1    Smokeless tobacco: Never Used   Vaping Use    Vaping Use: Never used   Substance and Sexual Activity    Alcohol use: No     Alcohol/week: 0.0 standard drinks    Drug use: No    Sexual activity: None   Other Topics Concern    None   Social History Narrative    None     Social Determinants of Health     Financial Resource Strain:     Difficulty of Paying Living Expenses: Not on file   Food Insecurity:     Worried About Running Out of Food in the Last Year: Not on file    Zelda of Food in the Last Year: Not on file   Transportation Needs:     Lack of Transportation (Medical):  Not on file    Lack of Transportation (Non-Medical):  Not on file   Physical Activity:     Days of Exercise per Week: Not on file    Minutes of Exercise per Session: Not on file   Stress:     Feeling of Stress : Not on file   Social Connections:     Frequency of Communication with Friends and Family: Not on file    Frequency of Social Gatherings with Friends and Family: Not on file    Attends Episcopalian Services: Not on file    Active Member of Clubs or Organizations: Not on file    Attends Club or Organization Meetings: Not on file    Marital Status: Not on file   Intimate Partner Violence:     Fear of Current or Ex-Partner: Not on file    Emotionally Abused: Not on file    Physically Abused: Not on file    Sexually Abused: Not on file   Housing Stability:     Unable to Pay for Housing in the Last Year: Not on file    Number of Places Lived in the Last Year: Not on file    Unstable Housing in the Last Year: Not on file       Family History   Problem Relation Age of Onset    Diabetes Mother     Heart Disease Father     Heart Attack Father     No Known Problems Son     Cancer Other         lung    Prostate Cancer Other        Current Facility-Administered Medications   Medication Dose Route Frequency Provider Last Rate Last Admin    morphine sulfate (PF) injection 4 mg  4 mg IntraVENous Q15 Min PRN Zev Hoover, DO   4 mg at 03/05/22 8198    sodium chloride flush 0.9 % injection 10 mL  10 mL IntraVENous Once Zev Hoover, DO        ammonium lactate (LAC-HYDRIN) 12 % lotion   Topical PRN Zev Hoover, DO        [START ON 3/6/2022] aspirin EC tablet 81 mg  81 mg Oral Daily Zev Hoover, DO        atenolol (TENORMIN) tablet 50 mg  50 mg Oral Daily Zev Hoover, DO        atorvastatin (LIPITOR) tablet 40 mg  40 mg Oral Daily Zev Hoover, DO        ciclopirox (PENLAC) 8 % solution   Topical Nightly Zev Hoover, DO        clopidogrel (PLAVIX) tablet 75 mg  75 mg Oral Daily Zev Hoover, DO        apixaban (ELIQUIS) tablet 5 mg  5 mg Oral BID [facility] Holiday, DO        ferrous sulfate (IRON 325) tablet 325 mg  325 mg Oral BID [facility] Holiday, DO        gabapentin (NEURONTIN) capsule 300 mg  300 mg Oral BID [facility] Holiday, DO        hydroCHLOROthiazide (HYDRODIURIL) tablet 25 mg  25 mg Oral Daily [facility] Holiday, DO        meclizine (ANTIVERT) tablet 25 mg  25 mg Oral TID PRN [facility] Holiday, DO        nitroGLYCERIN (NITROSTAT) SL tablet 0.4 mg  0.4 mg SubLINGual Q5 Min PRN [facility] Holiday, DO        pioglitazone (ACTOS) tablet 45 mg  45 mg Oral Daily [facility] Holiday, DO        tiZANidine (ZANAFLEX) tablet 2 mg  2 mg Oral Q6H PRN [facility] Holiday, DO        Febuxostat TABS 80 mg  80 mg Oral Daily [facility] Holiday, DO        morphine sulfate (PF) injection 4 mg  4 mg IntraVENous Q4H PRN [facility] Holiday, DO        sodium chloride flush 0.9 % injection 5-40 mL  5-40 mL IntraVENous 2 times per day [facility] Holiday, DO        sodium chloride flush 0.9 % injection 5-40 mL  5-40 mL IntraVENous PRN [facility] Holiday, DO        0.9 % sodium chloride infusion  25 mL IntraVENous PRN Zev J Holiday, DO        enoxaparin (LOVENOX) injection 40 mg  40 mg SubCUTAneous Daily [facility]        acetaminophen (TYLENOL) tablet 650 mg  650 mg Oral Q4H PRN [facility], DO        ondansetron (ZOFRAN-ODT) disintegrating tablet 4 mg  4 mg Oral Q8H PRN [facility] Holiday, DO        Or    ondansetron (ZOFRAN) injection 4 mg  4 mg IntraVENous Q6H PRN Zev J Holiday, DO        amLODIPine (NORVASC) tablet 10 mg  10 mg Oral Daily Vita Aviles MD        And    lisinopril (PRINIVIL;ZESTRIL) tablet 20 mg  20 mg Oral Daily Vita Aviles MD           ALLERGIES: Patient has no known allergies. Review of Systems   Constitutional: Negative for activity change, appetite change, chills, diaphoresis, fatigue and fever. HENT: Negative. Negative for facial swelling, nosebleeds and trouble swallowing. Eyes: Negative for discharge, redness and visual disturbance. Respiratory: Negative. Negative for apnea, cough, choking, chest tightness, shortness of breath, wheezing and stridor. Cardiovascular: Positive for chest pain. Gastrointestinal: Negative. Endocrine: Negative. Genitourinary: Negative for difficulty urinating, flank pain, frequency and hematuria. Musculoskeletal: Negative. Negative for neck pain. Skin: Negative. Allergic/Immunologic: Negative. Neurological: Negative for dizziness, seizures, syncope, speech difficulty, weakness and light-headedness. Psychiatric/Behavioral: Negative for behavioral problems, confusion and sleep disturbance. The patient is not nervous/anxious. VITALS:  Blood pressure (!) 148/73, pulse 70, temperature 97.9 °F (36.6 °C), temperature source Oral, resp. rate 18, height 5' 10\" (1.778 m), weight 196 lb 11.2 oz (89.2 kg), SpO2 100 %. Body mass index is 28.22 kg/m². Physical Exam  Constitutional:       Appearance: He is well-developed. He is not diaphoretic. HENT:      Head: Normocephalic and atraumatic. Eyes:      Conjunctiva/sclera: Conjunctivae normal.      Pupils: Pupils are equal, round, and reactive to light. Neck:      Thyroid: No thyromegaly. Vascular: Normal carotid pulses. No carotid bruit, hepatojugular reflux or JVD. Trachea: No tracheal deviation. Cardiovascular:      Rate and Rhythm: Normal rate and regular rhythm. Chest Wall: PMI is not displaced. Pulses: Intact distal pulses. Carotid pulses are 3+ on the right side and 3+ on the left side. Radial pulses are 3+ on the right side and 3+ on the left side. Femoral pulses are 3+ on the right side and 3+ on the left side. Popliteal pulses are 3+ on the right side and 3+ on the left side. Dorsalis pedis pulses are 3+ on the right side and 3+ on the left side. Posterior tibial pulses are 3+ on the right side and 3+ on the left side.       Heart sounds: Normal heart sounds, S1 normal and S2 normal. No murmur heard. No friction rub. No gallop. No S3 or S4 sounds. Pulmonary:      Effort: Pulmonary effort is normal. No tachypnea or respiratory distress. Breath sounds: Normal breath sounds. No wheezing, rhonchi or rales. Chest:      Chest wall: No tenderness. Abdominal:      General: Bowel sounds are normal. There is no distension. Palpations: Abdomen is soft. Tenderness: There is no abdominal tenderness. There is no guarding or rebound. Musculoskeletal:         General: No tenderness. Normal range of motion. Skin:     General: Skin is warm. Findings: No erythema or rash. Nails: There is no clubbing. Neurological:      Mental Status: He is alert and oriented to person, place, and time. Cranial Nerves: No cranial nerve deficit. Coordination: Coordination normal.   Psychiatric:         Behavior: Behavior normal.         Thought Content:  Thought content normal.         Judgment: Judgment normal.         LABS:  Recent Results (from the past 24 hour(s))   Comprehensive Metabolic Panel    Collection Time: 03/05/22  8:30 AM   Result Value Ref Range    Sodium 140 135 - 144 mEq/L    Potassium 4.6 3.4 - 4.9 mEq/L    Chloride 102 95 - 107 mEq/L    CO2 22 20 - 31 mEq/L    Anion Gap 16 (H) 9 - 15 mEq/L    Glucose 166 (H) 70 - 99 mg/dL    BUN 20 8 - 23 mg/dL    CREATININE 1.15 0.70 - 1.20 mg/dL    GFR Non-African American >60.0 >60    GFR  >60.0 >60    Calcium 9.3 8.5 - 9.9 mg/dL    Total Protein 7.8 6.3 - 8.0 g/dL    Albumin 4.6 3.5 - 4.6 g/dL    Total Bilirubin 0.7 0.2 - 0.7 mg/dL    Alkaline Phosphatase 113 (H) 35 - 104 U/L    ALT 29 0 - 41 U/L    AST 28 0 - 40 U/L    Globulin 3.2 2.3 - 3.5 g/dL   CBC with Auto Differential    Collection Time: 03/05/22  8:30 AM   Result Value Ref Range    WBC 5.4 4.8 - 10.8 K/uL    RBC 4.10 (L) 4.70 - 6.10 M/uL    Hemoglobin 11.4 (L) 14.0 - 18.0 g/dL    Hematocrit 35.1 (L) 42.0 - 52.0 %    MCV 85.6 80.0 - benefits of the procedure. 3. Max cardiac meds  4. Monitor on tele  5. ACS orders  6. Hold DOAC in anticipation of LHC on Monday  7.  GI/DVT proph      Electronically signed by Liam Paul DO on 3/5/2022 at 3:54 PM

## 2022-03-05 NOTE — ED NOTES
Tele monitor placed on pt and report given to the nurse on Nuria Schmitz [address], RN  03/05/22 Extension Isai Valencia RN  03/05/22 Extension Isai Valencia RN  03/05/22 1983

## 2022-03-06 LAB
GFR AFRICAN AMERICAN: >60
GFR NON-AFRICAN AMERICAN: 59
GLUCOSE BLD-MCNC: 115 MG/DL (ref 70–99)
GLUCOSE BLD-MCNC: 137 MG/DL (ref 70–99)
GLUCOSE BLD-MCNC: 82 MG/DL (ref 70–99)
GLUCOSE BLD-MCNC: 97 MG/DL (ref 70–99)
PERFORMED ON: ABNORMAL
PERFORMED ON: NORMAL
PERFORMED ON: NORMAL
POC CREATININE: 1.2 MG/DL (ref 0.8–1.3)
POC SAMPLE TYPE: ABNORMAL

## 2022-03-06 PROCEDURE — G0378 HOSPITAL OBSERVATION PER HR: HCPCS

## 2022-03-06 PROCEDURE — 6370000000 HC RX 637 (ALT 250 FOR IP): Performed by: INTERNAL MEDICINE

## 2022-03-06 PROCEDURE — 6370000000 HC RX 637 (ALT 250 FOR IP): Performed by: EMERGENCY MEDICINE

## 2022-03-06 PROCEDURE — 2700000000 HC OXYGEN THERAPY PER DAY

## 2022-03-06 PROCEDURE — 2060000000 HC ICU INTERMEDIATE R&B

## 2022-03-06 PROCEDURE — 2580000003 HC RX 258: Performed by: INTERNAL MEDICINE

## 2022-03-06 PROCEDURE — 99233 SBSQ HOSP IP/OBS HIGH 50: CPT | Performed by: INTERNAL MEDICINE

## 2022-03-06 RX ORDER — SODIUM CHLORIDE 9 MG/ML
25 INJECTION, SOLUTION INTRAVENOUS PRN
Status: DISCONTINUED | OUTPATIENT
Start: 2022-03-06 | End: 2022-03-07 | Stop reason: HOSPADM

## 2022-03-06 RX ORDER — SODIUM CHLORIDE 0.9 % (FLUSH) 0.9 %
5-40 SYRINGE (ML) INJECTION EVERY 12 HOURS SCHEDULED
Status: DISCONTINUED | OUTPATIENT
Start: 2022-03-07 | End: 2022-03-07 | Stop reason: HOSPADM

## 2022-03-06 RX ORDER — PREDNISONE 50 MG/1
50 TABLET ORAL ONCE
Status: DISCONTINUED | OUTPATIENT
Start: 2022-03-07 | End: 2022-03-07 | Stop reason: HOSPADM

## 2022-03-06 RX ORDER — SODIUM CHLORIDE 9 MG/ML
INJECTION, SOLUTION INTRAVENOUS CONTINUOUS
Status: DISCONTINUED | OUTPATIENT
Start: 2022-03-07 | End: 2022-03-07 | Stop reason: HOSPADM

## 2022-03-06 RX ORDER — SODIUM CHLORIDE 0.9 % (FLUSH) 0.9 %
5-40 SYRINGE (ML) INJECTION PRN
Status: DISCONTINUED | OUTPATIENT
Start: 2022-03-06 | End: 2022-03-07 | Stop reason: HOSPADM

## 2022-03-06 RX ORDER — DIPHENHYDRAMINE HCL 25 MG
50 TABLET ORAL ONCE
Status: DISCONTINUED | OUTPATIENT
Start: 2022-03-07 | End: 2022-03-07 | Stop reason: HOSPADM

## 2022-03-06 RX ADMIN — SODIUM CHLORIDE, PRESERVATIVE FREE 10 ML: 5 INJECTION INTRAVENOUS at 08:40

## 2022-03-06 RX ADMIN — FERROUS SULFATE TAB 325 MG (65 MG ELEMENTAL FE) 325 MG: 325 (65 FE) TAB at 08:42

## 2022-03-06 RX ADMIN — ATORVASTATIN CALCIUM 40 MG: 40 TABLET, FILM COATED ORAL at 08:41

## 2022-03-06 RX ADMIN — LISINOPRIL 20 MG: 20 TABLET ORAL at 08:40

## 2022-03-06 RX ADMIN — SODIUM CHLORIDE, PRESERVATIVE FREE 10 ML: 5 INJECTION INTRAVENOUS at 21:22

## 2022-03-06 RX ADMIN — AMLODIPINE BESYLATE 10 MG: 10 TABLET ORAL at 08:40

## 2022-03-06 RX ADMIN — PIOGLITAZONE 45 MG: 15 TABLET ORAL at 08:42

## 2022-03-06 RX ADMIN — GABAPENTIN 300 MG: 300 CAPSULE ORAL at 21:22

## 2022-03-06 RX ADMIN — FERROUS SULFATE TAB 325 MG (65 MG ELEMENTAL FE) 325 MG: 325 (65 FE) TAB at 21:22

## 2022-03-06 RX ADMIN — GABAPENTIN 300 MG: 300 CAPSULE ORAL at 08:41

## 2022-03-06 RX ADMIN — CLOPIDOGREL BISULFATE 75 MG: 75 TABLET ORAL at 08:41

## 2022-03-06 RX ADMIN — HYDROCHLOROTHIAZIDE 25 MG: 25 TABLET ORAL at 08:41

## 2022-03-06 RX ADMIN — ASPIRIN 81 MG: 81 TABLET, COATED ORAL at 08:41

## 2022-03-06 ASSESSMENT — PAIN SCALES - GENERAL
PAINLEVEL_OUTOF10: 0
PAINLEVEL_OUTOF10: 0

## 2022-03-06 NOTE — PROGRESS NOTES
lactate (LAC-HYDRIN) 12 % lotion   Topical PRN [facility] Holiday, DO        aspirin EC tablet 81 mg  81 mg Oral Daily [facility] Holiday, DO   81 mg at 03/06/22 0841    atenolol (TENORMIN) tablet 50 mg  50 mg Oral Daily [facility] Holiday, DO        atorvastatin (LIPITOR) tablet 40 mg  40 mg Oral Daily [facility] Holiday, DO   40 mg at 03/06/22 0841    ciclopirox (PENLAC) 8 % solution   Topical Nightly [facility] Holiday, DO        clopidogrel (PLAVIX) tablet 75 mg  75 mg Oral Daily [facility] Holiday, DO   75 mg at 03/06/22 0841    [Held by provider] apixaban (ELIQUIS) tablet 5 mg  5 mg Oral BID [facility] Holiday, DO        ferrous sulfate (IRON 325) tablet 325 mg  325 mg Oral BID [facility] Holiday, DO   325 mg at 03/06/22 0801    gabapentin (NEURONTIN) capsule 300 mg  300 mg Oral BID [facility] Holiday, DO   300 mg at 03/06/22 0841    hydroCHLOROthiazide (HYDRODIURIL) tablet 25 mg  25 mg Oral Daily [facility] Holiday, DO   25 mg at 03/06/22 1905    meclizine (ANTIVERT) tablet 25 mg  25 mg Oral TID PRN [facility] Holiday, DO        nitroGLYCERIN (NITROSTAT) SL tablet 0.4 mg  0.4 mg SubLINGual Q5 Min PRN [facility] Holiday, DO        pioglitazone (ACTOS) tablet 45 mg  45 mg Oral Daily [facility] Holiday, DO   45 mg at 03/06/22 0842    tiZANidine (ZANAFLEX) tablet 2 mg  2 mg Oral Q6H PRN [facility] Holiday, DO        Febuxostat TABS 80 mg  80 mg Oral Daily [facility] Holiday, DO        morphine sulfate (PF) injection 4 mg  4 mg IntraVENous Q4H PRN [facility] Holiday, DO        sodium chloride flush 0.9 % injection 5-40 mL  5-40 mL IntraVENous 2 times per day [facility] Holiday, DO   10 mL at 03/06/22 0840    sodium chloride flush 0.9 % injection 5-40 mL  5-40 mL IntraVENous PRN [facility] Holiday, DO        0.9 % sodium chloride infusion  25 mL IntraVENous PRN [facility] Holiday, DO        enoxaparin (LOVENOX) injection 40 mg  40 mg SubCUTAneous Daily Zev Hoover,         acetaminophen (TYLENOL) tablet 650 mg  650 mg Oral Q4H PRN Zev Hoover,         ondansetron (ZOFRAN-ODT) disintegrating tablet 4 mg  4 mg Oral Q8H PRN Zev J Holiday, DO        Or    ondansetron (ZOFRAN) injection 4 mg  4 mg IntraVENous Q6H PRN Zev J Holiday, DO        amLODIPine (NORVASC) tablet 10 mg  10 mg Oral Daily Baylee Ornelas MD   10 mg at 03/06/22 0840    And    lisinopril (PRINIVIL;ZESTRIL) tablet 20 mg  20 mg Oral Daily Baylee Ornelas MD   20 mg at 03/06/22 0840       ALLERGIES: Patient has no known allergies. OBJECTIVE:     VITALS:  Blood pressure (!) 140/68, pulse 63, temperature 97.7 °F (36.5 °C), temperature source Oral, resp. rate 18, height 5' 10\" (1.778 m), weight 196 lb 11.2 oz (89.2 kg), SpO2 100 %. Body mass index is 28.22 kg/m². Physical Exam  Constitutional:       Appearance: He is well-developed. He is not diaphoretic. HENT:      Head: Normocephalic and atraumatic. Eyes:      Pupils: Pupils are equal, round, and reactive to light. Neck:      Thyroid: No thyromegaly. Vascular: No JVD. Trachea: No tracheal deviation. Cardiovascular:      Rate and Rhythm: Normal rate and regular rhythm. Chest Wall: PMI is not displaced. Pulses: Intact distal pulses. Heart sounds: Normal heart sounds. Heart sounds not distant. No murmur heard. No friction rub. No gallop. No S3 sounds. Pulmonary:      Effort: No respiratory distress. Breath sounds: No wheezing or rales. Chest:      Chest wall: No tenderness. Abdominal:      General: Bowel sounds are normal. There is no distension. Palpations: Abdomen is soft. There is no mass. Tenderness: There is no abdominal tenderness. There is no guarding or rebound. Musculoskeletal:      Cervical back: Normal range of motion and neck supple. Skin:     General: Skin is warm and dry. Coloration: Skin is not pale. Findings: No erythema or rash. Neurological:      Mental Status: He is alert and oriented to person, place, and time. Cranial Nerves: No cranial nerve deficit.    Psychiatric: Behavior: Behavior normal.         Thought Content: Thought content normal.         Judgment: Judgment normal.           LABS:  Recent Results (from the past 24 hour(s))   POCT Glucose    Collection Time: 03/05/22 12:50 PM   Result Value Ref Range    POC Glucose 110 (H) 70 - 99 mg/dl    Performed on ACCU-CHEK    Troponin    Collection Time: 03/05/22  1:32 PM   Result Value Ref Range    Troponin <0.010 0.000 - 0.010 ng/mL   POCT Glucose    Collection Time: 03/05/22  4:31 PM   Result Value Ref Range    POC Glucose 127 (H) 70 - 99 mg/dl    Performed on ACCU-CHEK    POCT Glucose    Collection Time: 03/05/22  7:58 PM   Result Value Ref Range    POC Glucose 86 70 - 99 mg/dl    Performed on ACCU-CHEK    POCT Glucose    Collection Time: 03/06/22  6:21 AM   Result Value Ref Range    POC Glucose 97 70 - 99 mg/dl    Performed on ACCU-CHEK      Troponin:    Lab Results   Component Value Date    TROPONINI <0.010 03/05/2022             Chief Complaint   Patient presents with    Chest Pain          Patient is a 67 y.o. male who presents with a chief complaint of CP. Patient is followed on a regular basis by Dr. J Carlos Brandon MD.   Presents with mid sternal chest pressure/heaviness. CP started yesterday and re occured with more intensity this am while patient was visiting his wife in ICU. Associated nausea and dizziness. Patient with hx of MV PCI, last LHC in 2017. Last stress test in 2021 which was negative. Patient with hx of DM, HTN HLD. Hx of Pafib on DOAC. Initial C.E is negative.  CTA of chest is negative fo PE. EKG with no acute ischemic changes.         Past Medical History        Past Medical History:   Diagnosis Date    Anemia, normocytic normochromic      CAD (coronary artery disease)       multiple PCI    Carotid artery stenosis      Diabetes mellitus ([facility]      Gout 10/2/2017    Hyperlipidemia      Hypertension      Neuropathy      Occlusive disease of artery of lower extremity ([facility] 10/2/2017    Type 2 diabetes mellitus without complication [facility])               Patient Active Problem List   Diagnosis    Peripheral vascular disease, unspecified ([facility]    Essential hypertension    Occlusive disease of artery of lower extremity (HCC)    Gout    Angina, class III ([facility]    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, 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 ([facility]    Stenosis of left carotid artery    Dyslipidemia    Angina of effort ([facility]    NSVT (nonsustained ventricular tachycardia) ([facility]    Coronary artery disease involving native coronary artery of native heart with angina pectoris ([facility]    Other hyperlipidemia    Coronary artery disease involving native coronary artery of native heart without angina pectoris    Pain in right knee    Unilateral primary osteoarthritis, right knee    Unilateral primary osteoarthritis, right knee    Anemia of unknown etiology    Bradycardia    Right flank pain    Syncope and collapse    Carotid stenosis, left    Chest pain         Past Surgical History         Past Surgical History:   Procedure Laterality Date    ARTERIOGRAM Right 10/6/2017     RIGHT ARM AORTO-FEMORAL DIGITAL SUBTRACTION ARTERIOGRAPHY performed by Viry Stoddard MD at [facility] Left 9/8/2020     LEFT CAROTID ENDARTERECTOMY performed by Viry Stoddard MD at [address] [facility] N/A 11/7/11     xience stent to RAC and CIRC    CORONARY ANGIOPLASTY WITH STENT PLACEMENT Left 4/19/13     xience stent to LAD    CORONARY ANGIOPLASTY WITH STENT PLACEMENT Right 12/22/13     Promus stent to RCA    DIAGNOSTIC CARDIAC CATH LAB PROCEDURE   09/20/2019    FEMORAL-FEMORAL BYPASS GRAFT        HERNIA REPAIR         as child    MI ESOPHAGOGASTRODUODENOSCOPY TRANSORAL DIAGNOSTIC N/A 11/30/2017     EGD ESOPHAGOGASTRODUODENOSCOPY with biopsy performed by Akil Adair MD at [address] [place] [address] Left 10/27/2017     LEFT FEMORAL DISTAL BYPASS, (PAT DONE 10-2-17) performed by Oneil Arnold MD at [address]        Socioeconomic History    Marital status:        Spouse name: None    Number of children: None    Years of education: None    Highest education level: None   Occupational History    None   Tobacco Use    Smoking status: Former Smoker       Start date: 1960       Quit date:        Years since quittin.1    Smokeless tobacco: Never Used   Vaping Use    Vaping Use: Never used   Substance and Sexual Activity    Alcohol use: No       Alcohol/week: 0.0 standard drinks    Drug use: No    Sexual activity: None   Other Topics Concern    None   Social History Narrative    None      Social Determinants of Health      Financial Resource Strain:     Difficulty of Paying Living Expenses: Not on file   Food Insecurity:     Worried About Running Out of Food in the Last Year: Not on file    Zelda of Food in the Last Year: Not on file   Transportation Needs:     Lack of Transportation (Medical): Not on file    Lack of Transportation (Non-Medical):  Not on file   Physical Activity:     Days of Exercise per Week: Not on file    Minutes of Exercise per Session: Not on file   Stress:     Feeling of Stress : Not on file   Social Connections:     Frequency of Communication with Friends and Family: Not on file    Frequency of Social Gatherings with Friends and Family: Not on file    Attends Voodoo Services: Not on file    Active Member of Clubs or Organizations: Not on file    Attends Club or Organization Meetings: Not on file    Marital Status: Not on file   Intimate Partner Violence:     Fear of Current or Ex-Partner: Not on file    Emotionally Abused: Not on file    Physically Abused: Not on file    Sexually Abused: Not on file   Housing Stability:     Unable to Pay for Housing in the Last Year: Not on file    Number of Places Lived in the Last Year: Not on file    Unstable Housing in the Last Year: Not on file            Family History   Family History   Problem Relation Age of Onset    Diabetes Mother      Heart Disease Father      Heart Attack Father      No Known Problems Son      Cancer Other           lung    Prostate Cancer Other              Current Facility-Administered Medications             Current Facility-Administered Medications   Medication Dose Route Frequency Provider Last Rate Last Admin    morphine sulfate (PF) injection 4 mg  4 mg IntraVENous Q15 Min PRN [facility] Holiday, DO   4 mg at 03/05/22 7986    sodium chloride flush 0.9 % injection 10 mL  10 mL IntraVENous Once [facility] Holiday, DO        ammonium lactate (LAC-HYDRIN) 12 % lotion   Topical PRN [facility] Carolineiday, DO        [START ON 3/6/2022] aspirin EC tablet 81 mg  81 mg Oral Daily [facility] Holiday, DO        atenolol (TENORMIN) tablet 50 mg  50 mg Oral Daily [facility] Holiday, DO        atorvastatin (LIPITOR) tablet 40 mg  40 mg Oral Daily [facility] Holiday, DO        ciclopirox (PENLAC) 8 % solution   Topical Nightly [facility] Holiday, DO        clopidogrel (PLAVIX) tablet 75 mg  75 mg Oral Daily [facility] Holiday, DO        apixaban (ELIQUIS) tablet 5 mg  5 mg Oral BID [facility] Holiday, DO        ferrous sulfate (IRON 325) tablet 325 mg  325 mg Oral BID [facility] Holiday, DO        gabapentin (NEURONTIN) capsule 300 mg  300 mg Oral BID [facility] Holiday, DO        hydroCHLOROthiazide (HYDRODIURIL) tablet 25 mg  25 mg Oral Daily [facility] Holiday, DO        meclizine (ANTIVERT) tablet 25 mg  25 mg Oral TID PRN [facility] Holiday, DO        nitroGLYCERIN (NITROSTAT) SL tablet 0.4 mg  0.4 mg SubLINGual Q5 Min PRN [facility] Holiday, DO        pioglitazone (ACTOS) tablet 45 mg  45 mg Oral Daily [facility] Holiday, DO        tiZANidine (ZANAFLEX) tablet 2 mg  2 mg Oral Q6H PRN [facility] Holiday, DO        Febuxostat TABS 80 mg  80 mg Oral Daily [facility] Holiday, DO        morphine sulfate (PF) injection 4 mg  4 mg IntraVENous Q4H PRN [facility] Holiday, DO        sodium chloride flush 0.9 % injection 5-40 mL  5-40 mL IntraVENous 2 times per day Zev J Holiday, DO        sodium chloride flush 0.9 % injection 5-40 mL  5-40 mL IntraVENous PRN Zev J Holiday, DO        0.9 % sodium chloride infusion  25 mL IntraVENous PRN [facility] Holiday, DO        enoxaparin (LOVENOX) injection 40 mg  40 mg SubCUTAneous Daily Wes J Holiday, DO        acetaminophen (TYLENOL) tablet 650 mg  650 mg Oral Q4H PRN [facility] Holiday, DO        ondansetron (ZOFRAN-ODT) disintegrating tablet 4 mg  4 mg Oral Q8H PRN [facility] Holiday, DO         Or    ondansetron (ZOFRAN) injection 4 mg  4 mg IntraVENous Q6H PRN [facility] Holiday, DO        amLODIPine (NORVASC) tablet 10 mg  10 mg Oral Daily Anabell Ward MD         And    lisinopril (PRINIVIL;ZESTRIL) tablet 20 mg  20 mg Oral Daily Anabell Ward MD                ALLERGIES: Patient has no known allergies.     Review of Systems   Constitutional: Negative for activity change, appetite change, chills, diaphoresis, fatigue and fever. HENT: Negative. Negative for facial swelling, nosebleeds and trouble swallowing. Eyes: Negative for discharge, redness and visual disturbance. Respiratory: Negative. Negative for apnea, cough, choking, chest tightness, shortness of breath, wheezing and stridor. Cardiovascular: Positive for chest pain. Gastrointestinal: Negative. Endocrine: Negative. Genitourinary: Negative for difficulty urinating, flank pain, frequency and hematuria. Musculoskeletal: Negative. Negative for neck pain. Skin: Negative. Allergic/Immunologic: Negative. Neurological: Negative for dizziness, seizures, syncope, speech difficulty, weakness and light-headedness. Psychiatric/Behavioral: Negative for behavioral problems, confusion and sleep disturbance. The patient is not nervous/anxious.             VITALS:  Blood pressure (!) 148/73, pulse 70, temperature 97.9 °F (36.6 °C), temperature source Oral, resp.  rate 18, height 5' 10\" (1.778 m), weight 196 lb 11.2 oz (89.2 kg), SpO2 100 %. Body mass index is 28.22 kg/m².     Physical Exam  Constitutional:       Appearance: He is well-developed. He is not diaphoretic. HENT:      Head: Normocephalic and atraumatic. Eyes:      Conjunctiva/sclera: Conjunctivae normal.      Pupils: Pupils are equal, round, and reactive to light. Neck:      Thyroid: No thyromegaly. Vascular: Normal carotid pulses. No carotid bruit, hepatojugular reflux or JVD. Trachea: No tracheal deviation. Cardiovascular:      Rate and Rhythm: Normal rate and regular rhythm. Chest Wall: PMI is not displaced. Pulses: Intact distal pulses. Carotid pulses are 3+ on the right side and 3+ on the left side. Radial pulses are 3+ on the right side and 3+ on the left side. Femoral pulses are 3+ on the right side and 3+ on the left side. Popliteal pulses are 3+ on the right side and 3+ on the left side. Dorsalis pedis pulses are 3+ on the right side and 3+ on the left side. Posterior tibial pulses are 3+ on the right side and 3+ on the left side. Heart sounds: Normal heart sounds, S1 normal and S2 normal. No murmur heard. No friction rub. No gallop. No S3 or S4 sounds. Pulmonary:      Effort: Pulmonary effort is normal. No tachypnea or respiratory distress. Breath sounds: Normal breath sounds. No wheezing, rhonchi or rales. Chest:      Chest wall: No tenderness. Abdominal:      General: Bowel sounds are normal. There is no distension. Palpations: Abdomen is soft. Tenderness: There is no abdominal tenderness. There is no guarding or rebound. Musculoskeletal:         General: No tenderness. Normal range of motion. Skin:     General: Skin is warm. Findings: No erythema or rash. Nails: There is no clubbing. Neurological:      Mental Status: He is alert and oriented to person, place, and time.       Cranial Nerves: No cranial nerve deficit. Coordination: Coordination normal.   Psychiatric:         Behavior: Behavior normal.         Thought Content:  Thought content normal.         Judgment: Judgment normal.            LABS:  Recent Results         Recent Results (from the past 24 hour(s))   Comprehensive Metabolic Panel     Collection Time: 03/05/22  8:30 AM   Result Value Ref Range     Sodium 140 135 - 144 mEq/L     Potassium 4.6 3.4 - 4.9 mEq/L     Chloride 102 95 - 107 mEq/L     CO2 22 20 - 31 mEq/L     Anion Gap 16 (H) 9 - 15 mEq/L     Glucose 166 (H) 70 - 99 mg/dL     BUN 20 8 - 23 mg/dL     CREATININE 1.15 0.70 - 1.20 mg/dL     GFR Non- >60.0 >60     GFR  >60.0 >60     Calcium 9.3 8.5 - 9.9 mg/dL     Total Protein 7.8 6.3 - 8.0 g/dL     Albumin 4.6 3.5 - 4.6 g/dL     Total Bilirubin 0.7 0.2 - 0.7 mg/dL     Alkaline Phosphatase 113 (H) 35 - 104 U/L     ALT 29 0 - 41 U/L     AST 28 0 - 40 U/L     Globulin 3.2 2.3 - 3.5 g/dL   CBC with Auto Differential     Collection Time: 03/05/22  8:30 AM   Result Value Ref Range     WBC 5.4 4.8 - 10.8 K/uL     RBC 4.10 (L) 4.70 - 6.10 M/uL     Hemoglobin 11.4 (L) 14.0 - 18.0 g/dL     Hematocrit 35.1 (L) 42.0 - 52.0 %     MCV 85.6 80.0 - 100.0 fL     MCH 27.7 27.0 - 31.3 pg     MCHC 32.3 (L) 33.0 - 37.0 %     RDW 16.3 (H) 11.5 - 14.5 %     Platelets 652 373 - 089 K/uL     Neutrophils % 62.9 %     Lymphocytes % 23.5 %     Monocytes % 8.0 %     Eosinophils % 3.4 %     Basophils % 2.2 %     Neutrophils Absolute 3.4 1.4 - 6.5 K/uL     Lymphocytes Absolute 1.3 1.0 - 4.8 K/uL     Monocytes Absolute 0.4 0.2 - 0.8 K/uL     Eosinophils Absolute 0.2 0.0 - 0.7 K/uL     Basophils Absolute 0.1 0.0 - 0.2 K/uL   Magnesium     Collection Time: 03/05/22  8:30 AM   Result Value Ref Range     Magnesium 2.0 1.7 - 2.4 mg/dL   Troponin     Collection Time: 03/05/22  8:30 AM   Result Value Ref Range     Troponin <0.010 0.000 - 0.010 ng/mL   D-Dimer, Quantitative     Collection Time: 03/05/22  8:30 AM   Result Value Ref Range     D-Dimer, Quant 2.67 (HH) 0.00 - 0.50 mg/L FEU   POCT Glucose     Collection Time: 03/05/22 12:50 PM   Result Value Ref Range     POC Glucose 110 (H) 70 - 99 mg/dl     Performed on ACCU-CHEK     Troponin     Collection Time: 03/05/22  1:32 PM   Result Value Ref Range     Troponin <0.010 0.000 - 0.010 ng/mL         Troponin:         Lab Results   Component Value Date     TROPONINI <0.010 03/05/2022         EKG: normal sinus rhythm, nonspecific ST and T waves changes        ASSESSMENT:           Active Hospital Problems     Diagnosis Date Noted    Chest pain [R07.9] 03/05/2022       Priority: High      Angina class IV  Hx of CAD s/p MV PCI  DM  HTN  HLD  ? Hx of PAfib- not on DOAC. Was initially started by pCP for unknown reason but patient not taking it. Carotid disease, s/p left CEA  PAD s/p LE fem-pop byapss     PLAN:   1. As always, aggressive risk factor modification is strongly recommended. We should adhere to the JNC VIII guidelines for HTN management and the NCEPATP III guidelines for LDL-C management. 2. Had a long talk with the patient regarding their symptoms, test results and the different treatment options available which include cardiac cath, alternate imaging modality and medical therapy. Patient would like to proceed with cardiac catheterization and understands the risks and benefits of the procedure. 3. Max cardiac meds  4. Monitor on tele  5. ACS orders  6. Hold DOAC in anticipation of C on Monday with dr Halima Pepper. ? Need to resume. Patient not taking it.    7. GI/DVT proph      Electronically signed by Nupur Guevara DO on 3/6/2022 at 10:15 AM

## 2022-03-06 NOTE — FLOWSHEET NOTE
1207- Patient arrived to floor from ER. Vital signs stable. Patient alert and oriented X 4. Assessment complete. Skin intact, no open areas noted. Patient oriented to room and call light. Electronically signed by Manjula Ramires on 3/5/2022 at 7:38 PM

## 2022-03-06 NOTE — FLOWSHEET NOTE
1210 - 2 RN admission skin assessment completed with Megan GABRIEL. Skin is intact with not notable redness.  Electronically signed by Sukh Bynum RN on 3/5/2022 at 7:39 PM

## 2022-03-06 NOTE — FLOWSHEET NOTE
[address] [place] [address] and clarified if patient is taking Eliquis. [facility] states it has not been filled since august 2021. Patient states he has not been taking it. Dr. Leslie Andrew made aware. Electronically signed by James Joe on 3/6/2022 at 10:17 AM  1653- Patient not on the cath lab schedule. Dr. Leslie Andrew notified and requested patient be put on. Supervisor Luis Gomez notified to add the patient for a heart catheterization with Dr. Alysha Griffin and Dr. Leslie Andrew on back up for PCI. Electronically signed by James Joe on 3/6/2022 at 4:54 PM  [phone]- Family brought patient's medication list in and it does have Eliquis on it. Patient now states he does take Eliquis at home. Electronically signed by James Joe on 3/6/2022 at 5:20 PM

## 2022-03-07 ENCOUNTER — APPOINTMENT (OUTPATIENT)
Dept: CARDIAC CATH/INVASIVE PROCEDURES | Age: 77
DRG: 287 | End: 2022-03-07
Payer: MEDICARE

## 2022-03-07 VITALS
HEIGHT: 70 IN | HEART RATE: 62 BPM | OXYGEN SATURATION: 100 % | WEIGHT: 196.7 LBS | TEMPERATURE: 97.3 F | SYSTOLIC BLOOD PRESSURE: 119 MMHG | BODY MASS INDEX: 28.16 KG/M2 | DIASTOLIC BLOOD PRESSURE: 62 MMHG | RESPIRATION RATE: 16 BRPM

## 2022-03-07 LAB
EKG ATRIAL RATE: 89 BPM
EKG P AXIS: 54 DEGREES
EKG P-R INTERVAL: 154 MS
EKG Q-T INTERVAL: 364 MS
EKG QRS DURATION: 92 MS
EKG QTC CALCULATION (BAZETT): 442 MS
EKG R AXIS: 32 DEGREES
EKG T AXIS: 46 DEGREES
EKG VENTRICULAR RATE: 89 BPM
GLUCOSE BLD-MCNC: 110 MG/DL (ref 70–99)
GLUCOSE BLD-MCNC: 94 MG/DL (ref 70–99)
INR BLD: 1
PERFORMED ON: ABNORMAL
PERFORMED ON: NORMAL
PROTHROMBIN TIME: 13.3 SEC (ref 12.3–14.9)

## 2022-03-07 PROCEDURE — C1894 INTRO/SHEATH, NON-LASER: HCPCS

## 2022-03-07 PROCEDURE — 2500000003 HC RX 250 WO HCPCS

## 2022-03-07 PROCEDURE — 2580000003 HC RX 258: Performed by: INTERNAL MEDICINE

## 2022-03-07 PROCEDURE — 2709999900 HC NON-CHARGEABLE SUPPLY

## 2022-03-07 PROCEDURE — 6360000004 HC RX CONTRAST MEDICATION: Performed by: INTERNAL MEDICINE

## 2022-03-07 PROCEDURE — 85610 PROTHROMBIN TIME: CPT

## 2022-03-07 PROCEDURE — 36415 COLL VENOUS BLD VENIPUNCTURE: CPT

## 2022-03-07 PROCEDURE — 6370000000 HC RX 637 (ALT 250 FOR IP): Performed by: EMERGENCY MEDICINE

## 2022-03-07 PROCEDURE — 6360000002 HC RX W HCPCS

## 2022-03-07 PROCEDURE — 4A023N7 MEASUREMENT OF CARDIAC SAMPLING AND PRESSURE, LEFT HEART, PERCUTANEOUS APPROACH: ICD-10-PCS | Performed by: INTERNAL MEDICINE

## 2022-03-07 PROCEDURE — 93458 L HRT ARTERY/VENTRICLE ANGIO: CPT

## 2022-03-07 PROCEDURE — B2111ZZ FLUOROSCOPY OF MULTIPLE CORONARY ARTERIES USING LOW OSMOLAR CONTRAST: ICD-10-PCS | Performed by: INTERNAL MEDICINE

## 2022-03-07 PROCEDURE — C1769 GUIDE WIRE: HCPCS

## 2022-03-07 PROCEDURE — 93458 L HRT ARTERY/VENTRICLE ANGIO: CPT | Performed by: INTERNAL MEDICINE

## 2022-03-07 PROCEDURE — C1887 CATHETER, GUIDING: HCPCS

## 2022-03-07 PROCEDURE — B2151ZZ FLUOROSCOPY OF LEFT HEART USING LOW OSMOLAR CONTRAST: ICD-10-PCS | Performed by: INTERNAL MEDICINE

## 2022-03-07 PROCEDURE — G0378 HOSPITAL OBSERVATION PER HR: HCPCS

## 2022-03-07 PROCEDURE — 99024 POSTOP FOLLOW-UP VISIT: CPT | Performed by: INTERNAL MEDICINE

## 2022-03-07 PROCEDURE — 6370000000 HC RX 637 (ALT 250 FOR IP): Performed by: INTERNAL MEDICINE

## 2022-03-07 RX ORDER — LISINOPRIL 20 MG/1
20 TABLET ORAL DAILY
Qty: 30 TABLET | Refills: 3 | Status: SHIPPED | OUTPATIENT
Start: 2022-03-08

## 2022-03-07 RX ORDER — ONDANSETRON 2 MG/ML
4 INJECTION INTRAMUSCULAR; INTRAVENOUS EVERY 6 HOURS PRN
Status: DISCONTINUED | OUTPATIENT
Start: 2022-03-07 | End: 2022-03-07 | Stop reason: HOSPADM

## 2022-03-07 RX ORDER — SODIUM CHLORIDE 450 MG/100ML
INJECTION, SOLUTION INTRAVENOUS CONTINUOUS
Status: DISCONTINUED | OUTPATIENT
Start: 2022-03-07 | End: 2022-03-07 | Stop reason: HOSPADM

## 2022-03-07 RX ORDER — SODIUM CHLORIDE 0.9 % (FLUSH) 0.9 %
5-40 SYRINGE (ML) INJECTION EVERY 12 HOURS SCHEDULED
Status: DISCONTINUED | OUTPATIENT
Start: 2022-03-07 | End: 2022-03-07 | Stop reason: HOSPADM

## 2022-03-07 RX ORDER — ACETAMINOPHEN 325 MG/1
650 TABLET ORAL EVERY 4 HOURS PRN
Status: DISCONTINUED | OUTPATIENT
Start: 2022-03-07 | End: 2022-03-07 | Stop reason: HOSPADM

## 2022-03-07 RX ORDER — SODIUM CHLORIDE 9 MG/ML
25 INJECTION, SOLUTION INTRAVENOUS PRN
Status: DISCONTINUED | OUTPATIENT
Start: 2022-03-07 | End: 2022-03-07 | Stop reason: HOSPADM

## 2022-03-07 RX ORDER — SODIUM CHLORIDE 0.9 % (FLUSH) 0.9 %
5-40 SYRINGE (ML) INJECTION PRN
Status: DISCONTINUED | OUTPATIENT
Start: 2022-03-07 | End: 2022-03-07 | Stop reason: HOSPADM

## 2022-03-07 RX ADMIN — CLOPIDOGREL BISULFATE 75 MG: 75 TABLET ORAL at 08:16

## 2022-03-07 RX ADMIN — GABAPENTIN 300 MG: 300 CAPSULE ORAL at 08:14

## 2022-03-07 RX ADMIN — LISINOPRIL 20 MG: 20 TABLET ORAL at 08:15

## 2022-03-07 RX ADMIN — HYDROCHLOROTHIAZIDE 25 MG: 25 TABLET ORAL at 08:15

## 2022-03-07 RX ADMIN — ATORVASTATIN CALCIUM 40 MG: 40 TABLET, FILM COATED ORAL at 08:14

## 2022-03-07 RX ADMIN — SODIUM CHLORIDE, PRESERVATIVE FREE 10 ML: 5 INJECTION INTRAVENOUS at 08:19

## 2022-03-07 RX ADMIN — Medication 8 ML: at 11:03

## 2022-03-07 RX ADMIN — IOPAMIDOL 75 ML: 612 INJECTION, SOLUTION INTRAVENOUS at 09:31

## 2022-03-07 RX ADMIN — ASPIRIN 81 MG: 81 TABLET, COATED ORAL at 08:14

## 2022-03-07 RX ADMIN — SODIUM CHLORIDE: 9 INJECTION, SOLUTION INTRAVENOUS at 06:13

## 2022-03-07 RX ADMIN — SODIUM CHLORIDE, PRESERVATIVE FREE 10 ML: 5 INJECTION INTRAVENOUS at 08:20

## 2022-03-07 RX ADMIN — AMLODIPINE BESYLATE 10 MG: 10 TABLET ORAL at 08:15

## 2022-03-07 RX ADMIN — ATENOLOL 50 MG: 50 TABLET ORAL at 08:15

## 2022-03-07 ASSESSMENT — PAIN SCALES - GENERAL
PAINLEVEL_OUTOF10: 0
PAINLEVEL_OUTOF10: 0

## 2022-03-07 ASSESSMENT — PAIN DESCRIPTION - PROGRESSION
CLINICAL_PROGRESSION: RESOLVED

## 2022-03-07 NOTE — PROGRESS NOTES
Pressure bandage removed from R wrist, no bleeding or hematoma. Pt up to bathroom independently. VSS. Report given to HARVEY Disla. Transport requested for pt to return to .

## 2022-03-07 NOTE — BRIEF OP NOTE
Brief Postoperative Note      Patient: Felicitas Baeza  YOB: 1945  MRN: [id number]     Section of Cardiology  Adult Brief Cardiac Cath Procedure Note        Procedure(s):  LHC, b/l coronary angio via RRA    Pre-operative Diagnosis:  CP    H&P Status: Completed and reviewed. Post-operative Diagnosis:  Stents in RCA CX and LAD all are patent. LAD has very distal disease at the [place].    EF 55 EDP 12    Findings:  See full report    Complications:  none    Primary Proceduralist:   Elda Everett MD

## 2022-03-07 NOTE — CARE COORDINATION
[facility] [place] Case Management Initial Discharge Assessment    Met with Patient to discuss discharge plan. PCP: Josie Ventura MD                                Date of Last Visit: 11/2MONTHS AGO    [facility] Patient: No     Discharge Planning    Living Arrangements: independently at home    Who do you live with? CURRENTLY ALONE, AS PTS SPOUSE IS IN ICU    Who helps you with your care:  self    If lives at home:     Do you have any barriers navigating in your home? no    Patient can perform ADL? Yes    Current Services (outpatient and in home) :  None    Dialysis: No    Is transportation available to get to your appointments? Yes    DME Equipment:  no    Respiratory equipment: None    Respiratory provider:  no     Pharmacy:  yes - [place] with Medication Assistance Program?  No      Patient agreeable to [place] ? Declined    Patient agreeable to SNF/Rehab? Declined    Other discharge needs identified? Cardiac Rehab    Does Patient Have a High-Risk for Readmission Diagnosis (CHF, PN, MI, COPD)? No      Initial Discharge Plan? (Note: please see concurrent daily documentation for any updates after initial note). MET WITH PATIENT, FROM HOME INDEPENDENTLY. PTS WIFE IS CURRENTLY IN ICU FOLLOWING A SUBDURAL HEMATOMA.       Readmission Risk              Risk of Unplanned Readmission:  17         Electronically signed by Jessica Hayes RN on 3/7/2022 at 8:21 AM

## 2022-03-07 NOTE — DISCHARGE INSTR - DIET

## 2022-03-07 NOTE — DISCHARGE SUMMARY
Cardiology Discharge Summary      Patient Identification:  Endy Gold  :   MRN: [id number]   Account: [de-identified]     Admit date: 3/5/2022  Discharge date: 3/7/2022   Attending provider: Waylan Hatchet, DO        Primary care provider: Raymundo Chakraborty MD     Admission Diagnoses:  Chest pain         Discharge Diagnoses: Active Hospital Problems    Diagnosis Date Noted    Chest pain [R07.9] 2022     Priority: [address] [facility] Course:   Endy Gold is a 67 y.o. male admitted to [facility] on 3/5/2022 for . Med Rx  Procedures:   1. Cath showed patent stents RCA LAD and CX EF 55 EDP 12     Consults:   None    Examination:  /76   Pulse 69   Temp 97.2 °F (36.2 °C) (Temporal)   Resp 18   Ht 5' 10\" (1.778 m)   Wt 196 lb 11.2 oz (89.2 kg)   SpO2 97%   BMI 28.22 kg/m²    Physical Exam   Constitutional: He appears healthy. No distress. HENT:   Normal cephalic and Atraumatic   Eyes: Pupils are equal, round, and reactive to light. Neck: Thyroid normal. No JVD present. No neck adenopathy. No thyromegaly present. Cardiovascular: Normal rate, regular rhythm, normal heart sounds, intact distal pulses and normal pulses. Pulmonary/Chest: Effort normal and breath sounds normal. He has no wheezes. He has no rales. He exhibits no tenderness. Abdominal: Soft. Bowel sounds are normal. There is no abdominal tenderness. Musculoskeletal:         General: No tenderness or edema. Normal range of motion. Cervical back: Normal range of motion and neck supple. Neurological: He is alert and oriented to person, place, and time. Skin: Skin is warm. No cyanosis. Nails show no clubbing.        Medications:  Current Discharge Medication List      START taking these medications    Details   lisinopril (PRINIVIL;ZESTRIL) 20 MG tablet Take 1 tablet by mouth daily  Qty: 30 tablet, Refills: 3         CONTINUE these Indication:   Chest pain, elevated d-dimer Technique:  Spiral CT acquisition of the chest from the thoracic inlet to the upper abdomen following IV contrast.  Maximum intensity projection (MIP)reconstructions were performed. All CT scans at this facility use dose modulation, iterative reconstruction, and/or weight based dosing when appropriate to reduce radiation dose to as low as reasonably achievable. Contrast:  100 mL Isovue-300 IV Comparison:  Same day chest x-ray and CT thorax 12/21/2013. RESULT: Limitations:  None. Evaluation for thromboembolic disease:      - Right heart chambers:  No thromboembolic disease.      - Main pulmonary arteries:  No thromboembolic disease.      - Lobar pulmonary arteries:  No thromboembolic disease.        - Segmental pulmonary arteries:  No thromboembolic disease.        - Subsegmental pulmonary arteries:  No thromboembolic disease. Lines, tubes, and devices:  None. Lung parenchyma and pleura:  No consolidation. No suspicious pulmonary nodule. Bibasilar atelectasis/scarring. Scattered calcified granulomas. No pleural effusion. Central airways are patent. Thoracic inlet, heart, and mediastinum:  No lymphadenopathy in the axillary, mediastinal, or hilar regions. The thoracic aorta and main pulmonary artery are normal in caliber. The cardiac chambers are normal in size. No coronary artery atherosclerotic calcifications are noted, although the study is not optimized for coronary assessment. No pericardial effusion or thickening. Bones and soft tissues:  No destructive bone lesion. Degenerative changes of the thoracic spine. Bilateral gynecomastia. Upper abdomen:  17 mm low-attenuation left adrenal nodule, likely an adenoma. .      No CT evidence of pulmonary embolism acute findings in the thorax. No suspicious thoracic lymphadenopathy. XR CHEST PORTABLE    Result Date: 3/5/2022  Exam: XR CHEST PORTABLE History:  Chest pain Technique: AP portable view of the chest obtained. Comparison: none Chest x-ray portable Findings: The cardiac silhouette is enlarged. There are no infiltrates, consolidations or effusions. . Bones of the thorax appear intact. No radiographic evidence of acute intrathoracic process.        Labs:   Recent Results (from the past 72 hour(s))   EKG 12 Lead - Chest Pain    Collection Time: 03/05/22  8:21 AM   Result Value Ref Range    Ventricular Rate 89 BPM    Atrial Rate 89 BPM    P-R Interval 154 ms    QRS Duration 92 ms    Q-T Interval 364 ms    QTc Calculation (Bazett) 442 ms    P Axis 54 degrees    R Axis 32 degrees    T Axis 46 degrees   Comprehensive Metabolic Panel    Collection Time: 03/05/22  8:30 AM   Result Value Ref Range    Sodium 140 135 - 144 mEq/L    Potassium 4.6 3.4 - 4.9 mEq/L    Chloride 102 95 - 107 mEq/L    CO2 22 20 - 31 mEq/L    Anion Gap 16 (H) 9 - 15 mEq/L    Glucose 166 (H) 70 - 99 mg/dL    BUN 20 8 - 23 mg/dL    CREATININE 1.15 0.70 - 1.20 mg/dL    GFR Non-African American >60.0 >60    GFR  >60.0 >60    Calcium 9.3 8.5 - 9.9 mg/dL    Total Protein 7.8 6.3 - 8.0 g/dL    Albumin 4.6 3.5 - 4.6 g/dL    Total Bilirubin 0.7 0.2 - 0.7 mg/dL    Alkaline Phosphatase 113 (H) 35 - 104 U/L    ALT 29 0 - 41 U/L    AST 28 0 - 40 U/L    Globulin 3.2 2.3 - 3.5 g/dL   CBC with Auto Differential    Collection Time: 03/05/22  8:30 AM   Result Value Ref Range    WBC 5.4 4.8 - 10.8 K/uL    RBC 4.10 (L) 4.70 - 6.10 M/uL    Hemoglobin 11.4 (L) 14.0 - 18.0 g/dL    Hematocrit 35.1 (L) 42.0 - 52.0 %    MCV 85.6 80.0 - 100.0 fL    MCH 27.7 27.0 - 31.3 pg    MCHC 32.3 (L) 33.0 - 37.0 %    RDW 16.3 (H) 11.5 - 14.5 %    Platelets 598 995 - 121 K/uL    Neutrophils % 62.9 %    Lymphocytes % 23.5 %    Monocytes % 8.0 %    Eosinophils % 3.4 %    Basophils % 2.2 %    Neutrophils Absolute 3.4 1.4 - 6.5 K/uL    Lymphocytes Absolute 1.3 1.0 - 4.8 K/uL    Monocytes Absolute 0.4 0.2 - 0.8 K/uL    Eosinophils Absolute 0.2 0.0 - 0.7 K/uL    Basophils Absolute 0.1 0.0 - 0.2 K/uL   Magnesium    Collection Time: 03/05/22  8:30 AM   Result Value Ref Range    Magnesium 2.0 1.7 - 2.4 mg/dL   Troponin    Collection Time: 03/05/22  8:30 AM   Result Value Ref Range    Troponin <0.010 0.000 - 0.010 ng/mL   D-Dimer, Quantitative    Collection Time: 03/05/22  8:30 AM   Result Value Ref Range    D-Dimer, Quant 2.67 (HH) 0.00 - 0.50 mg/L FEU   POCT Venous    Collection Time: 03/05/22  9:51 AM   Result Value Ref Range    POC Creatinine 1.2 0.8 - 1.3 mg/dL    GFR Non- 59 (A) >60    GFR  >60 >60    Sample Type JASON     Performed on SEE BELOW    POCT Glucose    Collection Time: 03/05/22 12:50 PM   Result Value Ref Range    POC Glucose 110 (H) 70 - 99 mg/dl    Performed on ACCU-CHEK    Troponin    Collection Time: 03/05/22  1:32 PM   Result Value Ref Range    Troponin <0.010 0.000 - 0.010 ng/mL   POCT Glucose    Collection Time: 03/05/22  4:31 PM   Result Value Ref Range    POC Glucose 127 (H) 70 - 99 mg/dl    Performed on ACCU-CHEK    POCT Glucose    Collection Time: 03/05/22  7:58 PM   Result Value Ref Range    POC Glucose 86 70 - 99 mg/dl    Performed on ACCU-CHEK    POCT Glucose    Collection Time: 03/06/22  6:21 AM   Result Value Ref Range    POC Glucose 97 70 - 99 mg/dl    Performed on ACCU-CHEK    POCT Glucose    Collection Time: 03/06/22 10:41 AM   Result Value Ref Range    POC Glucose 137 (H) 70 - 99 mg/dl    Performed on ACCU-CHEK    POCT Glucose    Collection Time: 03/06/22  3:59 PM   Result Value Ref Range    POC Glucose 82 70 - 99 mg/dl    Performed on ACCU-CHEK    POCT Glucose    Collection Time: 03/06/22  7:56 PM   Result Value Ref Range    POC Glucose 115 (H) 70 - 99 mg/dl    Performed on ACCU-CHEK    Protime-INR    Collection Time: 03/07/22  5:39 AM   Result Value Ref Range    Protime 13.3 12.3 - 14.9 sec    INR 1.0    POCT Glucose    Collection Time: 03/07/22  6:00 AM   Result Value Ref Range    POC Glucose 94 70 - 99 mg/dl    Performed on ACCU-[facility]            Follow-up visits:   Jayy Vera MD  [facility] [address]  [address] [address]  [phone]    Schedule an appointment as soon as possible for a visit in 3 weeks         [facility] Problems    Diagnosis Date Noted    Chest pain [R07.9] 03/05/2022     Priority: Low      Angina -cath negative. Dc ASA. continue Plavix. Hx of CAD s/p MV PCI  DM  HTN BP low in Cath Lab. We will dc Amlodipine. HLD  Carotid disease, s/p left CEA  PAD s/p LE fem-pop bypass - Pt was on DOAC initially given by Dr. Penny Cook for PAD but pt has not been taking it at home.  We will remove from his med list.                  Electronically signed by Eric Oneil MD on 3/7/2022 at 9:42 AM

## 2022-03-07 NOTE — PROGRESS NOTES
CLINICAL PHARMACY NOTE: MEDS TO BEDS    Total # of Prescriptions Filled: 1   The following medications were delivered to the patient:  · . Lisinopril 20mg tab    Additional Documentation:

## 2022-03-07 NOTE — FLOWSHEET NOTE
Am nursing  assessment completed. Pt :   Awake and resting in bed. Alert and oriented x4  Breakfast: NPO for procedure  RESP:        Even and non labored       Lung sounds:    clear                             Oxygen: room air  Complaints of: none  Pain: denies  IV: iv x 2. NS at 75ml/hr  TELE: NSR  Dressings: none  Precautions:              Falls:      35  Danny:  23  Chart and meds reviewed. Noted Labs:   Plan for today:    Call light in reach. NOTES:   [id number] pt to cath lab via wheelchair. No complaints. Electronically signed by Shiv Bronson RN on 3/7/2022 at 8:30 AM      [phone]- report from [facility]. Pt had R radial cath with no intervention. Tolerated procedure well. Up independently to bathroom. 11am - Pt returned to [address] [place] [address] set up with lunch and tolerating PO fluids. VSS assessed and stable. R radial puncture site WNL, pt denies any pain at this time. IV fluids hep locked prior to arrival on [address] [place] [address] for discharge later today. 130- IV and Tele removed. Pt reviewed discharged instructions and received belongings from security.    Electronically signed by Renee Neville on 3/7/2022 at 11:29 AM

## 2022-03-07 NOTE — PROGRESS NOTES
Arrived to pre/post cath from 1W, alert and oriented. Vital signs obtained. Consent for procedure is in chart. Prepping pt for procedure.

## 2022-03-07 NOTE — PROGRESS NOTES
Returned from cath lab to pre/post cath, alert and oriented. R wrist remains stable, no bleeding or hematoma. VSS. Will continue to monitor.

## 2022-09-02 ENCOUNTER — OFFICE VISIT (OUTPATIENT)
Dept: CARDIOLOGY CLINIC | Age: 77
End: 2022-09-02
Payer: MEDICARE

## 2022-09-02 VITALS
HEART RATE: 83 BPM | BODY MASS INDEX: 28.87 KG/M2 | WEIGHT: 201.2 LBS | OXYGEN SATURATION: 98 % | SYSTOLIC BLOOD PRESSURE: 120 MMHG | DIASTOLIC BLOOD PRESSURE: 66 MMHG

## 2022-09-02 DIAGNOSIS — E78.5 DYSLIPIDEMIA: ICD-10-CM

## 2022-09-02 DIAGNOSIS — I10 ESSENTIAL HYPERTENSION: ICD-10-CM

## 2022-09-02 DIAGNOSIS — I25.119 CORONARY ARTERY DISEASE INVOLVING NATIVE CORONARY ARTERY OF NATIVE HEART WITH ANGINA PECTORIS (HCC): ICD-10-CM

## 2022-09-02 DIAGNOSIS — I73.9 PERIPHERAL VASCULAR DISEASE (HCC): Primary | ICD-10-CM

## 2022-09-02 DIAGNOSIS — I25.10 CORONARY ARTERY DISEASE INVOLVING NATIVE CORONARY ARTERY OF NATIVE HEART WITHOUT ANGINA PECTORIS: ICD-10-CM

## 2022-09-02 DIAGNOSIS — I47.29 NSVT (NONSUSTAINED VENTRICULAR TACHYCARDIA): ICD-10-CM

## 2022-09-02 DIAGNOSIS — I70.209 OCCLUSIVE DISEASE OF ARTERY OF LOWER EXTREMITY (HCC): ICD-10-CM

## 2022-09-02 DIAGNOSIS — R09.89 BILATERAL CAROTID BRUITS: ICD-10-CM

## 2022-09-02 DIAGNOSIS — E78.49 OTHER HYPERLIPIDEMIA: ICD-10-CM

## 2022-09-02 PROCEDURE — 99214 OFFICE O/P EST MOD 30 MIN: CPT | Performed by: INTERNAL MEDICINE

## 2022-09-02 PROCEDURE — 1123F ACP DISCUSS/DSCN MKR DOCD: CPT | Performed by: INTERNAL MEDICINE

## 2022-09-02 RX ORDER — ISOSORBIDE MONONITRATE 30 MG/1
30 TABLET, EXTENDED RELEASE ORAL DAILY
Qty: 90 TABLET | Refills: 3 | Status: SHIPPED | OUTPATIENT
Start: 2022-09-02 | End: 2022-09-02 | Stop reason: SDUPTHER

## 2022-09-02 RX ORDER — FUROSEMIDE 20 MG/1
TABLET ORAL
COMMUNITY
Start: 2022-08-16

## 2022-09-02 RX ORDER — ALLOPURINOL 100 MG/1
TABLET ORAL
COMMUNITY
Start: 2022-07-29

## 2022-09-02 RX ORDER — ISOSORBIDE MONONITRATE 30 MG/1
30 TABLET, EXTENDED RELEASE ORAL DAILY
Qty: 90 TABLET | Refills: 3 | Status: SHIPPED | OUTPATIENT
Start: 2022-09-02

## 2022-09-02 RX ORDER — NITROGLYCERIN 0.4 MG/1
TABLET SUBLINGUAL
Qty: 25 TABLET | Refills: 5 | Status: SHIPPED | OUTPATIENT
Start: 2022-09-02 | End: 2022-09-02 | Stop reason: SDUPTHER

## 2022-09-02 RX ORDER — NITROGLYCERIN 0.4 MG/1
TABLET SUBLINGUAL
Qty: 25 TABLET | Refills: 5 | Status: SHIPPED | OUTPATIENT
Start: 2022-09-02

## 2022-09-02 RX ORDER — ALPRAZOLAM 1 MG/1
TABLET ORAL
COMMUNITY
Start: 2022-07-08

## 2022-09-02 RX ORDER — APIXABAN 5 MG/1
TABLET, FILM COATED ORAL
COMMUNITY
Start: 2022-07-29

## 2022-09-02 ASSESSMENT — ENCOUNTER SYMPTOMS
STRIDOR: 0
NAUSEA: 0
CHEST TIGHTNESS: 1
COUGH: 0
BLOOD IN STOOL: 0
GASTROINTESTINAL NEGATIVE: 1
SHORTNESS OF BREATH: 0
EYES NEGATIVE: 1
WHEEZING: 0

## 2022-09-02 NOTE — PROGRESS NOTES
Subsequent Progress Note  Patient: Katerina Baldwin  YOB: 1945  MRN: [id number]    Chief Complaint: CP cad pad htn Dizzy   Chief Complaint   Patient presents with    Follow-up    Coronary Artery Disease    Hypertension       CV Data:  CAD 5-6 stents in total  10/2017 Left Fem Distal bypass- Dr. Elizabeth Mai  Remote- RIGHT  LE bypass  6/19/2018 Cath:  LAD, CXand RCA stents all patent with Mild ISR EF 55  8/2015 CUS DARIEL 50-69- followed by Dr. Elizabeth Mai  2/2018 echo ef 65  2/2019 spect negative  2019 LCEA  9/20/2019: cath LAD CX and RCA prior stents mild ISR but patent. Septal  occluded and fills from RCA. EF 60 EDP 9  11/2019 HM negative. 10/2020 CUS - Zolli. -LCEA patent RCIA - mild   9/21 SPECT negative   9/21 Echo EF 65 1+ MR  RVSP 56   3/22 cath LAD RCA nad CX all stents patent. Subjective/HPI: recently had 2 angina ( chest pressure heavy no radiation no sob but + diaphoresis moderate) took NTG w relief. Also having dizzy spells     9/27/2019: no cp no osb no falls no bleed takes meds. Had Pig tail catheter induced VT causing CP during cath     12/30/2019 no cp no sob no falls no bleed occ leg edema due to sleeping in a recliner. He falls a sleep every night wahching tv.     7/10/2020 no cp no sob no falls no bleed. Eats well. Sleep well. 1/15/21  Doing well no cp no sob some edema no falls n bleed. coouple months ago started Eliquis 5 bid by Dr. Laina Momin( Etiology unclear)     8/6/21 recent 3 episodes of angina ( heavy pressure) requiring NTG with relief. Sits down and relieved. No SOB but little nausea and diaphoresis. 9/3/21 no further CP no further SL NTG. No SOB no bleed no falls     12/3/21 doingw ell no cp no sob no falls no bleed little dizzy . 9/2/22 wife passed from [address]. Deprssed and upset. Had to take NTG on couple occasions. No sb no faalls no bleed.    Works on [employer]    EKG:    Past Medical History:   Diagnosis Date    Anemia, normocytic normochromic CAD (coronary artery disease)     multiple PCI    Carotid artery stenosis     Diabetes mellitus ([facility]     Gout 10/2/2017    Hyperlipidemia     Hypertension     Neuropathy     Occlusive disease of artery of lower extremity ([facility] 10/2/2017    Type 2 diabetes mellitus without complication [facility])        Past Surgical History:   Procedure Laterality Date    ARTERIOGRAM Right 10/6/2017    RIGHT ARM AORTO-FEMORAL DIGITAL SUBTRACTION ARTERIOGRAPHY performed by Khanh Starr MD at [address] [facility] Left 2020    LEFT CAROTID ENDARTERECTOMY performed by Khanh Starr MD at [facility] N/A 11    xience stent to RAC and [address] Left 13    xience stent to LAD    [address] [facility] Right 13    Promus stent to RCA    DIAGNOSTIC CARDIAC CATH LAB PROCEDURE  2019    FEMORAL-FEMORAL BYPASS GRAFT      HERNIA REPAIR      as child    UT ESOPHAGOGASTRODUODENOSCOPY TRANSORAL DIAGNOSTIC N/A 2017    EGD ESOPHAGOGASTRODUODENOSCOPY with biopsy performed by Paulino Cruz MD at [address] [facility] Left 10/27/2017    LEFT FEMORAL DISTAL BYPASS, (PAT DONE 10-2-17) performed by Khanh Starr MD at [address] [place] [address] History   Problem Relation Age of Onset    Diabetes Mother     Heart Disease Father     Heart Attack Father     No Known Problems Son     Cancer Other         lung    Prostate Cancer Other        Social History     Socioeconomic History    Marital status:    Tobacco Use    Smoking status: Former     Types: Cigarettes     Start date: 1960     Quit date:      Years since quittin.6    Smokeless tobacco: Never   Vaping Use    Vaping Use: Never used   Substance and Sexual Activity    Alcohol use: No     Alcohol/week: 0.0 standard drinks    Drug use: No       No Known Allergies    Current Outpatient Medications   Medication Sig Dispense Refill    allopurinol (ZYLOPRIM) 100 MG tablet TAKE 1 TABLET BY MOUTH EVERY DAY      ALPRAZolam (XANAX) 1 MG tablet TAKE 1/2 TO 1 TABLET BY MOUTH EVERY 8 HOURS AS NEEDED      ELIQUIS 5 MG TABS tablet TAKE 1 TABLET BY MOUTH TWICE DAILY      furosemide (LASIX) 20 MG tablet TAKE 1 TABLET BY MOUTH EVERY 12 HOURS AS NEEDED FOR SWELLING OF FEET OR LEGS      nitroGLYCERIN (NITROSTAT) 0.4 MG SL tablet PLACE 1 TABLET UNDER THE TONGUE EVERY 5 MINUTES AS NEEDED FOR CHEST PAIN(MAX 3 DOSES). IF NO RELIEF AFTER FIRST DOSE, CALL 911 25 tablet 5    isosorbide mononitrate (IMDUR) 30 MG extended release tablet Take 1 tablet by mouth daily 90 tablet 3    lisinopril (PRINIVIL;ZESTRIL) 20 MG tablet Take 1 tablet by mouth daily 30 tablet 3    clopidogrel (PLAVIX) 75 MG tablet Take 75 mg by mouth daily       gabapentin (NEURONTIN) 300 MG capsule Take 300 mg by mouth 2 times daily. atenolol (TENORMIN) 50 MG tablet TAKE 1 TABLET BY MOUTH TWICE DAILY (Patient taking differently: Take 50 mg by mouth 2 times daily as needed TAKE 1 TABLET BY MOUTH TWICE DAILY only if SBP is greater than 185) 180 tablet 2    hydrochlorothiazide (HYDRODIURIL) 25 MG tablet Take 25 mg by mouth daily      atorvastatin (LIPITOR) 40 MG tablet Take 1 tablet by mouth daily 90 tablet 2    meclizine (ANTIVERT) 12.5 MG tablet Take 12.5 mg by mouth 3 times daily as needed      ammonium lactate (LAC-HYDRIN) 12 % lotion Apply 1 applicator topically at bedtime       tiZANidine (ZANAFLEX) 2 MG tablet Take 2 mg by mouth every 6 hours as needed      ferrous sulfate 325 (65 FE) MG tablet Take 325 mg by mouth 2 times daily       pioglitazone (ACTOS) 45 MG tablet Take 45 mg by mouth daily       ULORIC 80 MG TABS Take 80 mg by mouth daily        No current facility-administered medications for this visit. Review of Systems:   Review of Systems   Constitutional: Negative. Negative for diaphoresis and fatigue. HENT: Negative. Eyes: Negative.     Respiratory: Positive for chest tightness. Negative for cough, shortness of breath, wheezing and stridor. Cardiovascular: Negative. Negative for chest pain, palpitations and leg swelling. Gastrointestinal: Negative. Negative for blood in stool and nausea. Genitourinary: Negative. Musculoskeletal: Negative. Skin: Negative. Neurological:  Positive for dizziness. Negative for syncope, weakness and light-headedness. Hematological: Negative. Psychiatric/Behavioral: Negative. Physical Examination:    /66 (Site: Right Upper Arm, Position: Sitting, Cuff Size: Large Adult)   Pulse 83   Wt 201 lb 3.2 oz (91.3 kg)   SpO2 98%   BMI 28.87 kg/m²    Physical Exam   Constitutional: He appears healthy. No distress. HENT:   Normal cephalic and Atraumatic   Eyes: Pupils are equal, round, and reactive to light. Neck: Thyroid normal. No JVD present. No neck adenopathy. No thyromegaly present. Cardiovascular: Normal rate and regular rhythm. Exam reveals decreased pulses. Murmur heard. Pulmonary/Chest: Effort normal and breath sounds normal. He has no wheezes. He has no rales. He exhibits no tenderness. Abdominal: Soft. Bowel sounds are normal. There is no abdominal tenderness. Musculoskeletal:         General: No tenderness or edema. Normal range of motion. Cervical back: Normal range of motion and neck supple. Neurological: He is alert and oriented to person, place, and time. Skin: Skin is warm. No cyanosis. Nails show no clubbing.      LABS:  CBC:   Lab Results   Component Value Date/Time    WBC 5.4 03/05/2022 08:30 AM    RBC 4.10 03/05/2022 08:30 AM    RBC 4.07 02/03/2012 06:41 AM    HGB 11.4 03/05/2022 08:30 AM    HCT 35.1 03/05/2022 08:30 AM    MCV 85.6 03/05/2022 08:30 AM    MCH 27.7 03/05/2022 08:30 AM    MCHC 32.3 03/05/2022 08:30 AM    RDW 16.3 03/05/2022 08:30 AM     03/05/2022 08:30 AM    MPV 10.4 09/24/2015 08:31 AM     Lipids:  Lab Results   Component Value Date    CHOL 172 08/19/2022    CHOL 190 09/20/2021    CHOL 176 05/27/2021     Lab Results   Component Value Date    TRIG 60 08/19/2022    TRIG 103 09/20/2021    TRIG 101 05/27/2021     Lab Results   Component Value Date    HDL 76 (H) 08/19/2022    HDL 60 (H) 09/20/2021    HDL 58 05/27/2021     Lab Results   Component Value Date    LDLCALC 84 08/19/2022    LDLCALC 109 09/20/2021    LDLCALC 98 05/27/2021     No results found for: LABVLDL, VLDL  Lab Results   Component Value Date    CHOLHDLRATIO 3.4 03/05/2013    CHOLHDLRATIO 3.1 11/04/2011     CMP:    Lab Results   Component Value Date/Time     03/05/2022 08:30 AM    K 4.6 03/05/2022 08:30 AM     03/05/2022 08:30 AM    CO2 22 03/05/2022 08:30 AM    BUN 20 03/05/2022 08:30 AM    CREATININE 1.2 03/05/2022 09:51 AM    CREATININE 1.15 03/05/2022 08:30 AM    GFRAA >60 03/05/2022 09:51 AM    LABGLOM 59 03/05/2022 09:51 AM    GLUCOSE 166 03/05/2022 08:30 AM    GLUCOSE 111 11/08/2011 10:48 AM    PROT 7.8 03/05/2022 08:30 AM    LABALBU 4.6 03/05/2022 08:30 AM    LABALBU 5.0 11/04/2011 02:55 PM    CALCIUM 9.3 03/05/2022 08:30 AM    BILITOT 0.7 03/05/2022 08:30 AM    ALKPHOS 113 03/05/2022 08:30 AM    AST 28 03/05/2022 08:30 AM    ALT 29 03/05/2022 08:30 AM     BMP:    Lab Results   Component Value Date/Time     03/05/2022 08:30 AM    K 4.6 03/05/2022 08:30 AM     03/05/2022 08:30 AM    CO2 22 03/05/2022 08:30 AM    BUN 20 03/05/2022 08:30 AM    LABALBU 4.6 03/05/2022 08:30 AM    LABALBU 5.0 11/04/2011 02:55 PM    CREATININE 1.2 03/05/2022 09:51 AM    CREATININE 1.15 03/05/2022 08:30 AM    CALCIUM 9.3 03/05/2022 08:30 AM    GFRAA >60 03/05/2022 09:51 AM    LABGLOM 59 03/05/2022 09:51 AM    GLUCOSE 166 03/05/2022 08:30 AM    GLUCOSE 111 11/08/2011 10:48 AM     Magnesium:    Lab Results   Component Value Date/Time    MG 2.0 03/05/2022 08:30 AM    MG 2.5 11/04/2011 02:55 PM     TSH:  Lab Results   Component Value Date    TSH 2.890 09/20/2021       Patient Active Problem List   Diagnosis    Peripheral vascular disease, unspecified ([facility]    Essential hypertension    Occlusive disease of artery of lower extremity ([facility])    Gout    Angina, class III ([facility])    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, 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 ([facility]    Stenosis of left carotid artery    Dyslipidemia    Angina of effort ([facility])    NSVT (nonsustained ventricular tachycardia) ([facility])    Coronary artery disease involving native coronary artery of native heart with angina pectoris ([facility]    Other hyperlipidemia    Coronary artery disease involving native coronary artery of native heart without angina pectoris    Pain in right knee    Unilateral primary osteoarthritis, right knee    Unilateral primary osteoarthritis, right knee    Anemia of unknown etiology    Bradycardia    Right flank pain    Syncope and collapse    Carotid stenosis, left    Chest pain       Medications Discontinued During This Encounter   Medication Reason    nitroGLYCERIN (NITROSTAT) 0.4 MG SL tablet REORDER       Modified Medications    Modified Medication Previous Medication    NITROGLYCERIN (NITROSTAT) 0.4 MG SL TABLET nitroGLYCERIN (NITROSTAT) 0.4 MG SL tablet       PLACE 1 TABLET UNDER THE TONGUE EVERY 5 MINUTES AS NEEDED FOR CHEST PAIN(MAX 3 DOSES). IF NO RELIEF AFTER FIRST DOSE, CALL 911    PLACE 1 TABLET UNDER THE TONGUE EVERY 5 MINUTES AS NEEDED FOR CHEST PAIN(MAX 3 DOSES). IF NO RELIEF AFTER FIRST DOSE, CALL 911       Orders Placed This Encounter   Medications    nitroGLYCERIN (NITROSTAT) 0.4 MG SL tablet     Sig: PLACE 1 TABLET UNDER THE TONGUE EVERY 5 MINUTES AS NEEDED FOR CHEST PAIN(MAX 3 DOSES). IF NO RELIEF AFTER FIRST DOSE, CALL 911     Dispense:  25 tablet     Refill:  5    isosorbide mononitrate (IMDUR) 30 MG extended release tablet     Sig: Take 1 tablet by mouth daily     Dispense:  90 tablet     Refill:  3       Assessment/Plan:    1. Coronary artery disease involving native coronary artery      Still w some anigna.  Possibly stress related. Will add Imdur  2. Essential hypertension   BP low repeat better. Pt little tdizzy. Dc Imdur. 3. Peripheral vascular disease ([address] - on Eliquis. Dr. Tonja Cabot     4. Angina, class III ([address]    5. Bilateral carotid artery stenosis   Will order CUS now. 6. Occlusive disease of artery of lower extremity ([address]     7. Dyslipidemia  Advance Atorva to 40 - continue recheck labs . Low fat deit. +    8. NSVT ( cath related casuing CP) - HM negative. 9. LE Edema- probably related to him sleeping in recliner as his legs are  Free of edema when he sleeps in bed. Counseling:  Heart Healthy Lifestyle, Low Salt Diet, Take Precautions to Prevent Falls and Walk Daily    Return in about 4 months (around 1/2/2023).       Electronically signed by Carleen Arellano MD on 9/2/2022 at 3:00 PM

## 2023-01-06 ENCOUNTER — OFFICE VISIT (OUTPATIENT)
Dept: CARDIOLOGY CLINIC | Age: 78
End: 2023-01-06
Payer: MEDICARE

## 2023-01-06 VITALS
HEART RATE: 80 BPM | WEIGHT: 201.4 LBS | OXYGEN SATURATION: 100 % | BODY MASS INDEX: 28.9 KG/M2 | SYSTOLIC BLOOD PRESSURE: 130 MMHG | DIASTOLIC BLOOD PRESSURE: 66 MMHG

## 2023-01-06 DIAGNOSIS — R09.89 BILATERAL CAROTID BRUITS: ICD-10-CM

## 2023-01-06 DIAGNOSIS — I73.9 CLAUDICATION (HCC): ICD-10-CM

## 2023-01-06 DIAGNOSIS — I65.23 BILATERAL CAROTID ARTERY STENOSIS: ICD-10-CM

## 2023-01-06 DIAGNOSIS — I10 ESSENTIAL HYPERTENSION: ICD-10-CM

## 2023-01-06 DIAGNOSIS — I25.10 CORONARY ARTERY DISEASE INVOLVING NATIVE CORONARY ARTERY OF NATIVE HEART WITHOUT ANGINA PECTORIS: ICD-10-CM

## 2023-01-06 DIAGNOSIS — I47.29 NSVT (NONSUSTAINED VENTRICULAR TACHYCARDIA): ICD-10-CM

## 2023-01-06 DIAGNOSIS — I73.9 PERIPHERAL VASCULAR DISEASE (HCC): ICD-10-CM

## 2023-01-06 DIAGNOSIS — E78.49 OTHER HYPERLIPIDEMIA: ICD-10-CM

## 2023-01-06 DIAGNOSIS — E78.5 DYSLIPIDEMIA: ICD-10-CM

## 2023-01-06 DIAGNOSIS — I25.119 CORONARY ARTERY DISEASE INVOLVING NATIVE CORONARY ARTERY OF NATIVE HEART WITH ANGINA PECTORIS (HCC): Primary | ICD-10-CM

## 2023-01-06 DIAGNOSIS — I70.209 OCCLUSIVE DISEASE OF ARTERY OF LOWER EXTREMITY (HCC): ICD-10-CM

## 2023-01-06 PROCEDURE — 99214 OFFICE O/P EST MOD 30 MIN: CPT | Performed by: INTERNAL MEDICINE

## 2023-01-06 PROCEDURE — 1123F ACP DISCUSS/DSCN MKR DOCD: CPT | Performed by: INTERNAL MEDICINE

## 2023-01-06 PROCEDURE — 3078F DIAST BP <80 MM HG: CPT | Performed by: INTERNAL MEDICINE

## 2023-01-06 PROCEDURE — 3075F SYST BP GE 130 - 139MM HG: CPT | Performed by: INTERNAL MEDICINE

## 2023-01-06 RX ORDER — ATENOLOL 50 MG/1
TABLET ORAL
Qty: 90 TABLET | Refills: 3 | Status: SHIPPED | OUTPATIENT
Start: 2023-01-06

## 2023-01-06 ASSESSMENT — ENCOUNTER SYMPTOMS
NAUSEA: 0
BLOOD IN STOOL: 0
STRIDOR: 0
CHEST TIGHTNESS: 1
WHEEZING: 0
COUGH: 0
EYES NEGATIVE: 1
SHORTNESS OF BREATH: 0
GASTROINTESTINAL NEGATIVE: 1

## 2023-01-06 NOTE — PROGRESS NOTES
Subsequent Progress Note  Patient: Berta Han  YOB: 1945  MRN: [id number]    Chief Complaint: CP cad pad htn Dizzy   Chief Complaint   Patient presents with    Follow-up     4 month       CV Data:  CAD 5-6 stents in total  10/2017 Left Fem Distal bypass- Dr. Kwaku Booen  Remote- RIGHT  LE bypass  6/19/2018 Cath:  LAD, CXand RCA stents all patent with Mild ISR EF 55  8/2015 CUS DARIEL 50-69- followed by Dr. Kwaku Boone  2/2018 echo ef 65  2/2019 spect negative  2019 LCEA  9/20/2019: cath LAD CX and RCA prior stents mild ISR but patent. Septal  occluded and fills from RCA. EF 60 EDP 9  11/2019 HM negative. 10/2020 CUS - Zolli. -LCEA patent RCIA - mild   9/21 SPECT negative   9/21 Echo EF 65 1+ MR  RVSP 56   3/22 cath LAD RCA nad CX all stents patent. Subjective/HPI: recently had 2 angina ( chest pressure heavy no radiation no sob but + diaphoresis moderate) took NTG w relief. Also having dizzy spells     9/27/2019: no cp no osb no falls no bleed takes meds. Had Pig tail catheter induced VT causing CP during cath     12/30/2019 no cp no sob no falls no bleed occ leg edema due to sleeping in a recliner. He falls a sleep every night wahching tv.     7/10/2020 no cp no sob no falls no bleed. Eats well. Sleep well. 1/15/21  Doing well no cp no sob some edema no falls n bleed. coouple months ago started Eliquis 5 bid by Dr. Toney Keith( Etiology unclear)     8/6/21 recent 3 episodes of angina ( heavy pressure) requiring NTG with relief. Sits down and relieved. No SOB but little nausea and diaphoresis. 9/3/21 no further CP no further SL NTG. No SOB no bleed no falls     12/3/21 doingw ell no cp no sob no falls no bleed little dizzy . 9/2/22 wife passed from [address]. Deprssed and upset. Had to take NTG on couple occasions. No sb no faalls no bleed. Works on [employer]    1/6/23 since last OV had to take SL NTG 2 times for CP. But he is not taking Atenolol for some reason.  No falls no bleed. His legs go numb if he stands too long. Remains active.      EKG:    Past Medical History:   Diagnosis Date    Anemia, normocytic normochromic     CAD (coronary artery disease)     multiple PCI    Carotid artery stenosis     Diabetes mellitus ([facility]     Gout 10/2/2017    Hyperlipidemia     Hypertension     Neuropathy     Occlusive disease of artery of lower extremity ([facility] 10/2/2017    Type 2 diabetes mellitus without complication [facility])        Past Surgical History:   Procedure Laterality Date    ARTERIOGRAM Right 10/6/2017    RIGHT ARM AORTO-FEMORAL DIGITAL SUBTRACTION ARTERIOGRAPHY performed by Daniel Valdivia MD at [address] [facility] Left 9/8/2020    LEFT CAROTID ENDARTERECTOMY performed by Daniel Valdivia MD at [address] [facility] N/A 11/7/11    xience stent to RAC and CIRC    CORONARY ANGIOPLASTY WITH STENT PLACEMENT Left 4/19/13    xience stent to LAD    [address] Right 12/22/13    Promus stent to RCA    DIAGNOSTIC CARDIAC CATH LAB PROCEDURE  09/20/2019    FEMORAL-FEMORAL BYPASS GRAFT      HERNIA REPAIR      as child    TN BYPASS W/VEIN FEMORAL-POPLITEAL Left 10/27/2017    LEFT FEMORAL DISTAL BYPASS, (PAT DONE 10-2-17) performed by Daniel Valdivia MD at [address]  [facility]ANS[facility]AL DIAGNOSTIC N/A 11/30/2017    EGD ESOPHAGOGASTRODUODENOSCOPY with biopsy performed by Lula Laura MD at [facility]       Family History   Problem Relation Age of Onset    Diabetes Mother     Heart Disease Father     Heart Attack Father     No Known Problems Son     Cancer Other         lung    Prostate Cancer Other        Social History     Socioeconomic History    Marital status:      Spouse name: None    Number of children: None    Years of education: None    Highest education level: None   Tobacco Use    Smoking status: Former     Types: Cigarettes     Start date: 8/11/1960     Quit date: 2008     Years since quitting: 15.0    Smokeless tobacco: Never   Vaping Use    Vaping Use: Never used   Substance and Sexual Activity    Alcohol use: No     Alcohol/week: 0.0 standard drinks    Drug use: No       No Known Allergies    Current Outpatient Medications   Medication Sig Dispense Refill    atenolol (TENORMIN) 50 MG tablet TAKE 1 TABLET BY MOUTH  DAILY 90 tablet 3    allopurinol (ZYLOPRIM) 100 MG tablet TAKE 1 TABLET BY MOUTH EVERY DAY      ALPRAZolam (XANAX) 1 MG tablet TAKE 1/2 TO 1 TABLET BY MOUTH EVERY 8 HOURS AS NEEDED      ELIQUIS 5 MG TABS tablet TAKE 1 TABLET BY MOUTH TWICE DAILY      furosemide (LASIX) 20 MG tablet TAKE 1 TABLET BY MOUTH EVERY 12 HOURS AS NEEDED FOR SWELLING OF FEET OR LEGS      isosorbide mononitrate (IMDUR) 30 MG extended release tablet Take 1 tablet by mouth daily 90 tablet 3    nitroGLYCERIN (NITROSTAT) 0.4 MG SL tablet PLACE 1 TABLET UNDER THE TONGUE EVERY 5 MINUTES AS NEEDED FOR CHEST PAIN(MAX 3 DOSES). IF NO RELIEF AFTER FIRST DOSE, CALL 911 25 tablet 5    lisinopril (PRINIVIL;ZESTRIL) 20 MG tablet Take 1 tablet by mouth daily 30 tablet 3    clopidogrel (PLAVIX) 75 MG tablet Take 75 mg by mouth daily       gabapentin (NEURONTIN) 300 MG capsule Take 300 mg by mouth 2 times daily. hydrochlorothiazide (HYDRODIURIL) 25 MG tablet Take 25 mg by mouth daily      atorvastatin (LIPITOR) 40 MG tablet Take 1 tablet by mouth daily 90 tablet 2    meclizine (ANTIVERT) 12.5 MG tablet Take 12.5 mg by mouth 3 times daily as needed      ammonium lactate (LAC-HYDRIN) 12 % lotion Apply 1 applicator topically at bedtime       tiZANidine (ZANAFLEX) 2 MG tablet Take 2 mg by mouth every 6 hours as needed      ferrous sulfate 325 (65 FE) MG tablet Take 325 mg by mouth 2 times daily       pioglitazone (ACTOS) 45 MG tablet Take 45 mg by mouth daily       ULORIC 80 MG TABS Take 80 mg by mouth daily        No current facility-administered medications for this visit.        Review of Systems:   Review of Systems Constitutional: Negative. Negative for diaphoresis and fatigue. HENT: Negative. Eyes: Negative. Respiratory:  Positive for chest tightness. Negative for cough, shortness of breath, wheezing and stridor. Cardiovascular: Negative. Negative for chest pain, palpitations and leg swelling. Gastrointestinal: Negative. Negative for blood in stool and nausea. Genitourinary: Negative. Musculoskeletal: Negative. Skin: Negative. Neurological:  Positive for dizziness. Negative for syncope, weakness and light-headedness. Hematological: Negative. Psychiatric/Behavioral: Negative. Physical Examination:    /66 (Site: Right Upper Arm, Position: Sitting, Cuff Size: Medium Adult)   Pulse 80   Wt 201 lb 6.4 oz (91.4 kg)   SpO2 100%   BMI 28.90 kg/m²    Physical Exam   Constitutional: He appears healthy. No distress. HENT:   Normal cephalic and Atraumatic   Eyes: Pupils are equal, round, and reactive to light. Neck: Thyroid normal. No JVD present. No neck adenopathy. No thyromegaly present. Cardiovascular: Normal rate and regular rhythm. Exam reveals decreased pulses. Murmur heard. Pulmonary/Chest: Effort normal and breath sounds normal. He has no wheezes. He has no rales. He exhibits no tenderness. Abdominal: Soft. Bowel sounds are normal. There is no abdominal tenderness. Musculoskeletal:         General: No tenderness or edema. Normal range of motion. Cervical back: Normal range of motion and neck supple. Neurological: He is alert and oriented to person, place, and time. Skin: Skin is warm. No cyanosis. Nails show no clubbing.      LABS:  CBC:   Lab Results   Component Value Date/Time    WBC 4.6 12/19/2022 08:33 AM    RBC 3.90 12/19/2022 08:33 AM    RBC 4.07 02/03/2012 06:41 AM    HGB 11.2 12/19/2022 08:33 AM    HCT 33.5 12/19/2022 08:33 AM    MCV 85.9 12/19/2022 08:33 AM    MCH 28.7 12/19/2022 08:33 AM    MCHC 33.4 12/19/2022 08:33 AM    RDW 17.5 12/19/2022 08:33 AM     12/19/2022 08:33 AM    MPV 10.4 09/24/2015 08:31 AM     Lipids:  Lab Results   Component Value Date    CHOL 172 08/19/2022    CHOL 190 09/20/2021    CHOL 176 05/27/2021     Lab Results   Component Value Date    TRIG 60 08/19/2022    TRIG 103 09/20/2021    TRIG 101 05/27/2021     Lab Results   Component Value Date    HDL 76 (H) 08/19/2022    HDL 60 (H) 09/20/2021    HDL 58 05/27/2021     Lab Results   Component Value Date    LDLCALC 84 08/19/2022    LDLCALC 109 09/20/2021    LDLCALC 98 05/27/2021     No results found for: LABVLDL, VLDL  Lab Results   Component Value Date    CHOLHDLRATIO 3.4 03/05/2013    CHOLHDLRATIO 3.1 11/04/2011     CMP:    Lab Results   Component Value Date/Time     12/19/2022 08:33 AM    K 4.8 12/19/2022 08:33 AM     12/19/2022 08:33 AM    CO2 27 12/19/2022 08:33 AM    BUN 19 12/19/2022 08:33 AM    CREATININE 1.15 12/19/2022 08:33 AM    GFRAA >60 03/05/2022 09:51 AM    LABGLOM >60.0 12/19/2022 08:33 AM    GLUCOSE 113 12/19/2022 08:33 AM    GLUCOSE 111 11/08/2011 10:48 AM    PROT 7.0 12/19/2022 08:33 AM    LABALBU 4.3 12/19/2022 08:33 AM    LABALBU 5.0 11/04/2011 02:55 PM    CALCIUM 9.3 12/19/2022 08:33 AM    BILITOT 0.5 12/19/2022 08:33 AM    ALKPHOS 89 12/19/2022 08:33 AM    AST 20 12/19/2022 08:33 AM    ALT 16 12/19/2022 08:33 AM     BMP:    Lab Results   Component Value Date/Time     12/19/2022 08:33 AM    K 4.8 12/19/2022 08:33 AM     12/19/2022 08:33 AM    CO2 27 12/19/2022 08:33 AM    BUN 19 12/19/2022 08:33 AM    LABALBU 4.3 12/19/2022 08:33 AM    LABALBU 5.0 11/04/2011 02:55 PM    CREATININE 1.15 12/19/2022 08:33 AM    CALCIUM 9.3 12/19/2022 08:33 AM    GFRAA >60 03/05/2022 09:51 AM    LABGLOM >60.0 12/19/2022 08:33 AM    GLUCOSE 113 12/19/2022 08:33 AM    GLUCOSE 111 11/08/2011 10:48 AM     Magnesium:    Lab Results   Component Value Date/Time    MG 2.0 03/05/2022 08:30 AM    MG 2.5 11/04/2011 02:55 PM     TSH:  Lab Results   Component Value Date    TSH 2.890 09/20/2021       Patient Active Problem List   Diagnosis    Peripheral vascular disease, unspecified ([facility]    Essential hypertension    Occlusive disease of artery of lower extremity (HCC)    Gout    Angina, class III ([facility])    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, 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 ([facility]    Stenosis of left carotid artery    Dyslipidemia    Angina of effort ([facility])    NSVT (nonsustained ventricular tachycardia)    Coronary artery disease involving native coronary artery of native heart with angina pectoris ([facility]    Other hyperlipidemia    Coronary artery disease involving native coronary artery of native heart without angina pectoris    Pain in right knee    Unilateral primary osteoarthritis, right knee    Unilateral primary osteoarthritis, right knee    Anemia of unknown etiology    Bradycardia    Right flank pain    Syncope and collapse    Carotid stenosis, left    Chest pain       Medications Discontinued During This Encounter   Medication Reason    atenolol (TENORMIN) 50 MG tablet REORDER       Modified Medications    Modified Medication Previous Medication    ATENOLOL (TENORMIN) 50 MG TABLET atenolol (TENORMIN) 50 MG tablet       TAKE 1 TABLET BY MOUTH  DAILY    TAKE 1 TABLET BY MOUTH TWICE DAILY       Orders Placed This Encounter   Medications    atenolol (TENORMIN) 50 MG tablet     Sig: TAKE 1 TABLET BY MOUTH  DAILY     Dispense:  90 tablet     Refill:  3       Assessment/Plan:    1. Coronary artery disease involving native coronary artery      Still w some anigna. Possibly stress related. Will add Imdur  2. Essential hypertension   BP low repeat better. Pt little tdizzy. Dc Imdur. 3. Peripheral vascular disease ([facility] - on Eliquis. Wore claudication - PVR    4. Angina, class III ([facility]    5. Bilateral carotid artery stenosis   Will order CUS now. 6. Occlusive disease of artery of lower extremity ([facility]     7. Dyslipidemia  Advance Atorva to 40 - continue recheck labs . Low fat deit. +    8. NSVT ( cath related casuing CP) - HM negative. 9. LE Edema- probably related to him sleeping in recliner as his legs are  Free of edema when he sleeps in bed. RTO after tests. Counseling:  Heart Healthy Lifestyle, Low Salt Diet, Take Precautions to Prevent Falls and Walk Daily    Return for AFTER TESTS.       Electronically signed by Tonia Jarvis MD on 1/6/2023 at 4:09 PM

## 2023-01-16 ENCOUNTER — HOSPITAL ENCOUNTER (OUTPATIENT)
Dept: ULTRASOUND IMAGING | Age: 78
Discharge: HOME | End: 2023-01-18
Payer: MEDICARE

## 2023-01-16 DIAGNOSIS — R09.89 BILATERAL CAROTID BRUITS: ICD-10-CM

## 2023-01-16 DIAGNOSIS — I70.209 OCCLUSIVE DISEASE OF ARTERY OF LOWER EXTREMITY (HCC): ICD-10-CM

## 2023-01-16 DIAGNOSIS — I73.9 CLAUDICATION (HCC): ICD-10-CM

## 2023-01-16 PROCEDURE — 93924 LWR XTR VASC STDY BILAT: CPT

## 2023-01-16 PROCEDURE — 93924 LWR XTR VASC STDY BILAT: CPT | Performed by: INTERNAL MEDICINE

## 2023-01-16 PROCEDURE — 93880 EXTRACRANIAL BILAT STUDY: CPT

## 2023-01-16 PROCEDURE — 93880 EXTRACRANIAL BILAT STUDY: CPT | Performed by: INTERNAL MEDICINE

## 2023-01-25 ENCOUNTER — PREP FOR PROCEDURE (OUTPATIENT)
Dept: CARDIOLOGY CLINIC | Age: 78
End: 2023-01-25

## 2023-01-25 ENCOUNTER — OFFICE VISIT (OUTPATIENT)
Dept: CARDIOLOGY CLINIC | Age: 78
End: 2023-01-25
Payer: MEDICARE

## 2023-01-25 VITALS
BODY MASS INDEX: 29.93 KG/M2 | DIASTOLIC BLOOD PRESSURE: 60 MMHG | HEART RATE: 72 BPM | SYSTOLIC BLOOD PRESSURE: 118 MMHG | WEIGHT: 208.6 LBS | OXYGEN SATURATION: 96 %

## 2023-01-25 DIAGNOSIS — I65.23 BILATERAL CAROTID ARTERY STENOSIS: ICD-10-CM

## 2023-01-25 DIAGNOSIS — I47.29 NSVT (NONSUSTAINED VENTRICULAR TACHYCARDIA): ICD-10-CM

## 2023-01-25 DIAGNOSIS — E78.49 OTHER HYPERLIPIDEMIA: ICD-10-CM

## 2023-01-25 DIAGNOSIS — R09.89 BILATERAL CAROTID BRUITS: ICD-10-CM

## 2023-01-25 DIAGNOSIS — I25.10 CORONARY ARTERY DISEASE INVOLVING NATIVE CORONARY ARTERY OF NATIVE HEART WITHOUT ANGINA PECTORIS: ICD-10-CM

## 2023-01-25 DIAGNOSIS — I73.9 PERIPHERAL VASCULAR DISEASE (HCC): ICD-10-CM

## 2023-01-25 DIAGNOSIS — I25.119 CORONARY ARTERY DISEASE INVOLVING NATIVE CORONARY ARTERY OF NATIVE HEART WITH ANGINA PECTORIS (HCC): ICD-10-CM

## 2023-01-25 DIAGNOSIS — I70.209 OCCLUSIVE DISEASE OF ARTERY OF LOWER EXTREMITY (HCC): ICD-10-CM

## 2023-01-25 DIAGNOSIS — I73.9 CLAUDICATION (HCC): ICD-10-CM

## 2023-01-25 DIAGNOSIS — I20.8 ANGINA OF EFFORT (HCC): ICD-10-CM

## 2023-01-25 DIAGNOSIS — I10 ESSENTIAL HYPERTENSION: Primary | ICD-10-CM

## 2023-01-25 DIAGNOSIS — E78.5 DYSLIPIDEMIA: ICD-10-CM

## 2023-01-25 PROCEDURE — 1123F ACP DISCUSS/DSCN MKR DOCD: CPT | Performed by: INTERNAL MEDICINE

## 2023-01-25 PROCEDURE — 99215 OFFICE O/P EST HI 40 MIN: CPT | Performed by: INTERNAL MEDICINE

## 2023-01-25 PROCEDURE — 3078F DIAST BP <80 MM HG: CPT | Performed by: INTERNAL MEDICINE

## 2023-01-25 PROCEDURE — 3074F SYST BP LT 130 MM HG: CPT | Performed by: INTERNAL MEDICINE

## 2023-01-25 RX ORDER — SODIUM CHLORIDE 9 MG/ML
INJECTION, SOLUTION INTRAVENOUS PRN
Status: CANCELLED | OUTPATIENT
Start: 2023-01-25

## 2023-01-25 RX ORDER — SODIUM CHLORIDE 0.9 % (FLUSH) 0.9 %
5-40 SYRINGE (ML) INJECTION PRN
Status: CANCELLED | OUTPATIENT
Start: 2023-01-25

## 2023-01-25 RX ORDER — SODIUM CHLORIDE 0.9 % (FLUSH) 0.9 %
5-40 SYRINGE (ML) INJECTION EVERY 12 HOURS SCHEDULED
Status: CANCELLED | OUTPATIENT
Start: 2023-01-25

## 2023-01-25 RX ORDER — ONDANSETRON 2 MG/ML
4 INJECTION INTRAMUSCULAR; INTRAVENOUS EVERY 6 HOURS PRN
Status: CANCELLED | OUTPATIENT
Start: 2023-01-25

## 2023-01-25 RX ORDER — DIPHENHYDRAMINE HYDROCHLORIDE 50 MG/ML
50 INJECTION INTRAMUSCULAR; INTRAVENOUS ONCE
Status: CANCELLED | OUTPATIENT
Start: 2023-01-25 | End: 2023-01-25

## 2023-01-25 RX ORDER — SODIUM CHLORIDE 450 MG/100ML
75 INJECTION, SOLUTION INTRAVENOUS CONTINUOUS
Status: CANCELLED | OUTPATIENT
Start: 2023-01-25

## 2023-01-25 RX ORDER — NITROGLYCERIN 0.4 MG/1
0.4 TABLET SUBLINGUAL EVERY 5 MIN PRN
Status: CANCELLED | OUTPATIENT
Start: 2023-01-25

## 2023-01-25 ASSESSMENT — ENCOUNTER SYMPTOMS
NAUSEA: 0
CHEST TIGHTNESS: 1
BLOOD IN STOOL: 0
COUGH: 0
SHORTNESS OF BREATH: 0
WHEEZING: 0
EYES NEGATIVE: 1
STRIDOR: 0
GASTROINTESTINAL NEGATIVE: 1

## 2023-01-25 NOTE — PROGRESS NOTES
Subsequent Progress Note  Patient: Tami Bob  YOB: 1945  MRN: [id number]    Chief Complaint: CP cad pad htn Dizzy   Chief Complaint   Patient presents with    Results     Carotid US       CV Data:  CAD 5-6 stents in total  10/2017 Left Fem Distal bypass- Dr. Andrew Torres  Remote- RIGHT  LE bypass  6/19/2018 Cath:  LAD, CXand RCA stents all patent with Mild ISR EF 55  8/2015 CUS DARIEL 50-69- followed by Dr. Andrew Torres  2/2018 echo ef 65  2/2019 spect negative  2019 LCEA  9/20/2019: cath LAD CX and RCA prior stents mild ISR but patent. Septal  occluded and fills from RCA. EF 60 EDP 9  11/2019 HM negative. 10/2020 CUS - Zolli. -LCEA patent RCIA - mild   9/21 SPECT negative   9/21 Echo EF 65 1+ MR  RVSP 56   3/22 cath LAD RCA nad CX all stents patent. 1/23 PVR- B/L Abn R>L  1/23 CUS Mild     Subjective/HPI: recently had 2 angina ( chest pressure heavy no radiation no sob but + diaphoresis moderate) took NTG w relief. Also having dizzy spells     9/27/2019: no cp no osb no falls no bleed takes meds. Had Pig tail catheter induced VT causing CP during cath     12/30/2019 no cp no sob no falls no bleed occ leg edema due to sleeping in a recliner. He falls a sleep every night wahching tv.     7/10/2020 no cp no sob no falls no bleed. Eats well. Sleep well. 1/15/21  Doing well no cp no sob some edema no falls n bleed. coouple months ago started Eliquis 5 bid by Dr. Kamilla Roach( Etiology unclear)     8/6/21 recent 3 episodes of angina ( heavy pressure) requiring NTG with relief. Sits down and relieved. No SOB but little nausea and diaphoresis. 9/3/21 no further CP no further SL NTG. No SOB no bleed no falls     12/3/21 doingw ell no cp no sob no falls no bleed little dizzy . 9/2/22 wife passed from [address]. Deprssed and upset. Had to take NTG on couple occasions. No sb no faalls no bleed. Works on [employer]    1/6/23 since last OV had to take SL NTG 2 times for CP.  But he is not taking Atenolol for some reason. No falls no bleed. His legs go numb if he stands too long. Remains active. 1/25/23 R leg hurts > Left. No cp no sob no falls no bleed. EKG:    Past Medical History:   Diagnosis Date    Anemia, normocytic normochromic     CAD (coronary artery disease)     multiple PCI    Carotid artery stenosis     Diabetes mellitus ([facility]     Gout 10/2/2017    Hyperlipidemia     Hypertension     Neuropathy     Occlusive disease of artery of lower extremity ([facility] 10/2/2017    Type 2 diabetes mellitus without complication [facility])        Past Surgical History:   Procedure Laterality Date    ARTERIOGRAM Right 10/6/2017    RIGHT ARM AORTO-FEMORAL DIGITAL SUBTRACTION ARTERIOGRAPHY performed by Jocelyn Cheney MD at [address] [facility] Left 9/8/2020    LEFT CAROTID ENDARTERECTOMY performed by Jocelyn Cheney MD at [address] [facility] N/A 11/7/11    xience stent to RAC and CIRC    CORONARY ANGIOPLASTY WITH STENT PLACEMENT Left 4/19/13    xience stent to LAD    [facility] Right 12/22/13    Promus stent to RCA    DIAGNOSTIC CARDIAC CATH LAB PROCEDURE  09/20/2019    FEMORAL-FEMORAL BYPASS GRAFT      HERNIA REPAIR      as child    FL BYPASS W/VEIN FEMORAL-POPLITEAL Left 10/27/2017    LEFT FEMORAL DISTAL BYPASS, (PAT DONE 10-2-17) performed by Jocelyn Cheney MD at [address] [place] [address] ESOPHAGOGASTRODUODENOSCOPY TRANSORAL DIAGNOSTIC N/A 11/30/2017    EGD ESOPHAGOGASTRODUODENOSCOPY with biopsy performed by Trevor Ballard MD at [facility]       Family History   Problem Relation Age of Onset    Diabetes Mother     Heart Disease Father     Heart Attack Father     No Known Problems Son     Cancer Other         lung    Prostate Cancer Other        Social History     Socioeconomic History    Marital status:       Spouse name: None    Number of children: None    Years of education: None    Highest education level: None   Tobacco Use Smoking status: Former     Types: Cigarettes     Start date: 8/11/1960     Quit date: 2008     Years since quitting: 15.0    Smokeless tobacco: Never   Vaping Use    Vaping Use: Never used   Substance and Sexual Activity    Alcohol use: No     Alcohol/week: 0.0 standard drinks    Drug use: No       No Known Allergies    Current Outpatient Medications   Medication Sig Dispense Refill    atenolol (TENORMIN) 50 MG tablet TAKE 1 TABLET BY MOUTH  DAILY 90 tablet 3    allopurinol (ZYLOPRIM) 100 MG tablet TAKE 1 TABLET BY MOUTH EVERY DAY      ALPRAZolam (XANAX) 1 MG tablet TAKE 1/2 TO 1 TABLET BY MOUTH EVERY 8 HOURS AS NEEDED      ELIQUIS 5 MG TABS tablet TAKE 1 TABLET BY MOUTH TWICE DAILY      furosemide (LASIX) 20 MG tablet TAKE 1 TABLET BY MOUTH EVERY 12 HOURS AS NEEDED FOR SWELLING OF FEET OR LEGS      isosorbide mononitrate (IMDUR) 30 MG extended release tablet Take 1 tablet by mouth daily 90 tablet 3    nitroGLYCERIN (NITROSTAT) 0.4 MG SL tablet PLACE 1 TABLET UNDER THE TONGUE EVERY 5 MINUTES AS NEEDED FOR CHEST PAIN(MAX 3 DOSES). IF NO RELIEF AFTER FIRST DOSE, CALL 911 25 tablet 5    lisinopril (PRINIVIL;ZESTRIL) 20 MG tablet Take 1 tablet by mouth daily 30 tablet 3    clopidogrel (PLAVIX) 75 MG tablet Take 75 mg by mouth daily       gabapentin (NEURONTIN) 300 MG capsule Take 300 mg by mouth 2 times daily.       hydrochlorothiazide (HYDRODIURIL) 25 MG tablet Take 25 mg by mouth daily      atorvastatin (LIPITOR) 40 MG tablet Take 1 tablet by mouth daily 90 tablet 2    meclizine (ANTIVERT) 12.5 MG tablet Take 12.5 mg by mouth 3 times daily as needed      ammonium lactate (LAC-HYDRIN) 12 % lotion Apply 1 applicator topically at bedtime       tiZANidine (ZANAFLEX) 2 MG tablet Take 2 mg by mouth every 6 hours as needed      ferrous sulfate 325 (65 FE) MG tablet Take 325 mg by mouth 2 times daily       pioglitazone (ACTOS) 45 MG tablet Take 45 mg by mouth daily       ULORIC 80 MG TABS Take 80 mg by mouth daily        No current facility-administered medications for this visit. Review of Systems:   Review of Systems   Constitutional: Negative. Negative for diaphoresis and fatigue. HENT: Negative. Eyes: Negative. Respiratory:  Positive for chest tightness. Negative for cough, shortness of breath, wheezing and stridor. Cardiovascular: Negative. Negative for chest pain, palpitations and leg swelling. Gastrointestinal: Negative. Negative for blood in stool and nausea. Genitourinary: Negative. Musculoskeletal: Negative. Skin: Negative. Neurological:  Positive for dizziness. Negative for syncope, weakness and light-headedness. Hematological: Negative. Psychiatric/Behavioral: Negative. Physical Examination:    /60 (Site: Right Upper Arm, Position: Sitting, Cuff Size: Large Adult)   Pulse 72   Wt 208 lb 9.6 oz (94.6 kg)   SpO2 96%   BMI 29.93 kg/m²    Physical Exam   Constitutional: He appears healthy. No distress. HENT:   Normal cephalic and Atraumatic   Eyes: Pupils are equal, round, and reactive to light. Neck: Thyroid normal. No JVD present. No neck adenopathy. No thyromegaly present. Cardiovascular: Normal rate and regular rhythm. Exam reveals decreased pulses. Murmur heard. Pulmonary/Chest: Effort normal and breath sounds normal. He has no wheezes. He has no rales. He exhibits no tenderness. Abdominal: Soft. Bowel sounds are normal. There is no abdominal tenderness. Musculoskeletal:         General: No tenderness or edema. Normal range of motion. Cervical back: Normal range of motion and neck supple. Neurological: He is alert and oriented to person, place, and time. Skin: Skin is warm. No cyanosis. Nails show no clubbing.      LABS:  CBC:   Lab Results   Component Value Date/Time    WBC 4.6 12/19/2022 08:33 AM    RBC 3.90 12/19/2022 08:33 AM    RBC 4.07 02/03/2012 06:41 AM    HGB 11.2 12/19/2022 08:33 AM    HCT 33.5 12/19/2022 08:33 AM    MCV 85.9 12/19/2022 08:33 AM    MCH 28.7 12/19/2022 08:33 AM    MCHC 33.4 12/19/2022 08:33 AM    RDW 17.5 12/19/2022 08:33 AM     12/19/2022 08:33 AM    MPV 10.4 09/24/2015 08:31 AM     Lipids:  Lab Results   Component Value Date    CHOL 172 08/19/2022    CHOL 190 09/20/2021    CHOL 176 05/27/2021     Lab Results   Component Value Date    TRIG 60 08/19/2022    TRIG 103 09/20/2021    TRIG 101 05/27/2021     Lab Results   Component Value Date    HDL 76 (H) 08/19/2022    HDL 60 (H) 09/20/2021    HDL 58 05/27/2021     Lab Results   Component Value Date    LDLCALC 84 08/19/2022    LDLCALC 109 09/20/2021    LDLCALC 98 05/27/2021     No results found for: LABVLDL, VLDL  Lab Results   Component Value Date    CHOLHDLRATIO 3.4 03/05/2013    CHOLHDLRATIO 3.1 11/04/2011     CMP:    Lab Results   Component Value Date/Time     12/19/2022 08:33 AM    K 4.8 12/19/2022 08:33 AM     12/19/2022 08:33 AM    CO2 27 12/19/2022 08:33 AM    BUN 19 12/19/2022 08:33 AM    CREATININE 1.15 12/19/2022 08:33 AM    GFRAA >60 03/05/2022 09:51 AM    LABGLOM >60.0 12/19/2022 08:33 AM    GLUCOSE 113 12/19/2022 08:33 AM    GLUCOSE 111 11/08/2011 10:48 AM    PROT 7.0 12/19/2022 08:33 AM    LABALBU 4.3 12/19/2022 08:33 AM    LABALBU 5.0 11/04/2011 02:55 PM    CALCIUM 9.3 12/19/2022 08:33 AM    BILITOT 0.5 12/19/2022 08:33 AM    ALKPHOS 89 12/19/2022 08:33 AM    AST 20 12/19/2022 08:33 AM    ALT 16 12/19/2022 08:33 AM     BMP:    Lab Results   Component Value Date/Time     12/19/2022 08:33 AM    K 4.8 12/19/2022 08:33 AM     12/19/2022 08:33 AM    CO2 27 12/19/2022 08:33 AM    BUN 19 12/19/2022 08:33 AM    LABALBU 4.3 12/19/2022 08:33 AM    LABALBU 5.0 11/04/2011 02:55 PM    CREATININE 1.15 12/19/2022 08:33 AM    CALCIUM 9.3 12/19/2022 08:33 AM    GFRAA >60 03/05/2022 09:51 AM    LABGLOM >60.0 12/19/2022 08:33 AM    GLUCOSE 113 12/19/2022 08:33 AM    GLUCOSE 111 11/08/2011 10:48 AM     Magnesium:    Lab Results   Component Value Date/Time MG 2.0 03/05/2022 08:30 AM    MG 2.5 11/04/2011 02:55 PM     TSH:  Lab Results   Component Value Date    TSH 2.890 09/20/2021       Patient Active Problem List   Diagnosis    Peripheral vascular disease, unspecified ([facility]    Essential hypertension    Occlusive disease of artery of lower extremity (HCC)    Gout    Angina, class III ([facility])    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, 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 ([facility]    Stenosis of left carotid artery    Dyslipidemia    Angina of effort (HCC)    NSVT (nonsustained ventricular tachycardia)    Coronary artery disease involving native coronary artery of native heart with angina pectoris ([facility]    Other hyperlipidemia    Coronary artery disease involving native coronary artery of native heart without angina pectoris    Pain in right knee    Unilateral primary osteoarthritis, right knee    Unilateral primary osteoarthritis, right knee    Anemia of unknown etiology    Bradycardia    Right flank pain    Syncope and collapse    Carotid stenosis, left    Chest pain       There are no discontinued medications. Modified Medications    No medications on file       No orders of the defined types were placed in this encounter. Assessment/Plan:    1. Coronary artery disease involving native coronary artery      Still w some anigna. Possibly stress related. Will add Imdur  2. Essential hypertension   BP low repeat better. Pt little tdizzy. Dc Imdur. 3. Peripheral vascular disease ([facility] - on Eliquis. Worse claudication - PVR - abn- RBA Peripheral angio PT - will proceed. Hold Eliquis 2 days    4. Angina, class III ([facility]    5. Bilateral carotid artery stenosis   Will order CUS now stable      6. Occlusive disease of artery of lower extremity ([facility]     7. Dyslipidemia  Advance Atorva to 40 - continue recheck labs . Low fat deit. +    8. NSVT ( cath related casuing CP) - HM negative.       9. LE Edema- probably related to him sleeping in recliner as his legs are  Free of edema when he sleeps in bed.        RTO after tests.   Counseling:  Heart Healthy Lifestyle, Low Salt Diet, Take Precautions to Prevent Falls and Walk Daily    Return for Schedule Cath.      Electronically signed by VIPUL FUNG MD on 1/25/2023 at 1:18 PM

## 2023-02-08 ENCOUNTER — HOSPITAL ENCOUNTER (OUTPATIENT)
Dept: CARDIAC CATH/INVASIVE PROCEDURES | Age: 78
Discharge: HOME | End: 2023-02-08
Attending: INTERNAL MEDICINE | Admitting: INTERNAL MEDICINE
Payer: MEDICARE

## 2023-02-08 ENCOUNTER — APPOINTMENT (OUTPATIENT)
Dept: ULTRASOUND IMAGING | Age: 78
End: 2023-02-08
Attending: INTERNAL MEDICINE
Payer: MEDICARE

## 2023-02-08 VITALS
TEMPERATURE: 98.3 F | DIASTOLIC BLOOD PRESSURE: 69 MMHG | RESPIRATION RATE: 13 BRPM | HEART RATE: 51 BPM | BODY MASS INDEX: 29.78 KG/M2 | HEIGHT: 70 IN | OXYGEN SATURATION: 98 % | WEIGHT: 208 LBS | SYSTOLIC BLOOD PRESSURE: 139 MMHG

## 2023-02-08 PROBLEM — R68.89 ABNORMAL ANKLE BRACHIAL INDEX: Status: ACTIVE | Noted: 2023-02-08

## 2023-02-08 LAB
ANION GAP SERPL CALCULATED.3IONS-SCNC: 10 MEQ/L (ref 9–15)
BUN BLDV-MCNC: 27 MG/DL (ref 8–23)
CALCIUM SERPL-MCNC: 8.8 MG/DL (ref 8.5–9.9)
CHLORIDE BLD-SCNC: 105 MEQ/L (ref 95–107)
CO2: 24 MEQ/L (ref 20–31)
CREAT SERPL-MCNC: 1.53 MG/DL (ref 0.7–1.2)
GFR SERPL CREATININE-BSD FRML MDRD: 46.1 ML/MIN/{1.73_M2}
GLUCOSE BLD-MCNC: 118 MG/DL (ref 70–99)
HCT VFR BLD CALC: 33.8 % (ref 42–52)
HEMOGLOBIN: 10.9 G/DL (ref 14–18)
MCH RBC QN AUTO: 28.3 PG (ref 27–31.3)
MCHC RBC AUTO-ENTMCNC: 32.2 % (ref 33–37)
MCV RBC AUTO: 88 FL (ref 79–92.2)
PDW BLD-RTO: 17.5 % (ref 11.5–14.5)
PLATELET # BLD: 146 K/UL (ref 130–400)
POTASSIUM SERPL-SCNC: 4.6 MEQ/L (ref 3.4–4.9)
RBC # BLD: 3.84 M/UL (ref 4.7–6.1)
SODIUM BLD-SCNC: 139 MEQ/L (ref 135–144)
WBC # BLD: 5 K/UL (ref 4.8–10.8)

## 2023-02-08 PROCEDURE — 2500000003 HC RX 250 WO HCPCS

## 2023-02-08 PROCEDURE — 85027 COMPLETE CBC AUTOMATED: CPT

## 2023-02-08 PROCEDURE — 75710 ARTERY X-RAYS ARM/LEG: CPT

## 2023-02-08 PROCEDURE — 36247 INS CATH ABD/L-EXT ART 3RD: CPT

## 2023-02-08 PROCEDURE — 6360000004 HC RX CONTRAST MEDICATION: Performed by: INTERNAL MEDICINE

## 2023-02-08 PROCEDURE — C1894 INTRO/SHEATH, NON-LASER: HCPCS

## 2023-02-08 PROCEDURE — 75774 ARTERY X-RAY EACH VESSEL: CPT

## 2023-02-08 PROCEDURE — 2580000003 HC RX 258

## 2023-02-08 PROCEDURE — 80048 BASIC METABOLIC PNL TOTAL CA: CPT

## 2023-02-08 PROCEDURE — 2709999900 HC NON-CHARGEABLE SUPPLY

## 2023-02-08 PROCEDURE — 93926 LOWER EXTREMITY STUDY: CPT

## 2023-02-08 PROCEDURE — 99153 MOD SED SAME PHYS/QHP EA: CPT

## 2023-02-08 PROCEDURE — 99152 MOD SED SAME PHYS/QHP 5/>YRS: CPT

## 2023-02-08 PROCEDURE — 6360000002 HC RX W HCPCS

## 2023-02-08 PROCEDURE — C1769 GUIDE WIRE: HCPCS

## 2023-02-08 RX ORDER — MIDAZOLAM HYDROCHLORIDE 2 MG/2ML
2 INJECTION, SOLUTION INTRAMUSCULAR; INTRAVENOUS
Status: DISCONTINUED | OUTPATIENT
Start: 2023-02-08 | End: 2023-02-09 | Stop reason: HOSPADM

## 2023-02-08 RX ORDER — SODIUM CHLORIDE 0.9 % (FLUSH) 0.9 %
5-40 SYRINGE (ML) INJECTION EVERY 12 HOURS SCHEDULED
Status: DISCONTINUED | OUTPATIENT
Start: 2023-02-08 | End: 2023-02-10 | Stop reason: HOSPADM

## 2023-02-08 RX ORDER — SODIUM CHLORIDE 450 MG/100ML
75 INJECTION, SOLUTION INTRAVENOUS CONTINUOUS
Status: DISCONTINUED | OUTPATIENT
Start: 2023-02-08 | End: 2023-02-10 | Stop reason: HOSPADM

## 2023-02-08 RX ORDER — ACETAMINOPHEN 325 MG/1
650 TABLET ORAL EVERY 4 HOURS PRN
Status: DISCONTINUED | OUTPATIENT
Start: 2023-02-08 | End: 2023-02-10 | Stop reason: HOSPADM

## 2023-02-08 RX ORDER — MORPHINE SULFATE 2 MG/ML
2 INJECTION, SOLUTION INTRAMUSCULAR; INTRAVENOUS
Status: DISCONTINUED | OUTPATIENT
Start: 2023-02-08 | End: 2023-02-09 | Stop reason: HOSPADM

## 2023-02-08 RX ORDER — ONDANSETRON 2 MG/ML
4 INJECTION INTRAMUSCULAR; INTRAVENOUS EVERY 6 HOURS PRN
Status: DISCONTINUED | OUTPATIENT
Start: 2023-02-08 | End: 2023-02-10 | Stop reason: HOSPADM

## 2023-02-08 RX ORDER — HYDRALAZINE HYDROCHLORIDE 20 MG/ML
10 INJECTION INTRAMUSCULAR; INTRAVENOUS EVERY 4 HOURS PRN
Status: DISCONTINUED | OUTPATIENT
Start: 2023-02-08 | End: 2023-02-10 | Stop reason: HOSPADM

## 2023-02-08 RX ORDER — SODIUM CHLORIDE 9 MG/ML
INJECTION, SOLUTION INTRAVENOUS CONTINUOUS
Status: DISCONTINUED | OUTPATIENT
Start: 2023-02-08 | End: 2023-02-10 | Stop reason: HOSPADM

## 2023-02-08 RX ORDER — NITROGLYCERIN 0.4 MG/1
0.4 TABLET SUBLINGUAL EVERY 5 MIN PRN
Status: DISCONTINUED | OUTPATIENT
Start: 2023-02-08 | End: 2023-02-10 | Stop reason: HOSPADM

## 2023-02-08 RX ORDER — LABETALOL HYDROCHLORIDE 5 MG/ML
10 INJECTION, SOLUTION INTRAVENOUS EVERY 30 MIN PRN
Status: DISCONTINUED | OUTPATIENT
Start: 2023-02-08 | End: 2023-02-10 | Stop reason: HOSPADM

## 2023-02-08 RX ORDER — DIPHENHYDRAMINE HYDROCHLORIDE 50 MG/ML
50 INJECTION INTRAMUSCULAR; INTRAVENOUS ONCE
Status: DISCONTINUED | OUTPATIENT
Start: 2023-02-08 | End: 2023-02-10 | Stop reason: HOSPADM

## 2023-02-08 RX ORDER — SODIUM CHLORIDE 9 MG/ML
INJECTION, SOLUTION INTRAVENOUS PRN
Status: DISCONTINUED | OUTPATIENT
Start: 2023-02-08 | End: 2023-02-10 | Stop reason: HOSPADM

## 2023-02-08 RX ORDER — SODIUM CHLORIDE 0.9 % (FLUSH) 0.9 %
5-40 SYRINGE (ML) INJECTION PRN
Status: DISCONTINUED | OUTPATIENT
Start: 2023-02-08 | End: 2023-02-10 | Stop reason: HOSPADM

## 2023-02-08 RX ORDER — FENTANYL CITRATE 0.05 MG/ML
25 INJECTION, SOLUTION INTRAMUSCULAR; INTRAVENOUS
Status: DISCONTINUED | OUTPATIENT
Start: 2023-02-08 | End: 2023-02-09 | Stop reason: HOSPADM

## 2023-02-08 RX ADMIN — IOPAMIDOL 20 ML: 612 INJECTION, SOLUTION INTRAVENOUS at 12:24

## 2023-02-08 NOTE — BRIEF OP NOTE
Section of Cardiology  Adult Brief LE angio Procedure Note        Procedure(s): Right lower extremity angio, left to right femorofemoral bypass angiogram    Pre-operative Diagnosis: Claudication, abnormal BRYN    H&P Status: Completed and reviewed. Post-operative Diagnosis:      Left to right femorofemoral bypass is widely patent. Right lower extremity: Common femoral artery patent, profunda patent, SFA heavily calcified, mid to distal stent is patent with 50% in-stent restenosis without any significant gradient. Two-vessel runoff via the posterior tib artery as well as the peroneal.  100% anterior artery stenosis      Unable to obtain left extremity angiogram due to access of the left to right femorofemoral bypass graft. Recommend duplex ultrasound examination. Findings:  See full report    Complications:  none    Primary Proceduralist:   Dr. Enrique Cap therapy  Risk factor modification  Plan for left lower extremity arterial duplex ultrasound given inability to obtain left extremity angiogram due to access of the left to right femoral-femoral bypass graft.   Full procedure note to follow

## 2023-02-08 NOTE — DISCHARGE INSTRUCTIONS
May shower and remove left groin dressing in the morning  No driving for 24 hours  No heavy lifting anything over 5 pounds for 2 days  Any redness, drainage, increased swelling or fever please call Dr. Salazar Form office  Any bleeding apply pressure and if needed call 911  Resume medications as at home  Follow up in 4 weeks with Dr. Louis Garcia, please call for appointment

## 2023-02-08 NOTE — PROGRESS NOTES
Left groin stable. Patient up and ready for discharge. IV dc'd from left forearm intact. Discharge instructions given and patient verbalizes understanding.   Discharged to care of family via wheelchair

## 2023-02-08 NOTE — PROGRESS NOTES
Arrived to pre/post from the cath lab and report from [facility]. Left groin dressing is soft with no bleeding or hematoma. Attached to monitor and patient is resting comfortably.   Ultrasound at the bedside

## 2023-02-10 PROCEDURE — 93926 LOWER EXTREMITY STUDY: CPT | Performed by: INTERNAL MEDICINE

## 2023-02-14 NOTE — PROCEDURES
[address]                      [address], [address] [facility]                                 PROCEDURE NOTE    PATIENT NAME: Salomon Ratliff                      :        1945  MED REC NO:   [id number]                            ROOM:  ACCOUNT NO:   [de-identified]                           ADMIT DATE: 2023  PROVIDER:     Fariha Hoover DO    DATE OF PROCEDURE:  2023    PROCEDURE PERFORMED:  Right lower extremity angiogram, left to right  fem-fem bypass angiogram.    PROCEDURE PERFORMED BY:  Zev Hoover DO    INDICATIONS:  Claudication, abnormal TONJA. COMPLICATIONS:  None. ACCESS SITE:  Left common femoral artery. DESCRIPTION OF PROCEDURE:  The patient was brought to the cardiac cath  lab suite where he was sterilely prepped and draped in usual fashion. 1% lidocaine was used to anesthetized the left inguinal and a 4-[place]  arterial sheath was placed without any difficulty. Next, the catheter  was noted to be in the left to right fem-fem bypass graft and angiogram  of the graft was widely patent. Next, catheter was introduced into the  right common femoral artery/proximal superficial femoral artery and  right lower extremity angiogram with run-off was performed. The  catheter was removed and the patient in attempt to withdraw the sheath  into the left common femoral artery out of the left to right fem-fem  bypass graft was unsuccessful and therefore an angiogram of the left  lower extremity was not able to be obtained. The procedure was  terminated and the patient was transferred to the postop holding area in  stable and satisfactory condition. Moderate IV conscious sedation was given with IV fentanyl as well as  Versed for the duration of the procedure under my direct supervision as  well as independent trained observer for the duration of the  procedure/greater than 50 minutes.     FINDINGS:  Left to right fem-fem bypass graft was widely patent; the  right lower extremity, common femoral artery is patent; profunda is  patent; SFA is very heavily calcified with mid to distal stent is patent  with 50% mid in-stent restenosis without any significant gradient across  the lesion. Two vessel run-off via the posterior tibial artery as well  as the peroneal artery. 100% proximal anterior tibial stenosis. ASSESSMENT:  1. Patent left to right fem-fem bypass graft. 2.  50% mid to right SFA in-stent restenosis. PLAN:  1. Maximize medical therapy. 2.  Risk factor modification. 3.  Plan for left lower extremity arterial duplex ultrasound examination  given inability to obtain left lower extremity angiogram due to access  of the left to right femoral-femoral bypass grafting.         Maddison Yoon DO    D: 02/14/2023 6:59:04       T: 02/14/2023 8:05:54     [email or web address]  Job#: [id number]     Doc#: [id number]    CC:

## 2023-03-13 ENCOUNTER — OFFICE VISIT (OUTPATIENT)
Dept: CARDIOLOGY CLINIC | Age: 78
End: 2023-03-13
Payer: MEDICARE

## 2023-03-13 VITALS
OXYGEN SATURATION: 94 % | WEIGHT: 204.6 LBS | HEART RATE: 52 BPM | BODY MASS INDEX: 29.36 KG/M2 | DIASTOLIC BLOOD PRESSURE: 60 MMHG | SYSTOLIC BLOOD PRESSURE: 126 MMHG

## 2023-03-13 DIAGNOSIS — R09.89 BILATERAL CAROTID BRUITS: ICD-10-CM

## 2023-03-13 DIAGNOSIS — I70.209 OCCLUSIVE DISEASE OF ARTERY OF LOWER EXTREMITY (HCC): ICD-10-CM

## 2023-03-13 DIAGNOSIS — E78.49 OTHER HYPERLIPIDEMIA: ICD-10-CM

## 2023-03-13 DIAGNOSIS — I25.10 CORONARY ARTERY DISEASE INVOLVING NATIVE CORONARY ARTERY OF NATIVE HEART WITHOUT ANGINA PECTORIS: ICD-10-CM

## 2023-03-13 DIAGNOSIS — I73.9 PERIPHERAL VASCULAR DISEASE (HCC): ICD-10-CM

## 2023-03-13 DIAGNOSIS — I65.23 BILATERAL CAROTID ARTERY STENOSIS: Primary | ICD-10-CM

## 2023-03-13 DIAGNOSIS — E78.5 DYSLIPIDEMIA: ICD-10-CM

## 2023-03-13 DIAGNOSIS — I25.119 CORONARY ARTERY DISEASE INVOLVING NATIVE CORONARY ARTERY OF NATIVE HEART WITH ANGINA PECTORIS (HCC): ICD-10-CM

## 2023-03-13 DIAGNOSIS — I47.29 NSVT (NONSUSTAINED VENTRICULAR TACHYCARDIA): ICD-10-CM

## 2023-03-13 DIAGNOSIS — I10 ESSENTIAL HYPERTENSION: ICD-10-CM

## 2023-03-13 PROCEDURE — 99214 OFFICE O/P EST MOD 30 MIN: CPT | Performed by: INTERNAL MEDICINE

## 2023-03-13 PROCEDURE — 3078F DIAST BP <80 MM HG: CPT | Performed by: INTERNAL MEDICINE

## 2023-03-13 PROCEDURE — 1123F ACP DISCUSS/DSCN MKR DOCD: CPT | Performed by: INTERNAL MEDICINE

## 2023-03-13 PROCEDURE — 3074F SYST BP LT 130 MM HG: CPT | Performed by: INTERNAL MEDICINE

## 2023-03-13 RX ORDER — ASPIRIN 81 MG/1
81 TABLET ORAL DAILY
Qty: 90 TABLET | Refills: 1
Start: 2023-03-13

## 2023-03-13 ASSESSMENT — ENCOUNTER SYMPTOMS
CHEST TIGHTNESS: 1
NAUSEA: 0
STRIDOR: 0
BLOOD IN STOOL: 0
COUGH: 0
SHORTNESS OF BREATH: 0
GASTROINTESTINAL NEGATIVE: 1
EYES NEGATIVE: 1
WHEEZING: 0

## 2023-03-13 NOTE — PROGRESS NOTES
Subsequent Progress Note  Patient: Marcelino Oneal  YOB: 1945  MRN: [id number]    Chief Complaint: CP cad pad htn Dizzy   Chief Complaint   Patient presents with    Procedure     2/8: peripheral angiogram       CV Data:  CAD 5-6 stents in total  10/2017 Left Fem Distal bypass- Dr. Kimberly Yang bypass  6/19/2018 Cath:  LAD, CXand RCA stents all patent with Mild ISR EF 55  8/2015 CUS DARIEL 50-69- followed by Dr. Janay Ansari  2/2018 echo ef 65  2/2019 spect negative  2019 LCEA  9/20/2019: cath LAD CX and RCA prior stents mild ISR but patent. Septal  occluded and fills from RCA. EF 60 EDP 9  11/2019 HM negative. 10/2020 CUS - Zolli. -LCEA patent RCIA - mild   9/21 SPECT negative   9/21 Echo EF 65 1+ MR  RVSP 56   3/22 cath LAD RCA nad CX all stents patent. 1/23 PVR- B/L Abn R>L  1/23 CUS Mild   2/8/23 PeripheralLeft to right femorofemoral bypass is widely patent. Right lower extremity: Common femoral artery patent, profunda patent, SFA heavily calcified, mid to distal stent is patent with 50% in-stent restenosis without any significant gradient. Two-vessel runoff via the posterior tib artery as well as the peroneal.  100% anterior artery stenosis    Subjective/HPI: recently had 2 angina ( chest pressure heavy no radiation no sob but + diaphoresis moderate) took NTG w relief. Also having dizzy spells     9/27/2019: no cp no osb no falls no bleed takes meds. Had Pig tail catheter induced VT causing CP during cath     12/30/2019 no cp no sob no falls no bleed occ leg edema due to sleeping in a recliner. He falls a sleep every night wahching tv.     7/10/2020 no cp no sob no falls no bleed. Eats well. Sleep well. 1/15/21  Doing well no cp no sob some edema no falls n bleed. coouple months ago started Eliquis 5 bid by Dr. Jessica Walker( Etiology unclear)     8/6/21 recent 3 episodes of angina ( heavy pressure) requiring NTG with relief. Sits down and relieved.  No SOB but little nausea and diaphoresis. 9/3/21 no further CP no further SL NTG. No SOB no bleed no falls     12/3/21 doingw ell no cp no sob no falls no bleed little dizzy . 9/2/22 wife passed from [address]. Deprssed and upset. Had to take NTG on couple occasions. No sb no faalls no bleed. Works on [employer]    1/6/23 since last OV had to take SL NTG 2 times for CP. But he is not taking Atenolol for some reason. No falls no bleed. His legs go numb if he stands too long. Remains active. 1/25/23 R leg hurts > Left. No cp no sob no falls no bleed. 3/13/23  claudication is stable he can still walk and rest and continue. No cp no sob eats well takes meds.      EKG:    Past Medical History:   Diagnosis Date    Anemia, normocytic normochromic     CAD (coronary artery disease)     multiple PCI    Carotid artery stenosis     Diabetes mellitus ([facility] [address]     Gout 10/2/2017    Hyperlipidemia     Hypertension     Neuropathy     Occlusive disease of artery of lower extremity ([facility] [address] 10/2/2017    Type 2 diabetes mellitus without complication [facility])        Past Surgical History:   Procedure Laterality Date    ARTERIOGRAM Right 10/6/2017    RIGHT ARM AORTO-FEMORAL DIGITAL SUBTRACTION ARTERIOGRAPHY performed by Johnnie Kim MD at [address] [facility] Left 9/8/2020    LEFT CAROTID ENDARTERECTOMY performed by Johnnie Kim MD at [address] [facility] N/A 11/7/11    xience stent to RAC and [address] Left 4/19/13    xience stent to LAD    [address] [facility] Right 12/22/13    Promus stent to RCA    DIAGNOSTIC CARDIAC CATH LAB PROCEDURE  09/20/2019    FEMORAL-FEMORAL BYPASS GRAFT      HERNIA REPAIR      as child    [place] BYPASS W/VEIN FEMORAL-POPLITEAL Left 10/27/2017    LEFT FEMORAL DISTAL BYPASS, (PAT DONE 10-2-17) performed by Johnnie Kim MD at [address] [place] [address] ESOPHAGOGASTRODUODENOSCOPY TRANSORAL DIAGNOSTIC N/A 11/30/2017    EGD ESOPHAGOGASTRODUODENOSCOPY with biopsy performed by Reynaldo John MD at [facility]       Family History   Problem Relation Age of Onset    Diabetes Mother     Heart Disease Father     Heart Attack Father     No Known Problems Son     Cancer Other         lung    Prostate Cancer Other        Social History     Socioeconomic History    Marital status:      Spouse name: None    Number of children: None    Years of education: None    Highest education level: None   Tobacco Use    Smoking status: Former     Types: Cigarettes     Start date: 8/11/1960     Quit date: 2008     Years since quitting: 15.2    Smokeless tobacco: Never   Vaping Use    Vaping Use: Never used   Substance and Sexual Activity    Alcohol use: No     Alcohol/week: 0.0 standard drinks    Drug use: No       No Known Allergies    Current Outpatient Medications   Medication Sig Dispense Refill    allopurinol (ZYLOPRIM) 100 MG tablet TAKE 1 TABLET BY MOUTH EVERY DAY      ALPRAZolam (XANAX) 1 MG tablet TAKE 1/2 TO 1 TABLET BY MOUTH EVERY 8 HOURS AS NEEDED      ELIQUIS 5 MG TABS tablet TAKE 1 TABLET BY MOUTH TWICE DAILY      furosemide (LASIX) 20 MG tablet TAKE 1 TABLET BY MOUTH EVERY 12 HOURS AS NEEDED FOR SWELLING OF FEET OR LEGS      nitroGLYCERIN (NITROSTAT) 0.4 MG SL tablet PLACE 1 TABLET UNDER THE TONGUE EVERY 5 MINUTES AS NEEDED FOR CHEST PAIN(MAX 3 DOSES). IF NO RELIEF AFTER FIRST DOSE, CALL 911 25 tablet 5    gabapentin (NEURONTIN) 300 MG capsule Take 300 mg by mouth 2 times daily.       hydrochlorothiazide (HYDRODIURIL) 25 MG tablet Take 25 mg by mouth daily      atorvastatin (LIPITOR) 40 MG tablet Take 1 tablet by mouth daily 90 tablet 2    ammonium lactate (LAC-HYDRIN) 12 % lotion Apply 1 applicator topically at bedtime       tiZANidine (ZANAFLEX) 2 MG tablet Take 2 mg by mouth every 6 hours as needed      ferrous sulfate 325 (65 FE) MG tablet Take 325 mg by mouth 2 times daily       ULORIC 80 MG TABS Take 80 mg by mouth daily No current facility-administered medications for this visit. Review of Systems:   Review of Systems   Constitutional: Negative. Negative for diaphoresis and fatigue. HENT: Negative. Eyes: Negative. Respiratory:  Positive for chest tightness. Negative for cough, shortness of breath, wheezing and stridor. Cardiovascular: Negative. Negative for chest pain, palpitations and leg swelling. Gastrointestinal: Negative. Negative for blood in stool and nausea. Genitourinary: Negative. Musculoskeletal: Negative. Skin: Negative. Neurological:  Positive for dizziness. Negative for syncope, weakness and light-headedness. Hematological: Negative. Psychiatric/Behavioral: Negative. Physical Examination:    /60 (Site: Right Upper Arm, Position: Sitting, Cuff Size: Large Adult)   Pulse 52   Wt 204 lb 9.6 oz (92.8 kg)   SpO2 94%   BMI 29.36 kg/m²    Physical Exam   Constitutional: He appears healthy. No distress. HENT:   Normal cephalic and Atraumatic   Eyes: Pupils are equal, round, and reactive to light. Neck: Thyroid normal. No JVD present. No neck adenopathy. No thyromegaly present. Cardiovascular: Normal rate and regular rhythm. Exam reveals decreased pulses. Murmur heard. Pulmonary/Chest: Effort normal and breath sounds normal. He has no wheezes. He has no rales. He exhibits no tenderness. Abdominal: Soft. Bowel sounds are normal. There is no abdominal tenderness. Musculoskeletal:         General: No tenderness or edema. Normal range of motion. Cervical back: Normal range of motion and neck supple. Neurological: He is alert and oriented to person, place, and time. Skin: Skin is warm. No cyanosis. Nails show no clubbing.      LABS:  CBC:   Lab Results   Component Value Date/Time    WBC 5.0 02/08/2023 10:30 AM    RBC 3.84 02/08/2023 10:30 AM    RBC 4.07 02/03/2012 06:41 AM    HGB 10.9 02/08/2023 10:30 AM    HCT 33.8 02/08/2023 10:30 AM    MCV 88.0 02/08/2023 10:30 AM    MCH 28.3 02/08/2023 10:30 AM    MCHC 32.2 02/08/2023 10:30 AM    RDW 17.5 02/08/2023 10:30 AM     02/08/2023 10:30 AM    MPV 10.4 09/24/2015 08:31 AM     Lipids:  Lab Results   Component Value Date    CHOL 172 08/19/2022    CHOL 190 09/20/2021    CHOL 176 05/27/2021     Lab Results   Component Value Date    TRIG 60 08/19/2022    TRIG 103 09/20/2021    TRIG 101 05/27/2021     Lab Results   Component Value Date    HDL 76 (H) 08/19/2022    HDL 60 (H) 09/20/2021    HDL 58 05/27/2021     Lab Results   Component Value Date    LDLCALC 84 08/19/2022    LDLCALC 109 09/20/2021    LDLCALC 98 05/27/2021     No results found for: LABVLDL, VLDL  Lab Results   Component Value Date    CHOLHDLRATIO 3.4 03/05/2013    CHOLHDLRATIO 3.1 11/04/2011     CMP:    Lab Results   Component Value Date/Time     02/08/2023 10:30 AM    K 4.6 02/08/2023 10:30 AM     02/08/2023 10:30 AM    CO2 24 02/08/2023 10:30 AM    BUN 27 02/08/2023 10:30 AM    CREATININE 1.53 02/08/2023 10:30 AM    GFRAA >60 03/05/2022 09:51 AM    LABGLOM 46.1 02/08/2023 10:30 AM    GLUCOSE 118 02/08/2023 10:30 AM    GLUCOSE 111 11/08/2011 10:48 AM    PROT 7.0 12/19/2022 08:33 AM    LABALBU 4.3 12/19/2022 08:33 AM    LABALBU 5.0 11/04/2011 02:55 PM    CALCIUM 8.8 02/08/2023 10:30 AM    BILITOT 0.5 12/19/2022 08:33 AM    ALKPHOS 89 12/19/2022 08:33 AM    AST 20 12/19/2022 08:33 AM    ALT 16 12/19/2022 08:33 AM     BMP:    Lab Results   Component Value Date/Time     02/08/2023 10:30 AM    K 4.6 02/08/2023 10:30 AM     02/08/2023 10:30 AM    CO2 24 02/08/2023 10:30 AM    BUN 27 02/08/2023 10:30 AM    LABALBU 4.3 12/19/2022 08:33 AM    LABALBU 5.0 11/04/2011 02:55 PM    CREATININE 1.53 02/08/2023 10:30 AM    CALCIUM 8.8 02/08/2023 10:30 AM    GFRAA >60 03/05/2022 09:51 AM    LABGLOM 46.1 02/08/2023 10:30 AM    GLUCOSE 118 02/08/2023 10:30 AM    GLUCOSE 111 11/08/2011 10:48 AM     Magnesium:    Lab Results   Component Value Date/Time    MG 2.0 03/05/2022 08:30 AM    MG 2.5 11/04/2011 02:55 PM     TSH:  Lab Results   Component Value Date    TSH 2.890 09/20/2021       Patient Active Problem List   Diagnosis    Peripheral vascular disease, unspecified ([facility] [address]    Essential hypertension    Occlusive disease of artery of lower extremity (HCC)    Gout    Angina, class III ([facility])    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, 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 [facility]    Stenosis of left carotid artery    Dyslipidemia    Angina of effort ([facility])    NSVT (nonsustained ventricular tachycardia)    Coronary artery disease involving native coronary artery of native heart with angina pectoris ([facility] [address]    Other hyperlipidemia    Coronary artery disease involving native coronary artery of native heart without angina pectoris    Pain in right knee    Unilateral primary osteoarthritis, right knee    Unilateral primary osteoarthritis, right knee    Anemia of unknown etiology    Bradycardia    Right flank pain    Syncope and collapse    Carotid stenosis, left    Chest pain    Abnormal ankle brachial index       There are no discontinued medications. Modified Medications    No medications on file       No orders of the defined types were placed in this encounter. Assessment/Plan:    1. Coronary artery disease involving native coronary artery      Still w some anigna. Possibly stress related. Will add Imdur  2. Essential hypertension   BP low repeat better. Pt little dizzy. Dc Imdur. - stable     3. Peripheral vascular disease ([facility] [address] - on Eliquis. Stable claudication - continue meds rx. Walk qd. - if severe then will reconsider options. His Left to right Fem bypass graft is patent but has B/L Distal pathology- continue Eliquis. ASA. walk daily    4. Angina, class III ([facility] [address]    5. Bilateral carotid artery stenosis   Will order CUS now stable      6. Occlusive disease of artery of lower extremity ([facility] [address]     7. Dyslipidemia  Advance Atorva to 40 - continue recheck labs .  Low fat deit.     8. NSVT ( cath related casuing CP) - HM negative. 9. LE Edema- probably related to him sleeping in recliner as his legs are  Free of edema when he sleeps in bed. RTO after tests. Counseling:  Heart Healthy Lifestyle, Low Salt Diet, Take Precautions to Prevent Falls and Walk Daily    No follow-ups on file.       Electronically signed by Tasia Cleveland MD on 3/13/2023 at 2:30 PM

## 2023-04-13 ENCOUNTER — APPOINTMENT (OUTPATIENT)
Dept: ULTRASOUND IMAGING | Age: 78
End: 2023-04-13
Payer: MEDICARE

## 2023-04-13 ENCOUNTER — HOSPITAL ENCOUNTER (EMERGENCY)
Age: 78
Discharge: HOME | End: 2023-04-13
Payer: MEDICARE

## 2023-04-13 VITALS
TEMPERATURE: 98.4 F | SYSTOLIC BLOOD PRESSURE: 168 MMHG | DIASTOLIC BLOOD PRESSURE: 70 MMHG | HEART RATE: 103 BPM | WEIGHT: 205 LBS | OXYGEN SATURATION: 94 % | RESPIRATION RATE: 16 BRPM | HEIGHT: 70 IN | BODY MASS INDEX: 29.35 KG/M2

## 2023-04-13 DIAGNOSIS — N45.3 ACUTE EPIDIDYMO-ORCHITIS: Primary | ICD-10-CM

## 2023-04-13 DIAGNOSIS — N39.0 URINARY TRACT INFECTION WITHOUT HEMATURIA, SITE UNSPECIFIED: ICD-10-CM

## 2023-04-13 LAB
ALBUMIN SERPL-MCNC: 4.3 G/DL (ref 3.5–4.6)
ALP SERPL-CCNC: 74 U/L (ref 35–104)
ALT SERPL-CCNC: 18 U/L (ref 0–41)
ANION GAP SERPL CALCULATED.3IONS-SCNC: 12 MEQ/L (ref 9–15)
APTT PPP: 35.6 SEC (ref 24.4–36.8)
AST SERPL-CCNC: 24 U/L (ref 0–40)
BACTERIA URNS QL MICRO: ABNORMAL /HPF
BASOPHILS # BLD: 0.1 K/UL (ref 0–0.2)
BASOPHILS NFR BLD: 0.9 %
BILIRUB SERPL-MCNC: 0.9 MG/DL (ref 0.2–0.7)
BILIRUB UR QL STRIP: NEGATIVE
BUN SERPL-MCNC: 24 MG/DL (ref 8–23)
CALCIUM SERPL-MCNC: 8.9 MG/DL (ref 8.5–9.9)
CHLORIDE SERPL-SCNC: 102 MEQ/L (ref 95–107)
CLARITY UR: CLEAR
CO2 SERPL-SCNC: 25 MEQ/L (ref 20–31)
COLOR UR: YELLOW
CREAT SERPL-MCNC: 1.23 MG/DL (ref 0.7–1.2)
EOSINOPHIL # BLD: 0.1 K/UL (ref 0–0.7)
EOSINOPHIL NFR BLD: 0.9 %
EPI CELLS #/AREA URNS AUTO: ABNORMAL /HPF (ref 0–5)
ERYTHROCYTE [DISTWIDTH] IN BLOOD BY AUTOMATED COUNT: 17.2 % (ref 11.5–14.5)
GLOBULIN SER CALC-MCNC: 3.4 G/DL (ref 2.3–3.5)
GLUCOSE SERPL-MCNC: 130 MG/DL (ref 70–99)
GLUCOSE UR STRIP-MCNC: NEGATIVE MG/DL
HCT VFR BLD AUTO: 31.8 % (ref 42–52)
HGB BLD-MCNC: 10.4 G/DL (ref 14–18)
HGB UR QL STRIP: ABNORMAL
HYALINE CASTS #/AREA URNS AUTO: ABNORMAL /HPF (ref 0–5)
INR PPP: 1.1
KETONES UR STRIP-MCNC: ABNORMAL MG/DL
LEUKOCYTE ESTERASE UR QL STRIP: ABNORMAL
LYMPHOCYTES # BLD: 0.9 K/UL (ref 1–4.8)
LYMPHOCYTES NFR BLD: 8.4 %
MCH RBC QN AUTO: 28.1 PG (ref 27–31.3)
MCHC RBC AUTO-ENTMCNC: 32.6 % (ref 33–37)
MCV RBC AUTO: 86.3 FL (ref 79–92.2)
MONOCYTES # BLD: 0.7 K/UL (ref 0.2–0.8)
MONOCYTES NFR BLD: 7.1 %
NEUTROPHILS # BLD: 8.7 K/UL (ref 1.4–6.5)
NEUTS SEG NFR BLD: 82.7 %
NITRITE UR QL STRIP: POSITIVE
PH UR STRIP: 7.5 [PH] (ref 5–9)
PLATELET # BLD AUTO: 191 K/UL (ref 130–400)
POTASSIUM SERPL-SCNC: 4.5 MEQ/L (ref 3.4–4.9)
PROT SERPL-MCNC: 7.7 G/DL (ref 6.3–8)
PROT UR STRIP-MCNC: ABNORMAL MG/DL
PROTHROMBIN TIME: 14.3 SEC (ref 12.3–14.9)
RBC # BLD AUTO: 3.69 M/UL (ref 4.7–6.1)
RBC #/AREA URNS AUTO: ABNORMAL /HPF (ref 0–5)
REASON FOR REJECTION: NORMAL
REASON FOR REJECTION: NORMAL
REJECTED TEST: NORMAL
REJECTED TEST: NORMAL
SODIUM SERPL-SCNC: 139 MEQ/L (ref 135–144)
SP GR UR STRIP: 1.01 (ref 1–1.03)
URINE REFLEX TO CULTURE: YES
UROBILINOGEN UR STRIP-ACNC: 0.2 E.U./DL
WBC # BLD AUTO: 10.5 K/UL (ref 4.8–10.8)
WBC #/AREA URNS AUTO: ABNORMAL /HPF (ref 0–5)

## 2023-04-13 PROCEDURE — 85730 THROMBOPLASTIN TIME PARTIAL: CPT

## 2023-04-13 PROCEDURE — 96365 THER/PROPH/DIAG IV INF INIT: CPT

## 2023-04-13 PROCEDURE — 87086 URINE CULTURE/COLONY COUNT: CPT

## 2023-04-13 PROCEDURE — 96375 TX/PRO/DX INJ NEW DRUG ADDON: CPT

## 2023-04-13 PROCEDURE — 80053 COMPREHEN METABOLIC PANEL: CPT

## 2023-04-13 PROCEDURE — 87186 SC STD MICRODIL/AGAR DIL: CPT

## 2023-04-13 PROCEDURE — 99284 EMERGENCY DEPT VISIT MOD MDM: CPT

## 2023-04-13 PROCEDURE — 85025 COMPLETE CBC W/AUTO DIFF WBC: CPT

## 2023-04-13 PROCEDURE — 85610 PROTHROMBIN TIME: CPT

## 2023-04-13 PROCEDURE — 6360000002 HC RX W HCPCS: Performed by: STUDENT IN AN ORGANIZED HEALTH CARE EDUCATION/TRAINING PROGRAM

## 2023-04-13 PROCEDURE — 87088 URINE BACTERIA CULTURE: CPT

## 2023-04-13 PROCEDURE — 76870 US EXAM SCROTUM: CPT

## 2023-04-13 PROCEDURE — 81001 URINALYSIS AUTO W/SCOPE: CPT

## 2023-04-13 PROCEDURE — 2580000003 HC RX 258: Performed by: STUDENT IN AN ORGANIZED HEALTH CARE EDUCATION/TRAINING PROGRAM

## 2023-04-13 RX ORDER — HYDROCODONE BITARTRATE AND ACETAMINOPHEN 5; 325 MG/1; MG/1
1 TABLET ORAL EVERY 6 HOURS PRN
Qty: 10 TABLET | Refills: 0 | Status: SHIPPED | OUTPATIENT
Start: 2023-04-13 | End: 2023-04-18

## 2023-04-13 RX ORDER — 0.9 % SODIUM CHLORIDE 0.9 %
500 INTRAVENOUS SOLUTION INTRAVENOUS ONCE
Status: COMPLETED | OUTPATIENT
Start: 2023-04-13 | End: 2023-04-13

## 2023-04-13 RX ORDER — ONDANSETRON 2 MG/ML
4 INJECTION INTRAMUSCULAR; INTRAVENOUS ONCE
Status: COMPLETED | OUTPATIENT
Start: 2023-04-13 | End: 2023-04-13

## 2023-04-13 RX ORDER — MORPHINE SULFATE 4 MG/ML
4 INJECTION, SOLUTION INTRAMUSCULAR; INTRAVENOUS ONCE
Status: COMPLETED | OUTPATIENT
Start: 2023-04-13 | End: 2023-04-13

## 2023-04-13 RX ORDER — ONDANSETRON 4 MG/1
4 TABLET, ORALLY DISINTEGRATING ORAL 3 TIMES DAILY PRN
Qty: 21 TABLET | Refills: 0 | Status: SHIPPED | OUTPATIENT
Start: 2023-04-13 | End: 2023-04-20

## 2023-04-13 RX ORDER — LEVOFLOXACIN 500 MG/1
500 TABLET, FILM COATED ORAL DAILY
Qty: 10 TABLET | Refills: 0 | Status: SHIPPED | OUTPATIENT
Start: 2023-04-13 | End: 2023-04-23

## 2023-04-13 RX ADMIN — SODIUM CHLORIDE 500 ML: 9 INJECTION, SOLUTION INTRAVENOUS at 10:25

## 2023-04-13 RX ADMIN — ONDANSETRON 4 MG: 2 INJECTION INTRAMUSCULAR; INTRAVENOUS at 10:21

## 2023-04-13 RX ADMIN — CEFTRIAXONE SODIUM 1000 MG: 1 INJECTION, POWDER, FOR SOLUTION INTRAMUSCULAR; INTRAVENOUS at 11:50

## 2023-04-13 RX ADMIN — MORPHINE SULFATE 4 MG: 4 INJECTION, SOLUTION INTRAMUSCULAR; INTRAVENOUS at 10:22

## 2023-04-13 ASSESSMENT — PAIN DESCRIPTION - LOCATION
LOCATION: SCROTUM

## 2023-04-13 ASSESSMENT — PAIN SCALES - GENERAL
PAINLEVEL_OUTOF10: 10
PAINLEVEL_OUTOF10: 8
PAINLEVEL_OUTOF10: 6
PAINLEVEL_OUTOF10: 8

## 2023-04-13 ASSESSMENT — PAIN DESCRIPTION - ORIENTATION
ORIENTATION: LEFT

## 2023-04-13 ASSESSMENT — ENCOUNTER SYMPTOMS
DIARRHEA: 0
SHORTNESS OF BREATH: 0
WHEEZING: 0
NAUSEA: 1
PHOTOPHOBIA: 0
ABDOMINAL PAIN: 0
CONSTIPATION: 0
COUGH: 0
VOMITING: 0

## 2023-04-13 ASSESSMENT — PAIN - FUNCTIONAL ASSESSMENT
PAIN_FUNCTIONAL_ASSESSMENT: 0-10

## 2023-04-13 ASSESSMENT — LIFESTYLE VARIABLES
HOW MANY STANDARD DRINKS CONTAINING ALCOHOL DO YOU HAVE ON A TYPICAL DAY: PATIENT DOES NOT DRINK
HOW OFTEN DO YOU HAVE A DRINK CONTAINING ALCOHOL: NEVER

## 2023-04-13 ASSESSMENT — PAIN DESCRIPTION - PAIN TYPE
TYPE: ACUTE PAIN

## 2023-04-13 ASSESSMENT — PAIN DESCRIPTION - DESCRIPTORS
DESCRIPTORS: SHARP
DESCRIPTORS: SHARP

## 2023-04-13 NOTE — ED NOTES
D/C instructions given to patient and girlfriend. Aware the rx's were electronically sent to [facility] on [address] [place] [address]. Verbalized understanding. Denies any change in complaints.  Took patient out via wheelchair     Tayo Joe RN  04/13/23 [id number]

## 2023-04-13 NOTE — ED NOTES
Returned. States pain is down to 8/10 at this time. Wife at bedside. Call light within reach. Aware we are waiting on results.      Agnieszka Harding RN  04/13/23 [id number]

## 2023-04-13 NOTE — ED NOTES
The following labs were labeled with appropriate pt sticker and tubed to lab:     [x] Blue     [x] Lavender   [] on ice  [x] Green/yellow  [] Green/black [] on ice  [] Foster Royal  [] on ice  [] Yellow  [] Red  [x] Type/ Screen  [] ABG  [] VBG    [] COVID-19 swab    [] Rapid  [] PCR  [] Flu swab  [] Peds Viral Panel     [x] Urine Sample  [] Fecal Sample  [] Pelvic Cultures  [] Blood Cultures  [] X 2  [] STREP Cultures      Roly Barajas RN  04/13/23 1029

## 2023-04-13 NOTE — ED TRIAGE NOTES
A & Ox4. Skin warm and dry. Pt states he was lifting a >100lb metal torch yesterday morning when he felt a pull in his left groin. States last night the pain went to his left testicle and today pain so bad that it's making him nauseated. Denies vomiting. Denies pain to left groin. Pain to left testicle with some swelling noted to left side.

## 2023-04-14 NOTE — ED PROVIDER NOTES
[address] [facility] ED  EMERGENCY DEPARTMENT ENCOUNTER      Pt Name: Siena Roman  MRN: [id number]  [place] 1945  Date of evaluation: 4/13/2023  Provider: Jong Cuellar PA-C    CHIEF COMPLAINT       Chief Complaint   Patient presents with    Testicle Pain     Lifted a very heavy metal torch yesterday morning and felt a pull in left groin. Last night pain got bad in left testicle and got nauseated from the pain         HISTORY OF PRESENT ILLNESS   (Location/Symptom, Timing/Onset, Context/Setting, Quality, Duration, Modifying Factors, Severity)  Note limiting factors. Siena Roman is a 65 y.o. male who per chart review has pmhx of HTN, PVD/PAD, CAD, HLD presents to the emergency department for evaluation of L testicular pain. Pt states yesterday he moved a very heavy (about 100 lb) metal torch. He states he felt some pulling in the groin afterward which has improved. Woke up today with pain in the left testicle. He does not think there is swelling or color change. The pain is constant but worsens when he touches the left teste. He denies hx of similar. No direct trauma to the testicle. He is in a monogamous relationship with 1 female partner and not concerned for STI. Associated nausea and slight dysuria that began today. Denies fever chills chest pain sob abd pain vomiting constipation diarrhea hematuria urinary frequency or urgency penile sores or lesions penile discharge/swelling back or flank pain. HPI    Nursing Notes were reviewed. REVIEW OF SYSTEMS    (2-9 systems for level 4, 10 or more for level 5)     Review of Systems   Constitutional:  Negative for chills, fatigue and fever. HENT:  Negative for congestion. Eyes:  Negative for photophobia. Respiratory:  Negative for cough, shortness of breath and wheezing. Cardiovascular:  Negative for palpitations. Gastrointestinal:  Positive for nausea. Negative for abdominal pain, constipation, diarrhea and vomiting.    Genitourinary:

## 2023-04-15 LAB
BACTERIA UR CULT: ABNORMAL
BACTERIA UR CULT: ABNORMAL
ORGANISM: ABNORMAL

## 2023-04-18 ENCOUNTER — OFFICE VISIT (OUTPATIENT)
Dept: UROLOGY | Age: 78
End: 2023-04-18
Payer: MEDICARE

## 2023-04-18 VITALS
BODY MASS INDEX: 27.72 KG/M2 | HEART RATE: 56 BPM | OXYGEN SATURATION: 99 % | HEIGHT: 71 IN | DIASTOLIC BLOOD PRESSURE: 60 MMHG | WEIGHT: 198 LBS | SYSTOLIC BLOOD PRESSURE: 108 MMHG

## 2023-04-18 DIAGNOSIS — N45.1 EPIDIDYMITIS: Primary | ICD-10-CM

## 2023-04-18 PROCEDURE — 3074F SYST BP LT 130 MM HG: CPT | Performed by: PHYSICIAN ASSISTANT

## 2023-04-18 PROCEDURE — 99203 OFFICE O/P NEW LOW 30 MIN: CPT | Performed by: PHYSICIAN ASSISTANT

## 2023-04-18 PROCEDURE — 3078F DIAST BP <80 MM HG: CPT | Performed by: PHYSICIAN ASSISTANT

## 2023-04-18 PROCEDURE — 1123F ACP DISCUSS/DSCN MKR DOCD: CPT | Performed by: PHYSICIAN ASSISTANT

## 2023-04-18 RX ORDER — ATENOLOL 50 MG/1
TABLET ORAL
COMMUNITY
Start: 2023-04-16

## 2023-04-18 RX ORDER — AMLODIPINE BESYLATE AND BENAZEPRIL HYDROCHLORIDE 10; 20 MG/1; MG/1
CAPSULE ORAL DAILY
COMMUNITY
Start: 2023-03-11

## 2023-04-18 RX ORDER — SILDENAFIL 100 MG/1
100 TABLET, FILM COATED ORAL DAILY PRN
COMMUNITY
Start: 2023-04-05

## 2023-04-18 RX ORDER — LEVOFLOXACIN 500 MG/1
500 TABLET, FILM COATED ORAL DAILY
Qty: 5 TABLET | Refills: 0 | Status: SHIPPED | OUTPATIENT
Start: 2023-04-18 | End: 2023-04-23

## 2023-04-18 ASSESSMENT — ENCOUNTER SYMPTOMS
CHEST TIGHTNESS: 0
DIARRHEA: 0
ABDOMINAL PAIN: 0
COUGH: 0
SHORTNESS OF BREATH: 0
TROUBLE SWALLOWING: 0
SINUS PRESSURE: 0
BACK PAIN: 0
VOMITING: 0

## 2023-04-18 ASSESSMENT — PATIENT HEALTH QUESTIONNAIRE - PHQ9
1. LITTLE INTEREST OR PLEASURE IN DOING THINGS: 0
SUM OF ALL RESPONSES TO PHQ9 QUESTIONS 1 & 2: 0
SUM OF ALL RESPONSES TO PHQ QUESTIONS 1-9: 0
2. FEELING DOWN, DEPRESSED OR HOPELESS: 0
SUM OF ALL RESPONSES TO PHQ QUESTIONS 1-9: 0

## 2023-04-18 NOTE — PROGRESS NOTES
Subjective:      Patient ID: Gigi Escobedo is a 65 y.o. male who presents today for:  Chief Complaint   Patient presents with    New Patient     Review abnormal US        HPI  65year old male who was found to have an incidental finding of left sided epididymo-orchitis on a scrotal US. The patient denies pain or discomfort. Also incidental finding of left testicular varices and small bilateral hydroceles. Past Medical History:   Diagnosis Date    Anemia, normocytic normochromic     CAD (coronary artery disease)     multiple PCI    Carotid artery stenosis     Diabetes mellitus ([facility] [address]     Gout 10/2/2017    Hyperlipidemia     Hypertension     Neuropathy     Occlusive disease of artery of lower extremity ([facility] [address] 10/2/2017    Type 2 diabetes mellitus without complication [facility])      Past Surgical History:   Procedure Laterality Date    ARTERIOGRAM Right 10/6/2017    RIGHT ARM AORTO-FEMORAL DIGITAL SUBTRACTION ARTERIOGRAPHY performed by Calos Bhakta MD at [address] [facility] Left 9/8/2020    LEFT CAROTID ENDARTERECTOMY performed by Calos Bhakta MD at [address] [facility] N/A 11/7/11    xience stent to RAC and [address] Left 4/19/13    xience stent to LAD    [address] [facility] Right 12/22/13    Promus stent to RCA    DIAGNOSTIC CARDIAC CATH LAB PROCEDURE  09/20/2019    FEMORAL-FEMORAL BYPASS GRAFT      HERNIA REPAIR      as child    [place] BYPASS W/VEIN FEMORAL-POPLITEAL Left 10/27/2017    LEFT FEMORAL DISTAL BYPASS, (PAT DONE 10-2-17) performed by Calos Bhakta MD at [address] [facility] ESOPHAGOGASTRODUODENOSCOPY TRANSORAL DIAGNOSTIC N/A 11/30/2017    EGD ESOPHAGOGASTRODUODENOSCOPY with biopsy performed by Sweta Harris MD at [address] [facility] History    Marital status:       Spouse name: Not on file    Number of children: Not on file    Years of education: Not on file

## 2023-05-18 ENCOUNTER — OFFICE VISIT (OUTPATIENT)
Dept: UROLOGY | Age: 78
End: 2023-05-18
Payer: MEDICARE

## 2023-05-18 VITALS
BODY MASS INDEX: 27.3 KG/M2 | SYSTOLIC BLOOD PRESSURE: 112 MMHG | DIASTOLIC BLOOD PRESSURE: 64 MMHG | WEIGHT: 195 LBS | HEART RATE: 67 BPM | HEIGHT: 71 IN

## 2023-05-18 DIAGNOSIS — Z12.5 SCREENING PSA (PROSTATE SPECIFIC ANTIGEN): ICD-10-CM

## 2023-05-18 DIAGNOSIS — N45.1 EPIDIDYMITIS: Primary | ICD-10-CM

## 2023-05-18 LAB
BILIRUBIN, POC: ABNORMAL
BLOOD URINE, POC: ABNORMAL
CLARITY, POC: CLEAR
COLOR, POC: YELLOW
GLUCOSE URINE, POC: ABNORMAL
KETONES, POC: ABNORMAL
LEUKOCYTE EST, POC: ABNORMAL
NITRITE, POC: ABNORMAL
PH, POC: 5.5
PROTEIN, POC: ABNORMAL
SPECIFIC GRAVITY, POC: 1.01
UROBILINOGEN, POC: 0.2

## 2023-05-18 PROCEDURE — 99213 OFFICE O/P EST LOW 20 MIN: CPT | Performed by: PHYSICIAN ASSISTANT

## 2023-05-18 PROCEDURE — 3078F DIAST BP <80 MM HG: CPT | Performed by: PHYSICIAN ASSISTANT

## 2023-05-18 PROCEDURE — 3074F SYST BP LT 130 MM HG: CPT | Performed by: PHYSICIAN ASSISTANT

## 2023-05-18 PROCEDURE — 1123F ACP DISCUSS/DSCN MKR DOCD: CPT | Performed by: PHYSICIAN ASSISTANT

## 2023-05-18 PROCEDURE — 81003 URINALYSIS AUTO W/O SCOPE: CPT | Performed by: PHYSICIAN ASSISTANT

## 2023-05-18 ASSESSMENT — ENCOUNTER SYMPTOMS
SHORTNESS OF BREATH: 0
ABDOMINAL PAIN: 0
SINUS PRESSURE: 0
COUGH: 0
BACK PAIN: 0
CHEST TIGHTNESS: 0
DIARRHEA: 0
TROUBLE SWALLOWING: 0
VOMITING: 0

## 2023-05-18 NOTE — PROGRESS NOTES
Subjective:      Patient ID: Uriel Bae is a 65 y.o. male    HPI  65year old male who was found to have an incidental finding of left sided epididymo-orchitis on a scrotal US on 4/13/23. The patient denies pain or discomfort. Also incidental finding of left testicular varices and small bilateral hydroceles. The patient was prescribed levaquin, which he took in full. On his follow up appointment today the patient denies any symptoms. Denies any urinary complaints       Past Medical History:   Diagnosis Date    Anemia, normocytic normochromic     CAD (coronary artery disease)     multiple PCI    Carotid artery stenosis     Diabetes mellitus ([facility] [address]     Gout 10/2/2017    Hyperlipidemia     Hypertension     Neuropathy     Occlusive disease of artery of lower extremity ([facility] [address] 10/2/2017    Type 2 diabetes mellitus without complication [facility])      Past Surgical History:   Procedure Laterality Date    ARTERIOGRAM Right 10/6/2017    RIGHT ARM AORTO-FEMORAL DIGITAL SUBTRACTION ARTERIOGRAPHY performed by Johnnie Kim MD at [address] [facility] Left 9/8/2020    LEFT CAROTID ENDARTERECTOMY performed by Johnnie Kim MD at [address] [facility] N/A 11/7/11    xience stent to RAC and CIRC    CORONARY ANGIOPLASTY WITH STENT PLACEMENT Left 4/19/13    xience stent to LAD    [facility] [facility] Right 12/22/13    Promus stent to RCA    DIAGNOSTIC CARDIAC CATH LAB PROCEDURE  09/20/2019    FEMORAL-FEMORAL BYPASS GRAFT      HERNIA REPAIR      as child    [place] BYPASS W/VEIN FEMORAL-POPLITEAL Left 10/27/2017    LEFT FEMORAL DISTAL BYPASS, (PAT DONE 10-2-17) performed by Johnnie Kim MD at [address] [facility] ESOPHAGOGASTRODUODENOSCOPY TRANSORAL DIAGNOSTIC N/A 11/30/2017    EGD ESOPHAGOGASTRODUODENOSCOPY with biopsy performed by Mukesh Thomas MD at [address] [facility] History    Marital status:       Spouse name: None    Number of

## 2023-07-05 ENCOUNTER — OFFICE VISIT (OUTPATIENT)
Dept: CARDIOLOGY CLINIC | Age: 78
End: 2023-07-05
Payer: MEDICARE

## 2023-07-05 VITALS
HEART RATE: 61 BPM | WEIGHT: 200.6 LBS | DIASTOLIC BLOOD PRESSURE: 62 MMHG | SYSTOLIC BLOOD PRESSURE: 136 MMHG | OXYGEN SATURATION: 96 % | BODY MASS INDEX: 27.98 KG/M2

## 2023-07-05 DIAGNOSIS — I99.8 ISCHEMIC REST PAIN OF LOWER EXTREMITY: ICD-10-CM

## 2023-07-05 DIAGNOSIS — I73.9 CLAUDICATION (HCC): ICD-10-CM

## 2023-07-05 DIAGNOSIS — M79.606 ISCHEMIC LEG PAIN: ICD-10-CM

## 2023-07-05 DIAGNOSIS — I25.119 CORONARY ARTERY DISEASE INVOLVING NATIVE CORONARY ARTERY OF NATIVE HEART WITH ANGINA PECTORIS (HCC): ICD-10-CM

## 2023-07-05 DIAGNOSIS — I73.9 PERIPHERAL VASCULAR DISEASE (HCC): ICD-10-CM

## 2023-07-05 DIAGNOSIS — R09.89 BILATERAL CAROTID BRUITS: ICD-10-CM

## 2023-07-05 DIAGNOSIS — R07.9 CHEST PAIN, UNSPECIFIED TYPE: Primary | ICD-10-CM

## 2023-07-05 DIAGNOSIS — M79.606 ISCHEMIC REST PAIN OF LOWER EXTREMITY: ICD-10-CM

## 2023-07-05 DIAGNOSIS — I70.209 OCCLUSIVE DISEASE OF ARTERY OF LOWER EXTREMITY (HCC): ICD-10-CM

## 2023-07-05 DIAGNOSIS — E78.5 DYSLIPIDEMIA: ICD-10-CM

## 2023-07-05 DIAGNOSIS — I65.23 BILATERAL CAROTID ARTERY STENOSIS: ICD-10-CM

## 2023-07-05 DIAGNOSIS — I47.29 NSVT (NONSUSTAINED VENTRICULAR TACHYCARDIA) (HCC): ICD-10-CM

## 2023-07-05 DIAGNOSIS — I70.223: ICD-10-CM

## 2023-07-05 DIAGNOSIS — I99.8 ISCHEMIC LEG PAIN: ICD-10-CM

## 2023-07-05 DIAGNOSIS — I10 ESSENTIAL HYPERTENSION: ICD-10-CM

## 2023-07-05 DIAGNOSIS — I25.10 CORONARY ARTERY DISEASE INVOLVING NATIVE CORONARY ARTERY OF NATIVE HEART WITHOUT ANGINA PECTORIS: ICD-10-CM

## 2023-07-05 PROCEDURE — 1123F ACP DISCUSS/DSCN MKR DOCD: CPT | Performed by: INTERNAL MEDICINE

## 2023-07-05 PROCEDURE — 93000 ELECTROCARDIOGRAM COMPLETE: CPT | Performed by: INTERNAL MEDICINE

## 2023-07-05 PROCEDURE — 3078F DIAST BP <80 MM HG: CPT | Performed by: INTERNAL MEDICINE

## 2023-07-05 PROCEDURE — 3075F SYST BP GE 130 - 139MM HG: CPT | Performed by: INTERNAL MEDICINE

## 2023-07-05 PROCEDURE — 99215 OFFICE O/P EST HI 40 MIN: CPT | Performed by: INTERNAL MEDICINE

## 2023-07-05 RX ORDER — ISOSORBIDE MONONITRATE 30 MG/1
30 TABLET, EXTENDED RELEASE ORAL DAILY
Qty: 90 TABLET | Refills: 3 | Status: SHIPPED | OUTPATIENT
Start: 2023-07-05

## 2023-07-05 RX ORDER — NITROGLYCERIN 0.4 MG/1
TABLET SUBLINGUAL
Qty: 25 TABLET | Refills: 5 | Status: SHIPPED | OUTPATIENT
Start: 2023-07-05

## 2023-07-05 ASSESSMENT — ENCOUNTER SYMPTOMS
STRIDOR: 0
SHORTNESS OF BREATH: 0
EYES NEGATIVE: 1
BLOOD IN STOOL: 0
NAUSEA: 0
CHEST TIGHTNESS: 1
WHEEZING: 0
COUGH: 0
GASTROINTESTINAL NEGATIVE: 1

## 2023-07-05 NOTE — PROGRESS NOTES
NTG with relief. Sits down and relieved. No SOB but little nausea and diaphoresis. 9/3/21 no further CP no further SL NTG. No SOB no bleed no falls     12/3/21 doingw ell no cp no sob no falls no bleed little dizzy . 9/2/22 wife passed from [address] and upset. Had to take NTG on couple occasions. No sb no faalls no bleed. Works on [employer]    1/6/23 since last OV had to take SL NTG 2 times for CP. But he is not taking Atenolol for some reason. No falls no bleed. His legs go numb if he stands too long. Remains active. 1/25/23 R leg hurts > Left. No cp no sob no falls no bleed. 3/13/23  claudication is stable he can still walk and rest and continue. No cp no sob eats well takes meds. 7/5/23 had some CP middle of the night and NTG helped. During the day no angiona and has not ever need NTG during the day despite being active. Still having B/L LE Claudication R>L.  No falls no bleed     EKG: SR 58    Past Medical History:   Diagnosis Date    Anemia, normocytic normochromic     CAD (coronary artery disease)     multiple PCI    Carotid artery stenosis     Diabetes mellitus ([address])     Gout 10/2/2017    Hyperlipidemia     Hypertension     Neuropathy     Occlusive disease of artery of lower extremity ([address]) 10/2/2017    Type 2 diabetes mellitus without complication [facility])        Past Surgical History:   Procedure Laterality Date    ARTERIOGRAM Right 10/6/2017    RIGHT ARM AORTO-FEMORAL DIGITAL SUBTRACTION ARTERIOGRAPHY performed by Rashawn Bennett MD at [address] [facility] Left 9/8/2020    LEFT CAROTID ENDARTERECTOMY performed by Rashawn Bennett MD at [address] [facility] N/A 11/7/11    xience stent to RAC and CIRC    CORONARY ANGIOPLASTY WITH STENT PLACEMENT Left 4/19/13    xience stent to LAD    [facility] Right 12/22/13    Promus stent to RCA    DIAGNOSTIC CARDIAC CATH LAB PROCEDURE  09/20/2019

## 2023-07-06 ENCOUNTER — TELEPHONE (OUTPATIENT)
Dept: CARDIOLOGY CLINIC | Age: 78
End: 2023-07-06

## 2023-07-06 DIAGNOSIS — I25.119 CORONARY ARTERY DISEASE INVOLVING NATIVE CORONARY ARTERY OF NATIVE HEART WITH ANGINA PECTORIS (HCC): Primary | ICD-10-CM

## 2023-07-06 NOTE — TELEPHONE ENCOUNTER
Pt has his CTA of aorta on 07/13/23 but they told him he needed some blood work done      Call and let him know bw was ordered.   Pt ph [phone]

## 2023-07-13 ENCOUNTER — TELEPHONE (OUTPATIENT)
Dept: CARDIOLOGY CLINIC | Age: 78
End: 2023-07-13

## 2023-07-13 ENCOUNTER — HOSPITAL ENCOUNTER (OUTPATIENT)
Dept: CT IMAGING | Age: 78
Discharge: HOME | End: 2023-07-15
Attending: INTERNAL MEDICINE
Payer: MEDICARE

## 2023-07-13 ENCOUNTER — HOSPITAL ENCOUNTER (OUTPATIENT)
Dept: LAB | Age: 78
Discharge: HOME | End: 2023-07-13
Attending: INTERNAL MEDICINE
Payer: MEDICARE

## 2023-07-13 DIAGNOSIS — I70.223: ICD-10-CM

## 2023-07-13 DIAGNOSIS — I25.119 CORONARY ARTERY DISEASE INVOLVING NATIVE CORONARY ARTERY OF NATIVE HEART WITH ANGINA PECTORIS (HCC): ICD-10-CM

## 2023-07-13 LAB
ANION GAP SERPL CALCULATED.3IONS-SCNC: 9 MEQ/L (ref 9–15)
BUN SERPL-MCNC: 30 MG/DL (ref 8–23)
CALCIUM SERPL-MCNC: 9.2 MG/DL (ref 8.5–9.9)
CHLORIDE SERPL-SCNC: 107 MEQ/L (ref 95–107)
CO2 SERPL-SCNC: 25 MEQ/L (ref 20–31)
CREAT SERPL-MCNC: 1.28 MG/DL (ref 0.7–1.2)
GLUCOSE SERPL-MCNC: 96 MG/DL (ref 70–99)
POTASSIUM SERPL-SCNC: 4.8 MEQ/L (ref 3.4–4.9)
SODIUM SERPL-SCNC: 141 MEQ/L (ref 135–144)

## 2023-07-13 PROCEDURE — 80048 BASIC METABOLIC PNL TOTAL CA: CPT

## 2023-07-13 PROCEDURE — 6360000004 HC RX CONTRAST MEDICATION: Performed by: INTERNAL MEDICINE

## 2023-07-13 PROCEDURE — 36415 COLL VENOUS BLD VENIPUNCTURE: CPT

## 2023-07-13 RX ADMIN — IOPAMIDOL 110 ML: 755 INJECTION, SOLUTION INTRAVENOUS at 15:33

## 2023-07-13 NOTE — TELEPHONE ENCOUNTER
Aric Phillips from [facility] calling to let Dr Robe Ramesh know that they cannot find pt's veins for CTA. They discharged pt. What should pt do?     Aric Phillips # [phone]

## 2023-07-14 NOTE — TELEPHONE ENCOUNTER
Have his get it done at [facility] EMERGENCY [facility] [place]. Have him drink extra water before showing up to get his veins plump.         Patient aware

## 2023-07-17 ENCOUNTER — HOSPITAL ENCOUNTER (OUTPATIENT)
Dept: CT IMAGING | Age: 78
Discharge: HOME | End: 2023-07-19
Attending: INTERNAL MEDICINE
Payer: MEDICARE

## 2023-07-17 PROCEDURE — 6360000004 HC RX CONTRAST MEDICATION: Performed by: INTERNAL MEDICINE

## 2023-07-17 PROCEDURE — 75635 CT ANGIO ABDOMINAL ARTERIES: CPT

## 2023-07-17 PROCEDURE — 2580000003 HC RX 258: Performed by: INTERNAL MEDICINE

## 2023-07-17 RX ORDER — SODIUM CHLORIDE 9 MG/ML
INJECTION, SOLUTION INTRAVENOUS ONCE
Status: COMPLETED | OUTPATIENT
Start: 2023-07-17 | End: 2023-07-17

## 2023-07-17 RX ADMIN — IOPAMIDOL 125 ML: 612 INJECTION, SOLUTION INTRAVENOUS at 15:54

## 2023-07-17 RX ADMIN — SODIUM CHLORIDE 50 ML: 9 INJECTION, SOLUTION INTRAVENOUS at 15:55

## 2023-07-26 DIAGNOSIS — I70.209 OCCLUSIVE DISEASE OF ARTERY OF LOWER EXTREMITY (HCC): Chronic | ICD-10-CM

## 2023-07-26 DIAGNOSIS — I73.9 CLAUDICATION (HCC): Primary | ICD-10-CM

## 2023-07-26 DIAGNOSIS — I73.9 PERIPHERAL VASCULAR DISEASE, UNSPECIFIED (HCC): ICD-10-CM

## 2023-07-26 DIAGNOSIS — R68.89 ABNORMAL ANKLE BRACHIAL INDEX: ICD-10-CM

## 2023-08-07 ENCOUNTER — HOSPITAL ENCOUNTER (OUTPATIENT)
Age: 78
Setting detail: OUTPATIENT SURGERY
Discharge: HOME | End: 2023-08-07
Attending: INTERNAL MEDICINE | Admitting: INTERNAL MEDICINE
Payer: MEDICARE

## 2023-08-07 VITALS
TEMPERATURE: 97.8 F | HEART RATE: 58 BPM | SYSTOLIC BLOOD PRESSURE: 173 MMHG | RESPIRATION RATE: 16 BRPM | DIASTOLIC BLOOD PRESSURE: 74 MMHG | OXYGEN SATURATION: 100 %

## 2023-08-07 DIAGNOSIS — I73.9 CLAUDICATION (HCC): ICD-10-CM

## 2023-08-07 LAB
ANION GAP SERPL CALCULATED.3IONS-SCNC: 9 MEQ/L (ref 9–15)
BUN SERPL-MCNC: 22 MG/DL (ref 8–23)
CALCIUM SERPL-MCNC: 9.1 MG/DL (ref 8.5–9.9)
CHLORIDE SERPL-SCNC: 106 MEQ/L (ref 95–107)
CO2 SERPL-SCNC: 26 MEQ/L (ref 20–31)
CREAT SERPL-MCNC: 1.14 MG/DL (ref 0.7–1.2)
ERYTHROCYTE [DISTWIDTH] IN BLOOD BY AUTOMATED COUNT: 17.6 % (ref 11.5–14.5)
GLUCOSE SERPL-MCNC: 112 MG/DL (ref 70–99)
HCT VFR BLD AUTO: 33.5 % (ref 42–52)
HGB BLD-MCNC: 10.8 G/DL (ref 14–18)
MCH RBC QN AUTO: 27.9 PG (ref 27–31.3)
MCHC RBC AUTO-ENTMCNC: 32.2 % (ref 33–37)
MCV RBC AUTO: 86.6 FL (ref 79–92.2)
PLATELET # BLD AUTO: 149 K/UL (ref 130–400)
PLATELET BLD QL SMEAR: ADEQUATE
POTASSIUM SERPL-SCNC: 4.9 MEQ/L (ref 3.4–4.9)
RBC # BLD AUTO: 3.87 M/UL (ref 4.7–6.1)
SLIDE REVIEW: ABNORMAL
SODIUM SERPL-SCNC: 141 MEQ/L (ref 135–144)
WBC # BLD AUTO: 4.7 K/UL (ref 4.8–10.8)

## 2023-08-07 PROCEDURE — C1769 GUIDE WIRE: HCPCS | Performed by: INTERNAL MEDICINE

## 2023-08-07 PROCEDURE — 6360000002 HC RX W HCPCS: Performed by: INTERNAL MEDICINE

## 2023-08-07 PROCEDURE — 75716 ARTERY X-RAYS ARMS/LEGS: CPT | Performed by: INTERNAL MEDICINE

## 2023-08-07 PROCEDURE — 2500000003 HC RX 250 WO HCPCS: Performed by: INTERNAL MEDICINE

## 2023-08-07 PROCEDURE — 6360000004 HC RX CONTRAST MEDICATION: Performed by: INTERNAL MEDICINE

## 2023-08-07 PROCEDURE — 2709999900 HC NON-CHARGEABLE SUPPLY: Performed by: INTERNAL MEDICINE

## 2023-08-07 PROCEDURE — 80048 BASIC METABOLIC PNL TOTAL CA: CPT

## 2023-08-07 PROCEDURE — 99152 MOD SED SAME PHYS/QHP 5/>YRS: CPT | Performed by: INTERNAL MEDICINE

## 2023-08-07 PROCEDURE — 99153 MOD SED SAME PHYS/QHP EA: CPT | Performed by: INTERNAL MEDICINE

## 2023-08-07 PROCEDURE — 85027 COMPLETE CBC AUTOMATED: CPT

## 2023-08-07 PROCEDURE — C1887 CATHETER, GUIDING: HCPCS | Performed by: INTERNAL MEDICINE

## 2023-08-07 PROCEDURE — 7100000010 HC PHASE II RECOVERY - FIRST 15 MIN: Performed by: INTERNAL MEDICINE

## 2023-08-07 PROCEDURE — 7100000011 HC PHASE II RECOVERY - ADDTL 15 MIN: Performed by: INTERNAL MEDICINE

## 2023-08-07 PROCEDURE — C1894 INTRO/SHEATH, NON-LASER: HCPCS | Performed by: INTERNAL MEDICINE

## 2023-08-07 RX ORDER — SODIUM CHLORIDE 9 MG/ML
INJECTION, SOLUTION INTRAVENOUS CONTINUOUS
Status: DISCONTINUED | OUTPATIENT
Start: 2023-08-07 | End: 2023-08-07 | Stop reason: HOSPADM

## 2023-08-07 RX ORDER — MIDAZOLAM HYDROCHLORIDE 1 MG/ML
INJECTION INTRAMUSCULAR; INTRAVENOUS PRN
Status: DISCONTINUED | OUTPATIENT
Start: 2023-08-07 | End: 2023-08-07 | Stop reason: HOSPADM

## 2023-08-07 RX ORDER — SODIUM CHLORIDE 0.9 % (FLUSH) 0.9 %
5-40 SYRINGE (ML) INJECTION EVERY 12 HOURS SCHEDULED
Status: DISCONTINUED | OUTPATIENT
Start: 2023-08-07 | End: 2023-08-07 | Stop reason: HOSPADM

## 2023-08-07 RX ORDER — SODIUM CHLORIDE 9 MG/ML
INJECTION, SOLUTION INTRAVENOUS PRN
Status: DISCONTINUED | OUTPATIENT
Start: 2023-08-07 | End: 2023-08-07 | Stop reason: HOSPADM

## 2023-08-07 RX ORDER — MORPHINE SULFATE 2 MG/ML
2 INJECTION, SOLUTION INTRAMUSCULAR; INTRAVENOUS
Status: DISCONTINUED | OUTPATIENT
Start: 2023-08-07 | End: 2023-08-07 | Stop reason: HOSPADM

## 2023-08-07 RX ORDER — LABETALOL HYDROCHLORIDE 5 MG/ML
10 INJECTION, SOLUTION INTRAVENOUS EVERY 30 MIN PRN
Status: DISCONTINUED | OUTPATIENT
Start: 2023-08-07 | End: 2023-08-07 | Stop reason: HOSPADM

## 2023-08-07 RX ORDER — FENTANYL CITRATE 50 UG/ML
INJECTION, SOLUTION INTRAMUSCULAR; INTRAVENOUS PRN
Status: DISCONTINUED | OUTPATIENT
Start: 2023-08-07 | End: 2023-08-07 | Stop reason: HOSPADM

## 2023-08-07 RX ORDER — SODIUM CHLORIDE 0.9 % (FLUSH) 0.9 %
5-40 SYRINGE (ML) INJECTION PRN
Status: DISCONTINUED | OUTPATIENT
Start: 2023-08-07 | End: 2023-08-07 | Stop reason: HOSPADM

## 2023-08-07 RX ORDER — MIDAZOLAM HYDROCHLORIDE 2 MG/2ML
2 INJECTION, SOLUTION INTRAMUSCULAR; INTRAVENOUS
Status: DISCONTINUED | OUTPATIENT
Start: 2023-08-07 | End: 2023-08-07 | Stop reason: HOSPADM

## 2023-08-07 RX ORDER — ONDANSETRON 2 MG/ML
4 INJECTION INTRAMUSCULAR; INTRAVENOUS EVERY 6 HOURS PRN
Status: DISCONTINUED | OUTPATIENT
Start: 2023-08-07 | End: 2023-08-07 | Stop reason: HOSPADM

## 2023-08-07 RX ORDER — ACETAMINOPHEN 325 MG/1
650 TABLET ORAL EVERY 4 HOURS PRN
Status: DISCONTINUED | OUTPATIENT
Start: 2023-08-07 | End: 2023-08-07 | Stop reason: HOSPADM

## 2023-08-07 RX ORDER — FENTANYL CITRATE 0.05 MG/ML
25 INJECTION, SOLUTION INTRAMUSCULAR; INTRAVENOUS
Status: DISCONTINUED | OUTPATIENT
Start: 2023-08-07 | End: 2023-08-07 | Stop reason: HOSPADM

## 2023-08-07 ASSESSMENT — PAIN - FUNCTIONAL ASSESSMENT: PAIN_FUNCTIONAL_ASSESSMENT: ACTIVITIES ARE NOT PREVENTED

## 2023-08-07 NOTE — PROGRESS NOTES
Pt arrived ambulatory from home. Pt A & O x 4, speaking in full sentences with unlabored breathing. Pt verbalized understanding of plan of care, consent signed. IV placed and labs sent.

## 2023-08-07 NOTE — PROGRESS NOTES
Pt verbalized understanding of discharge instructions. Pt denies CP or SOB. Pt able to get dressed. Pt taken out via wheelchair by [facility] [employer].

## 2023-08-07 NOTE — DISCHARGE INSTRUCTIONS
No driving, heavy lifting, operating heavy machinery or alcohol use for 48 hours. Dressing is to remain on until tomorrow afternoon. You may remove the dressing after showering. If bleeding occurs hold pressure for twenty minutes. If bleeding continues continue to hold pressure and proceed to the emergency room. Follow up with Dr. Errol Tipton as directed.

## 2023-08-07 NOTE — PROGRESS NOTES
Pt resting comfortably. Pt is on the bedside cardiac monitor. Pt denies pain. Left femoral access site dressed. Access site is soft, nontender, no bleeding present. Pt aware of plan of care at this time.

## 2023-08-07 NOTE — BRIEF OP NOTE
Section of Cardiology  Adult Brief LE angio Procedure Note        Procedure(s):  b/l LE angio via left CFA. Pre-operative Diagnosis:  claudication, abn LE CTA    H&P Status: Completed and reviewed. Post-operative Diagnosis:      Right LE: patent left to right fem-fem bypass. Right SFA with mid to distal stent with mild ISR, no focal stenosis/no gradient with catheter pull back. 2 vessel run off via PT and peroneal.     Left LE: patent CFA and profunda. 100% mid left SFA ISR. Patent left fem-pop bypass with proximal anatamosis 80-90% focal stenosis, distal anastamosis 50%. 2 vessel run off via Peroneal and PT. Findings:  See full report    Complications:  none    Primary Proceduralist:   Dr. Kenneth Baer med rx  Rfm  CTA is false positive for right SFA occlusion- stent is patent  Left LE PVI in future if warranted by symptoms, patient states he is asymptomatic in left LE now. Right LE symptoms ? Related to neuropathy, arthritis or microvascular disease.        Full procedure note to follow

## 2023-09-05 ENCOUNTER — OFFICE VISIT (OUTPATIENT)
Dept: CARDIOLOGY CLINIC | Age: 78
End: 2023-09-05
Payer: MEDICARE

## 2023-09-05 VITALS
OXYGEN SATURATION: 96 % | HEIGHT: 71 IN | WEIGHT: 198.8 LBS | HEART RATE: 49 BPM | DIASTOLIC BLOOD PRESSURE: 60 MMHG | BODY MASS INDEX: 27.83 KG/M2 | SYSTOLIC BLOOD PRESSURE: 126 MMHG

## 2023-09-05 DIAGNOSIS — I10 ESSENTIAL HYPERTENSION: ICD-10-CM

## 2023-09-05 DIAGNOSIS — I73.9 PERIPHERAL VASCULAR DISEASE (HCC): ICD-10-CM

## 2023-09-05 DIAGNOSIS — I25.10 CORONARY ARTERY DISEASE INVOLVING NATIVE CORONARY ARTERY OF NATIVE HEART WITHOUT ANGINA PECTORIS: ICD-10-CM

## 2023-09-05 DIAGNOSIS — I70.209 OCCLUSIVE DISEASE OF ARTERY OF LOWER EXTREMITY (HCC): ICD-10-CM

## 2023-09-05 DIAGNOSIS — I65.23 BILATERAL CAROTID ARTERY STENOSIS: ICD-10-CM

## 2023-09-05 DIAGNOSIS — I73.9 CLAUDICATION (HCC): ICD-10-CM

## 2023-09-05 DIAGNOSIS — R07.9 CHEST PAIN, UNSPECIFIED TYPE: ICD-10-CM

## 2023-09-05 DIAGNOSIS — I25.119 CORONARY ARTERY DISEASE INVOLVING NATIVE CORONARY ARTERY OF NATIVE HEART WITH ANGINA PECTORIS (HCC): Primary | ICD-10-CM

## 2023-09-05 DIAGNOSIS — I47.29 NSVT (NONSUSTAINED VENTRICULAR TACHYCARDIA) (HCC): ICD-10-CM

## 2023-09-05 DIAGNOSIS — E78.5 DYSLIPIDEMIA: ICD-10-CM

## 2023-09-05 PROCEDURE — 1123F ACP DISCUSS/DSCN MKR DOCD: CPT | Performed by: INTERNAL MEDICINE

## 2023-09-05 PROCEDURE — 99214 OFFICE O/P EST MOD 30 MIN: CPT | Performed by: INTERNAL MEDICINE

## 2023-09-05 PROCEDURE — 3074F SYST BP LT 130 MM HG: CPT | Performed by: INTERNAL MEDICINE

## 2023-09-05 PROCEDURE — 3078F DIAST BP <80 MM HG: CPT | Performed by: INTERNAL MEDICINE

## 2023-09-05 ASSESSMENT — ENCOUNTER SYMPTOMS
STRIDOR: 0
NAUSEA: 0
GASTROINTESTINAL NEGATIVE: 1
CHEST TIGHTNESS: 1
EYES NEGATIVE: 1
COUGH: 0
WHEEZING: 0
BLOOD IN STOOL: 0
SHORTNESS OF BREATH: 0

## 2023-09-05 NOTE — PROGRESS NOTES
09:51 AM    LABGLOM >60.0 08/07/2023 08:40 AM    GLUCOSE 112 08/07/2023 08:40 AM    GLUCOSE 111 11/08/2011 10:48 AM    PROT 7.7 04/13/2023 10:15 AM    LABALBU 4.3 04/13/2023 10:15 AM    LABALBU 5.0 11/04/2011 02:55 PM    CALCIUM 9.1 08/07/2023 08:40 AM    BILITOT 0.9 04/13/2023 10:15 AM    ALKPHOS 74 04/13/2023 10:15 AM    AST 24 04/13/2023 10:15 AM    ALT 18 04/13/2023 10:15 AM     BMP:    Lab Results   Component Value Date/Time     08/07/2023 08:40 AM    K 4.9 08/07/2023 08:40 AM     08/07/2023 08:40 AM    CO2 26 08/07/2023 08:40 AM    BUN 22 08/07/2023 08:40 AM    LABALBU 4.3 04/13/2023 10:15 AM    LABALBU 5.0 11/04/2011 02:55 PM    CREATININE 1.14 08/07/2023 08:40 AM    CALCIUM 9.1 08/07/2023 08:40 AM    GFRAA >60 03/05/2022 09:51 AM    LABGLOM >60.0 08/07/2023 08:40 AM    GLUCOSE 112 08/07/2023 08:40 AM    GLUCOSE 111 11/08/2011 10:48 AM     Magnesium:    Lab Results   Component Value Date/Time    MG 2.0 03/05/2022 08:30 AM    MG 2.5 11/04/2011 02:55 PM     TSH:  Lab Results   Component Value Date    TSH 2.890 09/20/2021       Patient Active Problem List   Diagnosis    Peripheral vascular disease, unspecified ([address])    Essential hypertension    Occlusive disease of artery of lower extremity ([address])    Gout    Angina, class III ([address])    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, 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 ([address])    Stenosis of left carotid artery    Dyslipidemia    Angina of effort (HCC)    NSVT (nonsustained ventricular tachycardia) ([address])    Coronary artery disease involving native coronary artery of native heart with angina pectoris ([address])    Other hyperlipidemia    Coronary artery disease involving native coronary artery of native heart without angina pectoris    Pain in right knee    Unilateral primary osteoarthritis, right knee    Unilateral primary osteoarthritis, right knee    Anemia of unknown etiology    Bradycardia    Right flank pain    Syncope and collapse    Carotid stenosis, left    Chest pain    Abnormal

## 2023-10-10 ENCOUNTER — APPOINTMENT (OUTPATIENT)
Dept: CT IMAGING | Age: 78
DRG: 066 | End: 2023-10-10
Payer: MEDICARE

## 2023-10-10 ENCOUNTER — HOSPITAL ENCOUNTER (INPATIENT)
Age: 78
LOS: 2 days | Discharge: HOME | DRG: 066 | End: 2023-10-12
Attending: EMERGENCY MEDICINE | Admitting: INTERNAL MEDICINE
Payer: MEDICARE

## 2023-10-10 DIAGNOSIS — R29.90 STROKE-LIKE SYMPTOMS: Primary | ICD-10-CM

## 2023-10-10 DIAGNOSIS — I63.9 ACUTE CVA (CEREBROVASCULAR ACCIDENT) (HCC): ICD-10-CM

## 2023-10-10 LAB
ALBUMIN SERPL-MCNC: 4.5 G/DL (ref 3.5–4.6)
ALP SERPL-CCNC: 81 U/L (ref 35–104)
ALT SERPL-CCNC: 12 U/L (ref 0–41)
ANION GAP SERPL CALCULATED.3IONS-SCNC: 11 MEQ/L (ref 9–15)
APTT PPP: 36.7 SEC (ref 24.4–36.8)
AST SERPL-CCNC: 24 U/L (ref 0–40)
BASOPHILS # BLD: 0.1 K/UL (ref 0–0.2)
BASOPHILS NFR BLD: 0.7 %
BILIRUB SERPL-MCNC: 0.7 MG/DL (ref 0.2–0.7)
BUN SERPL-MCNC: 28 MG/DL (ref 8–23)
CALCIUM SERPL-MCNC: 9.3 MG/DL (ref 8.5–9.9)
CHLORIDE SERPL-SCNC: 103 MEQ/L (ref 95–107)
CO2 SERPL-SCNC: 23 MEQ/L (ref 20–31)
CREAT SERPL-MCNC: 1.18 MG/DL (ref 0.7–1.2)
EOSINOPHIL # BLD: 0.1 K/UL (ref 0–0.7)
EOSINOPHIL NFR BLD: 1 %
ERYTHROCYTE [DISTWIDTH] IN BLOOD BY AUTOMATED COUNT: 18.1 % (ref 11.5–14.5)
GLOBULIN SER CALC-MCNC: 3 G/DL (ref 2.3–3.5)
GLUCOSE BLD-MCNC: 95 MG/DL (ref 70–99)
GLUCOSE SERPL-MCNC: 93 MG/DL (ref 70–99)
HCT VFR BLD AUTO: 33.5 % (ref 42–52)
HGB BLD-MCNC: 10.5 G/DL (ref 14–18)
INR PPP: 1.1
LYMPHOCYTES # BLD: 1.3 K/UL (ref 1–4.8)
LYMPHOCYTES NFR BLD: 18.4 %
MAGNESIUM SERPL-MCNC: 2.1 MG/DL (ref 1.7–2.4)
MCH RBC QN AUTO: 28.2 PG (ref 27–31.3)
MCHC RBC AUTO-ENTMCNC: 31.3 % (ref 33–37)
MCV RBC AUTO: 90.1 FL (ref 79–92.2)
MONOCYTES # BLD: 0.4 K/UL (ref 0.2–0.8)
MONOCYTES NFR BLD: 5.3 %
NEUTROPHILS # BLD: 5 K/UL (ref 1.4–6.5)
NEUTS SEG NFR BLD: 74 %
PERFORMED ON: NORMAL
PLATELET # BLD AUTO: 178 K/UL (ref 130–400)
POTASSIUM SERPL-SCNC: 4.8 MEQ/L (ref 3.4–4.9)
PROT SERPL-MCNC: 7.5 G/DL (ref 6.3–8)
PROTHROMBIN TIME: 14.4 SEC (ref 12.3–14.9)
RBC # BLD AUTO: 3.72 M/UL (ref 4.7–6.1)
SODIUM SERPL-SCNC: 137 MEQ/L (ref 135–144)
TROPONIN, HIGH SENSITIVITY: 19 NG/L (ref 0–19)
TROPONIN, HIGH SENSITIVITY: 21 NG/L (ref 0–19)
WBC # BLD AUTO: 7.1 K/UL (ref 4.8–10.8)

## 2023-10-10 PROCEDURE — 85730 THROMBOPLASTIN TIME PARTIAL: CPT

## 2023-10-10 PROCEDURE — 1210000000 HC MED SURG R&B

## 2023-10-10 PROCEDURE — 36415 COLL VENOUS BLD VENIPUNCTURE: CPT

## 2023-10-10 PROCEDURE — 85025 COMPLETE CBC W/AUTO DIFF WBC: CPT

## 2023-10-10 PROCEDURE — 70450 CT HEAD/BRAIN W/O DYE: CPT

## 2023-10-10 PROCEDURE — 93005 ELECTROCARDIOGRAM TRACING: CPT | Performed by: PHYSICIAN ASSISTANT

## 2023-10-10 PROCEDURE — 84484 ASSAY OF TROPONIN QUANT: CPT

## 2023-10-10 PROCEDURE — 80053 COMPREHEN METABOLIC PANEL: CPT

## 2023-10-10 PROCEDURE — 99285 EMERGENCY DEPT VISIT HI MDM: CPT

## 2023-10-10 PROCEDURE — 83735 ASSAY OF MAGNESIUM: CPT

## 2023-10-10 PROCEDURE — 85610 PROTHROMBIN TIME: CPT

## 2023-10-10 ASSESSMENT — PATIENT HEALTH QUESTIONNAIRE - PHQ9
SUM OF ALL RESPONSES TO PHQ QUESTIONS 1-9: 0
1. LITTLE INTEREST OR PLEASURE IN DOING THINGS: 0
SUM OF ALL RESPONSES TO PHQ QUESTIONS 1-9: 0
SUM OF ALL RESPONSES TO PHQ QUESTIONS 1-9: 0
2. FEELING DOWN, DEPRESSED OR HOPELESS: 0
SUM OF ALL RESPONSES TO PHQ9 QUESTIONS 1 & 2: 0
SUM OF ALL RESPONSES TO PHQ QUESTIONS 1-9: 0

## 2023-10-10 ASSESSMENT — ENCOUNTER SYMPTOMS
VOMITING: 0
SHORTNESS OF BREATH: 0
ABDOMINAL PAIN: 0

## 2023-10-10 ASSESSMENT — PAIN - FUNCTIONAL ASSESSMENT: PAIN_FUNCTIONAL_ASSESSMENT: NONE - DENIES PAIN

## 2023-10-10 NOTE — ED PROVIDER NOTES
[facility] ED  EMERGENCY DEPARTMENT ENCOUNTER      Pt Name: Cristy Yan  MRN: [id number]  [address] 1945  Date of evaluation: 10/10/2023  Provider: Alexander Christianson MD    CHIEF COMPLAINT       Chief Complaint   Patient presents with    Cerebrovascular Accident     Starting Friday when was seen at PCP   Pt take asa and plavix         HISTORY OF PRESENT ILLNESS   (Location/Symptom, Timing/Onset, Context/Setting, Quality, Duration, Modifying Factors, Severity)  Note limiting factors. Cristy Yan is a 77 y.o. male who presents to the emergency department for evaluation of stroke symptoms. He was reportedly seen at his PCP with reported difficulty with speech, slurred speech, stuttering speech, difficulty getting words out, the son feels like his speech has worsened about 50% since then. On my evaluation the patient has right facial droop the son says he did notice that initially. The patient is able to answer questions, denies any falls, headache or vision change, difficulty swallowing, chest pain or difficulty breathing, dizziness, numbness focal weakness to his extremities. Denies trauma. HPI  Chart review notes history of anemia, CAD status post PCI on aspirin, PAD status post carotid endarterectomy on Eliquis, diabetes, hypertension, hyperlipidemia, gout, Lasix use  Nursing Notes were reviewed. REVIEW OF SYSTEMS    (2-9 systems for level 4, 10 or more for level 5)     Review of Systems   Constitutional:  Negative for fever. HENT:  Negative for drooling. Eyes:  Negative for visual disturbance. Respiratory:  Negative for shortness of breath. Cardiovascular:  Negative for chest pain. Gastrointestinal:  Negative for abdominal pain and vomiting. Neurological:  Positive for facial asymmetry and speech difficulty. Negative for dizziness and syncope. Psychiatric/Behavioral:          Son reports he seems forgetful   All other systems reviewed and are negative.       Except as noted

## 2023-10-10 NOTE — ED NOTES
Per son, pt is to take eliquis twice a day but is only taking it once a day     Jennifer Carvalho RN  10/10/23 9782

## 2023-10-10 NOTE — ED PROVIDER NOTES
Basic Information   Time Seen: 6:15 PM   Primary Care Provider: Kanu Castillo MD     Chief Complaint   Patient presents with    Cerebrovascular Accident     Starting Friday when was seen at PCP   Pt take asa and plavix      HPI   Christeen Severin is a 77 yrs male who presents with pain/dysarthria. Patient was seen by his family doctor for the same complaints on Friday and had a CT scan ordered for Thursday however symptoms seem to be worsening over the past 2 days per family number   Physical Exam     BP      Temp      Pulse     Resp      SpO2         General: Awake and Alert, no acute distress   CV: RRR, S1, S2   Resp: LCTAB, even and non labored   Other: Dysarthria    Impression and Plan     Labs Reviewed   CBC WITH AUTO DIFFERENTIAL   COMPREHENSIVE METABOLIC PANEL   MAGNESIUM   PROTIME-INR   TROPONIN   TROPONIN   APTT        CT HEAD WO CONTRAST    (Results Pending)      Final Impression   I have performed a medical screening exam on Christeen Severin.  Based on this patient's chief complaint/symptoms of   Chief Complaint   Patient presents with    Cerebrovascular Accident     Starting Friday when was seen at PCP   Pt take asa and plavix    and my focused exam, their care will be started and transitioned to provider when room is available        Deonte Bird PA-C  10/10/23 1816  Supervising: Bridgette Valentine PA-C  10/10/23 1816

## 2023-10-10 NOTE — ED TRIAGE NOTES
Pt went to PCP on Friday and states that is when it was noticed  Pt son states that a CT was ordered for Thursday   Pt has altered speech and gait at this time   Pt is alert and oriented times 4

## 2023-10-11 ENCOUNTER — APPOINTMENT (OUTPATIENT)
Dept: MRI IMAGING | Age: 78
DRG: 066 | End: 2023-10-11
Payer: MEDICARE

## 2023-10-11 ENCOUNTER — APPOINTMENT (OUTPATIENT)
Age: 78
DRG: 066 | End: 2023-10-11
Attending: NURSE PRACTITIONER
Payer: MEDICARE

## 2023-10-11 PROBLEM — R29.90 STROKE-LIKE SYMPTOMS: Status: ACTIVE | Noted: 2023-10-11

## 2023-10-11 LAB
CHOLEST SERPL-MCNC: 158 MG/DL (ref 0–199)
ECHO AO ROOT DIAM: 3.3 CM
ECHO AO ROOT INDEX: 1.57 CM/M2
ECHO AV AREA PEAK VELOCITY: 1.9 CM2
ECHO AV AREA PLAN/BSA: 1.24 CM2/M2
ECHO AV AREA PLAN: 2.6 CM2
ECHO AV AREA VTI: 2 CM2
ECHO AV AREA/BSA PEAK VELOCITY: 0.9 CM2/M2
ECHO AV AREA/BSA VTI: 1 CM2/M2
ECHO AV CUSP MM: 1.1 CM
ECHO AV MEAN GRADIENT: 6 MMHG
ECHO AV MEAN VELOCITY: 1.1 M/S
ECHO AV PEAK GRADIENT: 12 MMHG
ECHO AV PEAK VELOCITY: 1.8 M/S
ECHO AV VELOCITY RATIO: 0.56
ECHO AV VTI: 46.4 CM
ECHO BSA: 2.12 M2
ECHO LA DIAMETER INDEX: 1.71 CM/M2
ECHO LA DIAMETER: 3.6 CM
ECHO LA TO AORTIC ROOT RATIO: 1.09
ECHO LA VOL 2C: 86 ML (ref 18–58)
ECHO LA VOL 2C: 95 ML (ref 18–58)
ECHO LV E' LATERAL VELOCITY: 9 CM/S
ECHO LV E' SEPTAL VELOCITY: 8 CM/S
ECHO LV EDV A2C: 83 ML
ECHO LV EDV A4C: 101 ML
ECHO LV EDV BP: 93 ML (ref 67–155)
ECHO LV EDV INDEX A4C: 48 ML/M2
ECHO LV EDV INDEX BP: 44 ML/M2
ECHO LV EDV NDEX A2C: 40 ML/M2
ECHO LV EJECTION FRACTION A2C: 61 %
ECHO LV EJECTION FRACTION A4C: 68 %
ECHO LV EJECTION FRACTION BIPLANE: 65 % (ref 55–100)
ECHO LV ESV A2C: 32 ML
ECHO LV ESV A4C: 33 ML
ECHO LV ESV BP: 33 ML (ref 22–58)
ECHO LV ESV INDEX A2C: 15 ML/M2
ECHO LV ESV INDEX A4C: 16 ML/M2
ECHO LV ESV INDEX BP: 16 ML/M2
ECHO LV FRACTIONAL SHORTENING: 31 % (ref 28–44)
ECHO LV INTERNAL DIMENSION DIASTOLE INDEX: 2 CM/M2
ECHO LV INTERNAL DIMENSION DIASTOLIC: 4.2 CM (ref 4.2–5.9)
ECHO LV INTERNAL DIMENSION SYSTOLIC INDEX: 1.38 CM/M2
ECHO LV INTERNAL DIMENSION SYSTOLIC: 2.9 CM
ECHO LV IVSD: 1.3 CM (ref 0.6–1)
ECHO LV IVSS: 1.7 CM
ECHO LV MASS 2D: 167.4 G (ref 88–224)
ECHO LV MASS INDEX 2D: 79.7 G/M2 (ref 49–115)
ECHO LV POSTERIOR WALL DIASTOLIC: 1 CM (ref 0.6–1)
ECHO LV POSTERIOR WALL SYSTOLIC: 1.7 CM
ECHO LV RELATIVE WALL THICKNESS RATIO: 0.48
ECHO LVOT AREA: 3.5 CM2
ECHO LVOT AV VTI INDEX: 0.59
ECHO LVOT DIAM: 2.1 CM
ECHO LVOT MEAN GRADIENT: 2 MMHG
ECHO LVOT PEAK GRADIENT: 3 MMHG
ECHO LVOT PEAK VELOCITY: 1 M/S
ECHO LVOT STROKE VOLUME INDEX: 45 ML/M2
ECHO LVOT SV: 94.5 ML
ECHO LVOT VTI: 27.3 CM
ECHO MV A VELOCITY: 0.96 M/S
ECHO MV E DECELERATION TIME (DT): 233.3 MS
ECHO MV E VELOCITY: 0.84 M/S
ECHO MV E/A RATIO: 0.88
ECHO MV E/E' LATERAL: 9.33
ECHO MV E/E' RATIO (AVERAGED): 9.92
ECHO MV E/E' SEPTAL: 10.5
ECHO PV MAX VELOCITY: 1 M/S
ECHO PV PEAK GRADIENT: 4 MMHG
ECHO RV INTERNAL DIMENSION: 2.7 CM
ECHO RV TAPSE: 3.1 CM (ref 1.7–?)
ECHO TV REGURGITANT MAX VELOCITY: 3.02 M/S
ECHO TV REGURGITANT PEAK GRADIENT: 36 MMHG
ERYTHROCYTE [DISTWIDTH] IN BLOOD BY AUTOMATED COUNT: 17.9 % (ref 11.5–14.5)
GLUCOSE BLD-MCNC: 103 MG/DL (ref 70–99)
GLUCOSE BLD-MCNC: 121 MG/DL (ref 70–99)
GLUCOSE BLD-MCNC: 128 MG/DL (ref 70–99)
GLUCOSE BLD-MCNC: 174 MG/DL (ref 70–99)
GLUCOSE BLD-MCNC: 88 MG/DL (ref 70–99)
HBA1C MFR BLD: 5.1 % (ref 4.8–5.9)
HCT VFR BLD AUTO: 31 % (ref 42–52)
HDLC SERPL-MCNC: 67 MG/DL (ref 40–59)
HGB BLD-MCNC: 9.6 G/DL (ref 14–18)
LDLC SERPL CALC-MCNC: 71 MG/DL (ref 0–129)
MCH RBC QN AUTO: 27.9 PG (ref 27–31.3)
MCHC RBC AUTO-ENTMCNC: 31 % (ref 33–37)
MCV RBC AUTO: 90.1 FL (ref 79–92.2)
PERFORMED ON: ABNORMAL
PERFORMED ON: NORMAL
PLATELET # BLD AUTO: 134 K/UL (ref 130–400)
RBC # BLD AUTO: 3.44 M/UL (ref 4.7–6.1)
TRIGL SERPL-MCNC: 100 MG/DL (ref 0–150)
WBC # BLD AUTO: 5.8 K/UL (ref 4.8–10.8)

## 2023-10-11 PROCEDURE — 70544 MR ANGIOGRAPHY HEAD W/O DYE: CPT

## 2023-10-11 PROCEDURE — 70547 MR ANGIOGRAPHY NECK W/O DYE: CPT

## 2023-10-11 PROCEDURE — 97166 OT EVAL MOD COMPLEX 45 MIN: CPT

## 2023-10-11 PROCEDURE — 93306 TTE W/DOPPLER COMPLETE: CPT

## 2023-10-11 PROCEDURE — APPSS45 APP SPLIT SHARED TIME 31-45 MINUTES: Performed by: NURSE PRACTITIONER

## 2023-10-11 PROCEDURE — 83036 HEMOGLOBIN GLYCOSYLATED A1C: CPT

## 2023-10-11 PROCEDURE — 99223 1ST HOSP IP/OBS HIGH 75: CPT | Performed by: PSYCHIATRY & NEUROLOGY

## 2023-10-11 PROCEDURE — 36415 COLL VENOUS BLD VENIPUNCTURE: CPT

## 2023-10-11 PROCEDURE — 97161 PT EVAL LOW COMPLEX 20 MIN: CPT

## 2023-10-11 PROCEDURE — 6370000000 HC RX 637 (ALT 250 FOR IP): Performed by: NURSE PRACTITIONER

## 2023-10-11 PROCEDURE — 2580000003 HC RX 258: Performed by: NURSE PRACTITIONER

## 2023-10-11 PROCEDURE — 92523 SPEECH SOUND LANG COMPREHEN: CPT

## 2023-10-11 PROCEDURE — 70551 MRI BRAIN STEM W/O DYE: CPT

## 2023-10-11 PROCEDURE — 80061 LIPID PANEL: CPT

## 2023-10-11 PROCEDURE — 92610 EVALUATE SWALLOWING FUNCTION: CPT

## 2023-10-11 PROCEDURE — 85027 COMPLETE CBC AUTOMATED: CPT

## 2023-10-11 PROCEDURE — 1210000000 HC MED SURG R&B

## 2023-10-11 RX ORDER — LABETALOL HYDROCHLORIDE 5 MG/ML
10 INJECTION, SOLUTION INTRAVENOUS EVERY 10 MIN PRN
Status: DISCONTINUED | OUTPATIENT
Start: 2023-10-11 | End: 2023-10-12 | Stop reason: HOSPADM

## 2023-10-11 RX ORDER — ONDANSETRON 2 MG/ML
4 INJECTION INTRAMUSCULAR; INTRAVENOUS EVERY 6 HOURS PRN
Status: DISCONTINUED | OUTPATIENT
Start: 2023-10-11 | End: 2023-10-12 | Stop reason: HOSPADM

## 2023-10-11 RX ORDER — ONDANSETRON 4 MG/1
4 TABLET, ORALLY DISINTEGRATING ORAL EVERY 8 HOURS PRN
Status: DISCONTINUED | OUTPATIENT
Start: 2023-10-11 | End: 2023-10-12 | Stop reason: HOSPADM

## 2023-10-11 RX ORDER — BUPROPION HYDROCHLORIDE 150 MG/1
1 TABLET ORAL DAILY
COMMUNITY
Start: 2023-10-07

## 2023-10-11 RX ORDER — INSULIN LISPRO 100 [IU]/ML
0-4 INJECTION, SOLUTION INTRAVENOUS; SUBCUTANEOUS
Status: DISCONTINUED | OUTPATIENT
Start: 2023-10-11 | End: 2023-10-12 | Stop reason: HOSPADM

## 2023-10-11 RX ORDER — ASPIRIN 81 MG/1
81 TABLET, CHEWABLE ORAL DAILY
Status: DISCONTINUED | OUTPATIENT
Start: 2023-10-11 | End: 2023-10-12 | Stop reason: HOSPADM

## 2023-10-11 RX ORDER — GLUCAGON 1 MG/ML
1 KIT INJECTION PRN
Status: DISCONTINUED | OUTPATIENT
Start: 2023-10-11 | End: 2023-10-12 | Stop reason: HOSPADM

## 2023-10-11 RX ORDER — POLYETHYLENE GLYCOL 3350 17 G/17G
17 POWDER, FOR SOLUTION ORAL DAILY PRN
Status: DISCONTINUED | OUTPATIENT
Start: 2023-10-11 | End: 2023-10-12 | Stop reason: HOSPADM

## 2023-10-11 RX ORDER — ASPIRIN 300 MG/1
300 SUPPOSITORY RECTAL DAILY
Status: DISCONTINUED | OUTPATIENT
Start: 2023-10-11 | End: 2023-10-12 | Stop reason: HOSPADM

## 2023-10-11 RX ORDER — SILDENAFIL 100 MG/1
100 TABLET, FILM COATED ORAL DAILY PRN
COMMUNITY
Start: 2023-09-21

## 2023-10-11 RX ORDER — PIOGLITAZONEHYDROCHLORIDE 30 MG/1
30 TABLET ORAL DAILY
COMMUNITY

## 2023-10-11 RX ORDER — SODIUM CHLORIDE 9 MG/ML
INJECTION, SOLUTION INTRAVENOUS PRN
Status: DISCONTINUED | OUTPATIENT
Start: 2023-10-11 | End: 2023-10-12 | Stop reason: HOSPADM

## 2023-10-11 RX ORDER — ATORVASTATIN CALCIUM 80 MG/1
80 TABLET, FILM COATED ORAL NIGHTLY
Status: DISCONTINUED | OUTPATIENT
Start: 2023-10-11 | End: 2023-10-12 | Stop reason: HOSPADM

## 2023-10-11 RX ORDER — DEXTROSE MONOHYDRATE 100 MG/ML
INJECTION, SOLUTION INTRAVENOUS CONTINUOUS PRN
Status: DISCONTINUED | OUTPATIENT
Start: 2023-10-11 | End: 2023-10-12 | Stop reason: HOSPADM

## 2023-10-11 RX ORDER — INSULIN LISPRO 100 [IU]/ML
0-4 INJECTION, SOLUTION INTRAVENOUS; SUBCUTANEOUS NIGHTLY
Status: DISCONTINUED | OUTPATIENT
Start: 2023-10-11 | End: 2023-10-12 | Stop reason: HOSPADM

## 2023-10-11 RX ORDER — SODIUM CHLORIDE 0.9 % (FLUSH) 0.9 %
5-40 SYRINGE (ML) INJECTION EVERY 12 HOURS SCHEDULED
Status: DISCONTINUED | OUTPATIENT
Start: 2023-10-11 | End: 2023-10-12 | Stop reason: HOSPADM

## 2023-10-11 RX ORDER — SODIUM CHLORIDE 0.9 % (FLUSH) 0.9 %
5-40 SYRINGE (ML) INJECTION PRN
Status: DISCONTINUED | OUTPATIENT
Start: 2023-10-11 | End: 2023-10-12 | Stop reason: HOSPADM

## 2023-10-11 RX ADMIN — ASPIRIN 81 MG 81 MG: 81 TABLET ORAL at 09:08

## 2023-10-11 RX ADMIN — SODIUM CHLORIDE, PRESERVATIVE FREE 10 ML: 5 INJECTION INTRAVENOUS at 21:25

## 2023-10-11 RX ADMIN — APIXABAN 5 MG: 5 TABLET, FILM COATED ORAL at 21:25

## 2023-10-11 RX ADMIN — ATORVASTATIN CALCIUM 80 MG: 80 TABLET, FILM COATED ORAL at 21:25

## 2023-10-11 RX ADMIN — APIXABAN 5 MG: 5 TABLET, FILM COATED ORAL at 09:08

## 2023-10-11 RX ADMIN — SODIUM CHLORIDE, PRESERVATIVE FREE 10 ML: 5 INJECTION INTRAVENOUS at 09:08

## 2023-10-11 ASSESSMENT — ENCOUNTER SYMPTOMS
ABDOMINAL PAIN: 0
NAUSEA: 0
COLOR CHANGE: 0
COUGH: 0
SHORTNESS OF BREATH: 0
VOMITING: 0
CHEST TIGHTNESS: 0
TROUBLE SWALLOWING: 0
WHEEZING: 0
ABDOMINAL DISTENTION: 0

## 2023-10-11 NOTE — H&P
confirmed emergency medical condition->Emergency Medical Condition (MA) What reading provider will be dictating this exam?->CRC FINDINGS: BRAIN/VENTRICLES: There is no acute intracranial hemorrhage, mass effect or midline shift. No abnormal extra-axial fluid collection. The gray-white differentiation is maintained without evidence of an acute infarct. There is no evidence of hydrocephalus. There are atherosclerotic calcifications of the intracranial vessels. There are nonspecific hypoattenuating foci in the subcortical and periventricular white matter that most likely represent chronic microangiopathic ischemic changes in a patient of this age. ORBITS: The visualized portion of the orbits demonstrate no acute abnormality. SINUSES: The visualized paranasal sinuses and mastoid air cells demonstrate no acute abnormality. SOFT TISSUES/SKULL:  No acute abnormality of the visualized skull or soft tissues. No acute intracranial abnormality.      Assessment      Hospital Problems             Last Modified POA    * (Principal) Acute CVA (cerebrovascular accident) ([address]) 10/10/2023 Yes     Plan     Acute CVA  Complaints of hard time finding words when talking  Positive for dysarthria - stuttering  CT head showed no acute intracranial abnormality  Admit to [facility] with telemetry  Consult neurology  For MRI of brain and MRA of head and neck  For TTE   Neurochecks every 4 hours x 6 occurrences  NPO for now - may check bedside swallow  Consult PT, OT, and ST  Stroke education  Check lipid panel  Currently on aspirin, Eliquis for PAD/PVD    Essential hypertension, hyperlipidemia, PVD, PAD s/p endarterectomy, CAD s/p stent placement  BP mildly elevated, latest /61  Denies chest pain, dizziness, headache  Check lipid panel  Hold BP medications for now  With PRN labetalol  Resume home medications: aspirin, Eliquis  Will resume home meds, as tolerated, when home med list is completed by nursing    DM type II  Accu-Cheks

## 2023-10-11 NOTE — CARE COORDINATION
Case Management Assessment  Initial Evaluation    Date/Time of Evaluation: 10/11/2023 10:57 AM  Assessment Completed by: Guero Guzman RN    If patient is discharged prior to next notation, then this note serves as note for discharge by case management. Patient Name: Koko Carvajal                   YOB: 1945  Diagnosis: Stroke-like symptoms [R29.90]  Acute CVA (cerebrovascular accident) [facility]) [I63.9]                   Date / Time: 10/10/2023  6:46 PM    Patient Admission Status: Inpatient   Readmission Risk (Low < 19, Mod (19-27), High > 27): No data recorded  Current PCP: Randi Gonzalez MD  PCP verified by CM? Yes    Chart Reviewed: Yes      History Provided by: Patient  Patient Orientation: Alert and Oriented, Person, Place, Situation, Self    Patient Cognition: Alert    Hospitalization in the last 30 days (Readmission):  No    If yes, Readmission Assessment in CM Navigator will be completed. Advance Directives:      Code Status: Full Code   Patient's Primary Decision Maker is: Named in Scanned ACP Document      Discharge Planning:    Patient lives with: Alone Type of Home: House  Primary Care Giver: Self  Patient Support Systems include: Family Members   Current Financial resources:    Current community resources:    Current services prior to admission: None            Current DME:              Type of Home Care services:  None    ADLS  Prior functional level: Independent in ADLs/IADLs  Current functional level: Independent in ADLs/IADLs    PT AM-PAC:   /24  OT AM-PAC: 18 /24    Family can provide assistance at DC: Yes  Would you like Case Management to discuss the discharge plan with any other family members/significant others, and if so, who?  Yes (PT GRANDSON OR GRANDDAUGHTER)  Plans to Return to Present Housing: Yes  Other Identified Issues/Barriers to RETURNING to current housing: NO  Potential Assistance needed at discharge: N/A            Potential DME:    Patient expects to

## 2023-10-11 NOTE — CONSULTS
[facility] Neurology Consult Note  Name: Jace Montero  Age: 77 y.o. Gender: male  CodeStatus: Full Code  Allergies: No Known Allergies    Chief Complaint:Cerebrovascular Accident (Starting Friday when was seen at PCP /Pt take asa and plavix)    Primary Care Provider: Jj Marin MD  InpatientTreatment Team: Treatment Team: Attending Provider: Tawanna Randhawa MD; Consulting Physician: Magnus Carpio MD; Consulting Physician: Tawanna Randhawa MD; Patient Care Tech: Pierce Bowen; Registered Nurse: Amira Oakley RN; Utilization Reviewer: Duran Walton RN  Admission Date: 10/10/2023      HPI   Consulting provider: Linda Jay for rule out stroke  Pt seen and examined for pudgy consult. Patient is 65-year-old -American male with past medical history of diabetes mellitus type 2, PAD post femoral-femoral bypass graft, neuropathy, hypertension, hyperlipidemia, gout, carotid artery stenosis that is post left carotid endarterectomy in 2020, CAD with multiple PCI, anemia presented to [facility] emergency room on 10/10/2023 for dysarthria, aphasia, right facial droop. Had gone to his primary care physician 4-5 days prior with same symptoms. Primary care provider had ordered an outpatient CAT scan to be done which patient had not had an opportunity to get yet. Patient is alert and oriented x3, no acute distress, cooperative. He reports his symptoms began suddenly while driving his car. Denies any recent or remote head trauma. No recent illness or COVID. Did not have a headache at the time. Reports he noticed that his speech was slurring and that he had difficulty getting words out. No vision changes, dysphagia, paresthesias, one-sided weakness. Denies previous history of TIA or CVA. Patient is on Eliquis and aspirin 81 mg daily for history of PAD and carotid endarterectomy. Patient was not taking Eliquis as prescribed and was only taken in the morning.     Vital signs on presentation 148/73,

## 2023-10-11 NOTE — PLAN OF CARE
See OT evaluation for all goals and OT POC.  Electronically signed by SUSHILA Leigh on 10/11/2023 at 11:00 AM

## 2023-10-11 NOTE — ED NOTES
Perfect Serve to [facility] Dr. Bacilio Rendon @2001     Miryam Wright  10/10/23 2002

## 2023-10-12 VITALS
DIASTOLIC BLOOD PRESSURE: 65 MMHG | RESPIRATION RATE: 16 BRPM | BODY MASS INDEX: 27.72 KG/M2 | HEIGHT: 71 IN | TEMPERATURE: 97.4 F | WEIGHT: 198 LBS | SYSTOLIC BLOOD PRESSURE: 130 MMHG | HEART RATE: 51 BPM | OXYGEN SATURATION: 100 %

## 2023-10-12 LAB
EKG ATRIAL RATE: 61 BPM
EKG P AXIS: 41 DEGREES
EKG P-R INTERVAL: 162 MS
EKG Q-T INTERVAL: 422 MS
EKG QRS DURATION: 90 MS
EKG QTC CALCULATION (BAZETT): 424 MS
EKG R AXIS: 13 DEGREES
EKG T AXIS: 17 DEGREES
EKG VENTRICULAR RATE: 61 BPM
GLUCOSE BLD-MCNC: 101 MG/DL (ref 70–99)
GLUCOSE BLD-MCNC: 99 MG/DL (ref 70–99)
PERFORMED ON: ABNORMAL
PERFORMED ON: NORMAL

## 2023-10-12 PROCEDURE — 6370000000 HC RX 637 (ALT 250 FOR IP): Performed by: NURSE PRACTITIONER

## 2023-10-12 PROCEDURE — 99232 SBSQ HOSP IP/OBS MODERATE 35: CPT | Performed by: PSYCHIATRY & NEUROLOGY

## 2023-10-12 PROCEDURE — APPSS15 APP SPLIT SHARED TIME 0-15 MINUTES: Performed by: NURSE PRACTITIONER

## 2023-10-12 PROCEDURE — 92526 ORAL FUNCTION THERAPY: CPT

## 2023-10-12 PROCEDURE — 92507 TX SP LANG VOICE COMM INDIV: CPT

## 2023-10-12 PROCEDURE — 93010 ELECTROCARDIOGRAM REPORT: CPT | Performed by: INTERNAL MEDICINE

## 2023-10-12 PROCEDURE — 6370000000 HC RX 637 (ALT 250 FOR IP): Performed by: INTERNAL MEDICINE

## 2023-10-12 RX ORDER — PIOGLITAZONEHYDROCHLORIDE 15 MG/1
30 TABLET ORAL DAILY
Status: DISCONTINUED | OUTPATIENT
Start: 2023-10-12 | End: 2023-10-12 | Stop reason: HOSPADM

## 2023-10-12 RX ORDER — LISINOPRIL 20 MG/1
20 TABLET ORAL DAILY
Qty: 30 TABLET | Refills: 3 | Status: SHIPPED | OUTPATIENT
Start: 2023-10-13

## 2023-10-12 RX ORDER — AMLODIPINE BESYLATE 5 MG/1
5 TABLET ORAL DAILY
Qty: 30 TABLET | Refills: 3 | Status: SHIPPED | OUTPATIENT
Start: 2023-10-13

## 2023-10-12 RX ORDER — ALLOPURINOL 100 MG/1
100 TABLET ORAL DAILY
Status: DISCONTINUED | OUTPATIENT
Start: 2023-10-12 | End: 2023-10-12 | Stop reason: HOSPADM

## 2023-10-12 RX ORDER — BUPROPION HYDROCHLORIDE 150 MG/1
150 TABLET ORAL DAILY
Status: DISCONTINUED | OUTPATIENT
Start: 2023-10-12 | End: 2023-10-12 | Stop reason: HOSPADM

## 2023-10-12 RX ORDER — AMLODIPINE BESYLATE 5 MG/1
5 TABLET ORAL DAILY
Status: DISCONTINUED | OUTPATIENT
Start: 2023-10-12 | End: 2023-10-12 | Stop reason: HOSPADM

## 2023-10-12 RX ORDER — ATORVASTATIN CALCIUM 80 MG/1
80 TABLET, FILM COATED ORAL NIGHTLY
Qty: 30 TABLET | Refills: 3 | Status: SHIPPED | OUTPATIENT
Start: 2023-10-12

## 2023-10-12 RX ORDER — ATENOLOL 50 MG/1
50 TABLET ORAL DAILY
Status: DISCONTINUED | OUTPATIENT
Start: 2023-10-12 | End: 2023-10-12

## 2023-10-12 RX ORDER — LISINOPRIL 20 MG/1
20 TABLET ORAL DAILY
Status: DISCONTINUED | OUTPATIENT
Start: 2023-10-12 | End: 2023-10-12 | Stop reason: HOSPADM

## 2023-10-12 RX ORDER — GABAPENTIN 300 MG/1
300 CAPSULE ORAL 2 TIMES DAILY
Status: DISCONTINUED | OUTPATIENT
Start: 2023-10-12 | End: 2023-10-12 | Stop reason: HOSPADM

## 2023-10-12 RX ADMIN — AMLODIPINE BESYLATE 5 MG: 5 TABLET ORAL at 11:22

## 2023-10-12 RX ADMIN — PIOGLITAZONE 30 MG: 15 TABLET ORAL at 11:22

## 2023-10-12 RX ADMIN — ALLOPURINOL 100 MG: 100 TABLET ORAL at 11:22

## 2023-10-12 RX ADMIN — ASPIRIN 81 MG 81 MG: 81 TABLET ORAL at 09:37

## 2023-10-12 RX ADMIN — LISINOPRIL 20 MG: 20 TABLET ORAL at 11:22

## 2023-10-12 RX ADMIN — APIXABAN 5 MG: 5 TABLET, FILM COATED ORAL at 09:38

## 2023-10-12 RX ADMIN — GABAPENTIN 300 MG: 300 CAPSULE ORAL at 11:22

## 2023-10-12 RX ADMIN — BUPROPION HYDROCHLORIDE 150 MG: 150 TABLET, EXTENDED RELEASE ORAL at 11:22

## 2023-10-12 ASSESSMENT — ENCOUNTER SYMPTOMS
COLOR CHANGE: 0
TROUBLE SWALLOWING: 0
NAUSEA: 0
WHEEZING: 0
COUGH: 0
VOMITING: 0
SHORTNESS OF BREATH: 0
CHEST TIGHTNESS: 0

## 2023-10-12 NOTE — PROGRESS NOTES
[facility]   [address]Department[address]  Speech Language Pathology  Initial Speech/Language/Cognitive Assessment    NAME:Armando High  : 1945 (1 y.o.)   [x]   confirmed    MRN: [id number]  [facility]  ADMISSION DATE: 10/10/2023  PATIENT DIAGNOSIS(ES): Stroke-like symptoms [R29.90]  Acute CVA (cerebrovascular accident) [facility]) [I63.9]  Chief Complaint   Patient presents with    Cerebrovascular Accident     Starting Friday when was seen at PCP   Pt take asa and plavix     Patient Active Problem List    Diagnosis Date Noted    Claudication ([address]) 2023    Abnormal ankle brachial index 2023    Angina of effort     Occlusive disease of artery of lower extremity ([address]) 10/02/2017    Acute CVA (cerebrovascular accident) ([address]) 10/10/2023    Epididymitis 2023    Chest pain 2022    Carotid stenosis, left 2020    Coronary artery disease involving native coronary artery of native heart with angina pectoris ([address]) 07/10/2020    Other hyperlipidemia 07/10/2020    Coronary artery disease involving native coronary artery of native heart without angina pectoris 07/10/2020    NSVT (nonsustained ventricular tachycardia) ([address]) 2019    Dyslipidemia 2019    Stenosis of left carotid artery 2019    Anemia of unknown etiology 2018    Bradycardia 2018    Right flank pain 2018    Syncope and collapse 2018    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
CLINICAL PHARMACY NOTE: MEDS TO BEDS    Total # of Prescriptions Filled: 3   The following medications were delivered to the patient:  Lisinopril 20 mg tab  Atorvastatin 80 mg tab  Amlodipine 5 mg tab    Additional Documentation:
Call from Cherylle Race on behalf of Dr Sandra López - Radiology -   MRI results: Acute to Subacute infarction in left basal gangalia/posterior limb of the left internal capsule. Small old infarct in the left occipital lobe. Minimal parenchymal volume loss. Mild chronic micro vascular disease. Unremarkable MRA of the head and neck.  Reported to Dr Ruth Mckeon
[facility] OCCUPATIONAL THERAPY EVALUATION - ACUTE     NAME: Gloria Espinoza  : 1945 (77 y.o.)  MRN: [id number]  CODE STATUS: Full Code  Room: G118E729-89    Date of Service: 10/11/2023    Patient Diagnosis(es): Stroke-like symptoms [R29.90]  Acute CVA (cerebrovascular accident) [facility]) [I63.9]   Patient Active Problem List    Diagnosis Date Noted    Claudication ([address]) 2023    Abnormal ankle brachial index 2023    Angina of effort     Occlusive disease of artery of lower extremity ([address]) 10/02/2017    Acute CVA (cerebrovascular accident) ([address]) 10/10/2023    Epididymitis 2023    Chest pain 2022    Carotid stenosis, left 2020    Coronary artery disease involving native coronary artery of native heart with angina pectoris ([address]) 07/10/2020    Other hyperlipidemia 07/10/2020    Coronary artery disease involving native coronary artery of native heart without angina pectoris 07/10/2020    NSVT (nonsustained ventricular tachycardia) ([address]) 2019    Dyslipidemia 2019    Stenosis of left carotid artery 2019    Anemia of unknown etiology 2018    Bradycardia 2018    Right flank pain 2018    Syncope and collapse 2018    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 10/2/2017    Hyperlipidemia     Hypertension     Neuropathy     Occlusive disease of artery of lower extremity ([address]) 10/2/2017    Type 2 diabetes mellitus without
[facility]  Facility/Department: [facility]  Speech Language Pathology   Treatment Note      Darrell Kramer  1945  [id number]  [x]   confirmed      Date: 10/12/2023    Stroke-like symptoms [R29.90]  Acute CVA (cerebrovascular accident) [facility]) [I63.9]    Restrictions/Precautions: Fall Risk, Up as Tolerated    Weight - Scale: 198 lb (89.8 kg)     ADULT DIET; Regular; Low Fat/Low Chol/High Fiber/2 gm Na    SpO2: 100 % (10/12/23 1315)  No active isolations      Subjective:  Alert and Cooperative      Pt seen eating lunch. D/c planned this date. Pt has OP speech therapy order. SLP provided encouragement to follow up. Pt verbalized understanding. Interventions used this date:  Expressive Language, Receptive Language, and Dysphagia Treatment      Objective/Assessment:  Patient progressing towards goals:  Short Term Goals  Time Frame for Short Term Goals: 2-3x week  Goal 1: Pt will describe an object by two attributes with 90% acc with mild cues to promote use of circumlocution strategy and help the patient express their basic personal, safety, and medical wants and needs. See below  Goal 2: Pt will provide two similarities and two differences for two given words/items with 90% accuracy with mild cues in order to help strengthen semantic organization, neuroplasticity, and the patient's ability to utilize circumlocution for expression of complex wants, needs, feelings, and ideas. 77% acc I  increasing to 83% acc with min cues  Goal 3: Pt will complete abstract convergent and divergent naming tasks with 90% accuracy with mild cues to promote semantic organization and the overall effectiveness and efficiency for expression of their wants, needs, feelings, and ideas.   Divergent: 60% acc I increasing to 80% acc with min cues  Goal 4: Pt will be educated on compensatory strategies for word finding deficits in 5/5 given opportunities to help the pt express his/her basic personal, safety, and medical needs
Physical Therapy Med Surg Initial Assessment  [facility]Department: [facility]  [facility]       NAME: Bridget House  : [birth year] (77 y.o.)  MRN: [id number]  CODE STATUS: Full Code    Date of Service: 10/11/2023    Patient Diagnosis(es): Stroke-like symptoms [R29.90]  Acute CVA (cerebrovascular accident) [facility]) [I63.9]   Chief Complaint   Patient presents with    Cerebrovascular Accident     Starting Friday when was seen at PCP   Pt take asa and plavix     Patient Active Problem List    Diagnosis Date Noted    Claudication ([address]) 2023    Abnormal ankle brachial index 2023    Angina of effort     Occlusive disease of artery of lower extremity ([address]) 10/02/2017    Acute CVA (cerebrovascular accident) ([address]) 10/10/2023    Epididymitis 2023    Chest pain 2022    Carotid stenosis, left 2020    Coronary artery disease involving native coronary artery of native heart with angina pectoris ([address]) 07/10/2020    Other hyperlipidemia 07/10/2020    Coronary artery disease involving native coronary artery of native heart without angina pectoris 07/10/2020    NSVT (nonsustained ventricular tachycardia) ([address]) 2019    Dyslipidemia 2019    Stenosis of left carotid artery 2019    Anemia of unknown etiology 2018    Bradycardia 2018    Right flank pain 2018    Syncope and collapse 2018    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
EVERY 8 HOURS AS NEEDED 7/8/22 10/11/23  Teresa Sánchez MD   gabapentin (NEURONTIN) 300 MG capsule Take 1 capsule by mouth 2 times daily. 8/7/20   Teresa Sánchez MD   hydrochlorothiazide (HYDRODIURIL) 25 MG tablet Take 1 tablet by mouth daily  10/11/23  Teresa Sánchez MD   atorvastatin (LIPITOR) 40 MG tablet Take 1 tablet by mouth daily 9/27/19 10/11/23  Susana Sabillon MD   ammonium lactate (LAC-HYDRIN) 12 % lotion Apply 1 applicator topically as needed for Dry Skin    Teresa Sánchez MD   tiZANidine (ZANAFLEX) 2 MG tablet Take 1 tablet by mouth every 6 hours as needed  10/11/23  Teresa Sánchez MD   ferrous sulfate 325 (65 FE) MG tablet Take 1 tablet by mouth daily 3/6/13   Teresa Sánchez MD   ULORIC 80 MG TABS Take 1 tablet by mouth daily 5/11/15 10/11/23  Teresa Sánchez MD       ALLERGIES   He has No Known Allergies. Medications removed from home list:   Alprazolam  Amlodipine-benazepril  Atorvastatin  Febuxostat  Hydrochlorothiazide    Medications added to home list:  Bupropion XL (Wellbutrin) 150 mg PO daily  Pioglitazone 30 mg PO daily  Sildenafil 100 mg PO PRN    Changes were made to the following PTA medications:  Ammonium lactate changed to PRN  Ferrous sulfate 325 mg changed from BID to daily    Other notes:  Patient's preferred pharmacy is the [facility] in [place]. Patient has been taking Eliquis once a day for the past month as he forgot the evening dose. Per patient, he has not been taking isosorbide.     Thank you,  Dayla Lesch, PharmD  PGY1 Pharmacy Resident  10/11/2023 11:57 AM
Recent Labs     10/10/23  1838   AST 24   ALT 12   BILITOT 0.7   ALKPHOS 81     Recent Labs     10/10/23  1838   INR 1.1     No results for input(s): \"CKTOTAL\", \"TROPONINI\" in the last 72 hours. Urinalysis:      Lab Results   Component Value Date/Time    NITRU POSITIVE 04/13/2023 10:15 AM    WBCUA  04/13/2023 10:15 AM    BACTERIA MANY 04/13/2023 10:15 AM    RBCUA 10-20 04/13/2023 10:15 AM    BLOODU trace 05/18/2023 10:18 AM    BLOODU SMALL 04/13/2023 10:15 AM    SPECGRAV 1.015 05/18/2023 10:18 AM    SPECGRAV 1.013 04/13/2023 10:15 AM    GLUCOSEU neg 05/18/2023 10:18 AM    GLUCOSEU Negative 04/13/2023 10:15 AM       Radiology:  CT HEAD WO CONTRAST   Final Result   No acute intracranial abnormality. MRI brain without contrast    (Results Pending)   MRA neck without contrast    (Results Pending)   MRA head without contrast    (Results Pending)     Assessment/Plan:    #DMII with neuropathy    - SSI    #PAD post fem bypass    - continue home meds    #TIA vs CVA    - neuro workup in process     - was on incorrect dosing of eliquis        Active Hospital Problems    Diagnosis Date Noted    Acute CVA (cerebrovascular accident) ([address]) [I63.9] 10/10/2023        Additional work up or/and treatment plan may be added today or then after based on clinical progression. I am managing a portion of pt care. Some medical issues are handled by other specialists. Additional work up and treatment should be done in out pt setting by pt PCP and other out pt providers. In addition to examining and evaluating pt, I spent additional time explaining care, normal and abnormal findings, and treatment plan. All of pt questions were answered. Counseling, diet and education were  provided. Case will be discussed with nursing staff when appropriate. Family will be updated if and when appropriate. Diet: ADULT DIET;  Regular; Low Fat/Low Chol/High Fiber/2 gm Na    Code Status: Full Code    Electronically signed by Anthony Mclean
Outcome Severity Scale: Level 7: Normal in all situations     Recommendations  Requires SLP Intervention: Yes  D/C Recommendations: Ongoing speech therapy is recommended during this hospitalization  Diet Solids Recommendation: Regular  Liquid Consistency Recommendation: Thin  Compensatory Swallowing Strategies : Eat/Feed slowly; Alternate solids and liquids;Small bites/sips  Recommended Form of Meds: PO  Therapeutic Interventions: Diet tolerance monitoring  Duration of Treatment: 1week  Frequency of Treatment: 1x week    Prognosis  Speech Therapy Prognosis  Prognosis Considerations: Previous Level of Function; Family/Community Support    Education  Patient Education: Role of SLP, rationale of evaluation, results of evaluation, and plan of care. Patient Education Response: Verbalizes understanding  Individuals consulted  Consulted and agree with results and recommendations: Patient;RN;Family member  Family member consulted: [address] [facility] [address] daughter and grand son  RN Name: Rico Deleon    Treatment/Goals  Short-term Goals  Timeframe for Short-term Goals: 1x week  Goal 1: Pt will tolerate trials of regular solids and thin liquids with adequate mastication, timely A-P, with no evidence of pocketing or s/s of aspiration during therapeutic meal monitor. Long-term Goals  Timeframe for Long-term Goals: 1 week  Goal 1: Pt will tolerate recommended diet with no adverse outcomes. Safety Devices  Safety Devices  Safety Devices in place: Yes  Type of devices: Call light within reach    Pain Assessment  Patient does not c/o pain. Pain Re-assessment  Patient does not c/o pain.       Therapy Time  SLP Individual Minutes  Time In: 1010  Time Out: 6995  Minutes: 8              Signature: Electronically signed by NARESH Gutiérrez on 10/11/2023 at 10:42 AM
and am the sole provider of all clinical decisions on the neurological status of this patient. Noted above. Mostly it appears to be nonfocal and patient was only taking Eliquis once a day. We will see what his MRI shows and then conclude. Examination shows near normal findings. 60% time spent on evaluating patient this case was discussed with ER    10/12/2023:  MRI of the brain positive for left basal ganglia/posterior internal capsule ischemic infarct. MRA of the head and neck negative. Echocardiogram with bubble study negative. LDL 71  Hemoglobin A1c 5.1  Hypertensive at times  Patient has been resumed on appropriate dose of Eliquis 5 mg twice daily and will continue aspirin 81 mg daily. Will need ongoing attention to risk factor modification  Okay to DC from neurology standpoint with outpatient physical and speech therapy. Follow-up 6 weeks. I have personally performed a face to face diagnostic evaluation on this patient, reviewed all data and investigations, and am the sole provider of all clinical decisions on the neurological status of this patient. Seen and examined and MRI reportedly shows a left basal ganglia infarct. Recommended compliance and will continue the Eliquis 5 mg twice a day with aspirin for now. Outpatient therapy is recommended. 60% time spent on evaluating patient      Sid Hudson MD, [place], American Board of Psychiatry & Neurology  Board Certified in Vascular Neurology  Board Certified in Neuromuscular Medicine  Certified in Neurorehabilitation         Collaborating physicians: Dr Billy Hudson    Electronically signed by SARITA Sams CNP on 10/12/2023 at 12:40 PM

## 2023-10-12 NOTE — PLAN OF CARE
Problem: Safety - Adult  Goal: Free from fall injury  10/12/2023 1333 by Jeana Meek RN  Outcome: Adequate for Discharge  10/12/2023 1333 by Jeana Meek RN  Outcome: Adequate for Discharge     Problem: Chronic Conditions and Co-morbidities  Goal: Patient's chronic conditions and co-morbidity symptoms are monitored and maintained or improved  10/12/2023 1333 by Jeana Meek RN  Outcome: Adequate for Discharge  10/12/2023 1333 by Jeana Meek RN  Outcome: Adequate for Discharge     Problem: Neurosensory - Adult  Goal: Achieves stable or improved neurological status  10/12/2023 1333 by Jeana Meek RN  Outcome: Adequate for Discharge  10/12/2023 1333 by Jeana Meek RN  Outcome: Adequate for Discharge  Goal: Achieves maximal functionality and self care  10/12/2023 1333 by Jeana Meek RN  Outcome: Adequate for Discharge  10/12/2023 1333 by Jeana Meek RN  Outcome: Adequate for Discharge     Problem: Respiratory - Adult  Goal: Achieves optimal ventilation and oxygenation  10/12/2023 1333 by Jeana eMek RN  Outcome: Adequate for Discharge  10/12/2023 1333 by Jeana Meek RN  Outcome: Adequate for Discharge     Problem: Cardiovascular - Adult  Goal: Maintains optimal cardiac output and hemodynamic stability  10/12/2023 1333 by Jeana Meek RN  Outcome: Adequate for Discharge  10/12/2023 1333 by Jeana Meek RN  Outcome: Adequate for Discharge  Goal: Absence of cardiac dysrhythmias or at baseline  10/12/2023 1333 by Jeana Meek RN  Outcome: Adequate for Discharge  10/12/2023 1333 by Jeana Meek RN  Outcome: Adequate for Discharge     Problem: Skin/Tissue Integrity - Adult  Goal: Skin integrity remains intact  10/12/2023 1333 by Jeana Meek RN  Outcome: Adequate for Discharge  10/12/2023 1333 by Jeana Meek RN  Outcome: Adequate for Discharge  Goal:

## 2023-10-12 NOTE — CARE COORDINATION
DC plan remains home with outpatient script for SLP. Will need script for DC, message left for Dr. Franny Honeycutt.

## 2023-10-12 NOTE — FLOWSHEET NOTE
Discharge paperwork reviewed with patient.    [facility]

## 2023-10-12 NOTE — PLAN OF CARE
Problem: Safety - Adult  Goal: Free from fall injury  Outcome: Progressing     Problem: Chronic Conditions and Co-morbidities  Goal: Patient's chronic conditions and co-morbidity symptoms are monitored and maintained or improved  Outcome: Progressing     Problem: SLP Adult - Impaired Swallowing  Goal: By Discharge: Advance to least restrictive diet without signs or symptoms of aspiration for planned discharge setting. See evaluation for individualized goals. 10/11/2023 1043 by NARESH Ulrich  Outcome: Progressing     Problem: SLP Adult - Impaired Communication  Goal: By Discharge: Demonstrates communication skills at highest level of function for planned discharge setting. See evaluation for individualized goals. 10/11/2023 1043 by NARESH Ulrich  Outcome: Progressing     Problem: Occupational Therapy - Adult  Goal: By Discharge: Performs self-care activities at highest level of function for planned discharge setting. See evaluation for individualized goals.   10/11/2023 1100 by Jennifer Huang OTR/L  Outcome: Progressing     Problem: Neurosensory - Adult  Goal: Achieves stable or improved neurological status  Outcome: Progressing  Goal: Achieves maximal functionality and self care  Outcome: Progressing     Problem: Respiratory - Adult  Goal: Achieves optimal ventilation and oxygenation  Outcome: Progressing     Problem: Cardiovascular - Adult  Goal: Maintains optimal cardiac output and hemodynamic stability  Outcome: Progressing  Goal: Absence of cardiac dysrhythmias or at baseline  Outcome: Progressing     Problem: Skin/Tissue Integrity - Adult  Goal: Skin integrity remains intact  Outcome: Progressing  Goal: Incisions, wounds, or drain sites healing without S/S of infection  Outcome: Progressing  Goal: Oral mucous membranes remain intact  Outcome: Progressing     Problem: Musculoskeletal - Adult  Goal: Return mobility to safest level of function  Outcome: Progressing  Goal: Maintain proper

## 2023-10-19 ENCOUNTER — HOSPITAL ENCOUNTER (OUTPATIENT)
Dept: SPEECH THERAPY | Age: 78
Setting detail: THERAPIES SERIES
Discharge: HOME | End: 2023-10-19
Payer: MEDICARE

## 2023-10-19 PROCEDURE — 92523 SPEECH SOUND LANG COMPREHEN: CPT

## 2023-10-19 NOTE — PROGRESS NOTES
(needs further assessment)      Recommendations  Requires SLP Intervention: Yes  Patient Education: pt educated on results of testing  Patient Education Response: Verbalizes understanding  Duration of Treatment: 4-6 months  Frequency of Treatment: 2x/week    Prognosis  Speech Therapy Prognosis  Prognosis: Good  Prognosis Considerations: Age, Family/Community Support, Previous Level of Function    Education  Individuals consulted  Consulted and agree with results and recommendations: Patient    Treatment/Goals  Long-term Goals  Timeframe for Long-term Goals: 3-4 months  Goal 1: Pt will improve his Speech Intelligibility to conversation level for effective communication of wants, needs, feelings, ideas, and medical/safety information with familiar and unfamiliar listeners. Goal 2: Pt will improve his Expressive Language abilities to a supervision level for expression of complex wants, needs, feelings/ideas, and medical/safety information. Short-term Goals  Timeframe for Short-term Goals: 2-3 months  Goal 1: Pt will complete OM drills paired with speech sounds to improve speech intelligibility x10/each and expression of their complex thoughts, needs, feelings, and ideas. Goal 2: Pt will complete labial and lingual exercises 10x/each to promote strength and ROM for improved speech intelligibility for expression of complex thoughts, needs, feelings, and ideas. Goal 3: Pt will be educated on strategies to improve speech intelligibility (abdominally supported breathing, over-articulation, easy onset etc.), and utilize them at the phrase/sentence level with 80% intelligibility and min verbal cues from SLP so the patient has a better understanding of strategies that will enhance their ability to express their wants and needs.   Goal 4: Pt will complete convergent and divergent naming tasks with 80% accuracy with mild cues to promote semantic organization and the overall effectiveness and efficiency for expression of their

## 2023-10-24 ENCOUNTER — HOSPITAL ENCOUNTER (OUTPATIENT)
Dept: SPEECH THERAPY | Age: 78
Setting detail: THERAPIES SERIES
Discharge: HOME | End: 2023-10-24
Payer: MEDICARE

## 2023-10-24 PROCEDURE — 92507 TX SP LANG VOICE COMM INDIV: CPT

## 2023-10-24 PROCEDURE — 97130 THER IVNTJ EA ADDL 15 MIN: CPT

## 2023-10-24 PROCEDURE — 97129 THER IVNTJ 1ST 15 MIN: CPT

## 2023-10-24 NOTE — PROGRESS NOTES
[address] Outpatient  Speech Language Pathology  Adult Daily Note    Bridget House  : 1945  [x]   confirmed      Date: 10/24/2023    Visit Information:  Visit Information  SLP Insurance Information: [employer]  Total # of Visits Approved:  (medical necessity)  Total # of Visits to Date: 2  No Show: 0  Canceled Appointment: 0       Plan of care signed (Y/N):     Yes    Certification MSWTAV:-52/73/10  Plan of Care Visit #1      Interventions used this date:  Speech Production, Cognitive Skill Development, and Instruction in Compensatory Strategies    Subjective:    Pt arrived on time and was cooperative with all tasks. Behavior:  Alert, Cooperative, and Pleasant       Objective/Assessment:   Patient progressing towards goals:   Goal 1: Pt will complete OM drills paired with speech sounds to improve speech intelligibility x10/each and expression of their complex thoughts, needs, feelings, and ideas. Goal 2: Pt will complete labial and lingual exercises 10x/each to promote strength and ROM for improved speech intelligibility for expression of complex thoughts, needs, feelings, and ideas. Goal 3: Pt will be educated on strategies to improve speech intelligibility (abdominally supported breathing, over-articulation, easy onset etc.), and utilize them at the phrase/sentence level with 80% intelligibility and min verbal cues from SLP so the patient has a better understanding of strategies that will enhance their ability to express their wants and needs. SLP introduced patient to the strategies of easy onset, overt articulation and pacing for speech intelligibility. Pt verbalized understanding.      Utilizing strategies patient was 60% intelligible at short phrase level Independently and when given min verbal cues pt was 80% acc  Goal 4: Pt will complete convergent and divergent naming tasks with 80% accuracy with mild cues to promote semantic organization and the overall effectiveness

## 2023-10-26 ENCOUNTER — HOSPITAL ENCOUNTER (OUTPATIENT)
Dept: SPEECH THERAPY | Age: 78
Setting detail: THERAPIES SERIES
Discharge: HOME | End: 2023-10-26
Payer: MEDICARE

## 2023-10-26 PROCEDURE — 92507 TX SP LANG VOICE COMM INDIV: CPT

## 2023-10-26 PROCEDURE — 97129 THER IVNTJ 1ST 15 MIN: CPT

## 2023-10-26 NOTE — PROGRESS NOTES
[address] Outpatient  Speech Language Pathology  Adult Daily Note    Danny Sosa  : 1945  [x]   confirmed      Date: 10/26/2023    Visit Information:  Visit Information  SLP Insurance Information: Aetna Medicare  Total # of Visits Approved:  (medical necessity)  Total # of Visits to Date: 3  No Show: 0  Canceled Appointment: 0       Plan of care signed (Y/N):     Yes    Certification [id number]  Plan of Care Visit #2      Interventions used this date:  Speech Production, Cognitive Skill Development, and Instruction in Compensatory Strategies    Subjective:    Pt arrived on time and was cooperative with all tasks. Pt reported that yesterday he had a difficult time writing out his money orders. SLP said she would start doing problem solving writing tasks and see if an OT referral is needed. Behavior:  Alert, Cooperative, and Pleasant       Objective/Assessment:   Patient progressing towards goals:   Goal 1: Pt will complete OM drills paired with speech sounds to improve speech intelligibility x10/each and expression of their complex thoughts, needs, feelings, and ideas. Goal 2: Pt will complete labial and lingual exercises 10x/each to promote strength and ROM for improved speech intelligibility for expression of complex thoughts, needs, feelings, and ideas. SLP introduced lingual strength and ROM exercises and gave patient educational handout for HEP  Pullbacks x5  Tongue tip up x5  Lingual push x5   Protrusion x5   All with min verbal and visual cueing    Goal 3: Pt will be educated on strategies to improve speech intelligibility (abdominally supported breathing, over-articulation, easy onset etc.), and utilize them at the phrase/sentence level with 80% intelligibility and min verbal cues from SLP so the patient has a better understanding of strategies that will enhance their ability to express their wants and needs. Pt recalled strategy of slow rate.  SLP re-educated

## 2023-10-31 ENCOUNTER — HOSPITAL ENCOUNTER (OUTPATIENT)
Dept: SPEECH THERAPY | Age: 78
Setting detail: THERAPIES SERIES
Discharge: HOME | End: 2023-10-31
Payer: MEDICARE

## 2023-10-31 PROCEDURE — 92507 TX SP LANG VOICE COMM INDIV: CPT

## 2023-10-31 NOTE — PROGRESS NOTES
[address] Out[facility]  Speech Language Pathology  Adult Daily Note    Thai Walton  : 1945  [x]   confirmed      Date: 10/31/2023    Visit Information:  Visit Information  SLP Insurance Information: Aetna Medicare  Total # of Visits Approved:  (medical necessity)  Total # of Visits to Date: 4  No Show: 0  Canceled Appointment: 0       Plan of care signed (Y/N):     Yes    Certification [email or web address]:/[id number]  Plan of Care Visit #3      Interventions used this date:  Speech Production, Cognitive Skill Development, and Instruction in Compensatory Strategies    Subjective:    Pt arrived on time and was cooperative with all tasks. Behavior:  Alert, Cooperative, and Pleasant       Objective/Assessment:   Patient progressing towards goals:   Goal 1: Pt will complete OM drills paired with speech sounds to improve speech intelligibility x10/each and expression of their complex thoughts, needs, feelings, and ideas. Goal 2: Pt will complete labial and lingual exercises 10x/each to promote strength and ROM for improved speech intelligibility for expression of complex thoughts, needs, feelings, and ideas. Pt reported that he was completing the lingual exercises for HEP  Goal 3: Pt will be educated on strategies to improve speech intelligibility (abdominally supported breathing, over-articulation, easy onset etc.), and utilize them at the phrase/sentence level with 80% intelligibility and min verbal cues from SLP so the patient has a better understanding of strategies that will enhance their ability to express their wants and needs. Not targeted  Goal 4: Pt will complete convergent and divergent naming tasks with 80% accuracy with mild cues to promote semantic organization and the overall effectiveness and efficiency for expression of their wants, needs, feelings, and ideas.   Pt completed concrete convergent naming with 80% acc Ind and increased to 100% acc min cues  Pt completed concrete divergent

## 2023-11-02 ENCOUNTER — HOSPITAL ENCOUNTER (OUTPATIENT)
Dept: SPEECH THERAPY | Age: 78
Setting detail: THERAPIES SERIES
Discharge: HOME | End: 2023-11-02

## 2023-11-02 NOTE — PROGRESS NOTES
Therapy                            Cancellation/No-show Note      Date:  2023  Patient Name:  [place] Worrell  :  1945   MRN:  [id number]      Visit Information:  Visit Information  SLP Insurance Information: Aetna Medicare  Total # of Visits Approved:  (medical necessity)  Total # of Visits to Date: 4  No Show: 0  Canceled Appointment: 1    For today's appointment patient:  Cancelled    Reason given by patient:  Patient ill    Follow-up needed:  Pt has future appointments scheduled, no follow up needed    Comments:       Signature: Electronically signed by NARESH Mariee on 23 at 9:01 AM EDT

## 2023-11-07 ENCOUNTER — HOSPITAL ENCOUNTER (OUTPATIENT)
Dept: SPEECH THERAPY | Age: 78
Setting detail: THERAPIES SERIES
Discharge: HOME | End: 2023-11-07
Payer: MEDICARE

## 2023-11-07 PROCEDURE — 92507 TX SP LANG VOICE COMM INDIV: CPT

## 2023-11-07 PROCEDURE — 97130 THER IVNTJ EA ADDL 15 MIN: CPT

## 2023-11-07 PROCEDURE — 97129 THER IVNTJ 1ST 15 MIN: CPT

## 2023-11-07 NOTE — PROGRESS NOTES
[address] Outpatient  Speech Language Pathology  Adult Daily Note    Aminata Vega  : 1945  [x]   confirmed      Date: 2023    Visit Information:  Visit Information  SLP Insurance Information: Aetna Medicare  Total # of Visits Approved:  (medical necessity)  Total # of Visits to Date: 5  No Show: 0  Canceled Appointment: 1       Plan of care signed (Y/N):     Yes    Certification [id number]:-  Plan of Care Visit #4      Interventions used this date:  Speech Production, Cognitive Skill Development, and Instruction in Compensatory Strategies    Subjective:    Pt arrived on time and was cooperative with all tasks. Behavior:  Alert, Cooperative, and Pleasant       Objective/Assessment:   Patient progressing towards goals:   Goal 1: Pt will complete OM drills paired with speech sounds to improve speech intelligibility x10/each and expression of their complex thoughts, needs, feelings, and ideas. Goal 2: Pt will complete labial and lingual exercises 10x/each to promote strength and ROM for improved speech intelligibility for expression of complex thoughts, needs, feelings, and ideas. Pt reported that he was completing the lingual exercises for HEP  Goal 3: Pt will be educated on strategies to improve speech intelligibility (abdominally supported breathing, over-articulation, easy onset etc.), and utilize them at the phrase/sentence level with 80% intelligibility and min verbal cues from SLP so the patient has a better understanding of strategies that will enhance their ability to express their wants and needs.   Pt reported doing his 5-7 word sentences  Pt explained process for how he does his money orders and had mild instances of repetition due to decreased intelligibility   Goal 4: Pt will complete convergent and divergent naming tasks with 80% accuracy with mild cues to promote semantic organization and the overall effectiveness and efficiency for expression of their wants,

## 2023-11-09 ENCOUNTER — HOSPITAL ENCOUNTER (OUTPATIENT)
Dept: SPEECH THERAPY | Age: 78
Setting detail: THERAPIES SERIES
Discharge: HOME | End: 2023-11-09
Payer: MEDICARE

## 2023-11-09 PROCEDURE — 92507 TX SP LANG VOICE COMM INDIV: CPT

## 2023-11-09 NOTE — PROGRESS NOTES
strategies that will enhance their ability to express their wants and needs. Not targeted  Goal 4: Pt will complete convergent and divergent naming tasks with 80% accuracy with mild cues to promote semantic organization and the overall effectiveness and efficiency for expression of their wants, needs, feelings, and ideas. Pt returned convergent naming homework and had 10/14 Ind and increased to 13/14 with min verbal cues in session. Goal 5: Pt will demonstrate reading comprehension at the complex sentence/paragraph level to 80% acc with mild cues to promote pt's ability to utilize reading/writing as a compensatory strategy for memory deficits and/or understand pertinent medical information and current events. Pt completed complex reading comprehension with 13/15 acc Ind  Goal 6: To address pt's cognitive deficits and promote recall of personal and medical information, pt will answer questions addressing delayed recall with 80% accuracy and min cues. Not targeted    Pain Assessment:  Initial Assessment:  Patient does not c/o pain. Re-assessment:  Patient does not c/o pain. Plan:  Continue with current goals    Patient/Caregiver Education:  Patient/Caregiver educated on session. Patient/Caregiver provided with home program: lingual OME, list of 5-7 and 8-9 word sentences,   Patient/Caregiver stated verbal understanding of directions.     Time in: 0830  Time out: 0915  Minutes seen: 39      Signature: Electronically signed by Selene Alfaro SLP on 11/9/2023 at 9:31 AM

## 2023-11-14 ENCOUNTER — HOSPITAL ENCOUNTER (OUTPATIENT)
Dept: SPEECH THERAPY | Age: 78
Setting detail: THERAPIES SERIES
Discharge: HOME | End: 2023-11-14
Payer: MEDICARE

## 2023-11-14 PROCEDURE — 97129 THER IVNTJ 1ST 15 MIN: CPT

## 2023-11-14 PROCEDURE — 92507 TX SP LANG VOICE COMM INDIV: CPT

## 2023-11-14 NOTE — PROGRESS NOTES
[address] Outpatient  Speech Language Pathology  Adult Daily Note    Thai Walton  : 1945  [x]   confirmed      Date: 2023    Visit Information:  Visit Information  SLP Insurance Information: Aetna Medicare  Total # of Visits Approved:  (medical necessity)  Total # of Visits to Date: 7  No Show: 0  Canceled Appointment: 1       Plan of care signed (Y/N):     Yes    Certification [id number]:-  Plan of Care Visit #6      Interventions used this date:  Speech Production, Cognitive Skill Development, and Instruction in Compensatory Strategies    Subjective:    Pt arrived on time and was cooperative with all tasks. Pt reports no new concerns. Pt reported that he went to visit family in [place] this past weekend. He reported that he was able to be understood during conversation. Behavior:  Alert, Cooperative, and Pleasant       Objective/Assessment:   Patient progressing towards goals:   Goal 1: Pt will complete OM drills paired with speech sounds to improve speech intelligibility x10/each and expression of their complex thoughts, needs, feelings, and ideas. Not targeted  Goal 2: Pt will complete labial and lingual exercises 10x/each to promote strength and ROM for improved speech intelligibility for expression of complex thoughts, needs, feelings, and ideas. Not targeted  Goal 3: Pt will be educated on strategies to improve speech intelligibility (abdominally supported breathing, over-articulation, easy onset etc.), and utilize them at the phrase/sentence level with 80% intelligibility and min verbal cues from SLP so the patient has a better understanding of strategies that will enhance their ability to express their wants and needs.   Pt produced 5-7 word sentences with 80% acc min verbal cues for over articulation and 8-9 word sentences with 80% acc min verbal cues for slow rate and pacing during the sentence  Goal 4: Pt will complete convergent and divergent naming

## 2023-11-16 ENCOUNTER — HOSPITAL ENCOUNTER (OUTPATIENT)
Dept: SPEECH THERAPY | Age: 78
Setting detail: THERAPIES SERIES
Discharge: HOME | End: 2023-11-16
Payer: MEDICARE

## 2023-11-16 PROCEDURE — 97129 THER IVNTJ 1ST 15 MIN: CPT

## 2023-11-16 PROCEDURE — 92507 TX SP LANG VOICE COMM INDIV: CPT

## 2023-11-16 PROCEDURE — 97130 THER IVNTJ EA ADDL 15 MIN: CPT

## 2023-11-16 NOTE — PROGRESS NOTES
[address] [facility]. [place], [address]               Phone: [phone]           Fax: [phone]                         [facility] Outpatient  Speech Language Pathology  Adult Progress Note                          Physician: SARITA Caruso-CNPFrom: Ganga Garza, SLP, MA,CCC-SLP   Patient: Aric Franks       : 1945  Diagnosis: Dysarthria, Aphasia, Cognitive impairment   Date: 2023  Treatment Diagnosis: ICD 10 R47.8, R47.01, R41.89  Date of Evaluation: 10/19/23      Plan of Care/Treatment to date: Speech Production Therapy, Expressive Language Therapy, Cognitive Skill Development, and Instruction in Compensatory Strategies    Date range from 10/19/23 to 23. Subjective: Pt has had good attendance and participation in therapy sessions. Pt reports completing HEP. Progress toward Short-Term Goals:   Goal 1: Pt will complete OM drills paired with speech sounds to improve speech intelligibility x10/each and expression of their complex thoughts, needs, feelings, and ideas. Progress Made  Goal 2: Pt will complete labial and lingual exercises 10x/each to promote strength and ROM for improved speech intelligibility for expression of complex thoughts, needs, feelings, and ideas. Progress Made  Goal 3: Pt will be educated on strategies to improve speech intelligibility (abdominally supported breathing, over-articulation, easy onset etc.), and utilize them at the phrase/sentence level with 80% intelligibility and min verbal cues from SLP so the patient has a better understanding of strategies that will enhance their ability to express their wants and needs.   Goal Met, increase  Goal 4: Pt will complete convergent and divergent naming tasks with 80% accuracy with mild cues to promote semantic organization and the overall effectiveness and efficiency for expression of their wants, needs, feelings,

## 2023-11-16 NOTE — PROGRESS NOTES
[address] Outpatient  Speech Language Pathology  Adult Daily Note    Bridget House  : 1945  [x]   confirmed      Date: 2023    Visit Information:  Visit Information  SLP Insurance Information: Aetna Medicare  Total # of Visits Approved:  (medical necessity)  Total # of Visits to Date: 8  No Show: 0  Canceled Appointment: 1       Plan of care signed (Y/N):     Yes    Certification [id number]:63-  Plan of Care Visit #7      Interventions used this date:  Speech Production, Cognitive Skill Development, and Instruction in Compensatory Strategies    Subjective:    Pt arrived on time and was cooperative with all tasks. Pt reports no new concerns. Behavior:  Alert, Cooperative, and Pleasant       Objective/Assessment:   Patient progressing towards goals:   Goal 1: Pt will complete OM drills paired with speech sounds to improve speech intelligibility x10/each and expression of their complex thoughts, needs, feelings, and ideas. Not targeted  Goal 2: Pt will complete labial and lingual exercises 10x/each to promote strength and ROM for improved speech intelligibility for expression of complex thoughts, needs, feelings, and ideas. Not targeted  Goal 3: Pt will be educated on strategies to improve speech intelligibility (abdominally supported breathing, over-articulation, easy onset etc.), and utilize them at the phrase/sentence level with 80% intelligibility and min verbal cues from SLP so the patient has a better understanding of strategies that will enhance their ability to express their wants and needs. Not targeted  Goal 4: Pt will complete convergent and divergent naming tasks with 80% accuracy with mild cues to promote semantic organization and the overall effectiveness and efficiency for expression of their wants, needs, feelings, and ideas.   Pt completed odd one out task with 100% acc Mod I and then completed convergent naming with 100% acc supervision to min assist    Goal

## 2023-11-20 NOTE — PROGRESS NOTES
Therapy                            Cancellation/No-show Note      Date:  2023  Patient Name:  Codie Hendrickson  :  1945   MRN:  [id number]      Visit Information:  Visit Information  SLP Insurance Information: Aetna Medicare  Total # of Visits Approved:  (medical necessity)  No Show: 0  Canceled Appointment: 2    For today's appointment patient:  Cancelled    Reason given by patient:  Other: Out of town.     Follow-up needed:  Pt has future appointments scheduled, no follow up needed    Comments:       Signature: Electronically signed by Chipper Habermann, SLP on 23 at 10:24 AM EST

## 2023-11-21 ENCOUNTER — HOSPITAL ENCOUNTER (OUTPATIENT)
Dept: SPEECH THERAPY | Age: 78
Setting detail: THERAPIES SERIES
Discharge: HOME | End: 2023-11-21
Payer: MEDICARE

## 2023-11-21 NOTE — PROGRESS NOTES
Therapy                            Cancellation/No-show Note      Date:  2023  Patient Name:  Yolanda Barfield  :  1945   MRN:  [id number]      Visit Information:  Visit Information  SLP Insurance Information: Aetna Medicare  Total # of Visits Approved:  (medical necessity)  Total # of Visits to Date: 8  No Show: 0  Canceled Appointment: 3    For today's appointment patient:  Cancelled    Reason given by patient:  Other: Pt out of town for appointment on     Follow-up needed:  Pt has future appointments scheduled, no follow up needed    Comments:       Signature: Electronically signed by NARESH Hartman on 23 at 10:16 AM EST

## 2023-11-28 ENCOUNTER — HOSPITAL ENCOUNTER (OUTPATIENT)
Dept: SPEECH THERAPY | Age: 78
Setting detail: THERAPIES SERIES
Discharge: HOME | End: 2023-11-28
Payer: MEDICARE

## 2023-11-30 ENCOUNTER — HOSPITAL ENCOUNTER (OUTPATIENT)
Dept: SPEECH THERAPY | Age: 78
Setting detail: THERAPIES SERIES
Discharge: HOME | End: 2023-11-30
Payer: MEDICARE

## 2023-11-30 PROCEDURE — 97129 THER IVNTJ 1ST 15 MIN: CPT

## 2023-11-30 PROCEDURE — 92507 TX SP LANG VOICE COMM INDIV: CPT

## 2023-12-01 PROBLEM — M79.671 PAIN IN BOTH FEET: Status: ACTIVE | Noted: 2020-07-22

## 2023-12-01 PROBLEM — M79.672 PAIN IN BOTH FEET: Status: ACTIVE | Noted: 2020-07-22

## 2023-12-01 PROBLEM — E11.42 TYPE 2 DIABETES MELLITUS WITH PERIPHERAL NEUROPATHY (HCC): Status: ACTIVE | Noted: 2020-02-14

## 2023-12-01 PROBLEM — I73.9 PERIPHERAL VASCULAR DISEASE OF FOOT (HCC): Status: ACTIVE | Noted: 2020-07-22

## 2023-12-01 PROBLEM — L84 CALLUS: Status: ACTIVE | Noted: 2023-12-01

## 2023-12-01 PROBLEM — M25.519 ARTHRALGIA OF SHOULDER: Status: ACTIVE | Noted: 2023-12-01

## 2023-12-01 PROBLEM — L97.911 ULCER OF RIGHT LEG, LIMITED TO BREAKDOWN OF SKIN (HCC): Status: ACTIVE | Noted: 2020-12-11

## 2023-12-01 PROBLEM — B35.1 ONYCHOMYCOSIS: Status: ACTIVE | Noted: 2020-02-14

## 2023-12-01 PROBLEM — L85.3 XEROSIS OF SKIN: Status: ACTIVE | Noted: 2020-08-13

## 2023-12-01 PROBLEM — R60.0 EDEMA OF BOTH LOWER LEGS DUE TO PERIPHERAL VENOUS INSUFFICIENCY: Status: ACTIVE | Noted: 2020-12-11

## 2023-12-01 PROBLEM — R60.0 EDEMA OF LOWER EXTREMITY: Status: ACTIVE | Noted: 2023-12-01

## 2023-12-01 PROBLEM — I87.2 EDEMA OF BOTH LOWER LEGS DUE TO PERIPHERAL VENOUS INSUFFICIENCY: Status: ACTIVE | Noted: 2020-12-11

## 2023-12-04 DIAGNOSIS — Z12.5 SCREENING PSA (PROSTATE SPECIFIC ANTIGEN): ICD-10-CM

## 2023-12-04 LAB — PSA SERPL-MCNC: 19.69 NG/ML (ref 0–4)

## 2023-12-05 ENCOUNTER — HOSPITAL ENCOUNTER (OUTPATIENT)
Dept: SPEECH THERAPY | Age: 78
Setting detail: THERAPIES SERIES
Discharge: HOME | End: 2023-12-05
Payer: MEDICARE

## 2023-12-05 PROCEDURE — 92507 TX SP LANG VOICE COMM INDIV: CPT

## 2023-12-05 NOTE — PROGRESS NOTES
[address] Outpatient  Speech Language Pathology  Adult Daily Note    Yolanda Barfield  : 1945  [x]   confirmed      Date: 2023    Visit Information:  Visit Information  SLP Insurance Information: Aetna Medicare  Total # of Visits Approved:  (medical necessity)  Total # of Visits to Date: 10  No Show: 0  Canceled Appointment: 3       Plan of care signed (Y/N):     Yes    Certification CZFMAN:-  Plan of Care Visit #2      Interventions used this date:  Speech Production, Cognitive Skill Development, and Instruction in Compensatory Strategies    Subjective:    Pt arrived on time and was cooperative with all tasks. Pt reports no new concerns. Behavior:  Alert, Cooperative, and Pleasant       Objective/Assessment:   Patient progressing towards goals:  Goal 1: Pt will complete OM drills paired with speech sounds to improve speech intelligibility x10/each and expression of their complex thoughts, needs, feelings, and ideas. Not targeted  Goal 2: Pt will complete labial and lingual exercises 10x/each to promote strength and ROM for improved speech intelligibility for expression of complex thoughts, needs, feelings, and ideas. Not tareted  Goal 3: Pt will be educated on strategies to improve speech intelligibility (abdominally supported breathing, over-articulation, easy onset etc.), and utilize them at the sentence level with 90% intelligibility and min verbal cues from SLP so the patient has a better understanding of strategies that will enhance their ability to express their wants and needs. Pt recalled slow rate, easy onset and over articulation and reported that he had been trying to use it in conversation on the phone.     At the 8-9 word sentence level patient was 60% acc Independently and increased to 90% acc min cues for rate and over articulation    Goal 4: Pt will complete convergent and divergent naming tasks with 80% accuracy with mild cues to promote semantic Goal Outcome Evaluation:          Observation goals  PRIOR TO DISCHARGE        Comments: Neuro consult and work up complete, safe dispo arranged : not Met  Nurse to notify provider when observation goals have been met and patient is ready for discharge.            Patient is A&O x4, flat affect, denies chest pain or SOB.  Up with SBA. Complains of pain on abdomen and back, offered tylenol, pt refused. On reg diet, refused to eat, pending tube feeding order. IVF started @ 100 mL/hr. Smith catheter patent with adequate output. GJ Tube site cleaned and applied new dressing. Seizure precaution maintained. Discharge pending.

## 2023-12-07 ENCOUNTER — HOSPITAL ENCOUNTER (OUTPATIENT)
Dept: SPEECH THERAPY | Age: 78
Setting detail: THERAPIES SERIES
Discharge: HOME | End: 2023-12-07
Payer: MEDICARE

## 2023-12-07 PROCEDURE — 97129 THER IVNTJ 1ST 15 MIN: CPT

## 2023-12-07 PROCEDURE — 92507 TX SP LANG VOICE COMM INDIV: CPT

## 2023-12-07 PROCEDURE — 97130 THER IVNTJ EA ADDL 15 MIN: CPT

## 2023-12-07 NOTE — PROGRESS NOTES
80% accuracy with mild cues to promote semantic organization and the overall effectiveness and efficiency for expression of their wants, needs, feelings, and ideas. Not targeted  Goal 5: Pt will demonstrate reading comprehension at the complex sentence/paragraph level to 80% acc with mild cues to promote pt's ability to utilize reading/writing as a compensatory strategy for memory deficits and/or understand pertinent medical information and current events. Pt completed complex paragraph reading comprehension with 86% acc min cues  Goal 6: To address pt's cognitive deficits and promote recall of personal and medical information, pt will answer questions addressing delayed recall with 80% accuracy and min cues. Pt recalled 4 words using association: after 5 minutes: 1st trial 3/4 Ind, 2nd trial 3/4 Ind and 3rd trial 4/4 Ind  Pt also recalled writing information down and Independently used it to boost recall of word list, writing it down in between spaced retrievals    Pain Assessment:  Initial Assessment:  Patient does not c/o pain. Re-assessment:  Patient does not c/o pain. Plan:  Continue with current goals  Decrease frequency to 1x/week    Patient/Caregiver Education:  Patient/Caregiver educated on session. Patient/Caregiver provided with home program: [email or web address], list of 5-7 and 8-9 word sentences,  Patient/Caregiver stated verbal understanding of directions.     Time in: 0830  Time out: 0915  Minutes seen: 45  Cognition: 23  Speech/language: 22      Signature: Electronically signed by NARESH Granados on 12/7/2023 at 9:16 AM

## 2023-12-08 ENCOUNTER — OFFICE VISIT (OUTPATIENT)
Dept: UROLOGY | Age: 78
End: 2023-12-08
Payer: MEDICARE

## 2023-12-08 VITALS
DIASTOLIC BLOOD PRESSURE: 62 MMHG | SYSTOLIC BLOOD PRESSURE: 132 MMHG | HEART RATE: 86 BPM | BODY MASS INDEX: 28 KG/M2 | HEIGHT: 71 IN | WEIGHT: 200 LBS

## 2023-12-08 DIAGNOSIS — R97.20 ELEVATED PSA: Primary | ICD-10-CM

## 2023-12-08 PROCEDURE — 99213 OFFICE O/P EST LOW 20 MIN: CPT | Performed by: PHYSICIAN ASSISTANT

## 2023-12-08 PROCEDURE — 3078F DIAST BP <80 MM HG: CPT | Performed by: PHYSICIAN ASSISTANT

## 2023-12-08 PROCEDURE — 1123F ACP DISCUSS/DSCN MKR DOCD: CPT | Performed by: PHYSICIAN ASSISTANT

## 2023-12-08 PROCEDURE — 3075F SYST BP GE 130 - 139MM HG: CPT | Performed by: PHYSICIAN ASSISTANT

## 2023-12-08 NOTE — PROGRESS NOTES
Subjective:      Patient ID: Aric Franks is a 77 y.o. male    HPI  77year old male who presents as a follow up for testicular varices, small bilateral hydroceles, and epididymo-orchitis for which he was treated with antibiotics. Currently denies any scrotal or testicular pain or discomfort. Denies any urological complaints. His PSA level on 12/4/23 was 19.69, previously it was 13.53 on 5/27/21.     Past Medical History:   Diagnosis Date    Anemia, normocytic normochromic     CAD (coronary artery disease)     multiple PCI    Carotid artery stenosis     Diabetes mellitus ([address])     Gout 10/2/2017    Hyperlipidemia     Hypertension     Neuropathy     Occlusive disease of artery of lower extremity ([address]) 10/2/2017    Type 2 diabetes mellitus without complication [facility])      Past Surgical History:   Procedure Laterality Date    ARTERIOGRAM Right 10/6/2017    RIGHT ARM AORTO-FEMORAL DIGITAL SUBTRACTION ARTERIOGRAPHY performed by Jimenez Velazquez MD at [facility] Left 9/8/2020    LEFT CAROTID ENDARTERECTOMY performed by Jimenez Velazquez MD at [facility] N/A 11/7/11    xience stent to RAC and CIRC    CORONARY ANGIOPLASTY WITH STENT PLACEMENT Left 4/19/13    xience stent to LAD    [facility] Right 12/22/13    Promus stent to RCA    DIAGNOSTIC CARDIAC CATH LAB PROCEDURE  09/20/2019    FEMORAL-FEMORAL BYPASS GRAFT      HERNIA REPAIR      as child    INVASIVE VASCULAR N/A 8/7/2023    Angioplasty peripheral artery (PERIPHERAL PERCUTANEOUS TRANSLUMINAL ANGIOPLASTY RIGHT LEG)  NEEDS UPDATED H&P performed by Sakshi Duong DO at [address] [facility] CATH LAB    OH BYPASS W/VEIN FEMORAL-POPLITEAL Left 10/27/2017    LEFT FEMORAL DISTAL BYPASS, (PAT DONE 10-2-17) performed by Jimenez eVlazquez MD at [facility] ESOPHAGOGASTRODUODENOSCOPY TRANSORAL DIAGNOSTIC N/A 11/30/2017    EGD ESOPHAGOGASTRODUODENOSCOPY with biopsy performed by Mayito Swain

## 2023-12-12 ENCOUNTER — APPOINTMENT (OUTPATIENT)
Dept: SPEECH THERAPY | Age: 78
End: 2023-12-12
Payer: MEDICARE

## 2023-12-13 PROBLEM — I63.512 CEREBROVASCULAR ACCIDENT (CVA) DUE TO STENOSIS OF LEFT MIDDLE CEREBRAL ARTERY (HCC): Status: ACTIVE | Noted: 2023-10-10

## 2023-12-14 ENCOUNTER — HOSPITAL ENCOUNTER (OUTPATIENT)
Dept: SPEECH THERAPY | Age: 78
Setting detail: THERAPIES SERIES
Discharge: HOME | End: 2023-12-14
Payer: MEDICARE

## 2023-12-14 PROCEDURE — 92507 TX SP LANG VOICE COMM INDIV: CPT

## 2023-12-14 NOTE — PROGRESS NOTES
[address] [facility]. [place], [address]               Phone: [phone]           Fax: [phone] [address] Outpatient  Speech Language Pathology  Adult Progress Note                          Physician: SARITA Tobar-CNP  From: Earl Sanders, SLP, MA,CCC-SLP   Patient: Maria L Disla       : 1945  Diagnosis: Dysarthria, Aphasia, Cognitive Impairment   Date: 2023  Treatment Diagnosis: ICD 10 R47.8, R46.01, R41.89  Date of Evaluation: 10/19/23      Plan of Care/Treatment to date: Speech Production Therapy, Expressive Language Therapy, Cognitive Skill Development, and Instruction in Compensatory Strategies    Date range from 23 to 23. Subjective: Pt had 2 cancellations during this progress period secondary to thanksgiving holiday. Pt has shown improvement with speech intelligibility goals. Progress toward Short-Term Goals:   Goal 1: Pt will complete OM drills paired with speech sounds to improve speech intelligibility x10/each and expression of their complex thoughts, needs, feelings, and ideas. Progress made  Goal 2: Pt will complete labial and lingual exercises 10x/each to promote strength and ROM for improved speech intelligibility for expression of complex thoughts, needs, feelings, and ideas. Goal Met  Goal 3: Pt will be educated on strategies to improve speech intelligibility (abdominally supported breathing, over-articulation, easy onset etc.), and utilize them at the sentence level with 90% intelligibility and min verbal cues from SLP so the patient has a better understanding of strategies that will enhance their ability to express their wants and needs.   Goal Met for sentence level, increase to paragraph/conversation level  Goal 4: Pt will complete convergent and divergent naming tasks with 80% accuracy with mild cues to promote semantic

## 2023-12-14 NOTE — PROGRESS NOTES
[address] Outpatient  Speech Language Pathology  Adult Daily Note    Aminata Vega  : 1945  [x]   confirmed      Date: 2023    Visit Information:  Visit Information  SLP Insurance Information: Aetna Medicare  Total # of Visits Approved:  (medical necessity)  Total # of Visits to Date: 12  No Show: 0  Canceled Appointment: 3       Plan of care signed (Y/N):     Yes    Certification [id number]:-  Plan of Care Visit #4      Interventions used this date:  Speech Production, Cognitive Skill Development, and Instruction in Compensatory Strategies    Subjective:    Pt arrived on time and was cooperative with all tasks. Pt reports no new concerns. 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 He saw Dr. Royce Foote for f/u on  and had no new changes. Behavior:  Alert, Cooperative, and Pleasant       Objective/Assessment:   Patient progressing towards goals:  Goal 1: Pt will complete OM drills paired with speech sounds to improve speech intelligibility x10/each and expression of their complex thoughts, needs, feelings, and ideas. Pt completed diadochokinesis task  [place] x10 with 80% acc mild visual cues  [place] 8/10 with 80% acc and mild verbal cues   [place] x10 with 90% acc  mild cues  [place] 90% acc with decreased rate  Goal 2: Pt will complete labial and lingual exercises 10x/each to promote strength and ROM for improved speech intelligibility for expression of complex thoughts, needs, feelings, and ideas. Not targeted  Goal 3: Pt will be educated on strategies to improve speech intelligibility (abdominally supported breathing, over-articulation, easy onset etc.), and utilize them at the sentence level with 90% intelligibility and min verbal cues from SLP so the patient has a better understanding of strategies that will enhance their ability to express their wants and needs.   At short paragraph level patient had 67% acc with mild verbal

## 2023-12-19 ENCOUNTER — APPOINTMENT (OUTPATIENT)
Dept: SPEECH THERAPY | Age: 78
End: 2023-12-19
Payer: MEDICARE

## 2023-12-26 ENCOUNTER — APPOINTMENT (OUTPATIENT)
Dept: SPEECH THERAPY | Age: 78
End: 2023-12-26
Payer: MEDICARE

## 2023-12-28 ENCOUNTER — HOSPITAL ENCOUNTER (OUTPATIENT)
Dept: SPEECH THERAPY | Age: 78
Setting detail: THERAPIES SERIES
Discharge: HOME | End: 2023-12-28
Payer: MEDICARE

## 2023-12-28 PROCEDURE — 92507 TX SP LANG VOICE COMM INDIV: CPT

## 2023-12-28 PROCEDURE — 97129 THER IVNTJ 1ST 15 MIN: CPT

## 2023-12-28 NOTE — PROGRESS NOTES
[address] Outpatient  Speech Language Pathology  Adult Daily Note    Gloria Espinoza  : 1945  [x]   confirmed      Date: 2023    Visit Information:  Visit Information  SLP Insurance Information: Aetna Medicare  Total # of Visits Approved:  (medical necessity)  Total # of Visits to Date: 15  No Show: 1  Canceled Appointment: 3       Plan of care signed (Y/N):     Yes    [facility]ification [facility]  Plan of Care Visit #1      Interventions used this date:  Speech Production, Cognitive Skill Development, and Instruction in Compensatory Strategies    Subjective:    Pt arrived on time and was cooperative with all tasks. Pt reports no new concerns. Pt reported that he had traveled to [place] to see his son over the holidays. Pt reports he has an appointment with his PCP following therapy session this date. Behavior:  Alert, Cooperative, and Pleasant       Objective/Assessment:   Patient progressing towards goals:  Goal 1: Pt will complete OM drills paired with speech sounds to improve speech intelligibility x10/each over 3 sessions and expression of their complex thoughts, needs, feelings, and ideas. Not targeted  Goal 2: Pt will be educated on strategies to improve speech intelligibility (abdominally supported breathing, over-articulation, easy onset etc.), and utilize them at the paragraph/conversation level with 90% intelligibility and min verbal cues from SLP so the patient has a better understanding of strategies that will enhance their ability to express their wants and needs.   Pt recalled use of slow rate and over articulation Independently and utilized at the conversation level with 81% intelligibility Independently and increased to 95% with min verbal cues  Goal 3: Pt will complete divergent naming tasks with 80% accuracy with mild cues to promote semantic organization and the overall effectiveness and efficiency for expression of their wants, needs, feelings, and

## 2024-01-04 ENCOUNTER — HOSPITAL ENCOUNTER (OUTPATIENT)
Dept: SPEECH THERAPY | Age: 79
Setting detail: THERAPIES SERIES
Discharge: HOME | End: 2024-01-04
Payer: MEDICARE

## 2024-01-04 PROCEDURE — 92507 TX SP LANG VOICE COMM INDIV: CPT

## 2024-01-04 PROCEDURE — 97129 THER IVNTJ 1ST 15 MIN: CPT

## 2024-01-04 NOTE — PROGRESS NOTES
items  Goal 4: To address pt's cognitive deficits and promote recall of personal and medical information, pt will answer questions addressing delayed recall with 80% accuracy and min cues.  Pt completed recall of functional appointment information after reading and writing details for repetition. Recall after 10 minute delay 8/8 acc with min verbal cues    Pain Assessment:  Initial Assessment:  Patient does not c/o pain.    Re-assessment:  Patient does not c/o pain.      Plan:  Continue with current goals    Patient/Caregiver Education:  Patient/Caregiver educated on session.  Patient/Caregiver provided with home program:   Patient/Caregiver stated verbal understanding of directions.    Time in: 0830  Time out: 0915  Minutes seen: 45  Speech: 30  Cognition: 15      Signature: Electronically signed by NARESH Eubanks on 1/4/2024 at 9:14 AM

## 2024-01-10 ENCOUNTER — OFFICE VISIT (OUTPATIENT)
Dept: CARDIOLOGY CLINIC | Age: 79
End: 2024-01-10
Payer: MEDICARE

## 2024-01-10 VITALS
HEIGHT: 71 IN | OXYGEN SATURATION: 100 % | BODY MASS INDEX: 28.61 KG/M2 | HEART RATE: 83 BPM | SYSTOLIC BLOOD PRESSURE: 128 MMHG | WEIGHT: 204.4 LBS | DIASTOLIC BLOOD PRESSURE: 70 MMHG

## 2024-01-10 DIAGNOSIS — I25.10 CORONARY ARTERY DISEASE INVOLVING NATIVE CORONARY ARTERY OF NATIVE HEART WITHOUT ANGINA PECTORIS: ICD-10-CM

## 2024-01-10 DIAGNOSIS — I47.29 NSVT (NONSUSTAINED VENTRICULAR TACHYCARDIA) (HCC): ICD-10-CM

## 2024-01-10 DIAGNOSIS — I65.23 BILATERAL CAROTID ARTERY STENOSIS: ICD-10-CM

## 2024-01-10 DIAGNOSIS — I70.209 OCCLUSIVE DISEASE OF ARTERY OF LOWER EXTREMITY (HCC): ICD-10-CM

## 2024-01-10 DIAGNOSIS — E78.5 DYSLIPIDEMIA: ICD-10-CM

## 2024-01-10 DIAGNOSIS — I73.9 CLAUDICATION (HCC): ICD-10-CM

## 2024-01-10 DIAGNOSIS — I25.119 CORONARY ARTERY DISEASE INVOLVING NATIVE CORONARY ARTERY OF NATIVE HEART WITH ANGINA PECTORIS (HCC): Primary | ICD-10-CM

## 2024-01-10 DIAGNOSIS — I10 ESSENTIAL HYPERTENSION: ICD-10-CM

## 2024-01-10 DIAGNOSIS — I73.9 PERIPHERAL VASCULAR DISEASE (HCC): ICD-10-CM

## 2024-01-10 PROCEDURE — 1123F ACP DISCUSS/DSCN MKR DOCD: CPT | Performed by: INTERNAL MEDICINE

## 2024-01-10 PROCEDURE — 99214 OFFICE O/P EST MOD 30 MIN: CPT | Performed by: INTERNAL MEDICINE

## 2024-01-10 PROCEDURE — 3074F SYST BP LT 130 MM HG: CPT | Performed by: INTERNAL MEDICINE

## 2024-01-10 PROCEDURE — 3078F DIAST BP <80 MM HG: CPT | Performed by: INTERNAL MEDICINE

## 2024-01-10 ASSESSMENT — ENCOUNTER SYMPTOMS
NAUSEA: 0
EYES NEGATIVE: 1
CHEST TIGHTNESS: 1
GASTROINTESTINAL NEGATIVE: 1
BLOOD IN STOOL: 0
COUGH: 0
WHEEZING: 0
STRIDOR: 0
SHORTNESS OF BREATH: 0

## 2024-01-10 NOTE — PROGRESS NOTES
Subsequent Progress Note  Patient: Armando Arias  YOB: 1945  MRN: [id number]    Chief Complaint: CP cad pad htn Dizzy   Chief Complaint   Patient presents with    Follow-up     4 month    Coronary Artery Disease       CV Data:  CAD 5-6 stents in total  10/2017 Left Fem Distal bypass- Dr. Maldonado  Remote- RIGHT  LE bypass  6/19/2018 Cath:  LAD, CXand RCA stents all patent with Mild ISR EF 55  8/2015 CUS DARIEL 50-69- followed by Dr. Maldonado  2/2018 echo ef 65  2/2019 spect negative  2019 LCEA  9/20/2019: cath LAD CX and RCA prior stents mild ISR but patent. Septal  occluded and fills from RCA. EF 60 EDP 9  11/2019 HM negative.   10/2020 CUS - Erica. -LCEA patent RCIA - mild   9/21 SPECT negative   9/21 Echo EF 65 1+ MR  RVSP 56   3/22 cath LAD RCA nad CX all stents patent.   1/23 PVR- B/L Abn R>L  1/23 CUS Mild   2/8/23 PeripheralLeft to right femorofemoral bypass is widely patent. Right lower extremity: Common femoral artery patent, profunda patent, SFA heavily calcified, mid to distal stent is patent with 50% in-stent restenosis without any significant gradient.  Two-vessel runoff via the posterior tib artery as well as the peroneal.  100% anterior artery stenosis  8/7/23 Peripheral angio - Rec Med Rx.   10/23 Echo EF 55-60     Subjective/HPI: recently had 2 angina ( chest pressure heavy no radiation no sob but + diaphoresis moderate) took NTG w relief.   Also having dizzy spells     9/27/2019: no cp no osb no falls no bleed takes meds. Had Pig tail catheter induced VT causing CP during cath     12/30/2019 no cp no sob no falls no bleed occ leg edema due to sleeping in a recliner. He falls a sleep every night wahching tv.     7/10/2020 no cp no sob no falls no bleed. Eats well. Sleep well.     1/15/21  Doing well no cp no sob some edema no falls n bleed.   coouple months ago started Eliquis 5 bid by Dr. Hall( Etiology unclear)     8/6/21 recent 3 episodes of angina ( heavy pressure) requiring NTG

## 2024-01-11 ENCOUNTER — HOSPITAL ENCOUNTER (OUTPATIENT)
Dept: SPEECH THERAPY | Age: 79
Setting detail: THERAPIES SERIES
Discharge: HOME | End: 2024-01-11
Payer: MEDICARE

## 2024-01-11 PROCEDURE — 92507 TX SP LANG VOICE COMM INDIV: CPT

## 2024-01-11 PROCEDURE — 97129 THER IVNTJ 1ST 15 MIN: CPT

## 2024-01-11 NOTE — PROGRESS NOTES
[facility]            Outpatient Rehab Dept               [address]                 [place], [place] [address]                 Phone: [phone]                                  Fax:  [phone]              [facility] Outpatient  Speech Language Pathology  Adult Discharge Note    NAME:Armando Arias  : 1945 (78 y.o.)   MRN: [id number]  Patient Diagnosis: Acute CVA (cerebrovascular accident) (HCC) [I63.9]  Referring Physician: JOELLE Marquis    Date of Evaluation: 10/19/23          Treatment Diagnosis: Dysarthria, Aphasia, Cognitive Impairment  ICD10 tx dx code: R47.8, R47.01, R41.89      Today's Date: 2024  Treatment Dates:  From: 10/19/23 To: 24      TREATMENT ADMINISTERED:  Speech Production Therapy, Expressive Language Therapy, Receptive Language Therapy, Cognitive Skill Development, and Instruction in Compensatory Strategies      PROGRESS TO DATE:   Goal 1: Pt will complete OM drills paired with speech sounds to improve speech intelligibility x10/each over 3 sessions and expression of their complex thoughts, needs, feelings, and ideas.  Goal Met  Goal 2: Pt will be educated on strategies to improve speech intelligibility (abdominally supported breathing, over-articulation, easy onset etc.), and utilize them at the paragraph/conversation level with 90% intelligibility and min verbal cues from SLP so the patient has a better understanding of strategies that will enhance their ability to express their wants and needs.  Goal Met  Goal 3: Pt will complete divergent naming tasks with 80% accuracy with mild cues to promote semantic organization and the overall effectiveness and efficiency for expression of their wants, needs, feelings, and ideas.  Goal met  Goal 4: To address pt's cognitive deficits and promote recall of personal and medical information, pt will answer questions addressing delayed recall with 80% accuracy and min cues.  Goal

## 2024-01-11 NOTE — PROGRESS NOTES
[facility] Outpatient  Speech Language Pathology  Adult Daily Note    Armando Arias  : 1945  [x]   confirmed      Date: 2024    Visit Information:  Visit Information  SLP Insurance Information: Aetna Medicare  Total # of Visits Approved:  (medical necessity)  Total # of Visits to Date: 2  No Show: 1  Canceled Appointment: 3         Plan of care signed (Y/N):     Yes    Certification Period:23-23  Plan of Care Visit #3      Interventions used this date:  Speech Production, Cognitive Skill Development, and Instruction in Compensatory Strategies    Subjective:    Pt arrived on time and was cooperative with all tasks. Pt reports no new concerns.     Pt reported that he had a visit with his cardiologist and no new updates.     Behavior:  Alert, Cooperative, and Pleasant       Objective/Assessment:   Patient progressing towards goals:  Goal 1: Pt will complete OM drills paired with speech sounds to improve speech intelligibility x10/each over 3 sessions and expression of their complex thoughts, needs, feelings, and ideas.  Pt completed OM diadochokinesis drills:  [place]: 10/10 with increased rate this date  [place]: 10/10 with mild increased rate this date  [place]: 10/10 with increased rate  [place]: 10/10 acc with slow rate and min cues  Goal 2: Pt will be educated on strategies to improve speech intelligibility (abdominally supported breathing, over-articulation, easy onset etc.), and utilize them at the paragraph/conversation level with 90% intelligibility and min verbal cues from SLP so the patient has a better understanding of strategies that will enhance their ability to express their wants and needs.  Pt was 90% intelligible at the conversation/paragraph reading level with mild verbal cues for rate and over articulation  Goal 3: Pt will complete divergent naming tasks with 80% accuracy with mild cues to promote semantic organization and the overall effectiveness and efficiency for

## 2024-01-18 ENCOUNTER — APPOINTMENT (OUTPATIENT)
Dept: SPEECH THERAPY | Age: 79
End: 2024-01-18
Payer: MEDICARE

## 2024-01-25 ENCOUNTER — APPOINTMENT (OUTPATIENT)
Dept: SPEECH THERAPY | Age: 79
End: 2024-01-25
Payer: MEDICARE

## 2024-03-12 ENCOUNTER — HOSPITAL ENCOUNTER (EMERGENCY)
Age: 79
Discharge: HOME | End: 2024-03-12
Payer: MEDICARE

## 2024-03-12 ENCOUNTER — APPOINTMENT (OUTPATIENT)
Dept: GENERAL RADIOLOGY | Age: 79
End: 2024-03-12
Payer: MEDICARE

## 2024-03-12 VITALS
SYSTOLIC BLOOD PRESSURE: 145 MMHG | HEIGHT: 71 IN | OXYGEN SATURATION: 95 % | DIASTOLIC BLOOD PRESSURE: 61 MMHG | WEIGHT: 200 LBS | RESPIRATION RATE: 20 BRPM | BODY MASS INDEX: 28 KG/M2 | HEART RATE: 78 BPM | TEMPERATURE: 98 F

## 2024-03-12 DIAGNOSIS — S63.634A SPRAIN OF INTERPHALANGEAL JOINT OF RIGHT RING FINGER, INITIAL ENCOUNTER: Primary | ICD-10-CM

## 2024-03-12 PROCEDURE — 99283 EMERGENCY DEPT VISIT LOW MDM: CPT

## 2024-03-12 PROCEDURE — 73130 X-RAY EXAM OF HAND: CPT

## 2024-03-12 PROCEDURE — 6370000000 HC RX 637 (ALT 250 FOR IP)

## 2024-03-12 RX ORDER — IBUPROFEN 600 MG/1
600 TABLET ORAL ONCE
Status: COMPLETED | OUTPATIENT
Start: 2024-03-12 | End: 2024-03-12

## 2024-03-12 RX ORDER — IBUPROFEN 400 MG/1
400 TABLET ORAL EVERY 6 HOURS PRN
Qty: 120 TABLET | Refills: 0 | Status: SHIPPED | OUTPATIENT
Start: 2024-03-12

## 2024-03-12 RX ADMIN — IBUPROFEN 600 MG: 600 TABLET, FILM COATED ORAL at 20:15

## 2024-03-12 ASSESSMENT — ENCOUNTER SYMPTOMS
ABDOMINAL PAIN: 0
SHORTNESS OF BREATH: 0
COUGH: 0
VOMITING: 0
NAUSEA: 0
DIARRHEA: 0

## 2024-03-12 NOTE — ED TRIAGE NOTES
Pt presents to ER with a finger injury that occurred today with a fall and catching himself. Pt slipped with a paint can and fell on his ring finger. Nothing else was hit, no LOC, no head hit. Pt is on blood thinners. Pt is A&Ox4, warm and dry at this time with vitals stable.

## 2024-03-12 NOTE — ED PROVIDER NOTES
[facility] ED  eMERGENCYdEPARTMENT eNCOUnter        Pt Name: Armando Arias  MRN: [id number]  Birthdate 1945of evaluation: 3/12/2024  Provider:Komal Goss PA-C  7:18 PM EDT    CHIEF COMPLAINT       Chief Complaint   Patient presents with    Finger Injury         HISTORY OF PRESENT ILLNESS  (Location/Symptom, Timing/Onset, Context/Setting, Quality, Duration, Modifying Factors, Severity.)   Armando Arias is a 78 y.o. male who presents to the emergency department with a past medical history of CAD, hypertension, hyperlipidemia, type 2 diabetes, coronary artery stenosis, gout, neuropathy, anemia, occlusive disease of lower extremity artery, claudication, peripheral vascular disease, NSVT, osteoarthritis, syncope, epididymitis, CVA, and peripheral venous insufficiency of both lower legs for evaluation of injury to ring finger on right hand.  Patient reports that about 3 hours ago he sat on a empty pain can and slipped off and comes up with his right hand.  Patient reports that since this incident, his right ring finger has been painful and he has had difficulty bending it.  Patient tried to wait and see if the pain get better, but it has not improved so he came to the emergency department for further evaluation.  Patient did not take any medications at home and did not use any ice.  Denies any head injuries.  Denies any pain anywhere else.  Denies any fevers, cough, congestion, shortness of breath, chest pain, abdominal pain, nausea, vomiting, diarrhea.    HPI    Nursing Notes were reviewed and I agree.    REVIEW OF SYSTEMS    (2-9 systems for level 4, 10 or more for level 5)     Review of Systems   Constitutional:  Negative for fever.   HENT:  Negative for congestion.    Respiratory:  Negative for cough and shortness of breath.    Cardiovascular:  Negative for chest pain.   Gastrointestinal:  Negative for abdominal pain, diarrhea, nausea and vomiting.   Musculoskeletal:  Positive for arthralgias.

## 2024-03-13 NOTE — DISCHARGE INSTRUCTIONS
Rest, ice, compress, and elevate finger over the next week.  You can use Tylenol and/or ibuprofen as needed for pain.  Follow with orthopedics as discussed.  Return to the emergency department for any worsening symptoms.

## 2024-05-04 ENCOUNTER — APPOINTMENT (OUTPATIENT)
Dept: CT IMAGING | Age: 79
DRG: 641 | End: 2024-05-04
Payer: MEDICARE

## 2024-05-04 ENCOUNTER — APPOINTMENT (OUTPATIENT)
Dept: GENERAL RADIOLOGY | Age: 79
DRG: 641 | End: 2024-05-04
Payer: MEDICARE

## 2024-05-04 ENCOUNTER — HOSPITAL ENCOUNTER (INPATIENT)
Age: 79
LOS: 1 days | Discharge: HOME | DRG: 641 | End: 2024-05-05
Attending: FAMILY MEDICINE | Admitting: FAMILY MEDICINE
Payer: MEDICARE

## 2024-05-04 DIAGNOSIS — N18.9 CHRONIC KIDNEY DISEASE, UNSPECIFIED CKD STAGE: ICD-10-CM

## 2024-05-04 DIAGNOSIS — R55 SYNCOPE AND COLLAPSE: Primary | ICD-10-CM

## 2024-05-04 DIAGNOSIS — E87.5 HYPERKALEMIA: ICD-10-CM

## 2024-05-04 LAB
ALBUMIN SERPL-MCNC: 4.5 G/DL (ref 3.5–4.6)
ALP SERPL-CCNC: 97 U/L (ref 35–104)
ALT SERPL-CCNC: 24 U/L (ref 0–41)
ANION GAP SERPL CALCULATED.3IONS-SCNC: 13 MEQ/L (ref 9–15)
ANION GAP SERPL CALCULATED.3IONS-SCNC: 8 MEQ/L (ref 9–15)
ANISOCYTOSIS BLD QL SMEAR: ABNORMAL
AST SERPL-CCNC: 26 U/L (ref 0–40)
BASOPHILS # BLD: 0.2 K/UL (ref 0–0.2)
BASOPHILS NFR BLD: 3 %
BILIRUB SERPL-MCNC: 0.5 MG/DL (ref 0.2–0.7)
BILIRUB UR QL STRIP: NEGATIVE
BUN SERPL-MCNC: 26 MG/DL (ref 8–23)
BUN SERPL-MCNC: 29 MG/DL (ref 8–23)
CALCIUM SERPL-MCNC: 9.1 MG/DL (ref 8.5–9.9)
CALCIUM SERPL-MCNC: 9.5 MG/DL (ref 8.5–9.9)
CHLORIDE SERPL-SCNC: 105 MEQ/L (ref 95–107)
CHLORIDE SERPL-SCNC: 110 MEQ/L (ref 95–107)
CLARITY UR: CLEAR
CO2 SERPL-SCNC: 25 MEQ/L (ref 20–31)
CO2 SERPL-SCNC: 25 MEQ/L (ref 20–31)
COLOR UR: YELLOW
CREAT SERPL-MCNC: 1.33 MG/DL (ref 0.7–1.2)
CREAT SERPL-MCNC: 1.47 MG/DL (ref 0.7–1.2)
EOSINOPHIL # BLD: 0.2 K/UL (ref 0–0.7)
EOSINOPHIL NFR BLD: 4 %
ERYTHROCYTE [DISTWIDTH] IN BLOOD BY AUTOMATED COUNT: 18.9 % (ref 11.5–14.5)
GLOBULIN SER CALC-MCNC: 2.7 G/DL (ref 2.3–3.5)
GLUCOSE BLD-MCNC: 120 MG/DL (ref 70–99)
GLUCOSE BLD-MCNC: 127 MG/DL (ref 70–99)
GLUCOSE BLD-MCNC: 176 MG/DL (ref 70–99)
GLUCOSE BLD-MCNC: 40 MG/DL (ref 70–99)
GLUCOSE BLD-MCNC: 82 MG/DL (ref 70–99)
GLUCOSE SERPL-MCNC: 103 MG/DL (ref 70–99)
GLUCOSE SERPL-MCNC: 131 MG/DL (ref 70–99)
GLUCOSE UR STRIP-MCNC: NEGATIVE MG/DL
HCT VFR BLD AUTO: 32.7 % (ref 42–52)
HGB BLD-MCNC: 9.7 G/DL (ref 14–18)
HGB UR QL STRIP: NEGATIVE
INR PPP: 1.2
KETONES UR STRIP-MCNC: NEGATIVE MG/DL
LEUKOCYTE ESTERASE UR QL STRIP: NEGATIVE
LIPASE SERPL-CCNC: 23 U/L (ref 12–95)
LYMPHOCYTES # BLD: 1 K/UL (ref 1–4.8)
LYMPHOCYTES NFR BLD: 11 %
MAGNESIUM SERPL-MCNC: 2 MG/DL (ref 1.7–2.4)
MAGNESIUM SERPL-MCNC: 2.1 MG/DL (ref 1.7–2.4)
MCH RBC QN AUTO: 27.6 PG (ref 27–31.3)
MCHC RBC AUTO-ENTMCNC: 29.7 % (ref 33–37)
MCV RBC AUTO: 92.9 FL (ref 79–92.2)
MONOCYTES # BLD: 0.5 K/UL (ref 0.2–0.8)
MONOCYTES NFR BLD: 7.6 %
NEUTROPHILS # BLD: 3.9 K/UL (ref 1.4–6.5)
NEUTS SEG NFR BLD: 68 %
NITRITE UR QL STRIP: NEGATIVE
NRBC BLD-RTO: 1 /100 WBC
OVALOCYTES BLD QL SMEAR: ABNORMAL
PATH INTERP BLD-IMP: YES
PERFORMED ON: ABNORMAL
PERFORMED ON: NORMAL
PH UR STRIP: 8 [PH] (ref 5–9)
PLATELET # BLD AUTO: 120 K/UL (ref 130–400)
PLATELET BLD QL SMEAR: ABNORMAL
POTASSIUM SERPL-SCNC: 4.7 MEQ/L (ref 3.4–4.9)
POTASSIUM SERPL-SCNC: 6.3 MEQ/L (ref 3.4–4.9)
POTASSIUM SERPL-SCNC: 6.4 MEQ/L (ref 3.4–4.9)
PROT SERPL-MCNC: 7.2 G/DL (ref 6.3–8)
PROT UR STRIP-MCNC: NEGATIVE MG/DL
PROTHROMBIN TIME: 15.1 SEC (ref 12.3–14.9)
RBC # BLD AUTO: 3.52 M/UL (ref 4.7–6.1)
SCHISTOCYTES BLD QL SMEAR: ABNORMAL
SLIDE REVIEW: ABNORMAL
SMUDGE CELLS BLD QL SMEAR: 6.7
SODIUM SERPL-SCNC: 143 MEQ/L (ref 135–144)
SODIUM SERPL-SCNC: 143 MEQ/L (ref 135–144)
SP GR UR STRIP: 1.01 (ref 1–1.03)
T4 FREE SERPL-MCNC: 0.99 NG/DL (ref 0.84–1.68)
TROPONIN, HIGH SENSITIVITY: 18 NG/L (ref 0–19)
TROPONIN, HIGH SENSITIVITY: 23 NG/L (ref 0–19)
TROPONIN, HIGH SENSITIVITY: 24 NG/L (ref 0–19)
TSH SERPL-MCNC: 1.57 UIU/ML (ref 0.44–3.86)
URINE REFLEX TO CULTURE: NORMAL
UROBILINOGEN UR STRIP-ACNC: 0.2 E.U./DL
VARIANT LYMPHS NFR BLD: 7 %
WBC # BLD AUTO: 5.8 K/UL (ref 4.8–10.8)

## 2024-05-04 PROCEDURE — 2500000003 HC RX 250 WO HCPCS

## 2024-05-04 PROCEDURE — 85610 PROTHROMBIN TIME: CPT

## 2024-05-04 PROCEDURE — 83690 ASSAY OF LIPASE: CPT

## 2024-05-04 PROCEDURE — 99285 EMERGENCY DEPT VISIT HI MDM: CPT

## 2024-05-04 PROCEDURE — 2580000003 HC RX 258

## 2024-05-04 PROCEDURE — 84132 ASSAY OF SERUM POTASSIUM: CPT

## 2024-05-04 PROCEDURE — 83735 ASSAY OF MAGNESIUM: CPT

## 2024-05-04 PROCEDURE — 93005 ELECTROCARDIOGRAM TRACING: CPT

## 2024-05-04 PROCEDURE — 80053 COMPREHEN METABOLIC PANEL: CPT

## 2024-05-04 PROCEDURE — 84439 ASSAY OF FREE THYROXINE: CPT

## 2024-05-04 PROCEDURE — 6370000000 HC RX 637 (ALT 250 FOR IP)

## 2024-05-04 PROCEDURE — 85025 COMPLETE CBC W/AUTO DIFF WBC: CPT

## 2024-05-04 PROCEDURE — 70450 CT HEAD/BRAIN W/O DYE: CPT

## 2024-05-04 PROCEDURE — 6370000000 HC RX 637 (ALT 250 FOR IP): Performed by: NURSE PRACTITIONER

## 2024-05-04 PROCEDURE — 71045 X-RAY EXAM CHEST 1 VIEW: CPT

## 2024-05-04 PROCEDURE — 36415 COLL VENOUS BLD VENIPUNCTURE: CPT

## 2024-05-04 PROCEDURE — 84443 ASSAY THYROID STIM HORMONE: CPT

## 2024-05-04 PROCEDURE — 1210000000 HC MED SURG R&B

## 2024-05-04 PROCEDURE — 81003 URINALYSIS AUTO W/O SCOPE: CPT

## 2024-05-04 PROCEDURE — 84484 ASSAY OF TROPONIN QUANT: CPT

## 2024-05-04 PROCEDURE — 6360000002 HC RX W HCPCS

## 2024-05-04 RX ORDER — SODIUM CHLORIDE 9 MG/ML
INJECTION, SOLUTION INTRAVENOUS CONTINUOUS
Status: DISCONTINUED | OUTPATIENT
Start: 2024-05-04 | End: 2024-05-04

## 2024-05-04 RX ORDER — ALBUTEROL SULFATE 2.5 MG/3ML
10 SOLUTION RESPIRATORY (INHALATION) ONCE
Status: DISCONTINUED | OUTPATIENT
Start: 2024-05-04 | End: 2024-05-04

## 2024-05-04 RX ORDER — BUPROPION HYDROCHLORIDE 150 MG/1
150 TABLET ORAL DAILY
Status: DISCONTINUED | OUTPATIENT
Start: 2024-05-05 | End: 2024-05-05 | Stop reason: HOSPADM

## 2024-05-04 RX ORDER — POLYETHYLENE GLYCOL 3350 17 G/17G
17 POWDER, FOR SOLUTION ORAL DAILY PRN
Status: DISCONTINUED | OUTPATIENT
Start: 2024-05-04 | End: 2024-05-05 | Stop reason: HOSPADM

## 2024-05-04 RX ORDER — AMLODIPINE BESYLATE 5 MG/1
5 TABLET ORAL DAILY
Status: DISCONTINUED | OUTPATIENT
Start: 2024-05-05 | End: 2024-05-05 | Stop reason: HOSPADM

## 2024-05-04 RX ORDER — SODIUM CHLORIDE 0.9 % (FLUSH) 0.9 %
5-40 SYRINGE (ML) INJECTION PRN
Status: DISCONTINUED | OUTPATIENT
Start: 2024-05-04 | End: 2024-05-05 | Stop reason: HOSPADM

## 2024-05-04 RX ORDER — ASPIRIN 81 MG/1
81 TABLET ORAL DAILY
Status: DISCONTINUED | OUTPATIENT
Start: 2024-05-05 | End: 2024-05-05 | Stop reason: HOSPADM

## 2024-05-04 RX ORDER — DEXTROSE MONOHYDRATE 100 MG/ML
INJECTION, SOLUTION INTRAVENOUS CONTINUOUS PRN
Status: DISCONTINUED | OUTPATIENT
Start: 2024-05-04 | End: 2024-05-05 | Stop reason: HOSPADM

## 2024-05-04 RX ORDER — INSULIN LISPRO 100 [IU]/ML
0-4 INJECTION, SOLUTION INTRAVENOUS; SUBCUTANEOUS NIGHTLY
Status: DISCONTINUED | OUTPATIENT
Start: 2024-05-04 | End: 2024-05-05 | Stop reason: HOSPADM

## 2024-05-04 RX ORDER — SODIUM CHLORIDE 0.9 % (FLUSH) 0.9 %
5-40 SYRINGE (ML) INJECTION EVERY 12 HOURS SCHEDULED
Status: DISCONTINUED | OUTPATIENT
Start: 2024-05-04 | End: 2024-05-05 | Stop reason: HOSPADM

## 2024-05-04 RX ORDER — GLUCAGON 1 MG/ML
1 KIT INJECTION PRN
Status: DISCONTINUED | OUTPATIENT
Start: 2024-05-04 | End: 2024-05-04 | Stop reason: SDUPTHER

## 2024-05-04 RX ORDER — ONDANSETRON 2 MG/ML
4 INJECTION INTRAMUSCULAR; INTRAVENOUS EVERY 6 HOURS PRN
Status: DISCONTINUED | OUTPATIENT
Start: 2024-05-04 | End: 2024-05-05 | Stop reason: HOSPADM

## 2024-05-04 RX ORDER — FUROSEMIDE 10 MG/ML
40 INJECTION INTRAMUSCULAR; INTRAVENOUS ONCE
Status: COMPLETED | OUTPATIENT
Start: 2024-05-04 | End: 2024-05-04

## 2024-05-04 RX ORDER — GABAPENTIN 300 MG/1
300 CAPSULE ORAL 2 TIMES DAILY
Status: DISCONTINUED | OUTPATIENT
Start: 2024-05-04 | End: 2024-05-05 | Stop reason: HOSPADM

## 2024-05-04 RX ORDER — ONDANSETRON 4 MG/1
4 TABLET, ORALLY DISINTEGRATING ORAL EVERY 8 HOURS PRN
Status: DISCONTINUED | OUTPATIENT
Start: 2024-05-04 | End: 2024-05-05 | Stop reason: HOSPADM

## 2024-05-04 RX ORDER — ISOSORBIDE MONONITRATE 30 MG/1
30 TABLET, EXTENDED RELEASE ORAL DAILY
Status: DISCONTINUED | OUTPATIENT
Start: 2024-05-05 | End: 2024-05-05 | Stop reason: HOSPADM

## 2024-05-04 RX ORDER — ACETAMINOPHEN 650 MG/1
650 SUPPOSITORY RECTAL EVERY 6 HOURS PRN
Status: DISCONTINUED | OUTPATIENT
Start: 2024-05-04 | End: 2024-05-05 | Stop reason: HOSPADM

## 2024-05-04 RX ORDER — INSULIN LISPRO 100 [IU]/ML
0-8 INJECTION, SOLUTION INTRAVENOUS; SUBCUTANEOUS
Status: DISCONTINUED | OUTPATIENT
Start: 2024-05-05 | End: 2024-05-05 | Stop reason: HOSPADM

## 2024-05-04 RX ORDER — SODIUM CHLORIDE 9 MG/ML
INJECTION, SOLUTION INTRAVENOUS PRN
Status: DISCONTINUED | OUTPATIENT
Start: 2024-05-04 | End: 2024-05-05 | Stop reason: HOSPADM

## 2024-05-04 RX ORDER — ACETAMINOPHEN 325 MG/1
650 TABLET ORAL EVERY 6 HOURS PRN
Status: DISCONTINUED | OUTPATIENT
Start: 2024-05-04 | End: 2024-05-05 | Stop reason: HOSPADM

## 2024-05-04 RX ORDER — DEXTROSE MONOHYDRATE 100 MG/ML
INJECTION, SOLUTION INTRAVENOUS CONTINUOUS PRN
Status: DISCONTINUED | OUTPATIENT
Start: 2024-05-04 | End: 2024-05-04 | Stop reason: SDUPTHER

## 2024-05-04 RX ORDER — GLUCAGON 1 MG/ML
1 KIT INJECTION PRN
Status: DISCONTINUED | OUTPATIENT
Start: 2024-05-04 | End: 2024-05-05 | Stop reason: HOSPADM

## 2024-05-04 RX ORDER — ALLOPURINOL 100 MG/1
100 TABLET ORAL DAILY
Status: DISCONTINUED | OUTPATIENT
Start: 2024-05-05 | End: 2024-05-05 | Stop reason: HOSPADM

## 2024-05-04 RX ORDER — ATORVASTATIN CALCIUM 80 MG/1
80 TABLET, FILM COATED ORAL NIGHTLY
Status: DISCONTINUED | OUTPATIENT
Start: 2024-05-04 | End: 2024-05-05 | Stop reason: HOSPADM

## 2024-05-04 RX ORDER — SODIUM CHLORIDE 9 MG/ML
INJECTION, SOLUTION INTRAVENOUS CONTINUOUS
Status: DISPENSED | OUTPATIENT
Start: 2024-05-04 | End: 2024-05-05

## 2024-05-04 RX ADMIN — SODIUM ZIRCONIUM CYCLOSILICATE 10 G: 5 POWDER, FOR SUSPENSION ORAL at 20:18

## 2024-05-04 RX ADMIN — GABAPENTIN 300 MG: 300 CAPSULE ORAL at 23:18

## 2024-05-04 RX ADMIN — DEXTROSE MONOHYDRATE 250 ML: 100 INJECTION, SOLUTION INTRAVENOUS at 21:05

## 2024-05-04 RX ADMIN — CALCIUM GLUCONATE 1000 MG: 98 INJECTION, SOLUTION INTRAVENOUS at 20:35

## 2024-05-04 RX ADMIN — SODIUM BICARBONATE 50 MEQ: 84 INJECTION, SOLUTION INTRAVENOUS at 20:18

## 2024-05-04 RX ADMIN — ATORVASTATIN CALCIUM 80 MG: 80 TABLET, FILM COATED ORAL at 23:18

## 2024-05-04 RX ADMIN — FUROSEMIDE 40 MG: 10 INJECTION, SOLUTION INTRAMUSCULAR; INTRAVENOUS at 20:22

## 2024-05-04 RX ADMIN — INSULIN HUMAN 10 UNITS: 100 INJECTION, SOLUTION PARENTERAL at 20:26

## 2024-05-04 ASSESSMENT — LIFESTYLE VARIABLES
HOW MANY STANDARD DRINKS CONTAINING ALCOHOL DO YOU HAVE ON A TYPICAL DAY: PATIENT DOES NOT DRINK
HOW OFTEN DO YOU HAVE A DRINK CONTAINING ALCOHOL: NEVER
HOW OFTEN DO YOU HAVE A DRINK CONTAINING ALCOHOL: NEVER
HOW MANY STANDARD DRINKS CONTAINING ALCOHOL DO YOU HAVE ON A TYPICAL DAY: PATIENT DOES NOT DRINK

## 2024-05-04 ASSESSMENT — PAIN - FUNCTIONAL ASSESSMENT: PAIN_FUNCTIONAL_ASSESSMENT: NONE - DENIES PAIN

## 2024-05-04 NOTE — ED TRIAGE NOTES
Patient brought by ems following syncopal episode at [facility]. Fell forward +loc,is on Eliquis. Patient states he took 2 nitro after fall due to palpitations.m Pt alert and oriented x4. C Collar and IV placed by ems

## 2024-05-04 NOTE — ED NOTES
Patient states was at [employer] became dizzy while singing and \"passed out\". Patient state fell forward striking forehead on ground. Patient states is on Eliquis but denies pain. States when woke up patient started having palpitations and took 2 nitro. Denies feeling dizzy or having chest pain at this time. C collar remains in place at this time. Patient denies pain and has not cervical spine pain at this time.

## 2024-05-04 NOTE — ED PROVIDER NOTES
160/71.  Nonfocal neurologic exam.  No significant trauma findings on exam.  Overall well-appearing for age.    EKG shows sinus bradycardia with regular intervals, heart rate 58, no acute ischemia, questionably peaked T waves precordial leads as compared to prior.  High-sensitivity troponins are flat at 24, then 23.  Portable chest x-ray does not show acute cardiopulmonary process.  CT head is without acute trauma process.  Patient has CKD his kidney dysfunction is slightly worse today.  Creatinine 1.47.  BUN 29.  Potassium is elevated at 6.4 no hemolysis.  No acidosis changes.  Will provide hyperkalemia treatment.  Will give Lasix, Lokelma, albuterol, insulin, sodium bicarb, calcium gluconate.      REASSESSMENT   Patient remains stable in status in the ED.  Sitting up and conversant with visitor.  No further complaints.  Patient will be admitted for syncopal event and hyperkalemia. Admitting to Dr. García.      CRITICAL CARE TIME   Total Critical Care time was 0 minutes, excluding separately reportable procedures.  There was a high probability of clinically significant/life threatening deterioration in the patient's condition which required my urgent intervention.      CONSULTS:  None    PROCEDURES:  Unless otherwise noted below, none     Procedures        FINAL IMPRESSION      1. Syncope and collapse    2. Hyperkalemia    3. Chronic kidney disease, unspecified CKD stage          DISPOSITION/PLAN   DISPOSITION Decision To Admit 05/04/2024 07:50:27 PM      PATIENT REFERRED TO:  No follow-up provider specified.    DISCHARGE MEDICATIONS:  New Prescriptions    No medications on file     Controlled Substances Monitoring:          No data to display                (Please note that portions of this note were completed with a voice recognition program.  Efforts were made to edit the dictations but occasionally words are mis-transcribed.)    CARLOS Arias (electronically signed)  Attending Emergency

## 2024-05-05 VITALS
WEIGHT: 201.5 LBS | OXYGEN SATURATION: 100 % | DIASTOLIC BLOOD PRESSURE: 66 MMHG | RESPIRATION RATE: 18 BRPM | HEART RATE: 57 BPM | SYSTOLIC BLOOD PRESSURE: 155 MMHG | HEIGHT: 70 IN | TEMPERATURE: 97.5 F | BODY MASS INDEX: 28.85 KG/M2

## 2024-05-05 LAB
ALBUMIN SERPL-MCNC: 4.3 G/DL (ref 3.5–4.6)
ALP SERPL-CCNC: 92 U/L (ref 35–104)
ALT SERPL-CCNC: 24 U/L (ref 0–41)
ANION GAP SERPL CALCULATED.3IONS-SCNC: 11 MEQ/L (ref 9–15)
AST SERPL-CCNC: 26 U/L (ref 0–40)
BASOPHILS # BLD: 0 K/UL (ref 0–0.2)
BASOPHILS NFR BLD: 0.6 %
BILIRUB SERPL-MCNC: 0.7 MG/DL (ref 0.2–0.7)
BUN SERPL-MCNC: 26 MG/DL (ref 8–23)
CALCIUM SERPL-MCNC: 9.1 MG/DL (ref 8.5–9.9)
CHLORIDE SERPL-SCNC: 108 MEQ/L (ref 95–107)
CO2 SERPL-SCNC: 25 MEQ/L (ref 20–31)
CREAT SERPL-MCNC: 1.33 MG/DL (ref 0.7–1.2)
EOSINOPHIL # BLD: 0.1 K/UL (ref 0–0.7)
EOSINOPHIL NFR BLD: 2.2 %
ERYTHROCYTE [DISTWIDTH] IN BLOOD BY AUTOMATED COUNT: 18.9 % (ref 11.5–14.5)
GLOBULIN SER CALC-MCNC: 2.7 G/DL (ref 2.3–3.5)
GLUCOSE BLD-MCNC: 104 MG/DL (ref 70–99)
GLUCOSE BLD-MCNC: 142 MG/DL (ref 70–99)
GLUCOSE SERPL-MCNC: 102 MG/DL (ref 70–99)
HCT VFR BLD AUTO: 33.3 % (ref 42–52)
HGB BLD-MCNC: 10.1 G/DL (ref 14–18)
LYMPHOCYTES # BLD: 1.2 K/UL (ref 1–4.8)
LYMPHOCYTES NFR BLD: 19.1 %
MAGNESIUM SERPL-MCNC: 1.8 MG/DL (ref 1.7–2.4)
MCH RBC QN AUTO: 27.5 PG (ref 27–31.3)
MCHC RBC AUTO-ENTMCNC: 30.3 % (ref 33–37)
MCV RBC AUTO: 90.7 FL (ref 79–92.2)
MONOCYTES # BLD: 0.6 K/UL (ref 0.2–0.8)
MONOCYTES NFR BLD: 9 %
NEUTROPHILS # BLD: 4.4 K/UL (ref 1.4–6.5)
NEUTS SEG NFR BLD: 68.9 %
PERFORMED ON: ABNORMAL
PERFORMED ON: ABNORMAL
PLATELET # BLD AUTO: 114 K/UL (ref 130–400)
POTASSIUM SERPL-SCNC: 4.7 MEQ/L (ref 3.4–4.9)
PROT SERPL-MCNC: 7 G/DL (ref 6.3–8)
RBC # BLD AUTO: 3.67 M/UL (ref 4.7–6.1)
SODIUM SERPL-SCNC: 144 MEQ/L (ref 135–144)
WBC # BLD AUTO: 6.4 K/UL (ref 4.8–10.8)

## 2024-05-05 PROCEDURE — 2580000003 HC RX 258: Performed by: NURSE PRACTITIONER

## 2024-05-05 PROCEDURE — 6370000000 HC RX 637 (ALT 250 FOR IP): Performed by: NURSE PRACTITIONER

## 2024-05-05 PROCEDURE — 85025 COMPLETE CBC W/AUTO DIFF WBC: CPT

## 2024-05-05 PROCEDURE — 83735 ASSAY OF MAGNESIUM: CPT

## 2024-05-05 PROCEDURE — 36415 COLL VENOUS BLD VENIPUNCTURE: CPT

## 2024-05-05 PROCEDURE — 80053 COMPREHEN METABOLIC PANEL: CPT

## 2024-05-05 RX ADMIN — ISOSORBIDE MONONITRATE 30 MG: 30 TABLET, EXTENDED RELEASE ORAL at 10:25

## 2024-05-05 RX ADMIN — ALLOPURINOL 100 MG: 100 TABLET ORAL at 10:25

## 2024-05-05 RX ADMIN — BUPROPION HYDROCHLORIDE 150 MG: 150 TABLET, EXTENDED RELEASE ORAL at 10:25

## 2024-05-05 RX ADMIN — ASPIRIN 81 MG: 81 TABLET, COATED ORAL at 10:25

## 2024-05-05 RX ADMIN — GABAPENTIN 300 MG: 300 CAPSULE ORAL at 10:25

## 2024-05-05 RX ADMIN — SODIUM CHLORIDE: 9 INJECTION, SOLUTION INTRAVENOUS at 00:18

## 2024-05-05 RX ADMIN — SODIUM CHLORIDE, PRESERVATIVE FREE 10 ML: 5 INJECTION INTRAVENOUS at 10:25

## 2024-05-05 RX ADMIN — AMLODIPINE BESYLATE 5 MG: 5 TABLET ORAL at 10:25

## 2024-05-05 NOTE — H&P
tolerated.  Depression: PT on home meds to control.  Will resume home meds, as tolerated.    Plan of care discussed with: patient    SIGNATURE: SARITA Dorantes - CNP  DATE: May 4, 2024  TIME: 8:57 PM     Que García MD - supervising physician

## 2024-05-05 NOTE — CARE COORDINATION
Case Management Assessment  Initial Evaluation    Date/Time of Evaluation: 5/5/2024 10:54 AM  Assessment Completed by: Angeles Merino RN    If patient is discharged prior to next notation, then this note serves as note for discharge by case management.    Patient Name: Armando Arias                   YOB: 1945  Diagnosis: Syncope and collapse [R55]  Hyperkalemia [E87.5]  Chronic kidney disease, unspecified CKD stage [N18.9]                   Date / Time: 5/4/2024  5:41 PM    Patient Admission Status: Inpatient   Readmission Risk (Low < 19, Mod (19-27), High > 27): Readmission Risk Score: 17.6    Current PCP: Sterling Hall MD  PCP verified by CM? Yes    Chart Reviewed: Yes      History Provided by: Patient  Patient Orientation: Alert and Oriented    Patient Cognition: Alert    Hospitalization in the last 30 days (Readmission):  No    If yes, Readmission Assessment in CM Navigator will be completed.    Advance Directives:      Code Status: Full Code   Patient's Primary Decision Maker is:        Discharge Planning:    Patient lives with: Alone Type of Home: House  Primary Care Giver: Self  Patient Support Systems include: Family Members   Current Financial resources:    Current community resources:    Current services prior to admission: None            Current DME:  NONE            Type of Home Care services:  None    ADLS  Prior functional level: Independent in ADLs/IADLs  Current functional level: Independent in ADLs/IADLs    PT AM-PAC:   /24  OT AM-PAC:   /24    Family can provide assistance at DC: Yes  Would you like Case Management to discuss the discharge plan with any other family members/significant others, and if so, who? No  Plans to Return to Present Housing: Yes  Other Identified Issues/Barriers to RETURNING to current housing: MEDICAL CLEARANCE  Potential Assistance needed at discharge: N/A            Potential DME:    Patient expects to discharge to: House  Plan for transportation at

## 2024-05-05 NOTE — PLAN OF CARE
Problem: Discharge Planning  Goal: Discharge to home or other facility with appropriate resources  Outcome: Progressing  Flowsheets (Taken 5/4/2024 2217 by Kasia Ballesteros, RN)  Discharge to home or other facility with appropriate resources:   Identify barriers to discharge with patient and caregiver   Arrange for needed discharge resources and transportation as appropriate   Identify discharge learning needs (meds, wound care, etc)   Refer to discharge planning if patient needs post-hospital services based on physician order or complex needs related to functional status, cognitive ability or social support system     Problem: Safety - Adult  Goal: Free from fall injury  Outcome: Progressing     Problem: ABCDS Injury Assessment  Goal: Absence of physical injury  Outcome: Progressing

## 2024-05-05 NOTE — DISCHARGE SUMMARY
[facility] Discharge Summary    Armando Arias  :  1945  MRN:  [id number]    Admit date:  2024  Discharge date:  2024    Admitting Physician:  Que García MD  Primary Care Physician:  Sterling Hall MD    Discharge Diagnoses:    Principal Problem:    Syncope and collapse  Resolved Problems:    * No resolved hospital problems. *    Chief Complaint   Patient presents with    Loss of Consciousness       Condition: improved   Activity: no restrictions   Diet: regular  Disposition: home  Functional Status: ambulatory    Significant Findings:     Negative orthostatic vitals        Latest Ref Rng & Units 2024     5:34 AM 2024    10:39 PM 2024     7:26 PM 2024     6:01 PM 2023     9:49 AM   Labs Renal   BUN 8 - 23 mg/dL   24    Cr 0.70 - 1.20 mg/dL 1.33  1.33   1.47  1.24    K 3.4 - 4.9 mEq/L 4.7  4.7  6.3  6.4  5.1    Na 135 - 144 mEq/L 144  143   143  141          Hospital Course:   78-year-old male with history of CAD, prior CVA, CKD, HTN presented after near syncopal event which happened while he was at [employer] sitting in his choir.  In the ED, he was found to have hyperkalemia.    He also states that he usually has breakfast but did not eat anything before going to [employer] in the morning.  The near syncopal episode was felt to be combination of relatively low blood glucose and vasovagal episode triggered by singing.    He did have 1 episode of hypoglycemia during the hospitalization.  Since his A1c is 5.4, his pioglitazone was discontinued.    He remained asymptomatic throughout the hospitalization.  No events were noted on telemetry.    He was counseled to discontinue NSAIDs.  He admitted to eating 5 bananas per day.  He was also counseled to stop eating bananas for the time being because his potassium was so high on admission..  He should have renal function panel rechecked in the next week with close attention to potassium level.

## 2024-05-05 NOTE — ACP (ADVANCE CARE PLANNING)
Advance Care Planning   Healthcare Decision Maker:  Jatinder Mcbride [phone]    Click here to complete Healthcare Decision Makers including selection of the Healthcare Decision Maker Relationship (ie \"Primary\").  Today we documented Decision Maker(s) consistent with Legal Next of Kin hierarchy.   PT STATES HE HAS POA PAPERWORK, REQUESTED IT BE BROUGHT IN.

## 2024-05-05 NOTE — ED NOTES
Report called to HARVEY Chauhan taking over care of pt going to room W176.  No acute distress noted.  Resps even non labored.  Skin p/w/d.  Pt is A&Ox4  Pt currently eating at drinking at this time.  VSS  GCS 15  ABCs intact.

## 2024-05-06 LAB — PATH INTERP BLD-IMP: NORMAL

## 2024-05-08 LAB
EKG ATRIAL RATE: 58 BPM
EKG P AXIS: 40 DEGREES
EKG P-R INTERVAL: 170 MS
EKG Q-T INTERVAL: 428 MS
EKG QRS DURATION: 92 MS
EKG QTC CALCULATION (BAZETT): 420 MS
EKG R AXIS: 33 DEGREES
EKG T AXIS: 33 DEGREES
EKG VENTRICULAR RATE: 58 BPM

## 2024-05-10 ENCOUNTER — OFFICE VISIT (OUTPATIENT)
Dept: CARDIOLOGY CLINIC | Age: 79
End: 2024-05-10
Payer: MEDICARE

## 2024-05-10 VITALS
RESPIRATION RATE: 15 BRPM | HEART RATE: 77 BPM | DIASTOLIC BLOOD PRESSURE: 76 MMHG | WEIGHT: 202 LBS | BODY MASS INDEX: 28.98 KG/M2 | SYSTOLIC BLOOD PRESSURE: 112 MMHG | OXYGEN SATURATION: 97 %

## 2024-05-10 DIAGNOSIS — I65.23 BILATERAL CAROTID ARTERY STENOSIS: ICD-10-CM

## 2024-05-10 DIAGNOSIS — E78.5 DYSLIPIDEMIA: ICD-10-CM

## 2024-05-10 DIAGNOSIS — I70.209 OCCLUSIVE DISEASE OF ARTERY OF LOWER EXTREMITY (HCC): ICD-10-CM

## 2024-05-10 DIAGNOSIS — R07.9 CHEST PAIN, UNSPECIFIED TYPE: ICD-10-CM

## 2024-05-10 DIAGNOSIS — I47.29 NSVT (NONSUSTAINED VENTRICULAR TACHYCARDIA) (HCC): ICD-10-CM

## 2024-05-10 DIAGNOSIS — I25.119 CORONARY ARTERY DISEASE INVOLVING NATIVE CORONARY ARTERY OF NATIVE HEART WITH ANGINA PECTORIS (HCC): Primary | ICD-10-CM

## 2024-05-10 DIAGNOSIS — I73.9 CLAUDICATION (HCC): ICD-10-CM

## 2024-05-10 DIAGNOSIS — R09.89 BILATERAL CAROTID BRUITS: ICD-10-CM

## 2024-05-10 DIAGNOSIS — I73.9 PERIPHERAL VASCULAR DISEASE (HCC): ICD-10-CM

## 2024-05-10 DIAGNOSIS — I25.10 CORONARY ARTERY DISEASE INVOLVING NATIVE CORONARY ARTERY OF NATIVE HEART WITHOUT ANGINA PECTORIS: ICD-10-CM

## 2024-05-10 PROCEDURE — 1123F ACP DISCUSS/DSCN MKR DOCD: CPT | Performed by: INTERNAL MEDICINE

## 2024-05-10 PROCEDURE — 3074F SYST BP LT 130 MM HG: CPT | Performed by: INTERNAL MEDICINE

## 2024-05-10 PROCEDURE — 99214 OFFICE O/P EST MOD 30 MIN: CPT | Performed by: INTERNAL MEDICINE

## 2024-05-10 PROCEDURE — 3078F DIAST BP <80 MM HG: CPT | Performed by: INTERNAL MEDICINE

## 2024-05-10 ASSESSMENT — ENCOUNTER SYMPTOMS
SHORTNESS OF BREATH: 0
CHEST TIGHTNESS: 1
STRIDOR: 0
EYES NEGATIVE: 1
GASTROINTESTINAL NEGATIVE: 1
COUGH: 0
NAUSEA: 0
BLOOD IN STOOL: 0
WHEEZING: 0

## 2024-05-10 NOTE — PROGRESS NOTES
tachycardia) ([facility])    Coronary artery disease involving native coronary artery of native heart with angina pectoris (HCC)    Other hyperlipidemia    Coronary artery disease involving native coronary artery of native heart without angina pectoris    Pain in right knee    Unilateral primary osteoarthritis, right knee    Unilateral primary osteoarthritis, right knee    Anemia of unknown etiology    Bradycardia    Right flank pain    Syncope and collapse    Carotid stenosis, left    Chest pain    Abnormal ankle brachial index    Epididymitis    Claudication ([facility])    Cerebrovascular accident (CVA) due to stenosis of left middle cerebral artery ([facility])    Stroke-like symptoms    Arthralgia of shoulder    Callus    Edema of both lower legs due to peripheral venous insufficiency    Edema of lower extremity    Onychomycosis    Pain in both feet    Peripheral vascular disease of foot ([facility])    Type 2 diabetes mellitus with peripheral neuropathy ([facility])    Ulcer of right leg, limited to breakdown of skin ([facility])    Xerosis of skin       There are no discontinued medications.        Modified Medications    No medications on file       No orders of the defined types were placed in this encounter.      Assessment/Plan:    1. Coronary artery disease involving native coronary artery - nocturnal and early am angina- add Imdur 30 stable no angian     Still w some anigna. Possibly stress related. Will add Imdur  2. Essential hypertension   BP low repeat better.      3. Peripheral vascular disease (HCC) - on Eliquis.    Stable claudication - continue meds rx. Walk qd. - if severe then will reconsider options.  His Left to right Fem bypass graft is patent but has B/L Distal pathology- continue Eliquis. ASA.  walk daily.  Walk daiily.  Seen  and angio done and opted for Med Jayesh.  Will refer to Dr. Patrick for opinion.     4. Angina, class III ([facility])    5. Bilateral carotid artery stenosis   Will order CUS now stable      6. Occlusive

## 2024-06-06 PROBLEM — I63.9 ACUTE CVA (CEREBROVASCULAR ACCIDENT) (HCC): Status: ACTIVE | Noted: 2024-06-06

## 2024-06-12 ENCOUNTER — OFFICE VISIT (OUTPATIENT)
Dept: NEUROLOGY | Age: 79
End: 2024-06-12
Payer: MEDICARE

## 2024-06-12 VITALS
BODY MASS INDEX: 28.41 KG/M2 | HEART RATE: 64 BPM | WEIGHT: 198 LBS | SYSTOLIC BLOOD PRESSURE: 159 MMHG | DIASTOLIC BLOOD PRESSURE: 77 MMHG

## 2024-06-12 DIAGNOSIS — I73.9 PERIPHERAL VASCULAR DISEASE OF FOOT (HCC): ICD-10-CM

## 2024-06-12 DIAGNOSIS — I63.512 CEREBROVASCULAR ACCIDENT (CVA) DUE TO STENOSIS OF LEFT MIDDLE CEREBRAL ARTERY (HCC): Primary | ICD-10-CM

## 2024-06-12 DIAGNOSIS — Z98.890 H/O CAROTID ENDARTERECTOMY: ICD-10-CM

## 2024-06-12 DIAGNOSIS — I65.22 CAROTID STENOSIS, LEFT: ICD-10-CM

## 2024-06-12 PROCEDURE — 99214 OFFICE O/P EST MOD 30 MIN: CPT | Performed by: PSYCHIATRY & NEUROLOGY

## 2024-06-12 PROCEDURE — 1123F ACP DISCUSS/DSCN MKR DOCD: CPT | Performed by: PSYCHIATRY & NEUROLOGY

## 2024-06-12 PROCEDURE — 3078F DIAST BP <80 MM HG: CPT | Performed by: PSYCHIATRY & NEUROLOGY

## 2024-06-12 PROCEDURE — 3077F SYST BP >= 140 MM HG: CPT | Performed by: PSYCHIATRY & NEUROLOGY

## 2024-06-12 RX ORDER — ATENOLOL 50 MG/1
TABLET ORAL
COMMUNITY
Start: 2024-06-06

## 2024-06-12 NOTE — PROGRESS NOTES
09:51 AM    LABGLOM 54.1 06/06/2024 07:32 AM    LABGLOM 59.0 12/29/2023 09:49 AM    GLUCOSE 114 06/06/2024 07:32 AM    GLUCOSE 111 11/08/2011 10:48 AM    CALCIUM 9.2 06/06/2024 07:32 AM    BILITOT 0.7 05/05/2024 05:34 AM    ALKPHOS 92 05/05/2024 05:34 AM    AST 26 05/05/2024 05:34 AM    ALT 24 05/05/2024 05:34 AM     Lab Results   Component Value Date/Time    PROTIME 15.1 05/04/2024 06:01 PM    PROTIME 10.7 11/04/2011 02:55 PM    INR 1.2 05/04/2024 06:01 PM     Lab Results   Component Value Date/Time    TSH 1.570 05/04/2024 06:01 PM    EYTWLZMD76 404 09/20/2021 08:10 AM    FOLATE 8.5 09/20/2021 08:10 AM    FERRITIN 652.3 04/12/2019 01:51 PM    IRON 78 04/12/2019 01:51 PM    TIBC 284 04/12/2019 01:51 PM     Lab Results   Component Value Date/Time    TRIG 84 06/06/2024 07:32 AM    HDL 59 06/06/2024 07:32 AM     No results found for: \"LABAMPH\", \"BARBSCNU\", \"LABBENZ\", \"CANNAB\", \"LABMETH\", \"PPXUR\", \"ETOH\"  No results found for: \"LITHIUM\", \"DILFRTOT\", \"VALPROATE\"    Assessment:       Diagnosis Orders   1. Cerebrovascular accident (CVA) due to stenosis of left middle cerebral artery (HCC)  Vascular duplex carotid bilateral      2. Carotid stenosis, left        3. Peripheral vascular disease of foot (HCC)        4. H/O carotid endarterectomy        Left cerebral stroke with almost complete recovery.  Patient's walking is improved his balance is somewhat better.  He is on a combination of Eliquis and aspirin for multiple risk factors for cerebrovascular disease.  No atrial fibrillation has been reported though likely to be paroxysmal atrial fibrillation.  No bleeding has occurred.    Patient had a carotid endarterectomy on the left in 2020 recommended a follow-up 1 year from now on.  Patient is considerable back issues on gabapentin.  He has numbness and tingling with a suggestion  of peripheral vascular disease.  Overall he is doing better from our and.  Will see him in a year    Sid Boston MD, FAHA  Diplomate,

## 2024-06-17 ENCOUNTER — TRANSCRIBE ORDERS (OUTPATIENT)
Dept: ADMINISTRATIVE | Age: 79
End: 2024-06-17

## 2024-06-17 DIAGNOSIS — I70.391: Primary | ICD-10-CM

## 2024-06-17 DIAGNOSIS — I74.3 EMBOLISM AND THROMBOSIS OF ARTERIES OF LOWER EXTREMITY (HCC): ICD-10-CM

## 2024-06-23 ENCOUNTER — APPOINTMENT (OUTPATIENT)
Dept: CT IMAGING | Age: 79
End: 2024-06-23
Attending: FAMILY MEDICINE
Payer: MEDICARE

## 2024-06-23 ENCOUNTER — HOSPITAL ENCOUNTER (EMERGENCY)
Age: 79
Discharge: HOME | End: 2024-06-23
Attending: FAMILY MEDICINE
Payer: MEDICARE

## 2024-06-23 VITALS
WEIGHT: 198 LBS | HEART RATE: 60 BPM | BODY MASS INDEX: 28.35 KG/M2 | SYSTOLIC BLOOD PRESSURE: 168 MMHG | TEMPERATURE: 98 F | DIASTOLIC BLOOD PRESSURE: 75 MMHG | HEIGHT: 70 IN | OXYGEN SATURATION: 100 % | RESPIRATION RATE: 18 BRPM

## 2024-06-23 DIAGNOSIS — R55 SYNCOPE AND COLLAPSE: Primary | ICD-10-CM

## 2024-06-23 LAB
ABO + RH BLD: NORMAL
ALBUMIN SERPL-MCNC: 3.8 G/DL (ref 3.5–4.6)
ALP SERPL-CCNC: 87 U/L (ref 35–104)
ALT SERPL-CCNC: 14 U/L (ref 0–41)
ANION GAP SERPL CALCULATED.3IONS-SCNC: 7 MEQ/L (ref 9–15)
ANISOCYTOSIS BLD QL SMEAR: ABNORMAL
AST SERPL-CCNC: 18 U/L (ref 0–40)
BASOPHILS # BLD: 0.2 K/UL (ref 0–0.2)
BASOPHILS NFR BLD: 2 %
BILIRUB SERPL-MCNC: 0.8 MG/DL (ref 0.2–0.7)
BILIRUB UR QL STRIP: NEGATIVE
BLD GP AB SCN SERPL QL: NORMAL
BNP BLD-MCNC: 226 PG/ML
BUN SERPL-MCNC: 23 MG/DL (ref 8–23)
CALCIUM SERPL-MCNC: 8.3 MG/DL (ref 8.5–9.9)
CHLORIDE SERPL-SCNC: 103 MEQ/L (ref 95–107)
CK SERPL-CCNC: 118 U/L (ref 0–190)
CLARITY UR: CLEAR
CO2 SERPL-SCNC: 24 MEQ/L (ref 20–31)
COLOR UR: YELLOW
CREAT SERPL-MCNC: 1.46 MG/DL (ref 0.7–1.2)
DACRYOCYTES BLD QL SMEAR: ABNORMAL
EOSINOPHIL # BLD: 0 K/UL (ref 0–0.7)
EOSINOPHIL NFR BLD: 1.1 %
ERYTHROCYTE [DISTWIDTH] IN BLOOD BY AUTOMATED COUNT: 18.8 % (ref 11.5–14.5)
ETHANOL PERCENT: NORMAL G/DL
ETHANOLAMINE SERPL-MCNC: <10 MG/DL (ref 0–0.08)
GLOBULIN SER CALC-MCNC: 2.6 G/DL (ref 2.3–3.5)
GLUCOSE BLD-MCNC: 141 MG/DL (ref 70–99)
GLUCOSE SERPL-MCNC: 142 MG/DL (ref 70–99)
GLUCOSE UR STRIP-MCNC: NEGATIVE MG/DL
HCT VFR BLD AUTO: 29.3 % (ref 42–52)
HGB BLD-MCNC: 9.3 G/DL (ref 14–18)
HGB UR QL STRIP: NEGATIVE
INR PPP: 1.4
KETONES UR STRIP-MCNC: NEGATIVE MG/DL
LACTIC ACID, SEPSIS: 1 MMOL/L (ref 0.5–1.9)
LACTIC ACID, SEPSIS: 1.8 MMOL/L (ref 0.5–1.9)
LEUKOCYTE ESTERASE UR QL STRIP: NEGATIVE
LIPASE SERPL-CCNC: 14 U/L (ref 12–95)
LYMPHOCYTES # BLD: 0.9 K/UL (ref 1–4.8)
LYMPHOCYTES NFR BLD: 8 %
MCH RBC QN AUTO: 28.4 PG (ref 27–31.3)
MCHC RBC AUTO-ENTMCNC: 31.7 % (ref 33–37)
MCV RBC AUTO: 89.6 FL (ref 79–92.2)
MICROCYTES BLD QL SMEAR: ABNORMAL
MONOCYTES # BLD: 0.2 K/UL (ref 0.2–0.8)
MONOCYTES NFR BLD: 1.9 %
NEUTROPHILS # BLD: 9.2 K/UL (ref 1.4–6.5)
NEUTS SEG NFR BLD: 88 %
NITRITE UR QL STRIP: NEGATIVE
OVALOCYTES BLD QL SMEAR: ABNORMAL
PATH INTERP BLD-IMP: YES
PERFORMED ON: ABNORMAL
PH UR STRIP: 7.5 [PH] (ref 5–9)
PLATELET # BLD AUTO: 131 K/UL (ref 130–400)
PLATELET BLD QL SMEAR: ADEQUATE
POIKILOCYTOSIS BLD QL SMEAR: ABNORMAL
POTASSIUM SERPL-SCNC: 4.9 MEQ/L (ref 3.4–4.9)
PROT SERPL-MCNC: 6.4 G/DL (ref 6.3–8)
PROT UR STRIP-MCNC: NEGATIVE MG/DL
PROTHROMBIN TIME: 16.9 SEC (ref 12.3–14.9)
RBC # BLD AUTO: 3.27 M/UL (ref 4.7–6.1)
SCHISTOCYTES BLD QL SMEAR: ABNORMAL
SLIDE REVIEW: ABNORMAL
SMUDGE CELLS BLD QL SMEAR: 9.6
SODIUM SERPL-SCNC: 134 MEQ/L (ref 135–144)
SP GR UR STRIP: 1.03 (ref 1–1.03)
TROPONIN, HIGH SENSITIVITY: 23 NG/L (ref 0–19)
TROPONIN, HIGH SENSITIVITY: 24 NG/L (ref 0–19)
URINE REFLEX TO CULTURE: NORMAL
UROBILINOGEN UR STRIP-ACNC: 0.2 E.U./DL
VARIANT LYMPHS NFR BLD: 1 %
WBC # BLD AUTO: 10.5 K/UL (ref 4.8–10.8)

## 2024-06-23 PROCEDURE — 6360000004 HC RX CONTRAST MEDICATION: Performed by: FAMILY MEDICINE

## 2024-06-23 PROCEDURE — 72131 CT LUMBAR SPINE W/O DYE: CPT

## 2024-06-23 PROCEDURE — 83605 ASSAY OF LACTIC ACID: CPT

## 2024-06-23 PROCEDURE — 85025 COMPLETE CBC W/AUTO DIFF WBC: CPT

## 2024-06-23 PROCEDURE — 82550 ASSAY OF CK (CPK): CPT

## 2024-06-23 PROCEDURE — 86901 BLOOD TYPING SEROLOGIC RH(D): CPT

## 2024-06-23 PROCEDURE — 70496 CT ANGIOGRAPHY HEAD: CPT

## 2024-06-23 PROCEDURE — 80053 COMPREHEN METABOLIC PANEL: CPT

## 2024-06-23 PROCEDURE — 81003 URINALYSIS AUTO W/O SCOPE: CPT

## 2024-06-23 PROCEDURE — 99285 EMERGENCY DEPT VISIT HI MDM: CPT

## 2024-06-23 PROCEDURE — 85610 PROTHROMBIN TIME: CPT

## 2024-06-23 PROCEDURE — 82077 ASSAY SPEC XCP UR&BREATH IA: CPT

## 2024-06-23 PROCEDURE — 72125 CT NECK SPINE W/O DYE: CPT

## 2024-06-23 PROCEDURE — 70498 CT ANGIOGRAPHY NECK: CPT

## 2024-06-23 PROCEDURE — 72128 CT CHEST SPINE W/O DYE: CPT

## 2024-06-23 PROCEDURE — 99284 EMERGENCY DEPT VISIT MOD MDM: CPT | Performed by: PHYSICIAN ASSISTANT

## 2024-06-23 PROCEDURE — 93005 ELECTROCARDIOGRAM TRACING: CPT | Performed by: FAMILY MEDICINE

## 2024-06-23 PROCEDURE — 86850 RBC ANTIBODY SCREEN: CPT

## 2024-06-23 PROCEDURE — 36415 COLL VENOUS BLD VENIPUNCTURE: CPT

## 2024-06-23 PROCEDURE — 70450 CT HEAD/BRAIN W/O DYE: CPT

## 2024-06-23 PROCEDURE — 71260 CT THORAX DX C+: CPT

## 2024-06-23 PROCEDURE — 83690 ASSAY OF LIPASE: CPT

## 2024-06-23 PROCEDURE — 83880 ASSAY OF NATRIURETIC PEPTIDE: CPT

## 2024-06-23 PROCEDURE — 74177 CT ABD & PELVIS W/CONTRAST: CPT

## 2024-06-23 PROCEDURE — 86900 BLOOD TYPING SEROLOGIC ABO: CPT

## 2024-06-23 PROCEDURE — 84484 ASSAY OF TROPONIN QUANT: CPT

## 2024-06-23 RX ADMIN — IOPAMIDOL 150 ML: 755 INJECTION, SOLUTION INTRAVENOUS at 13:20

## 2024-06-23 ASSESSMENT — LIFESTYLE VARIABLES
HOW OFTEN DO YOU HAVE A DRINK CONTAINING ALCOHOL: NEVER
HOW MANY STANDARD DRINKS CONTAINING ALCOHOL DO YOU HAVE ON A TYPICAL DAY: PATIENT DOES NOT DRINK

## 2024-06-23 NOTE — CONSULTS
Trauma Consult / H & P Note    Reason for Consult: Trauma  Consulting Provider: Esperanza Cleveland MD      BASIC INJURY INFORMATION:  Level of activation: CAT 1 downgraded to CAT 2  Mode of transport: Personal vehicle  Mechanism of injury: Fall from Standing  Complicating features: NA  Protective measures: NA    HISTORY OF PRESENT INJURY:   Armando Arias is a 78 y.o. male with a PMHx of CAD, prior CVA (10/2023, patient stopped taking Eliquis BID), CKD, HTN, DM2, gout, neuropathy, occlusive disease of lower extremity artery, claudication, PAD (s/p femoral-femoral bypass graft), NSVT, osteoarthritis, syncope. Patient presents to [facility] ED today s/p syncopal FFS at [facility]. Patient's grandson reports that patient syncopized, fell backwards, and hit head on concrete. Remained unconscious for brief but unknown period of time. Patient awoke at the scene and his grandson transported him by private vehicle to the ED. En route, patient's grandson reports that patient was awake but drowsy and was mumbling words that were difficult to understand/incoherent at times. Endorsed chest pain and was administered a home nitroglycerin tablet. Upon arrival, patient became nauseous in triage, diaphoretic, and again syncopized while in the wheelchair. CAT 1 trauma was called in setting of recent trauma with head strike and poor GCS.     Patient in CT scanner at time of CAT 1 page. On brief/initial evaluation while in scanner, patient opens his eyes to voice, vocalizes orientation to self, and follows commands. Dr. Gonzalez discussed patient with Dr. Cleveland and downgraded patient to CAT 2.     Once back in ED room, trauma evaluation completed. Patient is awake but drowsy. He does answer questions (including details about medical history) appropriately. He says that he remembers talking to a lady in the parking lot and waking up on the concrete. Does not remember falling. Denies lightheadedness, chest pain, SOB, numbness/tingling, vision

## 2024-06-23 NOTE — ED TRIAGE NOTES
Pt was brought into the ED by irlanda who states the patient fell in parking lot at [employer] and hit his head within the hour. Per grandceci, the patient began to have increased lethargy, foaming at the mouth, complaining of nausea and chest pain upon arrival to ED. Pt is on Eliquis and took one nitro en route. Pt is currently A&Ox1. Per gil, patient's baseline is A&Ox4.

## 2024-06-23 NOTE — PROGRESS NOTES
Spiritual Care Services     Summary of Visit:     responded to trauma call to Er, Pt at Cat scan, pt grandson brought his grandfather who fell at his [employer] in the parking lot, patient's grandson appeared a little anxious, on going spiritual health care as needed       Encounter Summary  Encounter Overview/Reason: Crisis  Service Provided For: Family, Patient  Referral/Consult From: Physician  Support System: Children, Family members, Alev[facility]/isaias community  Complexity of Encounter: Low  Begin Time: 1255  End Time : 1305  Total Time Calculated: 10 min     Crisis  Type: Trauma  Spiritual/Emotional needs  Type: Spiritual Support                      Spiritual Assessment/Intervention/Outcomes:    Assessment: Calm    Intervention: Other (Comment)    Outcome: Comfort      Care Plan:    Plan and Referrals  Plan/Referrals: Continue to visit, (comment)          Spiritual Care Services   Electronically signed by Chaplain Angelo on 6/23/2024 at 1:20 PM.    To reach a  for emotional and spiritual support, place an EPIC consult request.   If a  is needed immediately, dial “0” and ask to page the on-call .

## 2024-06-23 NOTE — CARE COORDINATION
I was asked to call pt's grandson Jatinder re: pt not wanting to stay to be admited, wants to go home. laura carpenter. had to leave message.    Electronically signed by Pam Lucas RN, BSN on 6/23/2024 at 3:37 PM

## 2024-06-23 NOTE — ED PROVIDER NOTES
[facility] ED  eMERGENCY dEPARTMENT eNCOUnter      Pt Name: Armando Arias  MRN: [id number]  Birthdate 1945  Date of evaluation: 6/23/2024  Provider: TROY ARTIS MD  5:59 PM EDT     CHIEF COMPLAINT       Chief Complaint   Patient presents with    Loss of Consciousness         HISTORY OF PRESENT ILLNESS   (Location/Symptom, Timing/Onset,Context/Setting, Quality, Duration, Modifying Factors, Severity)  Note limiting factors.   Armando Arias is a 78 y.o. male who presents to the emergency department syncope and head injury      Armando Arias is a 78 y.o. male past medical history of anemia, coronary artery disease, carotid artery stenosis, diabetes, gout hyperlipidemia and peripheral vascular disease and neuropathy.  Presented to the ED today with his grandson after he had a syncopal episode at [facility] states he was standing in the parking lot felt dizzy and passed out fell down and his head hit his concrete bystander called his grandson who on arrival found him awake sitting in the back of his car brought him to the ED for evaluation.  On arrival to the ED patient had another syncopal episode where he became unresponsive started foaming in the mouth the episode lasted less than 1 minute after which she became more awake alert and oriented but was drowsy.  Patient recall testing out of shortness did not remember anything after.  Denies any chest pain shortness of breath denies any other complaint or concerns         The history is provided by the patient.       NursingNotes were reviewed.    REVIEW OF SYSTEMS    (2-9 systems for level 4, 10 or more for level 5)     Review of Systems    Except as noted above the remainder of the review of systems was reviewed and negative.       PAST MEDICAL HISTORY     Past Medical History:   Diagnosis Date    Anemia, normocytic normochromic     CAD (coronary artery disease)     multiple PCI    Carotid artery stenosis     Diabetes mellitus (HCC)     Gout 10/2/2017

## 2024-06-23 NOTE — ED NOTES
Spoke with grandson who states he and his wife are his primary family who gives the patient care.   Otherwise the patient lives alone and continues ADL's along with driving himself to his appointments.

## 2024-06-23 NOTE — ED NOTES
Discussed discharge instruction with patient and grandson. Patient did not have any questions at this time. Patient ambulated out with a steady gait. No distress noted at this time

## 2024-06-24 LAB
EKG ATRIAL RATE: 64 BPM
EKG P AXIS: 47 DEGREES
EKG P-R INTERVAL: 168 MS
EKG Q-T INTERVAL: 428 MS
EKG QRS DURATION: 94 MS
EKG QTC CALCULATION (BAZETT): 441 MS
EKG R AXIS: 26 DEGREES
EKG T AXIS: 21 DEGREES
EKG VENTRICULAR RATE: 64 BPM
PATH INTERP BLD-IMP: NORMAL

## 2024-06-24 PROCEDURE — 93010 ELECTROCARDIOGRAM REPORT: CPT | Performed by: INTERNAL MEDICINE

## 2024-06-25 ENCOUNTER — HOSPITAL ENCOUNTER (OUTPATIENT)
Dept: CT IMAGING | Age: 79
Discharge: HOME | End: 2024-06-27
Attending: SURGERY
Payer: MEDICARE

## 2024-06-25 DIAGNOSIS — I70.391: ICD-10-CM

## 2024-06-25 DIAGNOSIS — I74.3 EMBOLISM AND THROMBOSIS OF ARTERIES OF LOWER EXTREMITY (HCC): ICD-10-CM

## 2024-06-25 PROCEDURE — 2580000003 HC RX 258: Performed by: SURGERY

## 2024-06-25 PROCEDURE — 6360000004 HC RX CONTRAST MEDICATION: Performed by: SURGERY

## 2024-06-25 PROCEDURE — 2709999900 CTA ABDOMINAL AORTA W BILAT RUNOFF W WO CONTRAST

## 2024-06-25 RX ORDER — 0.9 % SODIUM CHLORIDE 0.9 %
50 INTRAVENOUS SOLUTION INTRAVENOUS ONCE
Status: COMPLETED | OUTPATIENT
Start: 2024-06-25 | End: 2024-06-25

## 2024-06-25 RX ADMIN — IOPAMIDOL 125 ML: 755 INJECTION, SOLUTION INTRAVENOUS at 07:24

## 2024-06-25 RX ADMIN — SODIUM CHLORIDE 50 ML: 9 INJECTION, SOLUTION INTRAVENOUS at 07:25

## 2024-06-26 ENCOUNTER — TELEPHONE (OUTPATIENT)
Dept: NEUROLOGY | Age: 79
End: 2024-06-26

## 2024-06-26 ENCOUNTER — HOSPITAL ENCOUNTER (OUTPATIENT)
Dept: ULTRASOUND IMAGING | Age: 79
Discharge: HOME | End: 2024-06-28
Attending: PSYCHIATRY & NEUROLOGY
Payer: MEDICARE

## 2024-06-26 DIAGNOSIS — I63.512 CEREBROVASCULAR ACCIDENT (CVA) DUE TO STENOSIS OF LEFT MIDDLE CEREBRAL ARTERY (HCC): ICD-10-CM

## 2024-06-26 LAB
VAS LEFT CCA DIST EDV: 18.6 CM/S
VAS LEFT CCA DIST PSV: 96.9 CM/S
VAS LEFT CCA MID EDV: 22 CM/S
VAS LEFT CCA MID PSV: 113 CM/S
VAS LEFT CCA PROX EDV: 15.4 CM/S
VAS LEFT CCA PROX PSV: 101 CM/S
VAS LEFT ECA EDV: 11.8 CM/S
VAS LEFT ECA PSV: 95.7 CM/S
VAS LEFT ICA DIST EDV: 38.4 CM/S
VAS LEFT ICA DIST PSV: 122 CM/S
VAS LEFT ICA MID EDV: 24.2 CM/S
VAS LEFT ICA MID PSV: 91.9 CM/S
VAS LEFT ICA PROX EDV: 18 CM/S
VAS LEFT ICA PROX PSV: 82.6 CM/S
VAS LEFT VERTEBRAL EDV: 17.7 CM/S
VAS LEFT VERTEBRAL PSV: 70.3 CM/S
VAS RIGHT CCA DIST EDV: 17.2 CM/S
VAS RIGHT CCA DIST PSV: 114 CM/S
VAS RIGHT CCA MID EDV: 21.2 CM/S
VAS RIGHT CCA MID PSV: 107 CM/S
VAS RIGHT CCA PROX EDV: 22 CM/S
VAS RIGHT CCA PROX PSV: 124 CM/S
VAS RIGHT ECA EDV: 10.2 CM/S
VAS RIGHT ECA PSV: 122 CM/S
VAS RIGHT ICA DIST EDV: 43.4 CM/S
VAS RIGHT ICA DIST PSV: 111 CM/S
VAS RIGHT ICA MID EDV: 25.5 CM/S
VAS RIGHT ICA MID PSV: 88.2 CM/S
VAS RIGHT ICA PROX EDV: 23.6 CM/S
VAS RIGHT ICA PROX PSV: 73.9 CM/S
VAS RIGHT VERTEBRAL EDV: 12.7 CM/S
VAS RIGHT VERTEBRAL PSV: 29 CM/S

## 2024-06-26 PROCEDURE — 93880 EXTRACRANIAL BILAT STUDY: CPT | Performed by: RADIOLOGY

## 2024-06-26 PROCEDURE — 93880 EXTRACRANIAL BILAT STUDY: CPT

## 2024-06-26 NOTE — TELEPHONE ENCOUNTER
Patient was seen in office last week 6/17 and was then taken to ER for a syncopal episode over the weekend. He was advised to follow up with you. CT's were done in ER and Carotid ultrasound was completed today.  Please advise.

## 2024-07-09 ENCOUNTER — HOSPITAL ENCOUNTER (OUTPATIENT)
Dept: RADIATION ONCOLOGY | Age: 79
Discharge: HOME | End: 2024-07-09
Payer: MEDICARE

## 2024-07-09 VITALS
TEMPERATURE: 97.7 F | HEART RATE: 60 BPM | SYSTOLIC BLOOD PRESSURE: 142 MMHG | BODY MASS INDEX: 28.03 KG/M2 | WEIGHT: 195.8 LBS | OXYGEN SATURATION: 99 % | DIASTOLIC BLOOD PRESSURE: 67 MMHG | RESPIRATION RATE: 18 BRPM | HEIGHT: 70 IN

## 2024-07-09 DIAGNOSIS — C61 PROSTATE CANCER (HCC): Primary | ICD-10-CM

## 2024-07-09 PROCEDURE — 99205 OFFICE O/P NEW HI 60 MIN: CPT | Performed by: RADIOLOGY

## 2024-07-09 PROCEDURE — 99212 OFFICE O/P EST SF 10 MIN: CPT | Performed by: RADIOLOGY

## 2024-07-09 PROCEDURE — 99497 ADVNCD CARE PLAN 30 MIN: CPT | Performed by: RADIOLOGY

## 2024-07-09 PROCEDURE — G2211 COMPLEX E/M VISIT ADD ON: HCPCS | Performed by: RADIOLOGY

## 2024-07-09 RX ORDER — PIOGLITAZONEHYDROCHLORIDE 30 MG/1
30 TABLET ORAL DAILY
COMMUNITY

## 2024-07-09 NOTE — PROGRESS NOTES
SW met with pt as he was seen for consultation in radiation oncology for treatment of prostate cancer.  Meeting was brief introduction, and pt was advised of supportive services at the cancer center, of which he states he is aware as his wife, who is now , was also treated here. He volunteered this information as he reflected on favorable memories of his and her experiences at this facility.      Pt presents as extremely pleasant, and he has endorsed a distress of 0/10, stating that with age he has learned not to stress much over problems in life. He reports he has Advance Directives.      SW contact information was provided, and pt was invited to contact SW should he have questions or concerns in the future, to which he was agreeable. SW will continue to follow through radiation treatment.

## 2024-07-09 NOTE — PROGRESS NOTES
NURSING ASSESSMENT     Date: 7/9/2024        Patient Name: Armando Arias     YOB: 1945      Age:  78 y.o.      MRN: [id number]       Chaperone [] Yes   [x] No      Advance Directives:   Do you currently have completed advance directives (living will)? [x] Yes   [] No         *If yes, please bring us a copy for your records.  *If no, would you like info or assistance in completing advance directives (living will)?   [] Yes   [x] No    Pain Score:   Pain Score (1-10): 0        Is pain affecting your ability to take care of yourself or move throughout your home?                        [] Yes   [x] No    General: No Problems  Patient has gained weight [] Yes   [x] No  Patient has lost weight [] Yes   [x] No  How much weight in pounds and over what length of time:     Eyes (Ophthalmic): wears reading glasses     Skin (Dermatological): No Problems     ENT: Dentures but prefers not to wear     Respiratory: No Problems     Cardiovascular: has nitroglycerin to use prn, last episode a couple months ago      Device   [] Yes   [x] No   Copy of Card Obtained [] Yes   [x] No    Gastrointestinal: No Problems    Genito-Urinary: IPSS 0-1, occasional nocturia, no blood in urine     Breast: No Problems     Musculoskeletal: No Problems    Neurological: numbness and tingling in lower legs and feet      Hematological and Lymphatic: no problems     Endocrine: takes actos for DM with good control      A 10-point review of systems  has been conducted and pertinent positives have been   recorded. All other review of systems are negative    Was the patient admitted during the course of treatment OR within 30 days of treatment? N/a    Additional Comments: pt is see by Dr Valles, [facility] and would like treatments closer to home    PALLIATIVE CARE SCREENING TOOL    Patient Scenarios               (Score 1 Point Each)  The Patient has  A chronic illness with uncontrolled symptoms (e.g. pain, nausea, vomiting, shortness of breath,  anorexia)   []  Unresolved psychosocial or spiritual issues                                                                                                     []  Frequent visits to the Emergency Department  (>1 x per month or >2 in last 3 months)                                 []  More than one hospital admission in past 90 days                  []   Complex care requirements (e.g. functional dependency, complex home support for ventilator/antibiotics/feedings, patient in SNF/LTAC/nursing home) []  No advance directives in place                                                                                                                         []  TOTAL FOR SECTION #1-      Basic Disease Processes             (Score 3 Points Overall)  Locally advanced or metastatic cancer           []  Non-cancer life-limiting illness (COPD, CHF, advanced dementia, stroke)      []  Total score for section # 2-         Concomitant Disease Processes            (Score 1 Point Overall)  COPD               []   Renal Disease              []  CHF               []  Liver Disease              []  Other significant comorbidities (e.g. failure to thrive, feeding intolerance, functional decline)    []  Total score for section # 3-      Physician to complete #4, #5, & #6      Symptom Assessment             (Score 1 Point Each)  Severe/Uncontrolled Pain (e.g. patients who are opiate naïve and/or only taking OTC medications OR patients prescribed opiate by non-palliative care provider and pain not adequately controlled OR patient on long-term opiates for chronic pain and high risk for tolerance/abuse) []  Anorexia/failure to thrive            []  Unintentional weight loss of greater than 10 lbs in 1 month       []  Shortness of breath/increasing O2 needs via supplemental source      []  Cognitive impairment            []  Total score for section # 4-      Functional Status of Patient  ECOG 2              [] (Score 2 Point)  ECOG

## 2024-07-17 PROBLEM — C61 PROSTATE CANCER (HCC): Status: ACTIVE | Noted: 2024-07-17

## 2024-07-17 NOTE — CONSULTS
[facility]           Radiation Oncology      [id number] [place] [id number]        O: [phone]        F: [phone]       [facility]                   Dr. Aristeo Raymundo MD PhD    CONSULT NOTE     Date of Service: 2024  Patient ID: Armando Arias   : 1945  MRN: [id number]   Acct Number: [id number]       Armando Arias  78 y.o.   1945    REFERRING PROVIDER: No ref. provider found    PCP:  Sterling Hall MD    DIAGNOSIS:  1. Prostate cancer (HCC)        STAGING: Cancer Staging   Prostate cancer (HCC)  Staging form: Prostate, AJCC 8th Edition  - Clinical: Stage IIC (cT1c, cN0, cM0, PSA: 19.7, Grade Group: 3) - Signed by Aristeo Raymundo MD on 2024      HISTORY OF PRESENT ILLNESS: Mr. Armando Arias  is a 78 y.o. year old male  with a history of stroke, anemia, CAD, diabetes mellitus, gout, hyperlipidemia, PVD, neuropathy, and recent finding of unfavorable intermediate risk prostate adenocarcinoma, [place] 7 (4+3), and 6 out of 19 cores, with a pretreatment PSA of 19.69.  He presents to discuss the role and rationale of definitive radiation therapy with neoadjuvant, concurrent, and adjuvant ADT.    His oncologic history dates back to late  when an elevated PSA prompted a prostate biopsy.  MRI of the prostate on 2024 revealed lesions that were concerning for malignancy in both the right and left peripheral zones.  There was no suspicious lymph node or osseous abnormality.  The patient had an MRI guided fusion biopsy performed on 2024 which revealed in the left anterior medial zone, prostate adenocarcinoma, [place] 7 (3+4), and 2 of 2 fragmented cores involving 55% of the tissue; in the left anterior lateral zone, prostate adenocarcinoma, [place] 7 (3+4) involving 1 of 2 cores for total of 40%; in the right anterior medial zone, prostate adenocarcinoma, [place] 7 (3+4) involving 1 of 2 cores, 40%; in the right anterior lateral zone, prostate

## 2024-07-18 RX ORDER — ISOSORBIDE MONONITRATE 30 MG/1
30 TABLET, EXTENDED RELEASE ORAL DAILY
Qty: 90 TABLET | Refills: 3 | Status: SHIPPED | OUTPATIENT
Start: 2024-07-18

## 2024-07-18 NOTE — TELEPHONE ENCOUNTER
Requesting medication refill. Please approve or deny this request.    Rx requested:  Requested Prescriptions     Pending Prescriptions Disp Refills    isosorbide mononitrate (IMDUR) 30 MG extended release tablet [Pharmacy Med Name: ISOSORBIDE MONONITRATE 30MG ER TABS] 90 tablet 3     Sig: TAKE 1 TABLET BY MOUTH DAILY         Last Office Visit:   5/10/2024      Next Visit Date:  Future Appointments   Date Time Provider Department Center   9/11/2024  1:45 PM Rodrigo Sabillon MD [place] [facility]   10/16/2024  9:00 AM SCHEDULE, EDUIN RAD ONC NURSE [facility] RAD ONC [facility]   6/11/2025  1:45 PM Sid Boston MD [place] [facility] -               Last refill 07/05/2023. Please approve or deny.

## 2024-08-22 DIAGNOSIS — I25.119 CORONARY ARTERY DISEASE INVOLVING NATIVE CORONARY ARTERY OF NATIVE HEART WITH ANGINA PECTORIS (HCC): ICD-10-CM

## 2024-08-23 RX ORDER — POLYETHYLENE GLYCOL 3350, SODIUM CHLORIDE, SODIUM BICARBONATE, POTASSIUM CHLORIDE 420; 11.2; 5.72; 1.48 G/4L; G/4L; G/4L; G/4L
4000 POWDER, FOR SOLUTION ORAL ONCE
Qty: 4000 ML | Refills: 0 | Status: SHIPPED | OUTPATIENT
Start: 2024-08-23 | End: 2024-08-23

## 2024-08-23 RX ORDER — NITROGLYCERIN 0.4 MG/1
TABLET SUBLINGUAL
Qty: 25 TABLET | Refills: 5 | Status: SHIPPED | OUTPATIENT
Start: 2024-08-23

## 2024-08-23 NOTE — TELEPHONE ENCOUNTER
Requesting medication refill. Please approve or deny this request.    Rx requested:  Requested Prescriptions     Pending Prescriptions Disp Refills    nitroGLYCERIN (NITROSTAT) 0.4 MG SL tablet [Pharmacy Med Name: NITROGLYCERIN 0.4MG SUB TAB 25S] 25 tablet 5     Sig: DISSOLVE ONE TABLET UNDER TONGUE AS NEEDED FOR CHEST PAIN EVERY 5 MINUTES( MAX 3 DOSES). IF NO RELIEF AFTER FIRST DOSE, CALL 911         Last Office Visit:   5/10/2024      Next Visit Date:  Future Appointments   Date Time Provider Department Center   10/16/2024  9:00 AM SCHEDULE, EDUIN [facility] ONC NURSE [facility]   6/11/2025  1:45 PM Sid Boston MD [place] [facility] -               Last refill 07/05/2023. Please approve or deny.

## 2024-08-26 ENCOUNTER — PREP FOR PROCEDURE (OUTPATIENT)
Dept: GASTROENTEROLOGY | Age: 79
End: 2024-08-26

## 2024-08-26 RX ORDER — SODIUM CHLORIDE 9 MG/ML
INJECTION, SOLUTION INTRAVENOUS CONTINUOUS
Status: CANCELLED | OUTPATIENT
Start: 2024-08-26

## 2024-08-26 RX ORDER — SODIUM CHLORIDE 0.9 % (FLUSH) 0.9 %
5-40 SYRINGE (ML) INJECTION EVERY 12 HOURS SCHEDULED
Status: CANCELLED | OUTPATIENT
Start: 2024-08-26

## 2024-08-26 RX ORDER — SODIUM CHLORIDE 9 MG/ML
INJECTION, SOLUTION INTRAVENOUS PRN
Status: CANCELLED | OUTPATIENT
Start: 2024-08-26

## 2024-08-26 RX ORDER — SODIUM CHLORIDE 0.9 % (FLUSH) 0.9 %
5-40 SYRINGE (ML) INJECTION PRN
Status: CANCELLED | OUTPATIENT
Start: 2024-08-26

## 2024-09-09 ENCOUNTER — ANESTHESIA EVENT (OUTPATIENT)
Dept: ENDOSCOPY | Age: 79
End: 2024-09-09
Payer: MEDICARE

## 2024-09-09 ENCOUNTER — HOSPITAL ENCOUNTER (OUTPATIENT)
Age: 79
Setting detail: OUTPATIENT SURGERY
Discharge: HOME | End: 2024-09-09
Attending: SPECIALIST | Admitting: SPECIALIST
Payer: MEDICARE

## 2024-09-09 ENCOUNTER — ANESTHESIA (OUTPATIENT)
Dept: ENDOSCOPY | Age: 79
End: 2024-09-09
Payer: MEDICARE

## 2024-09-09 VITALS
HEIGHT: 70 IN | BODY MASS INDEX: 28.77 KG/M2 | WEIGHT: 201 LBS | RESPIRATION RATE: 16 BRPM | HEART RATE: 70 BPM | DIASTOLIC BLOOD PRESSURE: 63 MMHG | TEMPERATURE: 98.9 F | SYSTOLIC BLOOD PRESSURE: 140 MMHG | OXYGEN SATURATION: 98 %

## 2024-09-09 DIAGNOSIS — Z12.11 COLON CANCER SCREENING: ICD-10-CM

## 2024-09-09 PROCEDURE — 3609027000 HC COLONOSCOPY: Performed by: SPECIALIST

## 2024-09-09 PROCEDURE — 7100000010 HC PHASE II RECOVERY - FIRST 15 MIN: Performed by: SPECIALIST

## 2024-09-09 PROCEDURE — 88305 TISSUE EXAM BY PATHOLOGIST: CPT

## 2024-09-09 PROCEDURE — 2709999900 HC NON-CHARGEABLE SUPPLY: Performed by: SPECIALIST

## 2024-09-09 PROCEDURE — 6360000002 HC RX W HCPCS: Performed by: NURSE ANESTHETIST, CERTIFIED REGISTERED

## 2024-09-09 PROCEDURE — 3700000001 HC ADD 15 MINUTES (ANESTHESIA): Performed by: SPECIALIST

## 2024-09-09 PROCEDURE — 2580000003 HC RX 258: Performed by: SPECIALIST

## 2024-09-09 PROCEDURE — 3700000000 HC ANESTHESIA ATTENDED CARE: Performed by: SPECIALIST

## 2024-09-09 PROCEDURE — 45385 COLONOSCOPY W/LESION REMOVAL: CPT | Performed by: SPECIALIST

## 2024-09-09 PROCEDURE — 2500000003 HC RX 250 WO HCPCS: Performed by: NURSE ANESTHETIST, CERTIFIED REGISTERED

## 2024-09-09 RX ORDER — LIDOCAINE HYDROCHLORIDE 20 MG/ML
INJECTION, SOLUTION INFILTRATION; PERINEURAL PRN
Status: DISCONTINUED | OUTPATIENT
Start: 2024-09-09 | End: 2024-09-09 | Stop reason: SDUPTHER

## 2024-09-09 RX ORDER — SODIUM CHLORIDE 9 MG/ML
INJECTION, SOLUTION INTRAVENOUS CONTINUOUS
Status: DISCONTINUED | OUTPATIENT
Start: 2024-09-09 | End: 2024-09-09 | Stop reason: HOSPADM

## 2024-09-09 RX ORDER — SODIUM CHLORIDE 0.9 % (FLUSH) 0.9 %
5-40 SYRINGE (ML) INJECTION PRN
Status: CANCELLED | OUTPATIENT
Start: 2024-09-09

## 2024-09-09 RX ORDER — SODIUM CHLORIDE 0.9 % (FLUSH) 0.9 %
5-40 SYRINGE (ML) INJECTION EVERY 12 HOURS SCHEDULED
Status: DISCONTINUED | OUTPATIENT
Start: 2024-09-09 | End: 2024-09-09 | Stop reason: HOSPADM

## 2024-09-09 RX ORDER — NALOXONE HYDROCHLORIDE 0.4 MG/ML
INJECTION, SOLUTION INTRAMUSCULAR; INTRAVENOUS; SUBCUTANEOUS PRN
Status: CANCELLED | OUTPATIENT
Start: 2024-09-09

## 2024-09-09 RX ORDER — PROPOFOL 10 MG/ML
INJECTION, EMULSION INTRAVENOUS PRN
Status: DISCONTINUED | OUTPATIENT
Start: 2024-09-09 | End: 2024-09-09 | Stop reason: SDUPTHER

## 2024-09-09 RX ORDER — SODIUM CHLORIDE 9 MG/ML
INJECTION, SOLUTION INTRAVENOUS PRN
Status: CANCELLED | OUTPATIENT
Start: 2024-09-09

## 2024-09-09 RX ORDER — SODIUM CHLORIDE 9 MG/ML
INJECTION, SOLUTION INTRAVENOUS PRN
Status: DISCONTINUED | OUTPATIENT
Start: 2024-09-09 | End: 2024-09-09 | Stop reason: HOSPADM

## 2024-09-09 RX ORDER — SODIUM CHLORIDE 0.9 % (FLUSH) 0.9 %
5-40 SYRINGE (ML) INJECTION EVERY 12 HOURS SCHEDULED
Status: CANCELLED | OUTPATIENT
Start: 2024-09-09

## 2024-09-09 RX ORDER — SODIUM CHLORIDE 0.9 % (FLUSH) 0.9 %
5-40 SYRINGE (ML) INJECTION PRN
Status: DISCONTINUED | OUTPATIENT
Start: 2024-09-09 | End: 2024-09-09 | Stop reason: HOSPADM

## 2024-09-09 RX ADMIN — PROPOFOL 300 MG: 10 INJECTION, EMULSION INTRAVENOUS at 07:37

## 2024-09-09 RX ADMIN — LIDOCAINE HYDROCHLORIDE 40 MG: 20 INJECTION, SOLUTION INFILTRATION; PERINEURAL at 07:36

## 2024-09-09 RX ADMIN — SODIUM CHLORIDE 500 ML: 9 INJECTION, SOLUTION INTRAVENOUS at 07:20

## 2024-09-09 ASSESSMENT — PAIN - FUNCTIONAL ASSESSMENT: PAIN_FUNCTIONAL_ASSESSMENT: 0-10

## 2024-09-24 ENCOUNTER — HOSPITAL ENCOUNTER (OUTPATIENT)
Dept: LAB | Age: 79
Discharge: HOME | End: 2024-09-24
Payer: MEDICARE

## 2024-09-24 LAB
BACTERIA URNS QL MICRO: ABNORMAL /HPF
BILIRUB UR QL STRIP: NEGATIVE
CLARITY UR: ABNORMAL
COLOR UR: YELLOW
EPI CELLS #/AREA URNS AUTO: ABNORMAL /HPF (ref 0–5)
GLUCOSE UR STRIP-MCNC: NEGATIVE MG/DL
HGB UR QL STRIP: ABNORMAL
HYALINE CASTS #/AREA URNS AUTO: ABNORMAL /HPF (ref 0–5)
KETONES UR STRIP-MCNC: NEGATIVE MG/DL
LEUKOCYTE ESTERASE UR QL STRIP: ABNORMAL
NITRITE UR QL STRIP: POSITIVE
PH UR STRIP: 6.5 [PH] (ref 5–9)
PROT UR STRIP-MCNC: ABNORMAL MG/DL
RBC #/AREA URNS AUTO: ABNORMAL /HPF (ref 0–5)
SP GR UR STRIP: 1.02 (ref 1–1.03)
UROBILINOGEN UR STRIP-ACNC: 0.2 E.U./DL
WBC #/AREA URNS AUTO: >100 /HPF (ref 0–5)

## 2024-09-24 PROCEDURE — 81001 URINALYSIS AUTO W/SCOPE: CPT

## 2024-09-24 PROCEDURE — 36415 COLL VENOUS BLD VENIPUNCTURE: CPT

## 2024-09-24 PROCEDURE — 87186 SC STD MICRODIL/AGAR DIL: CPT

## 2024-09-24 PROCEDURE — 87086 URINE CULTURE/COLONY COUNT: CPT

## 2024-09-24 PROCEDURE — 87077 CULTURE AEROBIC IDENTIFY: CPT

## 2024-09-26 LAB
BACTERIA UR CULT: ABNORMAL
BACTERIA UR CULT: ABNORMAL
ORGANISM: ABNORMAL

## 2024-10-09 PROBLEM — Z12.11 COLON CANCER SCREENING: Status: RESOLVED | Noted: 2024-09-09 | Resolved: 2024-10-09

## 2024-10-15 ENCOUNTER — APPOINTMENT (OUTPATIENT)
Dept: GENERAL RADIOLOGY | Age: 79
DRG: 683 | End: 2024-10-15
Payer: MEDICARE

## 2024-10-15 ENCOUNTER — HOSPITAL ENCOUNTER (INPATIENT)
Age: 79
LOS: 2 days | Discharge: HOME | DRG: 683 | End: 2024-10-17
Attending: EMERGENCY MEDICINE | Admitting: FAMILY MEDICINE
Payer: MEDICARE

## 2024-10-15 ENCOUNTER — APPOINTMENT (OUTPATIENT)
Dept: CT IMAGING | Age: 79
DRG: 683 | End: 2024-10-15
Payer: MEDICARE

## 2024-10-15 DIAGNOSIS — R53.83 OTHER FATIGUE: ICD-10-CM

## 2024-10-15 DIAGNOSIS — N18.9 ACUTE KIDNEY INJURY SUPERIMPOSED ON CKD (HCC): Primary | ICD-10-CM

## 2024-10-15 DIAGNOSIS — R74.8 ELEVATED LIVER ENZYMES: ICD-10-CM

## 2024-10-15 DIAGNOSIS — N17.9 ACUTE KIDNEY INJURY SUPERIMPOSED ON CKD (HCC): Primary | ICD-10-CM

## 2024-10-15 LAB
ALBUMIN SERPL-MCNC: 4 G/DL (ref 3.5–4.6)
ALP SERPL-CCNC: 91 U/L (ref 35–104)
ALT SERPL-CCNC: 88 U/L (ref 0–41)
ANION GAP SERPL CALCULATED.3IONS-SCNC: 12 MEQ/L (ref 9–15)
ANISOCYTOSIS BLD QL SMEAR: ABNORMAL
APTT PPP: 40.6 SEC (ref 24.4–36.8)
AST SERPL-CCNC: 87 U/L (ref 0–40)
B PARAP IS1001 DNA NPH QL NAA+NON-PROBE: NOT DETECTED
B PERT.PT PRMT NPH QL NAA+NON-PROBE: NOT DETECTED
BACTERIA URNS QL MICRO: ABNORMAL /HPF
BASOPHILS # BLD: 0 K/UL (ref 0–0.2)
BASOPHILS NFR BLD: 0.2 %
BILIRUB DIRECT SERPL-MCNC: 0.5 MG/DL (ref 0–0.4)
BILIRUB SERPL-MCNC: 1.8 MG/DL (ref 0.2–0.7)
BILIRUB UR QL STRIP: NEGATIVE
BNP BLD-MCNC: 1478 PG/ML
BUN SERPL-MCNC: 39 MG/DL (ref 8–23)
C PNEUM DNA NPH QL NAA+NON-PROBE: NOT DETECTED
CALCIUM SERPL-MCNC: 9.1 MG/DL (ref 8.5–9.9)
CHLORIDE SERPL-SCNC: 95 MEQ/L (ref 95–107)
CLARITY UR: ABNORMAL
CO2 SERPL-SCNC: 25 MEQ/L (ref 20–31)
COLOR UR: ABNORMAL
CREAT SERPL-MCNC: 2.98 MG/DL (ref 0.7–1.2)
CREAT UR-MCNC: 63.8 MG/DL
EOSINOPHIL # BLD: 0 K/UL (ref 0–0.7)
EOSINOPHIL NFR BLD: 0 %
EPI CELLS #/AREA URNS AUTO: ABNORMAL /HPF (ref 0–5)
ERYTHROCYTE [DISTWIDTH] IN BLOOD BY AUTOMATED COUNT: 18 % (ref 11.5–14.5)
FLUAV RNA NPH QL NAA+NON-PROBE: NOT DETECTED
FLUBV RNA NPH QL NAA+NON-PROBE: NOT DETECTED
GLOBULIN SER CALC-MCNC: 3 G/DL (ref 2.3–3.5)
GLUCOSE BLD-MCNC: 184 MG/DL (ref 70–99)
GLUCOSE BLD-MCNC: 252 MG/DL (ref 70–99)
GLUCOSE BLD-MCNC: 266 MG/DL (ref 70–99)
GLUCOSE SERPL-MCNC: 294 MG/DL (ref 70–99)
GLUCOSE UR STRIP-MCNC: NEGATIVE MG/DL
HADV DNA NPH QL NAA+NON-PROBE: NOT DETECTED
HCOV 229E RNA NPH QL NAA+NON-PROBE: NOT DETECTED
HCOV HKU1 RNA NPH QL NAA+NON-PROBE: NOT DETECTED
HCOV NL63 RNA NPH QL NAA+NON-PROBE: NOT DETECTED
HCOV OC43 RNA NPH QL NAA+NON-PROBE: NOT DETECTED
HCT VFR BLD AUTO: 31.1 % (ref 42–52)
HGB BLD-MCNC: 10 G/DL (ref 14–18)
HGB UR QL STRIP: ABNORMAL
HMPV RNA NPH QL NAA+NON-PROBE: NOT DETECTED
HPIV1 RNA NPH QL NAA+NON-PROBE: NOT DETECTED
HPIV2 RNA NPH QL NAA+NON-PROBE: NOT DETECTED
HPIV3 RNA NPH QL NAA+NON-PROBE: NOT DETECTED
HPIV4 RNA NPH QL NAA+NON-PROBE: NOT DETECTED
HYALINE CASTS #/AREA URNS AUTO: ABNORMAL /HPF (ref 0–5)
INR PPP: 1.3
KETONES UR STRIP-MCNC: ABNORMAL MG/DL
LEUKOCYTE ESTERASE UR QL STRIP: ABNORMAL
LYMPHOCYTES # BLD: 0.3 K/UL (ref 1–4.8)
LYMPHOCYTES NFR BLD: 2 %
M PNEUMO DNA NPH QL NAA+NON-PROBE: NOT DETECTED
MAGNESIUM SERPL-MCNC: 1.6 MG/DL (ref 1.7–2.4)
MCH RBC QN AUTO: 28.3 PG (ref 27–31.3)
MCHC RBC AUTO-ENTMCNC: 32.2 % (ref 33–37)
MCV RBC AUTO: 88.1 FL (ref 79–92.2)
METAMYELOCYTES NFR BLD MANUAL: 1 %
MICROALBUMIN UR-MCNC: 1.8 MG/DL
MICROALBUMIN/CREAT UR-RTO: 28.2 MG/G (ref 0–30)
MICROCYTES BLD QL SMEAR: ABNORMAL
MONOCYTES # BLD: 0.1 K/UL (ref 0.2–0.8)
MONOCYTES NFR BLD: 1 %
NEUTROPHILS # BLD: 12.1 K/UL (ref 1.4–6.5)
NEUTS BAND NFR BLD MANUAL: 19 %
NEUTS SEG NFR BLD: 77 %
NITRITE UR QL STRIP: NEGATIVE
OVALOCYTES BLD QL SMEAR: ABNORMAL
PATH INTERP BLD-IMP: YES
PERFORMED ON: ABNORMAL
PH UR STRIP: 5 [PH] (ref 5–9)
PLATELET # BLD AUTO: 92 K/UL (ref 130–400)
PLATELET BLD QL SMEAR: ABNORMAL
POIKILOCYTOSIS BLD QL SMEAR: ABNORMAL
POTASSIUM SERPL-SCNC: 4.9 MEQ/L (ref 3.4–4.9)
PROT SERPL-MCNC: 7 G/DL (ref 6.3–8)
PROT UR STRIP-MCNC: 30 MG/DL
PROTHROMBIN TIME: 15.9 SEC (ref 12.3–14.9)
RBC # BLD AUTO: 3.53 M/UL (ref 4.7–6.1)
RBC #/AREA URNS HPF: ABNORMAL /HPF (ref 0–2)
RSV RNA NPH QL NAA+NON-PROBE: NOT DETECTED
RV+EV RNA NPH QL NAA+NON-PROBE: NOT DETECTED
SARS-COV-2 RNA NPH QL NAA+NON-PROBE: NOT DETECTED
SCHISTOCYTES BLD QL SMEAR: ABNORMAL
SLIDE REVIEW: ABNORMAL
SMUDGE CELLS BLD QL SMEAR: 1.9
SODIUM SERPL-SCNC: 132 MEQ/L (ref 135–144)
SP GR UR STRIP: 1.02 (ref 1–1.03)
TROPONIN, HIGH SENSITIVITY: 33 NG/L (ref 0–19)
TSH REFLEX: 1.28 UIU/ML (ref 0.44–3.86)
URINE REFLEX TO CULTURE: YES
UROBILINOGEN UR STRIP-ACNC: 1 E.U./DL
WBC # BLD AUTO: 12.5 K/UL (ref 4.8–10.8)
WBC #/AREA URNS AUTO: >100 /HPF (ref 0–5)
YEAST URNS QL MICRO: PRESENT /HPF

## 2024-10-15 PROCEDURE — 83735 ASSAY OF MAGNESIUM: CPT

## 2024-10-15 PROCEDURE — 1210000000 HC MED SURG R&B

## 2024-10-15 PROCEDURE — 6370000000 HC RX 637 (ALT 250 FOR IP): Performed by: FAMILY MEDICINE

## 2024-10-15 PROCEDURE — 70450 CT HEAD/BRAIN W/O DYE: CPT

## 2024-10-15 PROCEDURE — 80053 COMPREHEN METABOLIC PANEL: CPT

## 2024-10-15 PROCEDURE — 2580000003 HC RX 258: Performed by: FAMILY MEDICINE

## 2024-10-15 PROCEDURE — 83880 ASSAY OF NATRIURETIC PEPTIDE: CPT

## 2024-10-15 PROCEDURE — 87086 URINE CULTURE/COLONY COUNT: CPT

## 2024-10-15 PROCEDURE — 71045 X-RAY EXAM CHEST 1 VIEW: CPT

## 2024-10-15 PROCEDURE — 82043 UR ALBUMIN QUANTITATIVE: CPT

## 2024-10-15 PROCEDURE — 87088 URINE BACTERIA CULTURE: CPT

## 2024-10-15 PROCEDURE — 99285 EMERGENCY DEPT VISIT HI MDM: CPT

## 2024-10-15 PROCEDURE — 0202U NFCT DS 22 TRGT SARS-COV-2: CPT

## 2024-10-15 PROCEDURE — 82248 BILIRUBIN DIRECT: CPT

## 2024-10-15 PROCEDURE — 93005 ELECTROCARDIOGRAM TRACING: CPT | Performed by: EMERGENCY MEDICINE

## 2024-10-15 PROCEDURE — 85730 THROMBOPLASTIN TIME PARTIAL: CPT

## 2024-10-15 PROCEDURE — 85025 COMPLETE CBC W/AUTO DIFF WBC: CPT

## 2024-10-15 PROCEDURE — 84443 ASSAY THYROID STIM HORMONE: CPT

## 2024-10-15 PROCEDURE — 6370000000 HC RX 637 (ALT 250 FOR IP): Performed by: EMERGENCY MEDICINE

## 2024-10-15 PROCEDURE — 85610 PROTHROMBIN TIME: CPT

## 2024-10-15 PROCEDURE — 87186 SC STD MICRODIL/AGAR DIL: CPT

## 2024-10-15 PROCEDURE — 81001 URINALYSIS AUTO W/SCOPE: CPT

## 2024-10-15 PROCEDURE — 82570 ASSAY OF URINE CREATININE: CPT

## 2024-10-15 PROCEDURE — 84484 ASSAY OF TROPONIN QUANT: CPT

## 2024-10-15 RX ORDER — ISOSORBIDE MONONITRATE 30 MG/1
30 TABLET, EXTENDED RELEASE ORAL DAILY
Status: DISCONTINUED | OUTPATIENT
Start: 2024-10-15 | End: 2024-10-17 | Stop reason: HOSPADM

## 2024-10-15 RX ORDER — GLUCAGON 1 MG/ML
1 KIT INJECTION PRN
Status: DISCONTINUED | OUTPATIENT
Start: 2024-10-15 | End: 2024-10-17 | Stop reason: HOSPADM

## 2024-10-15 RX ORDER — ASPIRIN 81 MG/1
81 TABLET ORAL DAILY
Status: DISCONTINUED | OUTPATIENT
Start: 2024-10-15 | End: 2024-10-17 | Stop reason: HOSPADM

## 2024-10-15 RX ORDER — SODIUM CHLORIDE 9 MG/ML
INJECTION, SOLUTION INTRAVENOUS CONTINUOUS
Status: DISPENSED | OUTPATIENT
Start: 2024-10-15 | End: 2024-10-16

## 2024-10-15 RX ORDER — AMLODIPINE BESYLATE 5 MG/1
5 TABLET ORAL DAILY
Status: DISCONTINUED | OUTPATIENT
Start: 2024-10-15 | End: 2024-10-17 | Stop reason: HOSPADM

## 2024-10-15 RX ORDER — LANOLIN ALCOHOL/MO/W.PET/CERES
800 CREAM (GRAM) TOPICAL ONCE
Status: COMPLETED | OUTPATIENT
Start: 2024-10-15 | End: 2024-10-15

## 2024-10-15 RX ORDER — INSULIN LISPRO 100 [IU]/ML
0-8 INJECTION, SOLUTION INTRAVENOUS; SUBCUTANEOUS
Status: DISCONTINUED | OUTPATIENT
Start: 2024-10-15 | End: 2024-10-17 | Stop reason: HOSPADM

## 2024-10-15 RX ORDER — GABAPENTIN 300 MG/1
300 CAPSULE ORAL 2 TIMES DAILY
Status: DISCONTINUED | OUTPATIENT
Start: 2024-10-15 | End: 2024-10-17 | Stop reason: HOSPADM

## 2024-10-15 RX ORDER — SODIUM CHLORIDE 0.9 % (FLUSH) 0.9 %
5-40 SYRINGE (ML) INJECTION EVERY 12 HOURS SCHEDULED
Status: DISCONTINUED | OUTPATIENT
Start: 2024-10-15 | End: 2024-10-17 | Stop reason: HOSPADM

## 2024-10-15 RX ORDER — SODIUM CHLORIDE 9 MG/ML
INJECTION, SOLUTION INTRAVENOUS PRN
Status: DISCONTINUED | OUTPATIENT
Start: 2024-10-15 | End: 2024-10-17 | Stop reason: HOSPADM

## 2024-10-15 RX ORDER — POLYETHYLENE GLYCOL 3350 17 G/17G
17 POWDER, FOR SOLUTION ORAL DAILY PRN
Status: DISCONTINUED | OUTPATIENT
Start: 2024-10-15 | End: 2024-10-17 | Stop reason: HOSPADM

## 2024-10-15 RX ORDER — DEXTROSE MONOHYDRATE 100 MG/ML
INJECTION, SOLUTION INTRAVENOUS CONTINUOUS PRN
Status: DISCONTINUED | OUTPATIENT
Start: 2024-10-15 | End: 2024-10-17 | Stop reason: HOSPADM

## 2024-10-15 RX ORDER — ONDANSETRON 4 MG/1
4 TABLET, ORALLY DISINTEGRATING ORAL EVERY 8 HOURS PRN
Status: DISCONTINUED | OUTPATIENT
Start: 2024-10-15 | End: 2024-10-17 | Stop reason: HOSPADM

## 2024-10-15 RX ORDER — CLONIDINE HYDROCHLORIDE 0.1 MG/1
0.1 TABLET ORAL 2 TIMES DAILY
COMMUNITY
Start: 2024-08-23

## 2024-10-15 RX ORDER — ATORVASTATIN CALCIUM 80 MG/1
80 TABLET, FILM COATED ORAL NIGHTLY
Status: DISCONTINUED | OUTPATIENT
Start: 2024-10-15 | End: 2024-10-17 | Stop reason: HOSPADM

## 2024-10-15 RX ORDER — SODIUM CHLORIDE 0.9 % (FLUSH) 0.9 %
5-40 SYRINGE (ML) INJECTION PRN
Status: DISCONTINUED | OUTPATIENT
Start: 2024-10-15 | End: 2024-10-17 | Stop reason: HOSPADM

## 2024-10-15 RX ORDER — INSULIN GLARGINE 100 [IU]/ML
10 INJECTION, SOLUTION SUBCUTANEOUS DAILY
Status: DISCONTINUED | OUTPATIENT
Start: 2024-10-15 | End: 2024-10-17 | Stop reason: HOSPADM

## 2024-10-15 RX ORDER — ONDANSETRON 2 MG/ML
4 INJECTION INTRAMUSCULAR; INTRAVENOUS EVERY 6 HOURS PRN
Status: DISCONTINUED | OUTPATIENT
Start: 2024-10-15 | End: 2024-10-17 | Stop reason: HOSPADM

## 2024-10-15 RX ORDER — ACETAMINOPHEN 650 MG/1
650 SUPPOSITORY RECTAL EVERY 6 HOURS PRN
Status: DISCONTINUED | OUTPATIENT
Start: 2024-10-15 | End: 2024-10-17 | Stop reason: HOSPADM

## 2024-10-15 RX ORDER — ACETAMINOPHEN 325 MG/1
650 TABLET ORAL EVERY 6 HOURS PRN
Status: DISCONTINUED | OUTPATIENT
Start: 2024-10-15 | End: 2024-10-17 | Stop reason: HOSPADM

## 2024-10-15 RX ORDER — ATENOLOL 50 MG/1
50 TABLET ORAL DAILY
Status: DISCONTINUED | OUTPATIENT
Start: 2024-10-15 | End: 2024-10-17 | Stop reason: HOSPADM

## 2024-10-15 RX ORDER — BUPROPION HYDROCHLORIDE 150 MG/1
150 TABLET ORAL DAILY
Status: DISCONTINUED | OUTPATIENT
Start: 2024-10-15 | End: 2024-10-17 | Stop reason: HOSPADM

## 2024-10-15 RX ADMIN — APIXABAN 2.5 MG: 5 TABLET, FILM COATED ORAL at 13:41

## 2024-10-15 RX ADMIN — ATORVASTATIN CALCIUM 80 MG: 80 TABLET, FILM COATED ORAL at 20:25

## 2024-10-15 RX ADMIN — AMLODIPINE BESYLATE 5 MG: 5 TABLET ORAL at 13:40

## 2024-10-15 RX ADMIN — GABAPENTIN 300 MG: 300 CAPSULE ORAL at 13:40

## 2024-10-15 RX ADMIN — APIXABAN 2.5 MG: 5 TABLET, FILM COATED ORAL at 20:26

## 2024-10-15 RX ADMIN — SODIUM CHLORIDE, PRESERVATIVE FREE 10 ML: 5 INJECTION INTRAVENOUS at 20:26

## 2024-10-15 RX ADMIN — ASPIRIN 81 MG: 81 TABLET, COATED ORAL at 13:40

## 2024-10-15 RX ADMIN — GABAPENTIN 300 MG: 300 CAPSULE ORAL at 20:25

## 2024-10-15 RX ADMIN — Medication 800 MG: at 11:38

## 2024-10-15 RX ADMIN — ISOSORBIDE MONONITRATE 30 MG: 30 TABLET, EXTENDED RELEASE ORAL at 13:42

## 2024-10-15 RX ADMIN — SODIUM CHLORIDE: 9 INJECTION, SOLUTION INTRAVENOUS at 17:03

## 2024-10-15 RX ADMIN — SODIUM CHLORIDE: 9 INJECTION, SOLUTION INTRAVENOUS at 13:43

## 2024-10-15 RX ADMIN — INSULIN GLARGINE 10 UNITS: 100 INJECTION, SOLUTION SUBCUTANEOUS at 13:46

## 2024-10-15 ASSESSMENT — ENCOUNTER SYMPTOMS
VOMITING: 0
COUGH: 0
ABDOMINAL PAIN: 0
SHORTNESS OF BREATH: 0
BACK PAIN: 0

## 2024-10-15 ASSESSMENT — PAIN SCALES - GENERAL
PAINLEVEL_OUTOF10: 2
PAINLEVEL_OUTOF10: 0

## 2024-10-15 ASSESSMENT — PAIN - FUNCTIONAL ASSESSMENT: PAIN_FUNCTIONAL_ASSESSMENT: NONE - DENIES PAIN

## 2024-10-15 NOTE — ED TRIAGE NOTES
Pt got his flu shot yesterday at 1000  Pt states his legs feel asleep since the shot   Pt denies pain   Pt is afebrile  Pt thinks he had a reaction to flu shot   Pt is uses a cane   Pt is A&Ox4

## 2024-10-15 NOTE — CARE COORDINATION
Case Management Assessment  Initial Evaluation    Date/Time of Evaluation: 10/15/2024 4:02 PM  Assessment Completed by: Sussy Floyd RN    If patient is discharged prior to next notation, then this note serves as note for discharge by case management.    Patient Name: Armando Arias                   YOB: 1945  Diagnosis: LUANNE (acute kidney injury) (HCC) [N17.9]  Elevated liver enzymes [R74.8]  Other fatigue [R53.83]  Acute kidney injury superimposed on CKD (HCC) [N17.9, N18.9]                   Date / Time: 10/15/2024  9:55 AM    Patient Admission Status: Inpatient   Readmission Risk (Low < 19, Mod (19-27), High > 27): Readmission Risk Score: 16.6    Current PCP: Sterling Hall MD  PCP verified by CM? Yes    Chart Reviewed: Yes      History Provided by: Patient  Patient Orientation: Alert and Oriented    Patient Cognition: Alert    Hospitalization in the last 30 days (Readmission):  No    If yes, Readmission Assessment in  Navigator will be completed.    Advance Directives:      Code Status: Full Code   Patient's Primary Decision Maker is:        Discharge Planning:    Patient lives with: Alone Type of Home: House  Primary Care Giver: Self  Patient Support Systems include: Family Members, Friends/Neighbors   Current Financial resources: Medicare  Current community resources: None  Current services prior to admission: None            Current DME:  [facility]             Type of Home Care services:  None    ADLS  Prior functional level: Independent in ADLs/IADLs  Current functional level: Independent in ADLs/IADLs    PT AM-PAC:   /24  OT AM-PAC:   /24    Family can provide assistance at DC: Yes  Would you like Case Management to discuss the discharge plan with any other family members/significant others, and if so, who? Yes (SWATHI)  Plans to Return to Present Housing: Yes  Other Identified Issues/Barriers to RETURNING to current housing: MEDICAL COMPLICATION, CONSULT, LAB, TESTING,

## 2024-10-15 NOTE — ACP (ADVANCE CARE PLANNING)
Advance Care Planning   Healthcare Decision Maker:    Primary Decision Maker: Jatinder Caban - Grandchild - [phone]    Secondary Decision Maker: DUKE CABAN - Grandchild - [phone]

## 2024-10-15 NOTE — ED PROVIDER NOTES
department for evaluation of general weakness/fatigue.  Nonfocal neurologic exam, doubt CVA.  History and physical not highly suspicious of acute cord compression.  No indication for emergent MRI.  This is possibly secondary to his influenza vaccination however symptoms started 15 minutes after administration.  Leukocytosis noted.  Afebrile.  Chronic stable anemia and thrombocytopenia.  Hyperglycemia without DKA findings.  Unclear etiology of patient's elevated liver enzymes, no right upper quadrant tenderness or Gonzalez sign or vomiting, do not think emergent ultrasound is indicated at this time  Patient will require admission for management of LUANNE, possible PT/OT evaluation  No ICH    Dr kaey accepts    CONSULTS:  None    PROCEDURES:  Unless otherwise noted below, none     Procedures      FINAL IMPRESSION      1. Acute kidney injury superimposed on CKD (HCC)    2. Elevated liver enzymes    3. Other fatigue          DISPOSITION/PLAN   DISPOSITION Decision To Admit 10/15/2024 12:31:49 PM      PATIENT REFERRED TO:  No follow-up provider specified.    DISCHARGE MEDICATIONS:  New Prescriptions    No medications on file     Controlled Substances Monitoring:          No data to display                (Please note that portions of this note were completed with a voice recognition program.  Efforts were made to edit the dictations but occasionally words are mis-transcribed.)    Mike Mccloud MD (electronically signed)  Attending Emergency Physician         Mike Mccloud MD  10/15/24 1300

## 2024-10-15 NOTE — H&P
Hospital Medicine  History and Physical    Patient:  Armando Arias  MRN: [id number]    CHIEF COMPLAINT:    Chief Complaint   Patient presents with    Extremity Weakness     Bilateral legs (since getting the flu shot yesterday)       History Obtained From:  patient  Primary Care Physician: Sterling Hall MD    HISTORY OF PRESENT ILLNESS:   The patient is a 79 y.o. male who presents with a history of prostate cancer, coronary disease, diabetes mellitus type 2, hypertension, hyperlipidemia who presented to [facility] emergency room today shortly after receiving his flu shot.  The patient received a flu shot and felt his legs go numb within 15 minutes.  He was having difficulty ambulating.  However in the emergency room he was able to ambulate and the numbness seemed to improved.  However he was noted to have abnormal renal function testing.  The patient states he is still weak at this time.    Past Medical History:      Diagnosis Date    Anemia, normocytic normochromic     CAD (coronary artery disease)     multiple PCI    Cancer (HCC)     prostate    Carotid artery stenosis     Diabetes mellitus (HCC)     Gout 10/02/2017    Hyperlipidemia     Hypertension     Neuropathy     Occlusive disease of artery of lower extremity (HCC) 10/02/2017    Type 2 diabetes mellitus without complication (HCC)        Past Surgical History:      Procedure Laterality Date    ARTERIOGRAM Right 10/6/2017    RIGHT ARM AORTO-FEMORAL DIGITAL SUBTRACTION ARTERIOGRAPHY performed by Alex Maldonado MD at [facility]    CAROTID ENDARTERECTOMY Left 9/8/2020    LEFT CAROTID ENDARTERECTOMY performed by Alex Maldonado MD at [facility]    COLONOSCOPY N/A 9/9/2024    COLORECTAL CANCER SCREENING, NOT HIGH RISK with polypectomy performed by Oanh Connor MD at [facility]    CORONARY ANGIOPLASTY WITH STENT PLACEMENT N/A 11/7/11    xience stent to RAC and CIRC    CORONARY ANGIOPLASTY WITH STENT PLACEMENT Left 4/19/13    xience stent to LAD    CORONARY

## 2024-10-16 LAB
ALBUMIN SERPL-MCNC: 3.5 G/DL (ref 3.5–4.6)
ALP SERPL-CCNC: 78 U/L (ref 35–104)
ALT SERPL-CCNC: 60 U/L (ref 0–41)
ANION GAP SERPL CALCULATED.3IONS-SCNC: 9 MEQ/L (ref 9–15)
AST SERPL-CCNC: 61 U/L (ref 0–40)
BASOPHILS # BLD: 0.4 K/UL (ref 0–0.2)
BASOPHILS NFR BLD: 4 %
BILIRUB SERPL-MCNC: 1 MG/DL (ref 0.2–0.7)
BUN SERPL-MCNC: 36 MG/DL (ref 8–23)
CALCIUM SERPL-MCNC: 8.4 MG/DL (ref 8.5–9.9)
CHLORIDE SERPL-SCNC: 106 MEQ/L (ref 95–107)
CO2 SERPL-SCNC: 25 MEQ/L (ref 20–31)
CREAT SERPL-MCNC: 1.45 MG/DL (ref 0.7–1.2)
EOSINOPHIL # BLD: 0.2 K/UL (ref 0–0.7)
EOSINOPHIL NFR BLD: 2 %
ERYTHROCYTE [DISTWIDTH] IN BLOOD BY AUTOMATED COUNT: 17.1 % (ref 11.5–14.5)
ESTIMATED AVERAGE GLUCOSE: 160 MG/DL
GLOBULIN SER CALC-MCNC: 2.4 G/DL (ref 2.3–3.5)
GLUCOSE BLD-MCNC: 199 MG/DL (ref 70–99)
GLUCOSE BLD-MCNC: 235 MG/DL (ref 70–99)
GLUCOSE BLD-MCNC: 249 MG/DL (ref 70–99)
GLUCOSE BLD-MCNC: 263 MG/DL (ref 70–99)
GLUCOSE SERPL-MCNC: 186 MG/DL (ref 70–99)
HBA1C MFR BLD: 7.2 % (ref 4–6)
HCT VFR BLD AUTO: 26.7 % (ref 42–52)
HGB BLD-MCNC: 8.6 G/DL (ref 14–18)
LYMPHOCYTES # BLD: 0.6 K/UL (ref 1–4.8)
LYMPHOCYTES NFR BLD: 7 %
MCH RBC QN AUTO: 28 PG (ref 27–31.3)
MCHC RBC AUTO-ENTMCNC: 32.2 % (ref 33–37)
MCV RBC AUTO: 87 FL (ref 79–92.2)
MONOCYTES # BLD: 0.4 K/UL (ref 0.2–0.8)
MONOCYTES NFR BLD: 3.9 %
NEUTROPHILS # BLD: 7.5 K/UL (ref 1.4–6.5)
NEUTS BAND NFR BLD MANUAL: 5 %
NEUTS SEG NFR BLD: 79 %
PERFORMED ON: ABNORMAL
PLATELET # BLD AUTO: 74 K/UL (ref 130–400)
PLATELET BLD QL SMEAR: ABNORMAL
POTASSIUM SERPL-SCNC: 4.4 MEQ/L (ref 3.4–4.9)
POTASSIUM SERPL-SCNC: 4.4 MEQ/L (ref 3.4–4.9)
PROT SERPL-MCNC: 5.9 G/DL (ref 6.3–8)
RBC # BLD AUTO: 3.07 M/UL (ref 4.7–6.1)
SLIDE REVIEW: ABNORMAL
SMUDGE CELLS BLD QL SMEAR: 14.6
SODIUM SERPL-SCNC: 140 MEQ/L (ref 135–144)
WBC # BLD AUTO: 8.9 K/UL (ref 4.8–10.8)

## 2024-10-16 PROCEDURE — 85025 COMPLETE CBC W/AUTO DIFF WBC: CPT

## 2024-10-16 PROCEDURE — 97161 PT EVAL LOW COMPLEX 20 MIN: CPT

## 2024-10-16 PROCEDURE — 80053 COMPREHEN METABOLIC PANEL: CPT

## 2024-10-16 PROCEDURE — 36415 COLL VENOUS BLD VENIPUNCTURE: CPT

## 2024-10-16 PROCEDURE — 6370000000 HC RX 637 (ALT 250 FOR IP): Performed by: FAMILY MEDICINE

## 2024-10-16 PROCEDURE — 2580000003 HC RX 258: Performed by: FAMILY MEDICINE

## 2024-10-16 PROCEDURE — 97166 OT EVAL MOD COMPLEX 45 MIN: CPT

## 2024-10-16 PROCEDURE — 1210000000 HC MED SURG R&B

## 2024-10-16 PROCEDURE — 83036 HEMOGLOBIN GLYCOSYLATED A1C: CPT

## 2024-10-16 RX ADMIN — APIXABAN 2.5 MG: 5 TABLET, FILM COATED ORAL at 08:25

## 2024-10-16 RX ADMIN — INSULIN LISPRO 2 UNITS: 100 INJECTION, SOLUTION INTRAVENOUS; SUBCUTANEOUS at 08:22

## 2024-10-16 RX ADMIN — GABAPENTIN 300 MG: 300 CAPSULE ORAL at 19:57

## 2024-10-16 RX ADMIN — AMLODIPINE BESYLATE 5 MG: 5 TABLET ORAL at 08:25

## 2024-10-16 RX ADMIN — ASPIRIN 81 MG: 81 TABLET, COATED ORAL at 08:25

## 2024-10-16 RX ADMIN — ATENOLOL 50 MG: 50 TABLET ORAL at 08:25

## 2024-10-16 RX ADMIN — INSULIN GLARGINE 10 UNITS: 100 INJECTION, SOLUTION SUBCUTANEOUS at 08:23

## 2024-10-16 RX ADMIN — INSULIN LISPRO 2 UNITS: 100 INJECTION, SOLUTION INTRAVENOUS; SUBCUTANEOUS at 12:00

## 2024-10-16 RX ADMIN — APIXABAN 2.5 MG: 5 TABLET, FILM COATED ORAL at 19:57

## 2024-10-16 RX ADMIN — SODIUM CHLORIDE: 9 INJECTION, SOLUTION INTRAVENOUS at 01:05

## 2024-10-16 RX ADMIN — INSULIN LISPRO 2 UNITS: 100 INJECTION, SOLUTION INTRAVENOUS; SUBCUTANEOUS at 17:10

## 2024-10-16 RX ADMIN — ISOSORBIDE MONONITRATE 30 MG: 30 TABLET, EXTENDED RELEASE ORAL at 08:25

## 2024-10-16 RX ADMIN — INSULIN LISPRO 4 UNITS: 100 INJECTION, SOLUTION INTRAVENOUS; SUBCUTANEOUS at 21:59

## 2024-10-16 RX ADMIN — GABAPENTIN 300 MG: 300 CAPSULE ORAL at 08:25

## 2024-10-16 RX ADMIN — ATORVASTATIN CALCIUM 80 MG: 80 TABLET, FILM COATED ORAL at 19:57

## 2024-10-16 RX ADMIN — BUPROPION HYDROCHLORIDE 150 MG: 150 TABLET, EXTENDED RELEASE ORAL at 08:25

## 2024-10-16 NOTE — PLAN OF CARE
Problem: Chronic Conditions and Co-morbidities  Goal: Patient's chronic conditions and co-morbidity symptoms are monitored and maintained or improved  10/15/2024 2117 by Diaz Cadet RN  Outcome: Progressing  10/15/2024 1433 by Daria Jacques RN  Outcome: Progressing     Problem: Discharge Planning  Goal: Discharge to home or other facility with appropriate resources  10/15/2024 2117 by Diaz Cadet RN  Outcome: Progressing  10/15/2024 1433 by Daria Jacques RN  Outcome: Progressing     Problem: ABCDS Injury Assessment  Goal: Absence of physical injury  10/15/2024 2117 by Diaz Cadet RN  Outcome: Progressing  10/15/2024 1433 by Daria Jacques RN  Outcome: Progressing     Problem: Safety - Adult  Goal: Free from fall injury  10/15/2024 2117 by Diaz Cadet RN  Outcome: Progressing  10/15/2024 1433 by Daria Jacques RN  Outcome: Progressing     Problem: Pain  Goal: Verbalizes/displays adequate comfort level or baseline comfort level  Outcome: Progressing

## 2024-10-16 NOTE — CONSULTS
[facility]                   [address] [place] [address]                              CONSULTATION      PATIENT NAME: YOANDY LAYNE                : 1945  MED REC NO: [id number]                        ROOM: W474  ACCOUNT NO: [id number]                       ADMIT DATE: 10/15/2024  PROVIDER: Tahir Bales DO    RENAL CONSULT    CONSULT DATE: 10/15/2024      HISTORY OF PRESENT ILLNESS:  An 79-year-old black male, admitted to the hospital with abnormal renal function studies.  The patient has a history of recently having a flu shot and developing numbness to his lower legs.  Question of [place] needs to be evaluated.  He has known history of prostate cancer, coronary artery disease, diabetes mellitus type 2, hypertension, and hyperlipidemia.  Six months ago, the patient's GFR averaged, approximately 42 mL/minute.  At the time of his evaluation, the patient had a creatinine of 2.98 with a GFR of 28.5 mL/minute.  Blood sugars were elevated on admission, in addition to his blood sugars and liver enzymes.  The patient denies any bloody urine or dysuria.  No history of kidney stones or frequent urinary tract infections.    PAST MEDICAL HISTORY:  Coronary artery disease, prostate cancer, CKD 3A, diabetes mellitus type 2, PAD, hypertension, hyperlipidemia.    PAST SURGICAL HISTORY:  Right aortofemoral angiography, left carotid endarterectomy, colonoscopy x2, coronary angioplasties, fem-fem bypass grafts, hernia repair, left femoral distal bypass surgery, EGD.    ALLERGIES:  TO MEDICATIONS, NONE.      MEDICATIONS:  At the time of his admission; nitroglycerin, Tenormin, Actos, Imdur, lisinopril, Norvasc, Viagra, Wellbutrin, aspirin, Eliquis, allopurinol, and Neurontin.    REVIEW OF SYSTEMS:  Weakness.    HABITS:  No smoking or alcohol.    PHYSICAL EXAMINATION:  VITAL SIGNS: The patient is 5 feet 10 inches, 200 pounds.  Blood pressure 114/60, heart rate 75, respirations

## 2024-10-16 NOTE — CARE COORDINATION
MET WITH PATIENT AT BEDSIDE TO DISCUSS DISCHARGE PLAN. PATIENT DENIES HOME GOING NEEDS. DISCHARGE PLAN HOME NO NEEDS WHEN MEDICALLY CLEARED.

## 2024-10-17 VITALS
HEIGHT: 70 IN | TEMPERATURE: 97.7 F | WEIGHT: 200 LBS | SYSTOLIC BLOOD PRESSURE: 142 MMHG | RESPIRATION RATE: 18 BRPM | HEART RATE: 72 BPM | OXYGEN SATURATION: 99 % | DIASTOLIC BLOOD PRESSURE: 66 MMHG | BODY MASS INDEX: 28.63 KG/M2

## 2024-10-17 LAB
ALBUMIN SERPL-MCNC: 3.5 G/DL (ref 3.5–4.6)
ALP SERPL-CCNC: 90 U/L (ref 35–104)
ALT SERPL-CCNC: 56 U/L (ref 0–41)
ANION GAP SERPL CALCULATED.3IONS-SCNC: 8 MEQ/L (ref 9–15)
AST SERPL-CCNC: 46 U/L (ref 0–40)
BILIRUB SERPL-MCNC: 0.9 MG/DL (ref 0.2–0.7)
BUN SERPL-MCNC: 24 MG/DL (ref 8–23)
CALCIUM SERPL-MCNC: 8.5 MG/DL (ref 8.5–9.9)
CHLORIDE SERPL-SCNC: 105 MEQ/L (ref 95–107)
CO2 SERPL-SCNC: 26 MEQ/L (ref 20–31)
CREAT SERPL-MCNC: 1.14 MG/DL (ref 0.7–1.2)
EKG ATRIAL RATE: 81 BPM
EKG P AXIS: 50 DEGREES
EKG P-R INTERVAL: 162 MS
EKG Q-T INTERVAL: 406 MS
EKG QRS DURATION: 86 MS
EKG QTC CALCULATION (BAZETT): 471 MS
EKG R AXIS: 28 DEGREES
EKG T AXIS: 74 DEGREES
EKG VENTRICULAR RATE: 81 BPM
GLOBULIN SER CALC-MCNC: 2.7 G/DL (ref 2.3–3.5)
GLUCOSE BLD-MCNC: 179 MG/DL (ref 70–99)
GLUCOSE BLD-MCNC: 268 MG/DL (ref 70–99)
GLUCOSE SERPL-MCNC: 179 MG/DL (ref 70–99)
PATH INTERP BLD-IMP: NORMAL
PERFORMED ON: ABNORMAL
PERFORMED ON: ABNORMAL
POTASSIUM SERPL-SCNC: 4.3 MEQ/L (ref 3.4–4.9)
PROT SERPL-MCNC: 6.2 G/DL (ref 6.3–8)
SODIUM SERPL-SCNC: 139 MEQ/L (ref 135–144)

## 2024-10-17 PROCEDURE — 36415 COLL VENOUS BLD VENIPUNCTURE: CPT

## 2024-10-17 PROCEDURE — 6370000000 HC RX 637 (ALT 250 FOR IP): Performed by: FAMILY MEDICINE

## 2024-10-17 PROCEDURE — 80053 COMPREHEN METABOLIC PANEL: CPT

## 2024-10-17 PROCEDURE — 2580000003 HC RX 258: Performed by: FAMILY MEDICINE

## 2024-10-17 RX ORDER — FLUCONAZOLE 150 MG/1
150 TABLET ORAL DAILY
Qty: 7 TABLET | Refills: 0 | Status: SHIPPED | OUTPATIENT
Start: 2024-10-17 | End: 2024-10-17 | Stop reason: HOSPADM

## 2024-10-17 RX ORDER — CIPROFLOXACIN 250 MG/1
250 TABLET, FILM COATED ORAL 2 TIMES DAILY
Qty: 14 TABLET | Refills: 0 | Status: SHIPPED | OUTPATIENT
Start: 2024-10-17 | End: 2024-10-24

## 2024-10-17 RX ORDER — FLUCONAZOLE 100 MG/1
200 TABLET ORAL DAILY
Qty: 28 TABLET | Refills: 0 | Status: SHIPPED | OUTPATIENT
Start: 2024-10-17 | End: 2024-10-31

## 2024-10-17 RX ADMIN — INSULIN GLARGINE 10 UNITS: 100 INJECTION, SOLUTION SUBCUTANEOUS at 08:27

## 2024-10-17 RX ADMIN — AMLODIPINE BESYLATE 5 MG: 5 TABLET ORAL at 08:29

## 2024-10-17 RX ADMIN — GABAPENTIN 300 MG: 300 CAPSULE ORAL at 08:28

## 2024-10-17 RX ADMIN — SODIUM CHLORIDE, PRESERVATIVE FREE 10 ML: 5 INJECTION INTRAVENOUS at 08:29

## 2024-10-17 RX ADMIN — APIXABAN 2.5 MG: 5 TABLET, FILM COATED ORAL at 08:29

## 2024-10-17 RX ADMIN — ISOSORBIDE MONONITRATE 30 MG: 30 TABLET, EXTENDED RELEASE ORAL at 08:28

## 2024-10-17 RX ADMIN — ACETAMINOPHEN 325MG 650 MG: 325 TABLET ORAL at 03:12

## 2024-10-17 RX ADMIN — ATENOLOL 50 MG: 50 TABLET ORAL at 08:27

## 2024-10-17 RX ADMIN — BUPROPION HYDROCHLORIDE 150 MG: 150 TABLET, EXTENDED RELEASE ORAL at 08:27

## 2024-10-17 RX ADMIN — INSULIN LISPRO 4 UNITS: 100 INJECTION, SOLUTION INTRAVENOUS; SUBCUTANEOUS at 12:47

## 2024-10-17 RX ADMIN — ASPIRIN 81 MG: 81 TABLET, COATED ORAL at 08:27

## 2024-10-17 NOTE — PLAN OF CARE
Problem: Chronic Conditions and Co-morbidities  Goal: Patient's chronic conditions and co-morbidity symptoms are monitored and maintained or improved  10/16/2024 2205 by Diaz Cadet RN  Outcome: Progressing  10/16/2024 1207 by Alissa Loving RN  Outcome: Progressing     Problem: Discharge Planning  Goal: Discharge to home or other facility with appropriate resources  10/16/2024 2205 by Diaz Cadet RN  Outcome: Progressing  10/16/2024 1207 by Alissa Loving RN  Outcome: Progressing     Problem: ABCDS Injury Assessment  Goal: Absence of physical injury  10/16/2024 2205 by Diaz Cadet RN  Outcome: Progressing  10/16/2024 1207 by Alissa Loving RN  Outcome: Progressing     Problem: Safety - Adult  Goal: Free from fall injury  10/16/2024 2205 by Diaz Cadet RN  Outcome: Progressing  10/16/2024 1207 by Alissa Lvoing RN  Outcome: Progressing     Problem: Pain  Goal: Verbalizes/displays adequate comfort level or baseline comfort level  10/16/2024 2205 by Diaz Cadet RN  Outcome: Progressing  10/16/2024 1207 by Alissa Loving RN  Outcome: Progressing

## 2024-10-17 NOTE — PROGRESS NOTES
[facility] Occupational Therapy      Date: 10/15/2024  Patient Name: Armando Arias        MRN: [id number]  Account: [id number]   : 1945  (79 y.o.)  Room: [place] [address]    Chart reviewed, attempted OT at 1530 for evaluation. Patient not seen 2° to:    Pt. declined, stating: \"Come back tomorrow.    Spoke to HARVEY Fontaine RN aware. Will attempt again when able.    Electronically signed by SUSHILA Ochoa on 10/15/2024 at 3:34 PM    
  Hospitalist Daily Progress Note  Name: Armando Arias  Age: 79 y.o.  Gender: male  CodeStatus: Full Code  Allergies: No Known Allergies    Chief Complaint:Extremity Weakness (Bilateral legs (since getting the flu shot yesterday))      Primary Care Provider: Sterling Hall MD    InpatientTreatment Team: Treatment Team:   Que García MD Nadkarni, Vivek, MD Bennett, HARVEY Neil Tara L, Alissa Rodriguez, Mehreen Yung, Ghazal Gonzalez RN Reinhart, Jennifer    Admission Date: 10/15/2024      Subjective: No chest pain, sob, nausea.  Feels improved, walking slowly to bathroom.    Physical Exam  Vitals and nursing note reviewed.   Constitutional:       Appearance: Normal appearance.   Cardiovascular:      Rate and Rhythm: Normal rate and regular rhythm.   Pulmonary:      Effort: Pulmonary effort is normal.      Breath sounds: Normal breath sounds.   Abdominal:      General: Bowel sounds are normal.      Palpations: Abdomen is soft.   Musculoskeletal:         General: Normal range of motion.   Skin:     General: Skin is warm and dry.   Neurological:      Mental Status: He is alert and oriented to person, place, and time. Mental status is at baseline.         Medications:  Reviewed    Infusion Medications:    sodium chloride      sodium chloride 125 mL/hr at 10/16/24 0550    dextrose       Scheduled Medications:    sodium chloride flush  5-40 mL IntraVENous 2 times per day    amLODIPine  5 mg Oral Daily    aspirin EC  81 mg Oral Daily    atenolol  50 mg Oral Daily    atorvastatin  80 mg Oral Nightly    buPROPion  150 mg Oral Daily    apixaban  2.5 mg Oral BID    gabapentin  300 mg Oral BID    isosorbide mononitrate  30 mg Oral Daily    insulin glargine  10 Units SubCUTAneous Daily    insulin lispro  0-8 Units SubCUTAneous 4x Daily AC & HS     PRN Meds: sodium chloride flush, sodium chloride, ondansetron **OR** ondansetron, acetaminophen **OR** acetaminophen, polyethylene glycol, glucose, 
1430 Patient arrived to unit via cart in stable condition. Ambulated to bed, tolerated ambulation well. All admission questions answered appropriately. IV infusing without difficulty. Patient denies pain or nausea at this time, now resting comfortably with lights dim. Oriented to room and educated on call light use    [facility]15 [facility] called and notified of patient admission status per pt request    Electronically signed by Daria Jacques RN on 10/15/2024 at 3:02 PM    
CLINICAL PHARMACY NOTE: MEDS TO BEDS    Total # of Prescriptions Filled: 1   The following medications were delivered to the patient:  Ciprofloxacin 250mg tab    Additional Documentation: fluconazole discontinued by Dr. García due to drug interaction with amlodipine and eliquis.  He checked with ID and said fluconazole can be discontinued.  Talked with nurse that it would appear on the after visit summary but it was verbally discontinued by Dr. García and was not cancelled out in EPIC.    
Discharge papers reviewed.  Pt has received his antibiotic from our pharmacy and acknowledges use.  IV pulled. Pt dressed and transporting out to personal auto with friend.   
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, so stated ([facility])     Stenosis of left carotid artery     Dyslipidemia     Angina of effort ([facility])     NSVT (nonsustained ventricular tachycardia) ([facility])     Coronary artery disease involving native coronary artery of native heart with angina pectoris ([facility])     Other hyperlipidemia     Coronary artery disease involving native coronary artery of native heart without angina pectoris     Pain in right knee     Unilateral primary osteoarthritis, right knee     Unilateral primary osteoarthritis, right knee     Anemia of unknown etiology     Bradycardia     Right flank pain     Syncope and collapse     Carotid stenosis, left     Chest pain     Abnormal ankle brachial index     Epididymitis     Claudication ([facility])     Cerebrovascular accident (CVA) due to stenosis of left middle cerebral artery ([facility])     Stroke-like symptoms     Arthralgia of shoulder     Callus     Edema of both lower legs due to peripheral venous insufficiency     Edema of lower extremity     Onychomycosis     Pain in both feet     Peripheral vascular disease of foot ([facility])     Type 2 diabetes mellitus with peripheral neuropathy ([facility])     Ulcer of right leg, limited to breakdown of skin ([facility])     Xerosis of skin     Acute CVA (cerebrovascular accident) ([facility])     H/O carotid endarterectomy     Prostate cancer ([facility])     LUANNE (acute kidney injury) ([facility])      Subjective:  Admit Date: 10/15/2024    Interval History: doing well  less tingling lower limbs    Medications:  Scheduled Meds:   sodium chloride flush  5-40 mL 
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, so stated ([facility])     Stenosis of left carotid artery     Dyslipidemia     Angina of effort ([facility])     NSVT (nonsustained ventricular tachycardia) ([facility])     Coronary artery disease involving native coronary artery of native heart with angina pectoris ([facility])     Other hyperlipidemia     Coronary artery disease involving native coronary artery of native heart without angina pectoris     Pain in right knee     Unilateral primary osteoarthritis, right knee     Unilateral primary osteoarthritis, right knee     Anemia of unknown etiology     Bradycardia     Right flank pain     Syncope and collapse     Carotid stenosis, left     Chest pain     Abnormal ankle brachial index     Epididymitis     Claudication ([facility])     Cerebrovascular accident (CVA) due to stenosis of left middle cerebral artery ([facility])     Stroke-like symptoms     Arthralgia of shoulder     Callus     Edema of both lower legs due to peripheral venous insufficiency     Edema of lower extremity     Onychomycosis     Pain in both feet     Peripheral vascular disease of foot ([facility])     Type 2 diabetes mellitus with peripheral neuropathy ([facility])     Ulcer of right leg, limited to breakdown of skin ([facility])     Xerosis of skin     Acute CVA (cerebrovascular accident) ([facility])     H/O carotid endarterectomy     Prostate cancer ([facility])     LUANNE (acute kidney injury) ([facility])      Subjective:  Admit Date: 10/15/2024    Interval History: doing well    Medications:  Scheduled Meds:   sodium chloride flush  5-40 mL IntraVENous 2 
Physical Therapy Missed Treatment   Facility/Department: [facility] MED SURG W474/W474-01    NAME: Armando Arias    : 1945 (79 y.o.)  MRN: [id number]    Account: [id number]  Gender: male      Attempted PT eval. Pt bathing at this time.      Will follow and attempt PT evaluation again at earliest availability.       Susana Robertson, PT, 10/16/24 at 10:57 AM   
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, so stated ([facility]) 09/26/2018    Pain in right knee 06/20/2018    Unilateral primary osteoarthritis, right knee 06/20/2018    Unilateral primary osteoarthritis, right knee 06/20/2018    Angina, class III ([facility]) 06/14/2018    Gout 10/02/2017    Peripheral vascular disease, unspecified ([facility]) 08/21/2003    Essential hypertension 08/21/2003        Past Medical History:   Diagnosis Date    Anemia, normocytic normochromic     CAD (coronary artery disease)     multiple PCI    Cancer ([facility])     prostate    Carotid artery stenosis     Diabetes mellitus ([facility])     Gout 10/02/2017    Hyperlipidemia     Hypertension     Neuropathy     Occlusive disease of artery of lower extremity ([facility]) 10/02/2017    Type 2 diabetes mellitus without complication ([facility])      Past Surgical History:   Procedure Laterality Date    ARTERIOGRAM Right 10/6/2017    RIGHT ARM AORTO-FEMORAL DIGITAL SUBTRACTION ARTERIOGRAPHY performed by Alex Maldonado MD at [facility] [place]    CAROTID ENDARTERECTOMY Left 9/8/2020    LEFT CAROTID ENDARTERECTOMY performed by Alex Maldonado MD at [facility] [place]    COLONOSCOPY N/A 9/9/2024    COLORECTAL CANCER SCREENING, NOT HIGH RISK with polypectomy performed by Oanh Connor MD at [facility]    CORONARY ANGIOPLASTY WITH STENT PLACEMENT N/A 11/7/11    xience stent to RAC and CIRC    CORONARY ANGIOPLASTY WITH STENT PLACEMENT Left 4/19/13    xience stent to LAD    CORONARY ANGIOPLASTY WITH STENT PLACEMENT Right 12/22/13    Promus stent to RCA    DIAGNOSTIC CARDIAC CATH LAB PROCEDURE  09/20/2019    FEMORAL-FEMORAL BYPASS GRAFT      HERNIA REPAIR      as child    INVASIVE VASCULAR N/A 8/7/2023    Angioplasty peripheral artery (PERIPHERAL PERCUTANEOUS TRANSLUMINAL ANGIOPLASTY 
(PAT DONE 10-2-17) performed by Alex Maldonado MD [place] [facility] [place]    AZ ESOPHAGOGASTRODUODENOSCOPY TRANSORAL DIAGNOSTIC N/A 11/30/2017    EGD ESOPHAGOGASTRODUODENOSCOPY with biopsy performed by Oanh Connor MD at [facility]        Restrictions  Restrictions/Precautions: Fall Risk              Safety Devices: Safety Devices  Type of Devices: Call light within reach;Left in chair;Nurse notified     Patient's date of birth confirmed: Yes    General:       Subjective:Pt pleasant and cooperative          Pain at start of treatment: No    Pain at end of treatment: No    Location: N/A  Description: N/A  Nursing notified: No  RN: N/A  Intervention: Repositioned    Prior Level of Function:  Social/Functional History  Lives With: Alone  Type of Home: House  Home Layout: One level, Laundry in basement (8-9 steps with handrail)  Home Access:  (lift)  Bathroom Shower/Tub: Walk-in shower  Bathroom Toilet: Standard  Bathroom Equipment: Grab bars in shower, Hand-held shower  Bathroom Accessibility: Accessible  Home Equipment: Cane  Has the patient had two or more falls in the past year or any fall with injury in the past year?: No  Receives Help From: Family, Friend(s)  ADL Assistance: Independent  Homemaking Assistance: Independent (occasioanlly granddaughter will cook for patient)  Homemaking Responsibilities: Yes (Pt reports that granddaughter will set up pillboxes)  Ambulation Assistance: Independent  Transfer Assistance: Independent  Active : Yes  Patient's  Info: GRANDKIDS  Mode of Transportation: Car  Occupation: Retired    OBJECTIVE:     Orientation Status:  Orientation  Orientation Level: Oriented X4    Observation:  Observation/Palpation  Posture: Fair  Observation: mild flexed posture. IV in place    Cognition Status:  Cognition  Overall Cognitive Status: WFL    Perception Status:  Perception  Overall Perceptual Status: WFL    Vision and Hearing Status:  Vision  Vision Exceptions: Wears glasses for

## 2024-10-17 NOTE — DISCHARGE SUMMARY
Physician Discharge Summary     Patient ID:  Armando Arias  [id number]  79 y.o.  1945    Admit date: 10/15/2024    Discharge date : 10/17/24     Admitting Physician: Sam Santana MD     Discharge Physician: SAM SANTANA MD     Admission Diagnoses: LUANNE (acute kidney injury) (HCC) [N17.9]  Elevated liver enzymes [R74.8]  Other fatigue [R53.83]  Acute kidney injury superimposed on CKD (HCC) [N17.9, N18.9]    Discharge Diagnoses: LUANNE, UTI    Admission Condition: fair    Discharged Condition: good    Hospital Course:   LUANNE-given IV fluids and improved significantly.  Creatinine back to baseline.  Nephrology following.    UTI-patient with abnormal urinalysis however had few bacteria on microscopic.  Initially not started on antibiotic due to this however will be discharged home on Cipro and diflucan for E. coli and yeast.    Consults: nephrology    Significant Diagnostic Studies: as below    Discharge Exam:  BP (!) 155/75   Pulse 71   Temp 98.6 °F (37 °C) (Oral)   Resp 18   Ht 1.778 m (5' 10\")   Wt 90.7 kg (200 lb)   SpO2 100%   BMI 28.70 kg/m²   General appearance: alert, appears stated age, and cooperative  Lungs: clear to auscultation bilaterally  Heart: regular rate and rhythm, S1, S2 normal, no murmur, click, rub or gallop  Abdomen: soft, non-tender; bowel sounds normal; no masses,  no organomegaly  Extremities: extremities normal, atraumatic, no cyanosis or edema  Skin: Skin color, texture, turgor normal. No rashes or lesions    Labs:   Recent Labs     10/15/24  1031 10/16/24  0542   WBC 12.5* 8.9   HGB 10.0* 8.6*   HCT 31.1* 26.7*   PLT 92* 74*     Recent Labs     10/15/24  1031 10/16/24  0542 10/17/24  0520   * 140 139   K 4.9 4.4  4.4 4.3   CL 95 106 105   CO2 25 25 26   BUN 39* 36* 24*   CREATININE 2.98* 1.45* 1.14   CALCIUM 9.1 8.4* 8.5     Recent Labs     10/15/24  1031 10/16/24  0542 10/17/24  0520   AST 87* 61* 46*   ALT 88* 60* 56*   BILIDIR 0.5*  --   --    BILITOT 1.8* 1.0* 0.9*

## 2024-10-17 NOTE — CARE COORDINATION
1007  MET WITH PATIENT AT BEDSIDE TO DISCUSS DISCHARGE PLAN. PATIENT RESTING IN BED AND DENIES HOME GOING NEEDS. DISCHARGE PLAN HOME NO NEEDS.

## 2024-10-18 LAB
BACTERIA UR CULT: ABNORMAL
BACTERIA UR CULT: ABNORMAL
ORGANISM: ABNORMAL

## 2024-10-23 ENCOUNTER — HOSPITAL ENCOUNTER (OUTPATIENT)
Dept: RADIATION ONCOLOGY | Age: 79
Discharge: HOME | End: 2024-10-23
Payer: MEDICARE

## 2024-10-23 VITALS
SYSTOLIC BLOOD PRESSURE: 124 MMHG | TEMPERATURE: 97.6 F | BODY MASS INDEX: 28.3 KG/M2 | OXYGEN SATURATION: 96 % | WEIGHT: 197.2 LBS | HEART RATE: 62 BPM | DIASTOLIC BLOOD PRESSURE: 57 MMHG | RESPIRATION RATE: 18 BRPM

## 2024-10-23 DIAGNOSIS — C61 PROSTATE CANCER (HCC): Primary | ICD-10-CM

## 2024-10-23 PROCEDURE — 99212 OFFICE O/P EST SF 10 MIN: CPT | Performed by: RADIOLOGY

## 2024-10-23 PROCEDURE — 99214 OFFICE O/P EST MOD 30 MIN: CPT | Performed by: RADIOLOGY

## 2024-10-23 NOTE — PROGRESS NOTES
NURSING ASSESSMENT     Date: 10/23/2024        Patient Name: Armando Arias     YOB: 1945      Age:  79 y.o.      MRN: [id number]       Chaperone [] Yes   [x] No      Advance Directives:   Do you currently have completed advance directives (living will)? [x] Yes   [] No         *If yes, please bring us a copy for your records.  *If no, would you like info or assistance in completing advance directives (living will)?   [] Yes   [x] No    Pain Score:   Pain Score (1-10): Denies   Pain Location: NA   Pain Duration: NA   Pain Management/Control: NA      Is pain affecting your ability to take care of yourself or move throughout your home?                        [] Yes   [x] No    General: No Problems  Patient has gained weight [] Yes   [x] No  Patient has lost weight [] Yes   [x] No  How much weight in pounds and over what length of time:     Eyes (Ophthalmic): Wears reading glasses     Skin (Dermatological): No Problems     ENT: Upper dentures but does not wear them.     Respiratory: No Problems     Cardiovascular: No Problems      Device   [] Yes   [x] No   Copy of Card Obtained [] Yes   [x] No    Gastrointestinal: No Problems    Genito-Urinary: IPSS 21   Difficulty Urinating   Frequency   Nocturia times 4    Urge incontinence at night   Weak stream   Recently admitted to [facility] for UTI, treated with Cipro. Has 2 remaining days left on antibiotic.    Breast: No Problems     Musculoskeletal: No Problems    Neurological: Ongoing numbness and tingling to lower legs and feet at baseline      Hematological and Lymphatic: No Problems     Endocrine: Diabetes Mellitus        A 10-point review of systems  has been conducted and pertinent positives have been   recorded. All other review of systems are negative    Was the patient admitted during the course of treatment OR within 30 days of treatment?  Yes    If yes:  Date of Admission:10/15-10/47348  Hospital: [facility] with UTI and LUANNE    Additional Comments: F/U

## 2024-10-24 ENCOUNTER — TRANSCRIBE ORDERS (OUTPATIENT)
Dept: ADMINISTRATIVE | Age: 79
End: 2024-10-24

## 2024-10-24 DIAGNOSIS — I63.9 IMPENDING CEREBROVASCULAR ACCIDENT (HCC): ICD-10-CM

## 2024-10-24 DIAGNOSIS — R53.1 WEAKNESS: Primary | ICD-10-CM

## 2024-10-29 ENCOUNTER — OFFICE VISIT (OUTPATIENT)
Dept: CARDIOLOGY CLINIC | Age: 79
End: 2024-10-29

## 2024-10-29 VITALS
WEIGHT: 197 LBS | BODY MASS INDEX: 28.27 KG/M2 | OXYGEN SATURATION: 98 % | SYSTOLIC BLOOD PRESSURE: 110 MMHG | HEART RATE: 61 BPM | DIASTOLIC BLOOD PRESSURE: 70 MMHG

## 2024-10-29 DIAGNOSIS — I25.119 CORONARY ARTERY DISEASE INVOLVING NATIVE CORONARY ARTERY OF NATIVE HEART WITH ANGINA PECTORIS (HCC): Primary | ICD-10-CM

## 2024-10-29 DIAGNOSIS — I70.209 OCCLUSIVE DISEASE OF ARTERY OF LOWER EXTREMITY (HCC): ICD-10-CM

## 2024-10-29 DIAGNOSIS — I65.23 BILATERAL CAROTID ARTERY STENOSIS: ICD-10-CM

## 2024-10-29 ASSESSMENT — ENCOUNTER SYMPTOMS
EYES NEGATIVE: 1
STRIDOR: 0
COUGH: 0
NAUSEA: 0
BLOOD IN STOOL: 0
GASTROINTESTINAL NEGATIVE: 1
SHORTNESS OF BREATH: 0
WHEEZING: 0
CHEST TIGHTNESS: 1

## 2024-10-29 NOTE — PROGRESS NOTES
4.07 02/03/2012 06:41 AM    HGB 8.6 10/16/2024 05:42 AM    HCT 26.7 10/16/2024 05:42 AM    MCV 87.0 10/16/2024 05:42 AM    MCH 28.0 10/16/2024 05:42 AM    MCHC 32.2 10/16/2024 05:42 AM    RDW 17.1 10/16/2024 05:42 AM    PLT 74 10/16/2024 05:42 AM    MPV 10.4 09/24/2015 08:31 AM     Lipids:  Lab Results   Component Value Date    CHOL 153 06/06/2024    CHOL 158 10/11/2023    CHOL 172 08/19/2022     Lab Results   Component Value Date    TRIG 84 06/06/2024    TRIG 100 10/11/2023    TRIG 60 08/19/2022     Lab Results   Component Value Date    HDL 59 06/06/2024    HDL 67 (H) 10/11/2023    HDL 76 (H) 08/19/2022     No components found for: \"LDLCHOLESTEROL\", \"LDLCALC\"    No results found for: \"VLDL\"  Lab Results   Component Value Date    CHOLHDLRATIO 3.4 03/05/2013    CHOLHDLRATIO 3.1 11/04/2011     CMP:    Lab Results   Component Value Date/Time     10/17/2024 05:20 AM    K 4.3 10/17/2024 05:20 AM     10/17/2024 05:20 AM    CO2 26 10/17/2024 05:20 AM    BUN 24 10/17/2024 05:20 AM    CREATININE 1.14 10/17/2024 05:20 AM    GFRAA >60 03/05/2022 09:51 AM    LABGLOM 65.0 10/17/2024 05:20 AM    LABGLOM 59.0 12/29/2023 09:49 AM    GLUCOSE 179 10/17/2024 05:20 AM    GLUCOSE 111 11/08/2011 10:48 AM    CALCIUM 8.5 10/17/2024 05:20 AM    BILITOT 0.9 10/17/2024 05:20 AM    ALKPHOS 90 10/17/2024 05:20 AM    AST 46 10/17/2024 05:20 AM    ALT 56 10/17/2024 05:20 AM     BMP:    Lab Results   Component Value Date/Time     10/17/2024 05:20 AM    K 4.3 10/17/2024 05:20 AM     10/17/2024 05:20 AM    CO2 26 10/17/2024 05:20 AM    BUN 24 10/17/2024 05:20 AM    CREATININE 1.14 10/17/2024 05:20 AM    CALCIUM 8.5 10/17/2024 05:20 AM    GFRAA >60 03/05/2022 09:51 AM    LABGLOM 65.0 10/17/2024 05:20 AM    LABGLOM 59.0 12/29/2023 09:49 AM    GLUCOSE 179 10/17/2024 05:20 AM    GLUCOSE 111 11/08/2011 10:48 AM     Magnesium:    Lab Results   Component Value Date/Time    MG 1.6 10/15/2024 10:31 AM    MG 2.5 11/04/2011 02:55 PM

## 2024-11-22 ENCOUNTER — HOSPITAL ENCOUNTER (OUTPATIENT)
Dept: RADIATION ONCOLOGY | Age: 79
Discharge: HOME | End: 2024-11-22

## 2024-11-22 NOTE — PROGRESS NOTES
RADIATION ONCOLOGY  SpaceOar Gel & Fiducial Marker Procedure  Patient Education      If on Anti-Coagulants, discontinue them 7 days prior to the procedure and may be resumed 48 hours after the completion of the procedure provided you do not notice any active bleeding in urine or stool.    Over the counter NSAIDS taken on occasion (like naproxen, advil, etc) should also not be taken within this time frame (1 week) and you may resume taking 2 days after provided that you do not notice any active bleeding in urine or stool.     You are to complete 1 Fleets Enemas the evening before the procedure and 1 Fleets enema the morning of the procedure.    You are to take prescribed Percocet and Ativan one hour before the procedure and one dose of  prescribed antibiotic two hours before procedure.    You may have a medium breakfast the morning of the procedure.    You are to have someone drive you to and from the [facility].    Nursing will call you before the procedure to verify that all instructions were followed.    You will have a CT scan after the procedure.    You will be scheduled for CT Simulation approximately 10 days after the SpacerOar Gel and Fiducial Marker procedure.     Before leaving the [facility], you will need to urinate.    [x] Instructions provided to patient and patient verbalized understanding.

## 2024-11-29 NOTE — PROGRESS NOTES
[facility]           Radiation Oncology      [id number] [place], [place] [address]        O: [phone]        F: [phone]       [facility]                   Dr. Aristeo Raymundo MD PhD    FOLLOW-UP NOTE     Date of Service: 10/23/2024  Patient ID: Armando Arias   : 1945  MRN: [id number]   Acct Number: [id number]       NAME:  Armando Arias    :  1945 79 y.o. male     PCP: Sterling Hall MD    REFERRING PROVIDER: Leland    DIAGNOSIS:  1. Prostate cancer (HCC)        STAGING: Cancer Staging   Prostate cancer (HCC)  Staging form: Prostate, AJCC 8th Edition  - Clinical: Stage IIC (cT1c, cN0, cM0, PSA: 19.7, Grade Group: 3) - Signed by Aristeo Raymundo MD on 2024      PRIOR TREATMENT: None    TIME SINCE TREATMENT: Not applicable    RECENT HISTORY: Armando Arias returns for follow-up approximately 3 months after having been seen in consultation for the above diagnosis.  The patient started Lupron on 2024 and so far he is tolerating it well.  He did have a repeat colonoscopy on 2024 which revealed an early tubular adenoma in the rectum but otherwise no evidence of other disease.  His IPSS score today is 21.  He notes his biggest complaint being nocturia 4 times nightly.  He was otherwise admitted for UTI on 10/15/2024 and discharged on 10/17/2024.  He is currently on ciprofloxacin and has 2 more days left.     Past medical, surgical, social and family histories reviewed and updated as indicated.    ALLERGIES:  Patient has no known allergies.       MEDICATIONS:   Current Outpatient Medications:     cloNIDine (CATAPRES) 0.1 MG tablet, Take 1 tablet by mouth 2 times daily, Disp: , Rfl:     nitroGLYCERIN (NITROSTAT) 0.4 MG SL tablet, DISSOLVE ONE TABLET UNDER TONGUE AS NEEDED FOR CHEST PAIN EVERY 5 MINUTES( MAX 3 DOSES). IF NO RELIEF AFTER FIRST DOSE, CALL 911, Disp: 25 tablet, Rfl: 5    isosorbide mononitrate (IMDUR) 30 MG extended release tablet, TAKE 1

## 2024-12-05 RX ORDER — OXYCODONE AND ACETAMINOPHEN 5; 325 MG/1; MG/1
1 TABLET ORAL ONCE
Qty: 1 TABLET | Refills: 0 | Status: SHIPPED | OUTPATIENT
Start: 2024-12-05 | End: 2024-12-05

## 2024-12-05 RX ORDER — LEVOFLOXACIN 500 MG/1
500 TABLET, FILM COATED ORAL ONCE
Qty: 1 TABLET | Refills: 0 | Status: SHIPPED | OUTPATIENT
Start: 2024-12-05 | End: 2024-12-05

## 2024-12-05 RX ORDER — LORAZEPAM 1 MG/1
1 TABLET ORAL ONCE
Qty: 1 TABLET | Refills: 0 | Status: SHIPPED | OUTPATIENT
Start: 2024-12-05 | End: 2024-12-05

## 2024-12-06 ENCOUNTER — HOSPITAL ENCOUNTER (OUTPATIENT)
Dept: RADIATION ONCOLOGY | Age: 79
Discharge: HOME | End: 2024-12-06
Payer: MEDICARE

## 2024-12-06 DIAGNOSIS — C61 PROSTATE CANCER (HCC): Primary | ICD-10-CM

## 2024-12-06 PROCEDURE — A4648 IMPLANTABLE TISSUE MARKER: HCPCS

## 2024-12-06 PROCEDURE — 55876 PLACE RT DEVICE/MARKER PROS: CPT | Performed by: RADIOLOGY

## 2024-12-06 PROCEDURE — C1889 IMPLANT/INSERT DEVICE, NOC: HCPCS

## 2024-12-06 PROCEDURE — 55874 TPRNL PLMT BIODEGRDABL MATRL: CPT | Performed by: RADIOLOGY

## 2024-12-06 NOTE — PROGRESS NOTES
[facility]                                                           Radiation Oncology                                             F[employer]ial [employer]   PROCEDURE TIME OUT  [x]Verified patient name and date of birth  [x] Patient identified procedure and body site  Physician [x]Dr. Raymundo  Nursing [x]Lindsay Suresh RN []James Ocampo RN [x]Kimberly Browne RN    MEDICATIONS FOR PHYSICIAN TO ADMINISTER    [x] Lidocaine 2% Jelly X 1 syringe  [x] Lidocaine 2% with Epinephrine 20 ML in 2(two) 10 ML Syringes  [x] Normal Saline 10 ML  [x] SpaceOar or  [] Barrigel      Procedure  [x] Supplies and room set up per SOP  Procedure start time:0846  Fiducial Marker LT mid gland:0906  Fiducial Marker RT Base:0908  Fiducial Marker RT Dyer:0910  SpaceOar Gel insertion:0919  Procedure completion time: 0920    Vital Signs Pre-Procedure: 97.8,73,18,178/80, 100%    Vital Signs Post-Procedure:98.6, 72,18,  99% 148/78,     PT tolerated the procedure:  [x]Well  [x] Other:perianal area cleansed for mod amt bright red rectal bleeding post procedure    Post Procedure:  [x] Patient voided prior to discharge.    Discharge:  [x] PT aware that they are to call Radiation Oncology and / Or their Urologist for blood in their urine or difficulty urinating.  [x] PT to avoid any heavy lifting for the next 48 hours.  [x] PT may resume blood thinners in 48 hours if no blood in urine or stool.  [x] PT to call Radiation Oncology or their urologist for increased pain or fever greater than 100.5.  [x] PT driven home by responsible adult.

## 2024-12-06 NOTE — PROCEDURES
[facility]           Radiation Oncology      [address] [place] [address]        O: [phone]        F: [phone]       [facility]                   Dr. Aristeo Raymundo MD PhD    PROCEDURE NOTE     Date of Service: 2024  Patient ID: Armando Arias   : 1945  MRN: [id number]   Acct Number: [id number]     Armando Arias  79 y.o.   1945    REFERRING PROVIDER: Leland    PCP:  Sterling Hall MD    DIAGNOSIS:   1. Prostate cancer (HCC)        STAGING: Cancer Staging   Prostate cancer (HCC)  Staging form: Prostate, AJCC 8th Edition  - Clinical: Stage IIC (cT1c, cN0, cM0, PSA: 19.7, Grade Group: 3) - Signed by Aristeo Raymundo MD on 2024      PROCEDURE: The patient was placed in the dorsal lithotomy position on the examination table.  Genitalia were mobilized out of the perineal field. The perineum was then sterilized with Betadine. The perineum was anesthetized with 1% lidocaine for injection with epinephrine injecting a total of 5 cc of a solution within the superficial subcutaneous tissues of the perineum to help create a superficial block.  I then placed the rectal probe in position so the prostate was easily visualized, and once the prostate was in place and midline, I locked the ultrasound probe into the stepper unit.  A prostate apical block was then performed using the remaining 1% lidocaine with epinephrine injecting an additional 5 to 6 cc into the prostate apex along the expected needle tracks and near the pudendal nerves.  I then placed a total of 3 fiducial markers in the prostate using individually loaded needles with GoldAnchor fiducials. One fiducial was placed in the left mid gland and similarly 2 markers were placed in the right base and apex, respectively. An effort was made to place them at least 2 cm apart as allowed by prostate anatomy, and not near the capsule or urethra.      At this point a needle was positioned in the

## 2024-12-18 ENCOUNTER — HOSPITAL ENCOUNTER (OUTPATIENT)
Dept: RADIATION ONCOLOGY | Age: 79
Discharge: HOME | End: 2024-12-18
Payer: MEDICARE

## 2024-12-18 ENCOUNTER — HOSPITAL ENCOUNTER (OUTPATIENT)
Dept: MRI IMAGING | Age: 79
Discharge: HOME | End: 2024-12-20
Attending: RADIOLOGY
Payer: MEDICARE

## 2024-12-18 DIAGNOSIS — C61 PROSTATE CANCER (HCC): Primary | ICD-10-CM

## 2024-12-18 DIAGNOSIS — C61 PROSTATE CANCER (HCC): ICD-10-CM

## 2024-12-18 PROCEDURE — 72197 MRI PELVIS W/O & W/DYE: CPT | Performed by: RADIOLOGY

## 2024-12-18 PROCEDURE — 36415 COLL VENOUS BLD VENIPUNCTURE: CPT

## 2024-12-18 PROCEDURE — 6360000004 HC RX CONTRAST MEDICATION: Performed by: RADIOLOGY

## 2024-12-18 PROCEDURE — 77334 RADIATION TREATMENT AID(S): CPT | Performed by: RADIOLOGY

## 2024-12-18 PROCEDURE — A9577 INJ MULTIHANCE: HCPCS | Performed by: RADIOLOGY

## 2024-12-18 PROCEDURE — 72197 MRI PELVIS W/O & W/DYE: CPT

## 2024-12-18 RX ADMIN — GADOBENATE DIMEGLUMINE 20 ML: 529 INJECTION, SOLUTION INTRAVENOUS at 16:46

## 2024-12-18 NOTE — PROGRESS NOTES
Teaching & Instructions - Prostate  General  Simulation  Initial Treatment  Self-Care Info  Nutrition  Social Service    Site-Specific  Side Effects  Cystitis Mgmt  Bowel Mgmt  Perineal Care  Proctitis Mgmt  Sexuality Issues    Educational Handouts  Radiation Therapy & You  Site Specific Instructions  Radiation Oncology Dept Information  CBMS Program      Patient and his friend Susana, eager to learn, verbalized understanding of verbal education and handouts.

## 2024-12-26 PROCEDURE — 77301 RADIOTHERAPY DOSE PLAN IMRT: CPT | Performed by: RADIOLOGY

## 2024-12-26 PROCEDURE — 77338 DESIGN MLC DEVICE FOR IMRT: CPT | Performed by: RADIOLOGY

## 2024-12-26 PROCEDURE — 77300 RADIATION THERAPY DOSE PLAN: CPT | Performed by: RADIOLOGY

## 2024-12-30 ENCOUNTER — APPOINTMENT (OUTPATIENT)
Dept: RADIATION ONCOLOGY | Age: 79
End: 2024-12-30
Payer: MEDICARE

## 2025-01-06 ENCOUNTER — HOSPITAL ENCOUNTER (OUTPATIENT)
Dept: RADIATION ONCOLOGY | Age: 80
Discharge: HOME | End: 2025-01-06
Payer: MEDICARE

## 2025-01-06 DIAGNOSIS — C61 PROSTATE CANCER (HCC): Primary | ICD-10-CM

## 2025-01-07 ENCOUNTER — HOSPITAL ENCOUNTER (OUTPATIENT)
Dept: RADIATION ONCOLOGY | Age: 80
Discharge: HOME | End: 2025-01-07
Payer: MEDICARE

## 2025-01-07 PROCEDURE — 77385 HC NTSTY MODUL RAD TX DLVR SMPL: CPT | Performed by: RADIOLOGY

## 2025-01-08 ENCOUNTER — HOSPITAL ENCOUNTER (OUTPATIENT)
Dept: RADIATION ONCOLOGY | Age: 80
Discharge: HOME | End: 2025-01-08
Payer: MEDICARE

## 2025-01-08 PROCEDURE — 77385 HC NTSTY MODUL RAD TX DLVR SMPL: CPT | Performed by: RADIOLOGY

## 2025-01-09 ENCOUNTER — HOSPITAL ENCOUNTER (OUTPATIENT)
Dept: RADIATION ONCOLOGY | Age: 80
Discharge: HOME | End: 2025-01-09
Payer: MEDICARE

## 2025-01-09 VITALS
WEIGHT: 200.6 LBS | TEMPERATURE: 98.6 F | BODY MASS INDEX: 28.78 KG/M2 | OXYGEN SATURATION: 100 % | SYSTOLIC BLOOD PRESSURE: 162 MMHG | HEART RATE: 54 BPM | RESPIRATION RATE: 18 BRPM | DIASTOLIC BLOOD PRESSURE: 80 MMHG

## 2025-01-09 DIAGNOSIS — C61 PROSTATE CANCER (HCC): Primary | ICD-10-CM

## 2025-01-09 PROCEDURE — 77385 HC NTSTY MODUL RAD TX DLVR SMPL: CPT | Performed by: RADIOLOGY

## 2025-01-09 RX ORDER — ONDANSETRON 4 MG/1
4 TABLET, ORALLY DISINTEGRATING ORAL 3 TIMES DAILY PRN
Qty: 40 TABLET | Refills: 1 | Status: SHIPPED | OUTPATIENT
Start: 2025-01-09

## 2025-01-09 NOTE — PROGRESS NOTES
[facility]           Radiation Oncology      [address] [id number]        O: [phone]        F: [phone]       [facility]                   Dr. Aristeo Raymundo MD PhD    ON TREATMENT VISIT (OTV) NOTE     Date of Service: 2025  Patient ID: Armando Arias   : 1945  MRN: [id number]   Acct Number: [id number]       DIAGNOSIS:   Cancer Staging   Prostate cancer (HCC)  Staging form: Prostate, AJCC 8th Edition  - Clinical: Stage IIC (cT1c, cN0, cM0, PSA: 19.7, Grade Group: 3) - Signed by Aristeo Raymundo MD on 2024      Treatment Area: Pelvis- Male    Current Total Dose(cGy): 800  Current Fraction: 4    Patient was seen today for weekly visit.     Wt Readings from Last 3 Encounters:   25 91 kg (200 lb 9.6 oz)   10/29/24 89.4 kg (197 lb)   10/23/24 89.4 kg (197 lb 3.2 oz)       BP (!) 162/80   Pulse 54   Temp 98.6 °F (37 °C) (Temporal)   Resp 18   Wt 91 kg (200 lb 9.6 oz)   SpO2 100%   BMI 28.78 kg/m²     Lab Results   Component Value Date    WBC 8.9 10/16/2024    HGB 8.6 (L) 10/16/2024    HCT 26.7 (L) 10/16/2024    PLT 74 (L) 10/16/2024       Comfort Alteration  Fatigue:None, able to perform daily activities    Pain Location: denies any pain  Pain Intensity (Current): 0 No Pain  Pain Treatment: n/a  Pain Relief: n/a    Emotional Alteration:   Coping: effective    Nutritional Alteration  Anorexia: none   Nausea: reports one episode of nausea yesterday about 45 minutes after treatment. Which resolved on its own with no return.   Vomiting: No vomiting     Skin Alteration   Skin reaction: No changes noted    Elimination Alterations  Urinary Frequency: Urinating less than once an hour  Urinary Retention: Normal  Urinary Incontinence: baseline urgency  Dysuria: None  Nocturia: baseline 3-4   Proctitis: Denies any problems.   Diarrhea: None  Comments: Reports regular daily movements.     Additional Comments:     MEDICATIONS:     Current

## 2025-01-10 ENCOUNTER — HOSPITAL ENCOUNTER (OUTPATIENT)
Dept: RADIATION ONCOLOGY | Age: 80
Discharge: HOME | End: 2025-01-10
Payer: MEDICARE

## 2025-01-10 PROCEDURE — 77385 HC NTSTY MODUL RAD TX DLVR SMPL: CPT | Performed by: RADIOLOGY

## 2025-01-10 PROCEDURE — 77336 RADIATION PHYSICS CONSULT: CPT | Performed by: RADIOLOGY

## 2025-01-13 ENCOUNTER — HOSPITAL ENCOUNTER (OUTPATIENT)
Dept: RADIATION ONCOLOGY | Age: 80
Discharge: HOME | End: 2025-01-13
Payer: MEDICARE

## 2025-01-13 PROCEDURE — G6002 STEREOSCOPIC X-RAY GUIDANCE: HCPCS | Performed by: RADIOLOGY

## 2025-01-13 PROCEDURE — 77385 HC NTSTY MODUL RAD TX DLVR SMPL: CPT | Performed by: RADIOLOGY

## 2025-01-14 ENCOUNTER — HOSPITAL ENCOUNTER (OUTPATIENT)
Dept: RADIATION ONCOLOGY | Age: 80
Discharge: HOME | End: 2025-01-14
Payer: MEDICARE

## 2025-01-14 PROCEDURE — 77385 HC NTSTY MODUL RAD TX DLVR SMPL: CPT | Performed by: RADIOLOGY

## 2025-01-14 PROCEDURE — G6002 STEREOSCOPIC X-RAY GUIDANCE: HCPCS | Performed by: RADIOLOGY

## 2025-01-15 ENCOUNTER — HOSPITAL ENCOUNTER (OUTPATIENT)
Dept: RADIATION ONCOLOGY | Age: 80
Discharge: HOME | End: 2025-01-15
Payer: MEDICARE

## 2025-01-15 PROCEDURE — 77385 HC NTSTY MODUL RAD TX DLVR SMPL: CPT | Performed by: RADIOLOGY

## 2025-01-16 ENCOUNTER — HOSPITAL ENCOUNTER (OUTPATIENT)
Dept: RADIATION ONCOLOGY | Age: 80
Discharge: HOME | End: 2025-01-16
Payer: MEDICARE

## 2025-01-16 VITALS
BODY MASS INDEX: 29.01 KG/M2 | HEART RATE: 79 BPM | SYSTOLIC BLOOD PRESSURE: 151 MMHG | OXYGEN SATURATION: 96 % | DIASTOLIC BLOOD PRESSURE: 70 MMHG | RESPIRATION RATE: 18 BRPM | TEMPERATURE: 96.9 F | WEIGHT: 202.2 LBS

## 2025-01-16 DIAGNOSIS — C61 PROSTATE CANCER (HCC): Primary | ICD-10-CM

## 2025-01-16 PROCEDURE — 77385 HC NTSTY MODUL RAD TX DLVR SMPL: CPT | Performed by: RADIOLOGY

## 2025-01-16 NOTE — PROGRESS NOTES
[facility]           Radiation Oncology      [address] [id number]        O: [phone]        F: [phone]       [facility]                   Dr. Aristeo Raymundo MD PhD    ON TREATMENT VISIT (OTV) NOTE     Date of Service: 2025  Patient ID: Armando Arias   : 1945  MRN: [id number]   Acct Number: [id number]       DIAGNOSIS:   Cancer Staging   Prostate cancer (HCC)  Staging form: Prostate, AJCC 8th Edition  - Clinical: Stage IIC (cT1c, cN0, cM0, PSA: 19.7, Grade Group: 3) - Signed by Aristeo Raymundo MD on 2024      Treatment Area: Pelvis- Male    Current Total Dose(cGy): 1800 cGy  Current Fraction: 9    Patient was seen today for weekly visit.     Wt Readings from Last 3 Encounters:   25 91.7 kg (202 lb 3.2 oz)   25 91 kg (200 lb 9.6 oz)   10/29/24 89.4 kg (197 lb)       BP (!) 190/85   Pulse 79   Temp 96.9 °F (36.1 °C)   Resp 18   Wt 91.7 kg (202 lb 3.2 oz)   SpO2 96%   BMI 29.01 kg/m²     Lab Results   Component Value Date    WBC 8.9 10/16/2024    HGB 8.6 (L) 10/16/2024    HCT 26.7 (L) 10/16/2024    PLT 74 (L) 10/16/2024       Comfort Alteration  Fatigue:None, able to perform daily activities    Pain Location: Denies  Pain Intensity (Current): 0 No Pain  Pain Treatment: N/A  Pain Relief: n/a    Emotional Alteration:   Coping: effective    Nutritional Alteration  Anorexia: none   Nausea: No nausea noted since started taking ODT zofran before treatment  Vomiting: No vomiting     Skin Alteration   Skin reaction: No changes noted    Elimination Alterations  Urinary Frequency:  Urinating less than once an hour  Urinary Retention: Normal  Urinary Incontinence:  Baseline urgency, but denies incontinence  Dysuria: None  Nocturia: 3-4 time nightly  Proctitis: None  Diarrhea:  Constipation for a couple days. Had a firm BM at 4 am and then another BM at 8:30 am that was soft.  Comments:     Additional Comments: BP at end of visit

## 2025-01-17 ENCOUNTER — HOSPITAL ENCOUNTER (OUTPATIENT)
Dept: RADIATION ONCOLOGY | Age: 80
Discharge: HOME | End: 2025-01-17
Payer: MEDICARE

## 2025-01-17 PROCEDURE — 77336 RADIATION PHYSICS CONSULT: CPT | Performed by: RADIOLOGY

## 2025-01-17 PROCEDURE — 77385 HC NTSTY MODUL RAD TX DLVR SMPL: CPT | Performed by: RADIOLOGY

## 2025-01-20 ENCOUNTER — HOSPITAL ENCOUNTER (OUTPATIENT)
Dept: RADIATION ONCOLOGY | Age: 80
Discharge: HOME | End: 2025-01-20
Payer: MEDICARE

## 2025-01-20 PROCEDURE — 77385 HC NTSTY MODUL RAD TX DLVR SMPL: CPT | Performed by: RADIOLOGY

## 2025-01-21 ENCOUNTER — HOSPITAL ENCOUNTER (OUTPATIENT)
Dept: RADIATION ONCOLOGY | Age: 80
Discharge: HOME | End: 2025-01-21
Payer: MEDICARE

## 2025-01-21 PROCEDURE — 77385 HC NTSTY MODUL RAD TX DLVR SMPL: CPT | Performed by: RADIOLOGY

## 2025-01-22 ENCOUNTER — HOSPITAL ENCOUNTER (OUTPATIENT)
Dept: RADIATION ONCOLOGY | Age: 80
Discharge: HOME | End: 2025-01-22
Payer: MEDICARE

## 2025-01-22 VITALS
HEART RATE: 66 BPM | WEIGHT: 204 LBS | BODY MASS INDEX: 29.27 KG/M2 | DIASTOLIC BLOOD PRESSURE: 72 MMHG | RESPIRATION RATE: 18 BRPM | SYSTOLIC BLOOD PRESSURE: 180 MMHG | OXYGEN SATURATION: 98 % | TEMPERATURE: 96.9 F

## 2025-01-22 DIAGNOSIS — C61 PROSTATE CANCER (HCC): Primary | ICD-10-CM

## 2025-01-22 PROCEDURE — 77385 HC NTSTY MODUL RAD TX DLVR SMPL: CPT | Performed by: RADIOLOGY

## 2025-01-22 NOTE — PROGRESS NOTES
[facility]           Radiation Oncology      [address] [id number]        O: [phone]        F: [phone]       [facility]                   Dr. Aristeo Raymundo MD PhD    ON TREATMENT VISIT (OTV) NOTE     Date of Service: 2025  Patient ID: Armando Arias   : 1945  MRN: [id number]   Acct Number: [id number]       DIAGNOSIS:   Cancer Staging   Prostate cancer (HCC)  Staging form: Prostate, AJCC 8th Edition  - Clinical: Stage IIC (cT1c, cN0, cM0, PSA: 19.7, Grade Group: 3) - Signed by Aristeo Raymundo MD on 2024      Treatment Area: Pelvis- Male    Current Total Dose(cGy): 2400  Current Fraction: 12    Patient was seen today for weekly visit.     Wt Readings from Last 3 Encounters:   25 92.5 kg (204 lb)   25 91.7 kg (202 lb 3.2 oz)   25 91 kg (200 lb 9.6 oz)       BP (!) 180/72   Pulse 66   Temp 96.9 °F (36.1 °C)   Resp 18   Wt 92.5 kg (204 lb)   SpO2 98%   BMI 29.27 kg/m²     Lab Results   Component Value Date    WBC 8.9 10/16/2024    HGB 8.6 (L) 10/16/2024    HCT 26.7 (L) 10/16/2024    PLT 74 (L) 10/16/2024       Comfort Alteration  Fatigue:Able to perform daily activities with rest periods    Pain Location: none  Pain Intensity (Current): 0 No Pain  Pain Treatment: N/A  Pain Relief: n/a    Emotional Alteration:   Coping: effective    Nutritional Alteration  Anorexia: none   Nausea: No nausea noted, takes zofran 1 hr prior to treatment with relief  Vomiting: No vomiting     Skin Alteration   Skin reaction: No changes noted    Elimination Alterations  Urinary Frequency: None  Urinary Retention:  has had some dribbling after voiding over the past week or tow  Urinary Incontinence:  occasional urgency  Dysuria:  stings on occasion when he starts his stream over the past week  Nocturia x2  Proctitis:  none  Diarrhea:  has occasional constipation  Comments: .    Additional Comments: RN called and spoke with Dr Hall regarding

## 2025-01-23 ENCOUNTER — HOSPITAL ENCOUNTER (OUTPATIENT)
Dept: RADIATION ONCOLOGY | Age: 80
Discharge: HOME | End: 2025-01-23
Payer: MEDICARE

## 2025-01-23 PROCEDURE — 77385 HC NTSTY MODUL RAD TX DLVR SMPL: CPT | Performed by: RADIOLOGY

## 2025-01-24 ENCOUNTER — HOSPITAL ENCOUNTER (OUTPATIENT)
Dept: RADIATION ONCOLOGY | Age: 80
Discharge: HOME | End: 2025-01-24
Payer: MEDICARE

## 2025-01-24 PROCEDURE — 77385 HC NTSTY MODUL RAD TX DLVR SMPL: CPT | Performed by: RADIOLOGY

## 2025-01-24 PROCEDURE — 77336 RADIATION PHYSICS CONSULT: CPT | Performed by: RADIOLOGY

## 2025-01-27 ENCOUNTER — HOSPITAL ENCOUNTER (OUTPATIENT)
Dept: RADIATION ONCOLOGY | Age: 80
Discharge: HOME | End: 2025-01-27
Payer: MEDICARE

## 2025-01-27 PROCEDURE — G6002 STEREOSCOPIC X-RAY GUIDANCE: HCPCS | Performed by: RADIOLOGY

## 2025-01-27 PROCEDURE — 77385 HC NTSTY MODUL RAD TX DLVR SMPL: CPT | Performed by: RADIOLOGY

## 2025-01-28 ENCOUNTER — HOSPITAL ENCOUNTER (OUTPATIENT)
Dept: RADIATION ONCOLOGY | Age: 80
Discharge: HOME | End: 2025-01-28
Payer: MEDICARE

## 2025-01-28 PROCEDURE — G6002 STEREOSCOPIC X-RAY GUIDANCE: HCPCS | Performed by: RADIOLOGY

## 2025-01-28 PROCEDURE — 77385 HC NTSTY MODUL RAD TX DLVR SMPL: CPT | Performed by: RADIOLOGY

## 2025-01-28 NOTE — PROGRESS NOTES
Armando Arias   [id number]  1945   Patient Mid-Point Distress Screening Form   Please select the number that describes how much distress you have experiened in the past week (0-10): 0    Please select the number that descrbes how much distress you have experienced today (0-10): 0    If you responded with a score of 6 or above to the first tow questions; please address the following concerns:    Practical Concerns 0-10 Comment        Work, Concerns about Job          Finances, Bills, Insurance          Housing          Transportation          Availability of Caregiver     Family Concerns          Dealing with Children          Dealing with Partner          Ability to have Children          Family Health Issues     Emotional Concerns          Sadness, Depression          Anxiety, Worry, Fear          Concerns about Appearance          Loss of Interest in Usual Activities     Spiritual Concerns          Connection to a higher power          Sense of meaning and purpose          Support for my spiritual community     Physical Concerns          Nausea          Eating, Loss of Appetite          Pain          Fatigue       Other Concerns/Notes: SW met with pt as he was awaiting treatment today. He presents as quite pleasant and friendly, stating he feels treatment is going well, and he expressed his appreciation for the kindness of staff.  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 He states he started this after his nursing home from the [employer] to keep himself busy. He describes support in the community from his grandson and great-grandchildren, and he expresses fond memories of his wife, who  of cancer, within recent years.  Pt denies any current psychosocial needs, but he was invited to contact SW should any need arise, to which he was agreeable.      Date of visit: 2025  7:51 AM      : Gladis Macias

## 2025-01-29 ENCOUNTER — HOSPITAL ENCOUNTER (OUTPATIENT)
Dept: RADIATION ONCOLOGY | Age: 80
Discharge: HOME | End: 2025-01-29
Payer: MEDICARE

## 2025-01-29 VITALS
HEART RATE: 66 BPM | OXYGEN SATURATION: 96 % | WEIGHT: 208 LBS | RESPIRATION RATE: 18 BRPM | DIASTOLIC BLOOD PRESSURE: 62 MMHG | SYSTOLIC BLOOD PRESSURE: 156 MMHG | TEMPERATURE: 97.5 F | BODY MASS INDEX: 29.84 KG/M2

## 2025-01-29 DIAGNOSIS — C61 PROSTATE CANCER (HCC): Primary | ICD-10-CM

## 2025-01-29 PROCEDURE — G6002 STEREOSCOPIC X-RAY GUIDANCE: HCPCS | Performed by: RADIOLOGY

## 2025-01-29 PROCEDURE — 77385 HC NTSTY MODUL RAD TX DLVR SMPL: CPT | Performed by: RADIOLOGY

## 2025-01-29 NOTE — PROGRESS NOTES
[facility]           Radiation Oncology      [address] [place], [place] [address]        O: [phone]        F: [phone]       [facility]                   Dr. Aristeo Raymundo MD PhD    ON TREATMENT VISIT (OTV) NOTE     Date of Service: 2025  Patient ID: Armando Arias   : 1945  MRN: [id number]   Acct Number: [id number]       DIAGNOSIS:   Cancer Staging   Prostate cancer (HCC)  Staging form: Prostate, AJCC 8th Edition  - Clinical: Stage IIC (cT1c, cN0, cM0, PSA: 19.7, Grade Group: 3) - Signed by Aristeo Raymundo MD on 2024      Treatment Area: Pelvis- Male    Current Total Dose(cGy): 3600  Current Fraction: 18    Patient was seen today for weekly visit.     Wt Readings from Last 3 Encounters:   25 94.3 kg (208 lb)   25 92.5 kg (204 lb)   25 91.7 kg (202 lb 3.2 oz)       BP (!) 156/62   Pulse 66   Temp 97.5 °F (36.4 °C)   Resp 18   Wt 94.3 kg (208 lb)   SpO2 96%   BMI 29.84 kg/m²     Lab Results   Component Value Date    WBC 8.9 10/16/2024    HGB 8.6 (L) 10/16/2024    HCT 26.7 (L) 10/16/2024    PLT 74 (L) 10/16/2024       Comfort Alteration  Fatigue:None, able to perform daily activities    Pain Location: none  Pain Intensity (Current): 0 No Pain  Pain Treatment: N/A  Pain Relief: n/a    Emotional Alteration:   Coping: effective    Nutritional Alteration  Anorexia: none   Nausea: No nausea noted, takes zofran 1 hr prior to treatment  Vomiting: No vomiting     Skin Alteration   Skin reaction: No changes noted    Elimination Alterations  Urinary Frequency: None  Urinary Retention: states occasional dribbling, hesitancy  Urinary Incontinence: occasional urgency  Dysuria: None  Nocturia:  2-3 times   Proctitis:  stools have been harder sometimes but is able to move bowels daily without pain, blood or mucous  Diarrhea: None  Comments: .    Additional Comments: pt saw Dr Hall last week for elevated BP readings, no changes to BP meds, was

## 2025-01-30 ENCOUNTER — HOSPITAL ENCOUNTER (OUTPATIENT)
Dept: RADIATION ONCOLOGY | Age: 80
Discharge: HOME | End: 2025-01-30
Payer: MEDICARE

## 2025-01-30 VITALS — DIASTOLIC BLOOD PRESSURE: 70 MMHG | SYSTOLIC BLOOD PRESSURE: 162 MMHG

## 2025-01-30 PROCEDURE — 77385 HC NTSTY MODUL RAD TX DLVR SMPL: CPT | Performed by: RADIOLOGY

## 2025-01-30 RX ORDER — SULFAMETHOXAZOLE AND TRIMETHOPRIM 800; 160 MG/1; MG/1
1 TABLET ORAL 2 TIMES DAILY
COMMUNITY
Start: 2025-01-30

## 2025-01-31 ENCOUNTER — HOSPITAL ENCOUNTER (OUTPATIENT)
Dept: RADIATION ONCOLOGY | Age: 80
Discharge: HOME | End: 2025-01-31
Payer: MEDICARE

## 2025-01-31 PROCEDURE — 77385 HC NTSTY MODUL RAD TX DLVR SMPL: CPT | Performed by: RADIOLOGY

## 2025-01-31 PROCEDURE — 77427 RADIATION TX MANAGEMENT X5: CPT | Performed by: RADIOLOGY

## 2025-01-31 PROCEDURE — 77336 RADIATION PHYSICS CONSULT: CPT | Performed by: RADIOLOGY

## 2025-02-03 ENCOUNTER — HOSPITAL ENCOUNTER (OUTPATIENT)
Dept: RADIATION ONCOLOGY | Age: 80
Discharge: HOME | End: 2025-02-03
Payer: MEDICARE

## 2025-02-03 PROCEDURE — 77385 HC NTSTY MODUL RAD TX DLVR SMPL: CPT | Performed by: RADIOLOGY

## 2025-02-04 ENCOUNTER — HOSPITAL ENCOUNTER (OUTPATIENT)
Dept: RADIATION ONCOLOGY | Age: 80
Discharge: HOME | End: 2025-02-04
Payer: MEDICARE

## 2025-02-04 PROCEDURE — 77385 HC NTSTY MODUL RAD TX DLVR SMPL: CPT | Performed by: RADIOLOGY

## 2025-02-05 ENCOUNTER — HOSPITAL ENCOUNTER (OUTPATIENT)
Dept: RADIATION ONCOLOGY | Age: 80
Discharge: HOME | End: 2025-02-05
Payer: MEDICARE

## 2025-02-05 VITALS
DIASTOLIC BLOOD PRESSURE: 83 MMHG | SYSTOLIC BLOOD PRESSURE: 178 MMHG | HEART RATE: 63 BPM | WEIGHT: 202.4 LBS | TEMPERATURE: 96.8 F | BODY MASS INDEX: 29.04 KG/M2 | RESPIRATION RATE: 18 BRPM | OXYGEN SATURATION: 99 %

## 2025-02-05 DIAGNOSIS — C61 PROSTATE CANCER (HCC): Primary | ICD-10-CM

## 2025-02-05 PROCEDURE — 77385 HC NTSTY MODUL RAD TX DLVR SMPL: CPT | Performed by: RADIOLOGY

## 2025-02-06 ENCOUNTER — HOSPITAL ENCOUNTER (OUTPATIENT)
Dept: RADIATION ONCOLOGY | Age: 80
Discharge: HOME | End: 2025-02-06
Payer: MEDICARE

## 2025-02-06 PROCEDURE — 77385 HC NTSTY MODUL RAD TX DLVR SMPL: CPT | Performed by: RADIOLOGY

## 2025-02-07 ENCOUNTER — HOSPITAL ENCOUNTER (OUTPATIENT)
Dept: RADIATION ONCOLOGY | Age: 80
Discharge: HOME | End: 2025-02-07
Payer: MEDICARE

## 2025-02-07 PROCEDURE — 77336 RADIATION PHYSICS CONSULT: CPT | Performed by: RADIOLOGY

## 2025-02-07 PROCEDURE — 77385 HC NTSTY MODUL RAD TX DLVR SMPL: CPT | Performed by: RADIOLOGY

## 2025-02-10 ENCOUNTER — HOSPITAL ENCOUNTER (OUTPATIENT)
Dept: RADIATION ONCOLOGY | Age: 80
Discharge: HOME | End: 2025-02-10
Payer: MEDICARE

## 2025-02-10 PROCEDURE — 77385 HC NTSTY MODUL RAD TX DLVR SMPL: CPT | Performed by: RADIOLOGY

## 2025-02-11 ENCOUNTER — HOSPITAL ENCOUNTER (OUTPATIENT)
Dept: RADIATION ONCOLOGY | Age: 80
Discharge: HOME | End: 2025-02-11
Payer: MEDICARE

## 2025-02-11 PROCEDURE — 77385 HC NTSTY MODUL RAD TX DLVR SMPL: CPT | Performed by: RADIOLOGY

## 2025-02-12 ENCOUNTER — HOSPITAL ENCOUNTER (OUTPATIENT)
Dept: RADIATION ONCOLOGY | Age: 80
Discharge: HOME | End: 2025-02-12
Payer: MEDICARE

## 2025-02-12 VITALS
TEMPERATURE: 97.5 F | DIASTOLIC BLOOD PRESSURE: 70 MMHG | SYSTOLIC BLOOD PRESSURE: 164 MMHG | RESPIRATION RATE: 18 BRPM | OXYGEN SATURATION: 95 % | WEIGHT: 203.8 LBS | BODY MASS INDEX: 29.24 KG/M2 | HEART RATE: 63 BPM

## 2025-02-12 DIAGNOSIS — C61 PROSTATE CANCER (HCC): Primary | ICD-10-CM

## 2025-02-12 PROCEDURE — 77385 HC NTSTY MODUL RAD TX DLVR SMPL: CPT | Performed by: RADIOLOGY

## 2025-02-12 NOTE — PROGRESS NOTES
[facility]           Radiation Oncology      [address] [id number]        O: [phone]        F: [phone]       [facility]                   Dr. Aristeo Raymundo MD PhD    ON TREATMENT VISIT (OTV) NOTE     Date of Service: 2025  Patient ID: Armando Arias   : 1945  MRN: [id number]   Acct Number: [id number]       DIAGNOSIS:   Cancer Staging   Prostate cancer (HCC)  Staging form: Prostate, AJCC 8th Edition  - Clinical: Stage IIC (cT1c, cN0, cM0, PSA: 19.7, Grade Group: 3) - Signed by Aristeo Raymundo MD on 2024      Treatment Area: Pelvis- Male    Current Total Dose(cGy): 5600 cGy  Current Fraction: 28    Patient was seen today for weekly visit.     Wt Readings from Last 3 Encounters:   25 92.4 kg (203 lb 12.8 oz)   25 91.8 kg (202 lb 6.4 oz)   25 94.3 kg (208 lb)       BP (!) 175/77   Pulse 63   Temp 97.5 °F (36.4 °C)   Resp 18   Wt 92.4 kg (203 lb 12.8 oz)   SpO2 95%   BMI 29.24 kg/m²     Lab Results   Component Value Date    WBC 8.9 10/16/2024    HGB 8.6 (L) 10/16/2024    HCT 26.7 (L) 10/16/2024    PLT 74 (L) 10/16/2024       Comfort Alteration  Fatigue:Denies fatigue, but sleeps more.    Pain Location: Denies  Pain Intensity (Current):  NA  Pain Treatment: N/A  Pain Relief: n/a    Emotional Alteration:   Coping: effective    Nutritional Alteration  Anorexia: none   Nausea: No nausea noted  Vomiting: No vomiting     Skin Alteration   Skin reaction: No changes noted    Elimination Alterations  Urinary Frequency: None  Urinary Retention:  Ongoing occasional dribbling, hesitancy.  Urinary Incontinence:  Ongoing urgency.  Dysuria: None  Nocturia: times 4  Proctitis: None  Diarrhea: None  Comments: Since stopping zofran, has had regular BM's. Denies diarrhea or constipation.    Additional Comments: Finished Bactrim yesterday. Denies any congestion.    MEDICATIONS:     Current Outpatient Medications   Medication Sig Dispense

## 2025-02-13 ENCOUNTER — HOSPITAL ENCOUNTER (OUTPATIENT)
Dept: RADIATION ONCOLOGY | Age: 80
Discharge: HOME | End: 2025-02-13
Payer: MEDICARE

## 2025-02-13 PROCEDURE — 77385 HC NTSTY MODUL RAD TX DLVR SMPL: CPT | Performed by: RADIOLOGY

## 2025-02-14 ENCOUNTER — HOSPITAL ENCOUNTER (OUTPATIENT)
Dept: RADIATION ONCOLOGY | Age: 80
Discharge: HOME | End: 2025-02-14
Payer: MEDICARE

## 2025-02-14 PROCEDURE — 77385 HC NTSTY MODUL RAD TX DLVR SMPL: CPT | Performed by: RADIOLOGY

## 2025-02-17 ENCOUNTER — HOSPITAL ENCOUNTER (OUTPATIENT)
Dept: RADIATION ONCOLOGY | Age: 80
Discharge: HOME | End: 2025-02-17
Payer: MEDICARE

## 2025-02-17 PROCEDURE — 77385 HC NTSTY MODUL RAD TX DLVR SMPL: CPT | Performed by: RADIOLOGY

## 2025-02-18 ENCOUNTER — HOSPITAL ENCOUNTER (OUTPATIENT)
Dept: RADIATION ONCOLOGY | Age: 80
Discharge: HOME | End: 2025-02-18
Payer: MEDICARE

## 2025-02-18 PROCEDURE — 77385 HC NTSTY MODUL RAD TX DLVR SMPL: CPT | Performed by: RADIOLOGY

## 2025-02-19 ENCOUNTER — HOSPITAL ENCOUNTER (OUTPATIENT)
Dept: RADIATION ONCOLOGY | Age: 80
Discharge: HOME | End: 2025-02-19
Payer: MEDICARE

## 2025-02-19 VITALS
DIASTOLIC BLOOD PRESSURE: 76 MMHG | BODY MASS INDEX: 29.7 KG/M2 | WEIGHT: 207 LBS | HEART RATE: 63 BPM | SYSTOLIC BLOOD PRESSURE: 157 MMHG | OXYGEN SATURATION: 99 % | TEMPERATURE: 97.8 F | RESPIRATION RATE: 18 BRPM

## 2025-02-19 DIAGNOSIS — C61 PROSTATE CANCER (HCC): Primary | ICD-10-CM

## 2025-02-19 PROCEDURE — 77385 HC NTSTY MODUL RAD TX DLVR SMPL: CPT | Performed by: RADIOLOGY

## 2025-02-19 RX ORDER — OXYBUTYNIN CHLORIDE 10 MG/1
10 TABLET, EXTENDED RELEASE ORAL DAILY
Qty: 30 TABLET | Refills: 3 | Status: SHIPPED | OUTPATIENT
Start: 2025-02-19

## 2025-02-19 RX ORDER — TAMSULOSIN HYDROCHLORIDE 0.4 MG/1
0.4 CAPSULE ORAL 2 TIMES DAILY
Qty: 60 CAPSULE | Refills: 2 | Status: SHIPPED | OUTPATIENT
Start: 2025-02-19

## 2025-02-19 NOTE — PROGRESS NOTES
[facility]           Radiation Oncology      [address] [id number]        O: [phone]        F: [phone]       [facility]                   Dr. Aristeo Raymundo MD PhD    ON TREATMENT VISIT (OTV) NOTE     Date of Service: 2025  Patient ID: Armando Arias   : 1945  MRN: [id number]   Acct Number: [id number]       DIAGNOSIS:   Cancer Staging   Prostate cancer (HCC)  Staging form: Prostate, AJCC 8th Edition  - Clinical: Stage IIC (cT1c, cN0, cM0, PSA: 19.7, Grade Group: 3) - Signed by Aristeo Raymundo MD on 2024      Treatment Area: Pelvis- Male    Current Total Dose(cGy): 6600 cGy  Current Fraction: 33    Patient was seen today for weekly visit.     Wt Readings from Last 3 Encounters:   25 93.9 kg (207 lb)   25 92.4 kg (203 lb 12.8 oz)   25 91.8 kg (202 lb 6.4 oz)       BP (!) 180/78   Pulse 63   Temp 97.8 °F (36.6 °C)   Resp 18   Wt 93.9 kg (207 lb)   SpO2 99%   BMI 29.70 kg/m²     Lab Results   Component Value Date    WBC 8.9 10/16/2024    HGB 8.6 (L) 10/16/2024    HCT 26.7 (L) 10/16/2024    PLT 74 (L) 10/16/2024       Comfort Alteration  Fatigue:No change from last week, sleeps more than before starting treatment.    Pain Location: Denies  Pain Intensity (Current): 0 No Pain  Pain Treatment: No treatment scheduled  Pain Relief: n/a    Emotional Alteration:   Coping: effective    Nutritional Alteration  Anorexia: none   Nausea: No nausea noted  Vomiting: No vomiting     Skin Alteration   Skin reaction: No changes noted    Elimination Alterations  Urinary Frequency: None  Urinary Retention:  Hesitancy over the last 2-3 days  Urinary Incontinence:  1 episode of urge incontinence yesterday while at the grocery store  Dysuria: Mild burning with every urination over the last 2-3 days  Nocturia: 5 times  Proctitis: None  Diarrhea: None  Comments: Having regular daily BM's    Additional Comments:     MEDICATIONS:     Current 
5

## 2025-02-20 ENCOUNTER — HOSPITAL ENCOUNTER (OUTPATIENT)
Dept: RADIATION ONCOLOGY | Age: 80
Discharge: HOME | End: 2025-02-20
Payer: MEDICARE

## 2025-02-20 PROCEDURE — 77385 HC NTSTY MODUL RAD TX DLVR SMPL: CPT | Performed by: RADIOLOGY

## 2025-02-21 ENCOUNTER — HOSPITAL ENCOUNTER (OUTPATIENT)
Dept: RADIATION ONCOLOGY | Age: 80
Discharge: HOME | End: 2025-02-21
Payer: MEDICARE

## 2025-02-21 PROCEDURE — 77385 HC NTSTY MODUL RAD TX DLVR SMPL: CPT | Performed by: RADIOLOGY

## 2025-02-21 PROCEDURE — 77336 RADIATION PHYSICS CONSULT: CPT | Performed by: RADIOLOGY

## 2025-02-24 ENCOUNTER — HOSPITAL ENCOUNTER (OUTPATIENT)
Dept: RADIATION ONCOLOGY | Age: 80
Discharge: HOME | End: 2025-02-24
Payer: MEDICARE

## 2025-02-24 PROCEDURE — 77385 HC NTSTY MODUL RAD TX DLVR SMPL: CPT | Performed by: RADIOLOGY

## 2025-02-25 ENCOUNTER — HOSPITAL ENCOUNTER (OUTPATIENT)
Dept: RADIATION ONCOLOGY | Age: 80
Discharge: HOME | End: 2025-02-25
Payer: MEDICARE

## 2025-02-25 PROCEDURE — 77385 HC NTSTY MODUL RAD TX DLVR SMPL: CPT | Performed by: RADIOLOGY

## 2025-02-26 ENCOUNTER — HOSPITAL ENCOUNTER (OUTPATIENT)
Dept: RADIATION ONCOLOGY | Age: 80
Discharge: HOME | End: 2025-02-26
Payer: MEDICARE

## 2025-02-26 VITALS
RESPIRATION RATE: 18 BRPM | SYSTOLIC BLOOD PRESSURE: 168 MMHG | BODY MASS INDEX: 29.13 KG/M2 | DIASTOLIC BLOOD PRESSURE: 84 MMHG | TEMPERATURE: 96.9 F | HEART RATE: 57 BPM | OXYGEN SATURATION: 100 % | WEIGHT: 203 LBS

## 2025-02-26 DIAGNOSIS — C61 PROSTATE CANCER (HCC): Primary | ICD-10-CM

## 2025-02-26 PROCEDURE — 77385 HC NTSTY MODUL RAD TX DLVR SMPL: CPT | Performed by: RADIOLOGY

## 2025-02-26 NOTE — PROGRESS NOTES
[facility]           Radiation Oncology      [address] [id number]        O: [phone]        F: [phone]       [facility]                   Dr. Aristeo Raymundo MD PhD    ON TREATMENT VISIT (OTV) NOTE     Date of Service: 2025  Patient ID: Armando Arias   : 1945  MRN: [id number]   Acct Number: [id number]       DIAGNOSIS:   Cancer Staging   Prostate cancer (HCC)  Staging form: Prostate, AJCC 8th Edition  - Clinical: Stage IIC (cT1c, cN0, cM0, PSA: 19.7, Grade Group: 3) - Signed by Aristeo Raymundo MD on 2024      Treatment Area: Pelvis- Male    Current Total Dose(cGy): 7600  Current Fraction: 38    Patient was seen today for weekly visit.     Wt Readings from Last 3 Encounters:   25 92.1 kg (203 lb)   25 93.9 kg (207 lb)   25 92.4 kg (203 lb 12.8 oz)       BP (!) 168/84   Pulse 57   Temp 96.9 °F (36.1 °C)   Resp 18   Wt 92.1 kg (203 lb)   SpO2 100%   BMI 29.13 kg/m²     Lab Results   Component Value Date    WBC 8.9 10/16/2024    HGB 8.6 (L) 10/16/2024    HCT 26.7 (L) 10/16/2024    PLT 74 (L) 10/16/2024       Comfort Alteration  Fatigue:Able to perform daily activities with rest periods    Pain Location: none  Pain Intensity (Current): 0 No Pain  Pain Treatment: N/A  Pain Relief: n/a    Emotional Alteration:   Coping: effective    Nutritional Alteration  Anorexia: none   Nausea: No nausea noted, takes zofran 1 hour prior to treatments with no nausea  Vomiting: No vomiting     Skin Alteration   Skin reaction: No changes noted    Elimination Alterations  Urinary Frequency: None  Urinary Retention:  states he doesn't feel he empties all the time, occasional hestancy, has dribbling of urine which is baseline  Urinary Incontinence:  has urgency but gets to the bathroom on time  Dysuria:  burning with urination happens the first two times he voids at night, if he drinks water he does not have burning  Nocturia:  about 5 times

## 2025-02-27 ENCOUNTER — APPOINTMENT (OUTPATIENT)
Dept: RADIOLOGY | Facility: HOSPITAL | Age: 80
End: 2025-02-27
Payer: MEDICARE

## 2025-02-27 ENCOUNTER — HOSPITAL ENCOUNTER (EMERGENCY)
Facility: HOSPITAL | Age: 80
Discharge: HOME | End: 2025-02-27
Attending: EMERGENCY MEDICINE
Payer: MEDICARE

## 2025-02-27 ENCOUNTER — APPOINTMENT (OUTPATIENT)
Dept: CARDIOLOGY | Facility: HOSPITAL | Age: 80
End: 2025-02-27
Payer: MEDICARE

## 2025-02-27 ENCOUNTER — HOSPITAL ENCOUNTER (OUTPATIENT)
Dept: RADIATION ONCOLOGY | Age: 80
Discharge: HOME | End: 2025-02-27
Payer: MEDICARE

## 2025-02-27 VITALS
BODY MASS INDEX: 28.63 KG/M2 | SYSTOLIC BLOOD PRESSURE: 133 MMHG | WEIGHT: 200 LBS | TEMPERATURE: 97.3 F | HEIGHT: 70 IN | HEART RATE: 63 BPM | RESPIRATION RATE: 16 BRPM | DIASTOLIC BLOOD PRESSURE: 74 MMHG | OXYGEN SATURATION: 98 %

## 2025-02-27 DIAGNOSIS — R55 SYNCOPE AND COLLAPSE: Primary | ICD-10-CM

## 2025-02-27 DIAGNOSIS — S09.90XA INJURY OF HEAD, INITIAL ENCOUNTER: ICD-10-CM

## 2025-02-27 DIAGNOSIS — D64.9 ANEMIA, UNSPECIFIED TYPE: ICD-10-CM

## 2025-02-27 LAB
ABO GROUP (TYPE) IN BLOOD: NORMAL
ABO GROUP (TYPE) IN BLOOD: NORMAL
ALBUMIN SERPL BCP-MCNC: 3.9 G/DL (ref 3.4–5)
ALP SERPL-CCNC: 62 U/L (ref 33–136)
ALT SERPL W P-5'-P-CCNC: 17 U/L (ref 10–52)
ANION GAP SERPL CALC-SCNC: 11 MMOL/L (ref 10–20)
ANTIBODY SCREEN: NORMAL
AST SERPL W P-5'-P-CCNC: 18 U/L (ref 9–39)
BASOPHILS # BLD AUTO: 0.03 X10*3/UL (ref 0–0.1)
BASOPHILS NFR BLD AUTO: 0.8 %
BILIRUB SERPL-MCNC: 0.5 MG/DL (ref 0–1.2)
BUN SERPL-MCNC: 28 MG/DL (ref 6–23)
CALCIUM SERPL-MCNC: 8.4 MG/DL (ref 8.6–10.3)
CARDIAC TROPONIN I PNL SERPL HS: 10 NG/L (ref 0–20)
CARDIAC TROPONIN I PNL SERPL HS: 9 NG/L (ref 0–20)
CHLORIDE SERPL-SCNC: 108 MMOL/L (ref 98–107)
CO2 SERPL-SCNC: 24 MMOL/L (ref 21–32)
CREAT SERPL-MCNC: 1.24 MG/DL (ref 0.5–1.3)
EGFRCR SERPLBLD CKD-EPI 2021: 59 ML/MIN/1.73M*2
EOSINOPHIL # BLD AUTO: 0.14 X10*3/UL (ref 0–0.4)
EOSINOPHIL NFR BLD AUTO: 3.6 %
ERYTHROCYTE [DISTWIDTH] IN BLOOD BY AUTOMATED COUNT: 20.8 % (ref 11.5–14.5)
ETHANOL SERPL-MCNC: <10 MG/DL
GLUCOSE SERPL-MCNC: 195 MG/DL (ref 74–99)
HCT VFR BLD AUTO: 24.9 % (ref 41–52)
HGB BLD-MCNC: 7.7 G/DL (ref 13.5–17.5)
HYPOCHROMIA BLD QL SMEAR: NORMAL
IMM GRANULOCYTES # BLD AUTO: 0.03 X10*3/UL (ref 0–0.5)
IMM GRANULOCYTES NFR BLD AUTO: 0.8 % (ref 0–0.9)
INR PPP: 1.3 (ref 0.9–1.1)
LACTATE SERPL-SCNC: 1.5 MMOL/L (ref 0.4–2)
LYMPHOCYTES # BLD AUTO: 0.97 X10*3/UL (ref 0.8–3)
LYMPHOCYTES NFR BLD AUTO: 24.7 %
MCH RBC QN AUTO: 27.9 PG (ref 26–34)
MCHC RBC AUTO-ENTMCNC: 30.9 G/DL (ref 32–36)
MCV RBC AUTO: 90 FL (ref 80–100)
MONOCYTES # BLD AUTO: 0.46 X10*3/UL (ref 0.05–0.8)
MONOCYTES NFR BLD AUTO: 11.7 %
NEUTROPHILS # BLD AUTO: 2.3 X10*3/UL (ref 1.6–5.5)
NEUTROPHILS NFR BLD AUTO: 58.4 %
NRBC BLD-RTO: 0 /100 WBCS (ref 0–0)
OVALOCYTES BLD QL SMEAR: NORMAL
PLATELET # BLD AUTO: 130 X10*3/UL (ref 150–450)
POLYCHROMASIA BLD QL SMEAR: NORMAL
POTASSIUM SERPL-SCNC: 4 MMOL/L (ref 3.5–5.3)
PROT SERPL-MCNC: 6.3 G/DL (ref 6.4–8.2)
PROTHROMBIN TIME: 14.7 SECONDS (ref 9.8–12.4)
RBC # BLD AUTO: 2.76 X10*6/UL (ref 4.5–5.9)
RBC MORPH BLD: NORMAL
RH FACTOR (ANTIGEN D): NORMAL
RH FACTOR (ANTIGEN D): NORMAL
SCHISTOCYTES BLD QL SMEAR: NORMAL
SODIUM SERPL-SCNC: 139 MMOL/L (ref 136–145)
SPHEROCYTES BLD QL SMEAR: NORMAL
WBC # BLD AUTO: 3.9 X10*3/UL (ref 4.4–11.3)

## 2025-02-27 PROCEDURE — 86901 BLOOD TYPING SEROLOGIC RH(D): CPT | Performed by: EMERGENCY MEDICINE

## 2025-02-27 PROCEDURE — 93005 ELECTROCARDIOGRAM TRACING: CPT

## 2025-02-27 PROCEDURE — 72125 CT NECK SPINE W/O DYE: CPT

## 2025-02-27 PROCEDURE — 36415 COLL VENOUS BLD VENIPUNCTURE: CPT | Performed by: EMERGENCY MEDICINE

## 2025-02-27 PROCEDURE — 74177 CT ABD & PELVIS W/CONTRAST: CPT | Performed by: RADIOLOGY

## 2025-02-27 PROCEDURE — 84484 ASSAY OF TROPONIN QUANT: CPT | Performed by: EMERGENCY MEDICINE

## 2025-02-27 PROCEDURE — 71275 CT ANGIOGRAPHY CHEST: CPT

## 2025-02-27 PROCEDURE — 82077 ASSAY SPEC XCP UR&BREATH IA: CPT | Performed by: EMERGENCY MEDICINE

## 2025-02-27 PROCEDURE — 74177 CT ABD & PELVIS W/CONTRAST: CPT

## 2025-02-27 PROCEDURE — 71275 CT ANGIOGRAPHY CHEST: CPT | Performed by: RADIOLOGY

## 2025-02-27 PROCEDURE — 83605 ASSAY OF LACTIC ACID: CPT | Performed by: EMERGENCY MEDICINE

## 2025-02-27 PROCEDURE — 72125 CT NECK SPINE W/O DYE: CPT | Performed by: RADIOLOGY

## 2025-02-27 PROCEDURE — G0390 TRAUMA RESPONS W/HOSP CRITI: HCPCS

## 2025-02-27 PROCEDURE — 70450 CT HEAD/BRAIN W/O DYE: CPT | Performed by: RADIOLOGY

## 2025-02-27 PROCEDURE — 71045 X-RAY EXAM CHEST 1 VIEW: CPT | Mod: FOREIGN READ | Performed by: RADIOLOGY

## 2025-02-27 PROCEDURE — 71045 X-RAY EXAM CHEST 1 VIEW: CPT

## 2025-02-27 PROCEDURE — 77385 HC NTSTY MODUL RAD TX DLVR SMPL: CPT | Performed by: RADIOLOGY

## 2025-02-27 PROCEDURE — 2550000001 HC RX 255 CONTRASTS: Performed by: EMERGENCY MEDICINE

## 2025-02-27 PROCEDURE — 86920 COMPATIBILITY TEST SPIN: CPT

## 2025-02-27 PROCEDURE — 85610 PROTHROMBIN TIME: CPT | Performed by: EMERGENCY MEDICINE

## 2025-02-27 PROCEDURE — 80053 COMPREHEN METABOLIC PANEL: CPT | Performed by: EMERGENCY MEDICINE

## 2025-02-27 PROCEDURE — 85025 COMPLETE CBC W/AUTO DIFF WBC: CPT | Performed by: EMERGENCY MEDICINE

## 2025-02-27 PROCEDURE — 99285 EMERGENCY DEPT VISIT HI MDM: CPT | Mod: 25 | Performed by: EMERGENCY MEDICINE

## 2025-02-27 PROCEDURE — 70450 CT HEAD/BRAIN W/O DYE: CPT

## 2025-02-27 RX ADMIN — IOHEXOL 100 ML: 350 INJECTION, SOLUTION INTRAVENOUS at 17:20

## 2025-02-27 ASSESSMENT — PAIN SCALES - GENERAL: PAINLEVEL_OUTOF10: 0 - NO PAIN

## 2025-02-27 ASSESSMENT — PAIN DESCRIPTION - PROGRESSION: CLINICAL_PROGRESSION: NOT CHANGED

## 2025-02-27 ASSESSMENT — PAIN - FUNCTIONAL ASSESSMENT: PAIN_FUNCTIONAL_ASSESSMENT: 0-10

## 2025-02-27 ASSESSMENT — COLUMBIA-SUICIDE SEVERITY RATING SCALE - C-SSRS
6. HAVE YOU EVER DONE ANYTHING, STARTED TO DO ANYTHING, OR PREPARED TO DO ANYTHING TO END YOUR LIFE?: NO
2. HAVE YOU ACTUALLY HAD ANY THOUGHTS OF KILLING YOURSELF?: NO
1. IN THE PAST MONTH, HAVE YOU WISHED YOU WERE DEAD OR WISHED YOU COULD GO TO SLEEP AND NOT WAKE UP?: NO

## 2025-02-27 ASSESSMENT — ENCOUNTER SYMPTOMS: CONFUSION: 1

## 2025-02-27 NOTE — PROGRESS NOTES
[facility]           Radiation Oncology      [address] [place], [place] [address]        O: [phone]        F: [phone]       [facility]                   Dr. Aristeo Raymundo MD PhD    RADIATION TREATMENT SUMMARY NOTE     Date of Service: 2025  Patient ID: Armando Arias   : 1945  MRN: [id number]   Acct Number: [id number]       Patient Name:  Armando Arias,  1945,  79 y.o., male       Referring Physician: Silvio Ha DO  [id number] [place] [address]      PCP: Sterling Hall MD       Diagnosis:  Prostate cancer (HCC)  Staging form: Prostate, AJCC 8th Edition  - Clinical: Stage IIC (cT1c, cN0, cM0, PSA: 19.7, Grade Group: 3) - Signed by Aristeo Raymundo MD on 2024       Recent History:  ***    Site Start TX Last TX ED Fractions Dose Fx Dose Technique   Prostate Sv 25 52 39 7800 cGy 200 cGy VMAT               Concurrent therapy:      ***    Technique:                 ***      Treatment course:       ***    Plan:  ***         Aristeo Raymundo MD PhD  Radiation Oncologist, [facility]    Department of Radiation Oncology  [facility]

## 2025-02-27 NOTE — H&P
[facility]  TRAUMA SERVICE - HISTORY AND PHYSICAL / CONSULT    Patient Name: Meng Remy  MRN: [id number]  Admit Date: 227  : 1945  AGE: 79 y.o.   GENDER: male  ==============================================================================  MECHANISM OF INJURY / CHIEF COMPLAINT:   Patient is an 79-year-old male who presented to the emergency department today as a full trauma following a syncopal episode with head injury at the [facility].  Per EMS reports patient was unresponsive but became more responsive en route to hospital.  Patient has a history of prostate cancer.  Patient was not able to patient was unable to answer questions regarding what had transpired, however he was able to follow commands patient and state his name.       LOC (yes/no?): yes  Anticoagulant / Anti-platelet Rx? (for what dx?): Yes  Referring Facility Name (N/A for scene EMR run): N/A    INJURIES:   None    OTHER MEDICAL PROBLEMS:  Prostate cancer    INCIDENTAL FINDINGS:  Bulky anterior osteophytes compatible with DISH. Degree would certainly account for some component of dysphagia. 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 This accounts for mild diffuse stenosis of the cervical spine.   Bronchial thickening. No localizing infiltrate. There are few small pleural-based nodule seen in both lungs of unknown significance. Short-term follow-up is advised. There may be some component of prior asbestos exposure all difficult to assess with certainty. Pleural-based metastasis not excluded.  Heart size is enlarged. Fairly severe coronary artery calcification. Small adrenal nodule probably adenoma although difficult to characterize with certainty    ==============================================================================  ADMISSION PLAN OF CARE:  Pending disposition based on further evaluation and management by emergency medicine attending physician and in concert with trauma  attending physician.      ==============================================================================  PAST MEDICAL HISTORY:   PMH: Prostate cancer  Past Medical History:   Diagnosis Date    Cancer (Multi)          PSH: unknown  Past Surgical History:   Procedure Laterality Date    OTHER SURGICAL HISTORY  12/22/2022    Femorofemoral bypass    OTHER SURGICAL HISTORY  12/22/2022    Inguinal hernia repair     FH: unknown  No family history on file.  SOCIAL HISTORY:    Smoking: denies  Social History     Tobacco Use   Smoking Status Not on file   Smokeless Tobacco Not on file       Alcohol: denies  Social History     Substance and Sexual Activity   Alcohol Use Not on file       Drug use: denies    MEDICATIONS: unknown  Prior to Admission medications    Not on File     ALLERGIES: unknown  Not on File    REVIEW OF SYSTEMS:  Review of Systems   Psychiatric/Behavioral:  Positive for confusion (disorientation).    All other systems reviewed and are negative.    PHYSICAL EXAM:  PRIMARY SURVEY:  Primary Survey  See ED providers note for primary survey    SECONDARY SURVEY/PHYSICAL EXAM:  Physical Exam  Vitals and nursing note reviewed.   Constitutional:       General: He is awake.      Appearance: Normal appearance. He is overweight.   HENT:      Head: Normocephalic.      Nose: Nose normal.      Mouth/Throat:      Mouth: Mucous membranes are moist.   Cardiovascular:      Rate and Rhythm: Normal rate and regular rhythm.      Heart sounds: Normal heart sounds, S1 normal and S2 normal.   Pulmonary:      Effort: Pulmonary effort is normal.      Breath sounds: Normal breath sounds.   Abdominal:      General: Abdomen is flat. Bowel sounds are normal.      Palpations: Abdomen is soft.   Skin:     General: Skin is warm and dry.      Capillary Refill: Capillary refill takes less than 2 seconds.   Neurological:      General: No focal deficit present.      Mental Status: He is alert. He is disoriented.      GCS: GCS eye subscore is 4.  GCS verbal subscore is 3. GCS motor subscore is 6.   Psychiatric:         Mood and Affect: Mood normal.         Speech: Speech normal.         Behavior: Behavior is cooperative.       IMAGING SUMMARY:  (summary of findings, not a copy of dictation)  CT Head/Face: No acute intracranial abnormality or calvarial fracture   CT C-Spine: Negative for acute fracture or malalignment of the cervical spine  CT angio chest for PE: No evidence of acute pulmonary embolism. No evidence of acute thoracic dissection.  CT Chest/Abd/Pelvis: No acute traumatic findings  CXR/PXR:   Other(s): No evidence consolidating infiltrate or effusion     LABS:  Results for orders placed or performed during the hospital encounter of 02/27/25 (from the past 24 hours)   CBC and Auto Differential   Result Value Ref Range    WBC 3.9 (L) 4.4 - 11.3 x10*3/uL    nRBC 0.0 0.0 - 0.0 /100 WBCs    RBC 2.76 (L) 4.50 - 5.90 x10*6/uL    Hemoglobin 7.7 (L) 13.5 - 17.5 g/dL    Hematocrit 24.9 (L) 41.0 - 52.0 %    MCV 90 80 - 100 fL    MCH 27.9 26.0 - 34.0 pg    MCHC 30.9 (L) 32.0 - 36.0 g/dL    RDW 20.8 (H) 11.5 - 14.5 %    Platelets 130 (L) 150 - 450 x10*3/uL    Neutrophils % 58.4 40.0 - 80.0 %    Immature Granulocytes %, Automated 0.8 0.0 - 0.9 %    Lymphocytes % 24.7 13.0 - 44.0 %    Monocytes % 11.7 2.0 - 10.0 %    Eosinophils % 3.6 0.0 - 6.0 %    Basophils % 0.8 0.0 - 2.0 %    Neutrophils Absolute 2.30 1.60 - 5.50 x10*3/uL    Immature Granulocytes Absolute, Automated 0.03 0.00 - 0.50 x10*3/uL    Lymphocytes Absolute 0.97 0.80 - 3.00 x10*3/uL    Monocytes Absolute 0.46 0.05 - 0.80 x10*3/uL    Eosinophils Absolute 0.14 0.00 - 0.40 x10*3/uL    Basophils Absolute 0.03 0.00 - 0.10 x10*3/uL   Comprehensive Metabolic Panel   Result Value Ref Range    Glucose 195 (H) 74 - 99 mg/dL    Sodium 139 136 - 145 mmol/L    Potassium 4.0 3.5 - 5.3 mmol/L    Chloride 108 (H) 98 - 107 mmol/L    Bicarbonate 24 21 - 32 mmol/L    Anion Gap 11 10 - 20 mmol/L    Urea Nitrogen 28 (H)  6 - 23 mg/dL    Creatinine 1.24 0.50 - 1.30 mg/dL    eGFR 59 (L) >60 mL/min/1.73m*2    Calcium 8.4 (L) 8.6 - 10.3 mg/dL    Albumin 3.9 3.4 - 5.0 g/dL    Alkaline Phosphatase 62 33 - 136 U/L    Total Protein 6.3 (L) 6.4 - 8.2 g/dL    AST 18 9 - 39 U/L    Bilirubin, Total 0.5 0.0 - 1.2 mg/dL    ALT 17 10 - 52 U/L   Alcohol   Result Value Ref Range    Alcohol <10 <=10 mg/dL   Lactate   Result Value Ref Range    Lactate 1.5 0.4 - 2.0 mmol/L   Protime-INR   Result Value Ref Range    Protime 14.7 (H) 9.8 - 12.4 seconds    INR 1.3 (H) 0.9 - 1.1   Type And Screen   Result Value Ref Range    ABO TYPE O     Rh TYPE POS     ANTIBODY SCREEN NEG    Troponin I, High Sensitivity, Initial   Result Value Ref Range    Troponin I, High Sensitivity 10 0 - 20 ng/L   Morphology   Result Value Ref Range    RBC Morphology See Below     Polychromasia Mild     Hypochromia Mild     RBC Fragments Few     Spherocytes Few     Ovalocytes Few    ECG 12 lead   Result Value Ref Range    Ventricular Rate 58 BPM    Atrial Rate 58 BPM    FL Interval 178 ms    QRS Duration 100 ms    QT Interval 486 ms    QTC Calculation(Bazett) 477 ms    P Axis 42 degrees    R Axis 25 degrees    T Axis 18 degrees    QRS Count 10 beats    Q Onset 212 ms    P Onset 123 ms    P Offset 185 ms    T Offset 455 ms    QTC Fredericia 480 ms   VERIFY ABO/Rh Group Test   Result Value Ref Range    ABO TYPE O     Rh TYPE POS    Troponin, High Sensitivity, 1 Hour   Result Value Ref Range    Troponin I, High Sensitivity 9 0 - 20 ng/L   Prepare RBC   Result Value Ref Range    PRODUCT CODE H2641F99     Unit Number [id number]     Unit ABO O     Unit RH NEG     Dispense Status PI     Blood Expiration Date 3/29/2025 11:59:00 PM EDT     PRODUCT BLOOD TYPE 9500     UNIT VOLUME 323     PRODUCT CODE L6836S30     Unit Number [id number]     Unit ABO O     Unit RH NEG     Dispense Status PI     Blood Expiration Date 3/29/2025 11:59:00 PM EDT     PRODUCT BLOOD TYPE 9500     UNIT VOLUME  277     XM INTEP COMP     XM INTEP COMP        I have reviewed all laboratory and imaging results ordered/pertinent for this encounter.      Linda Sousa, SERENITY-CNP    Time spent  60  minutes obtaining labs, imaging, recommendations, interview, assessment, examination, medication review/ordering, and EMR review.    Plan of care was discussed extensively with patient. Patient verbalized understanding through teach back method. All questions and concerns addressed upon examination.     Of note, this documentation is completed using the Dragon Dictation system (voice recognition software). There may be spelling and/or grammatical errors that were not corrected prior to final submission.

## 2025-02-27 NOTE — PROGRESS NOTES
Discharge instructions provided and reviewed with patient.  He verbalized understanding and will follow up with Dr Raymundo 3/27/25.

## 2025-02-28 LAB
ATRIAL RATE: 58 BPM
BLOOD EXPIRATION DATE: NORMAL
BLOOD EXPIRATION DATE: NORMAL
DISPENSE STATUS: NORMAL
DISPENSE STATUS: NORMAL
P AXIS: 42 DEGREES
P OFFSET: 185 MS
P ONSET: 123 MS
PR INTERVAL: 178 MS
PRODUCT BLOOD TYPE: 9500
PRODUCT BLOOD TYPE: 9500
PRODUCT CODE: NORMAL
PRODUCT CODE: NORMAL
Q ONSET: 212 MS
QRS COUNT: 10 BEATS
QRS DURATION: 100 MS
QT INTERVAL: 486 MS
QTC CALCULATION(BAZETT): 477 MS
QTC FREDERICIA: 480 MS
R AXIS: 25 DEGREES
T AXIS: 18 DEGREES
T OFFSET: 455 MS
UNIT ABO: NORMAL
UNIT ABO: NORMAL
UNIT NUMBER: NORMAL
UNIT NUMBER: NORMAL
UNIT RH: NORMAL
UNIT RH: NORMAL
UNIT VOLUME: 277
UNIT VOLUME: 323
VENTRICULAR RATE: 58 BPM
XM INTEP: NORMAL
XM INTEP: NORMAL

## 2025-02-28 NOTE — ED PROVIDER NOTES
HPI   Chief Complaint   Patient presents with    Fall       HPI: 79-year-old male brought in as a full trauma activation after he sustained a syncopal episode with a head injury at the [facility].  Patient is on anticoagulants.  He has a history of prostate cancer.  Patient had a syncopal episode fell hit his head.  The initial call from EMS was that he was unresponsive.  They stated that he became more responsive while in route.  Patient was able to tell me his name.  He was able to follow commands.  He was not able to elaborate the rest of the history.    Family HX: Denies any significant/pertinent family history.  Social Hx: Denies ETOH or drug use.  Review of systems:  Gen.: No weight loss, fatigue, anorexia, insomnia, fever.   Eyes: No vision loss, double vision, drainage, eye pain.   ENT: No pharyngitis, dry mouth.   Cardiac: No chest pain, palpitations, syncope, near syncope.   Pulmonary: No shortness of breath, cough, hemoptysis.   Heme/lymph: No swollen glands, fever, bleeding.   GI: No abdominal pain, change in bowel habits, melena, hematemesis, hematochezia, nausea, vomiting, diarrhea.   : No discharge, dysuria, frequency, urgency, hematuria.   Musculoskeletal: No limb pain, joint pain, joint swelling.   Skin: No rashes.   Psych: No depression, anxiety, suicidality, homicidality.   Review of systems is otherwise negative unless stated above or in history of present illness.    Physical Exam:    Appearance: Alert, oriented , cooperative,  in no acute distress. Well nourished & well hydrated.  GCS is 13    Skin: Intact,  dry skin, no lesions, rash, petechiae or purpura.     Eyes: PERRLA, EOMs intact,  Conjunctiva pink with no redness or exudates. Eyelids without lesions. No scleral icterus.     ENT: Hearing grossly intact. External auditory canals patent, tympanic membranes intact with visible landmarks. Nares patent, mucus membranes moist. Dentition without lesions. Pharynx clear, uvula midline.      Neck: Supple, without meningismus. Thyroid not palpable. Trachea at midline. No lymphadenopathy.  C-collar in place    Pulmonary: Clear bilaterally with good chest wall excursion. No rales, rhonchi or wheezing. No accessory muscle use or stridor.    Cardiac: Normal S1, S2 without murmur, rub, gallop or extrasystole. No JVD, Carotids without bruits.    Abdomen: Soft, nontender, active bowel sounds.  No palpable organomegaly.  No rebound or guarding.  No CVA tenderness.    Genitourinary: Exam deferred.    Musculoskeletal: Full range of motion. no pain, edema, or deformity. Pulses full and equal. No cyanosis, clubbing, or edema.    Neurological:   no focal findings identified.    Psychiatric: Appropriate mood and affect.     Medical Decision-Making:  Testing: EKG was obtained which is interpreted by me shows a sinus bradycardic rhythm rate of 58 without evidence of obvious ST elevations or T wave inversions to indicate acute ischemia or infarct.  Patient was a full trauma activation due to the LOC and the decreased responsiveness per EMS.  Patient is currently alert.  He was evaluated at the bedside by trauma surgery.  He went emergently to the CT scanner as he did have a high risk for having an intracranial hemorrhage.  We were concerned that he may have had an epidural bleed.  He also underwent a CT of the C-spine as well as chest abdomen pelvis as we were looking for possible etiology for the syncopal episode.  CT of the head shows no acute intracranial abnormality.  CT C-spine shows no acute fractures but likely signs of DISH.  CT chest abdomen pelvis shows no evidence of PE no evidence of thoracic dissection heart size is enlarged fairly severe coronary artery calcification bronchial thickening no infiltrate or signs of trauma.  CT abdomen pelvis shows no acute traumatic findings.  Portable chest x-ray obtained interpreted by me showed some cardiomegaly but no evidence of obvious hemothorax or pneumothorax.   No obvious rib fractures were noted.  Labs reveal a white count of 3.9 and hemoglobin is 7.7.  Patient and looking at old records from an outside facility back to October has a baseline anemia of 8.6.  Patient states that this has been his baseline.  He has 2 negative troponins.  He has a normal lactate.  He has a negative alcohol level.  He has a glucose of 195 with a chloride of 108.  He is otherwise normal electrolytes.  INR is 1.3.  At this time patient is markedly more alert GCS is 15.  His family is at the bedside.  I recommended that he be admitted for the syncopal episode.  He states that he cannot stay because he has to have his radiation treatments at [facility].  Patient is currently awake alert and appropriate.  I did offer him some alternatives.  He states he really would like to go.  He ambulates with a narrow-base steady gait without being dizzy.  Therefore at this time I feel that the patient does have capacity to leave.  He does have family at the bedside to give him a ride home.  I also closed the loop with the trauma surgeon to let him know I spoke to Dr. Rivas.  Patient should have a very low threshold to return  Treatment:   Reevaluation:   Plan: AMAPatient and family/friend/caregiver are in agreement with this plan.   Impression:   1.  Syncope  2.  Anemia    Labs Reviewed  CBC WITH AUTO DIFFERENTIAL - Abnormal     WBC                           3.9 (*)                nRBC                          0.0                    RBC                           2.76 (*)               Hemoglobin                    7.7 (*)                Hematocrit                    24.9 (*)               MCV                           90                     MCH                           27.9                   MCHC                          30.9 (*)               RDW                           20.8 (*)               Platelets                     130 (*)                Neutrophils %                 58.4                   Immature  Granulocytes %, Automated   0.8                    Lymphocytes %                 24.7                   Monocytes %                   11.7                   Eosinophils %                 3.6                    Basophils %                   0.8                    Neutrophils Absolute          2.30                   Immature Granulocytes Absolute, Au*   0.03                   Lymphocytes Absolute          0.97                   Monocytes Absolute            0.46                   Eosinophils Absolute          0.14                   Basophils Absolute            0.03                COMPREHENSIVE METABOLIC PANEL - Abnormal     Glucose                       195 (*)                Sodium                        139                    Potassium                     4.0                    Chloride                      108 (*)                Bicarbonate                   24                     Anion Gap                     11                     Urea Nitrogen                 28 (*)                 Creatinine                    1.24                   eGFR                          59 (*)                 Calcium                       8.4 (*)                Albumin                       3.9                    Alkaline Phosphatase          62                     Total Protein                 6.3 (*)                AST                           18                     Bilirubin, Total              0.5                    ALT                           17                  PROTIME-INR - Abnormal     Protime                       14.7 (*)               INR                           1.3 (*)             ALCOHOL - Normal     Alcohol                       <10                 LACTATE - Normal     Lactate                       1.5                      Narrative: Venipuncture immediately after or during the administration of Metamizole may lead to falsely low results. Testing should be performed immediately prior to Metamizole dosing.  SERIAL  TROPONIN-INITIAL - Normal     Troponin I, High Sensitivity   10                       Narrative: Less than 99th percentile of normal range cutoff-                Female and children under 18 years old <14 ng/L; Male <21 ng/L: Negative                Repeat testing should be performed if clinically indicated.                                 Female and children under 18 years old 14-50 ng/L; Male 21-50 ng/L:                Consistent with possible cardiac damage and possible increased clinical                 risk. Serial measurements may help to assess extent of myocardial damage.                                 >50 ng/L: Consistent with cardiac damage, increased clinical risk and                myocardial infarction. Serial measurements may help assess extent of                 myocardial damage.                                  NOTE: Children less than 1 year old may have higher baseline troponin                 levels and results should be interpreted in conjunction with the overall                 clinical context.                                 NOTE: Troponin I testing is performed using a different                 testing methodology at [facility] than at other                 [facility]. Direct result comparisons should only                 be made within the same method.  SERIAL TROPONIN, 1 HOUR - Normal     Troponin I, High Sensitivity   9                        Narrative: Less than 99th percentile of normal range cutoff-                Female and children under 18 years old <14 ng/L; Male <21 ng/L: Negative                Repeat testing should be performed if clinically indicated.                                 Female and children under 18 years old 14-50 ng/L; Male 21-50 ng/L:                Consistent with possible cardiac damage and possible increased clinical                 risk. Serial measurements may help to assess extent of myocardial damage.                                  >50 ng/L: Consistent with cardiac damage, increased clinical risk and                myocardial infarction. Serial measurements may help assess extent of                 myocardial damage.                                  NOTE: Children less than 1 year old may have higher baseline troponin                 levels and results should be interpreted in conjunction with the overall                 clinical context.                                 NOTE: Troponin I testing is performed using a different                 testing methodology at [facility] than at other                 [facility]. Direct result comparisons should only                 be made within the same method.  TYPE AND SCREEN     ABO TYPE                      O                      Rh TYPE                       POS                    ANTIBODY SCREEN               NEG                 TROPONIN SERIES- (INITIAL, 1 HR)       Narrative: The following orders were created for panel order Troponin I Series, High Sensitivity (0, 1 HR).                Procedure                               Abnormality         Status                                   ---------                               -----------         ------                                   Troponin I, High Sensiti...[[id number]]  Normal              Final result                             Troponin, High Sensitivi...[[id number]]  Normal              Final result                                             Please view results for these tests on the individual orders.  VERAB/VERIFY ABORH     ABO TYPE                      O                      Rh TYPE                       POS                 PREPARE RBC     PRODUCT CODE                  O0934G73                Unit Number                                          Unit ABO                      O                      Unit RH                       NEG                    Dispense Status               PI                     Blood Expiration  Date                                PRODUCT BLOOD TYPE            9500                   UNIT VOLUME                   323                    PRODUCT CODE                  [id number]                Unit Number                                          Unit ABO                      O                      Unit RH                       NEG                    Dispense Status               PI                     Blood Expiration Date                                PRODUCT BLOOD TYPE            9500                   UNIT VOLUME                   277                    XM INTEP                      COMP                   XM INTEP                      COMP                MORPHOLOGY     CT angio chest for pulmonary embolism   Final Result    1. No evidence of acute pulmonary embolism. No evidence of acute    thoracic dissection.    2. Heart size is enlarged. Fairly severe coronary artery    calcification.    3. Bronchial thickening. No localizing infiltrate. There are few    small pleural-based nodule seen in both lungs of unknown    significance. Short-term follow-up is advised. There may be some    component of prior asbestos exposure all difficult to assess with    certainty. Pleural-based metastasis not excluded.          MACRO:    None.          Signed by: James Schneider 2/27/2025 5:32 PM    Dictation workstation:   [id number]     CT abdomen pelvis w IV contrast   Final Result    No acute traumatic findings.          Severe vascular calcification. Patent fem-fem bypass noted.          Enlarged prostate gland. This deforms the bladder base.          Robust diverticulosis. Correlation with colonoscopy results.          Small adrenal nodule probably adenoma although difficult to    characterize with certainty. Follow-up can be considered with    noncontrast adrenal CT on nonemergent basis.          MACRO:    None          Signed by: James Schneider 2/27/2025 5:35 PM    Dictation workstation:   [id number]     CT head W  O contrast trauma protocol   Final Result    CT HEAD:    No acute intracranial abnormality or calvarial fracture.    Senescent changes          CT CERVICAL SPINE:    No acute fracture or traumatic malalignment of the cervical spine.    Bulky anterior osteophytes compatible with DISH. Degree would    certainly account for some component of dysphagia. 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 This accounts for mild    diffuse stenosis of the cervical spine.          No findings of acute cervical spine fracture. If concern for nerve    root impingement, consider MRI          Signed by: James Schneider 2/27/2025 5:27 PM    Dictation workstation:   [id number]     CT cervical spine wo IV contrast   Final Result    CT HEAD:    No acute intracranial abnormality or calvarial fracture.    Senescent changes          CT CERVICAL SPINE:    No acute fracture or traumatic malalignment of the cervical spine.    Bulky anterior osteophytes compatible with DISH. Degree would    certainly account for some component of dysphagia. 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 This accounts for mild    diffuse stenosis of the cervical spine.          No findings of acute cervical spine fracture. If concern for nerve    root impingement, consider MRI          Signed by: James Schneider 2/27/2025 5:27 PM    Dictation workstation:   [id number]     XR chest 1 view   Final Result    No evidence consolidating infiltrate or effusion.    Signed by Terell Chao MD            History provided by:  EMS personnel  History limited by:  Mental status change          Patient History   Past Medical History:   Diagnosis Date    Cancer (Multi)      Past Surgical History:   Procedure Laterality Date    OTHER SURGICAL HISTORY  12/22/2022    Femorofemoral bypass    OTHER SURGICAL HISTORY  12/22/2022    Inguinal hernia repair     No family history on file.  Social History     Tobacco Use    Smoking status: Not on file     Smokeless tobacco: Not on file   Substance Use Topics    Alcohol use: Not on file    Drug use: Not on file       Physical Exam   ED Triage Vitals [02/27/25 1654]   Temperature Heart Rate Respirations BP   36 °C (96.8 °F) 60 18 144/68      Pulse Ox Temp Source Heart Rate Source Patient Position   98 % Temporal Monitor Sitting      BP Location FiO2 (%)     Left arm --       Physical Exam      ED Course & MDM   Diagnoses as of 02/27/25 2022   Syncope and collapse   Anemia, unspecified type   Injury of head, initial encounter                 No data recorded                                 Medical Decision Making      Procedure  Procedures     Salina Willis MD  02/27/25 2023

## 2025-03-03 ENCOUNTER — OFFICE VISIT (OUTPATIENT)
Dept: CARDIOLOGY CLINIC | Age: 80
End: 2025-03-03
Payer: MEDICARE

## 2025-03-03 VITALS
SYSTOLIC BLOOD PRESSURE: 138 MMHG | OXYGEN SATURATION: 98 % | BODY MASS INDEX: 29.79 KG/M2 | WEIGHT: 207.6 LBS | HEART RATE: 66 BPM | DIASTOLIC BLOOD PRESSURE: 60 MMHG

## 2025-03-03 DIAGNOSIS — I73.9 PERIPHERAL VASCULAR DISEASE: ICD-10-CM

## 2025-03-03 DIAGNOSIS — I47.29 NSVT (NONSUSTAINED VENTRICULAR TACHYCARDIA) (HCC): ICD-10-CM

## 2025-03-03 DIAGNOSIS — I10 ESSENTIAL HYPERTENSION: ICD-10-CM

## 2025-03-03 DIAGNOSIS — E78.5 DYSLIPIDEMIA: ICD-10-CM

## 2025-03-03 DIAGNOSIS — I25.119 CORONARY ARTERY DISEASE INVOLVING NATIVE CORONARY ARTERY OF NATIVE HEART WITH ANGINA PECTORIS: Primary | ICD-10-CM

## 2025-03-03 DIAGNOSIS — I70.209: ICD-10-CM

## 2025-03-03 DIAGNOSIS — R09.89 BILATERAL CAROTID BRUITS: ICD-10-CM

## 2025-03-03 DIAGNOSIS — I73.9 CLAUDICATION: ICD-10-CM

## 2025-03-03 DIAGNOSIS — I65.23 BILATERAL CAROTID ARTERY STENOSIS: ICD-10-CM

## 2025-03-03 PROCEDURE — 3075F SYST BP GE 130 - 139MM HG: CPT | Performed by: INTERNAL MEDICINE

## 2025-03-03 PROCEDURE — 1123F ACP DISCUSS/DSCN MKR DOCD: CPT | Performed by: INTERNAL MEDICINE

## 2025-03-03 PROCEDURE — 99214 OFFICE O/P EST MOD 30 MIN: CPT | Performed by: INTERNAL MEDICINE

## 2025-03-03 PROCEDURE — 3078F DIAST BP <80 MM HG: CPT | Performed by: INTERNAL MEDICINE

## 2025-03-03 PROCEDURE — 1159F MED LIST DOCD IN RCRD: CPT | Performed by: INTERNAL MEDICINE

## 2025-03-03 ASSESSMENT — ENCOUNTER SYMPTOMS
NAUSEA: 0
SHORTNESS OF BREATH: 0
EYES NEGATIVE: 1
BLOOD IN STOOL: 0
STRIDOR: 0
GASTROINTESTINAL NEGATIVE: 1
WHEEZING: 0
CHEST TIGHTNESS: 1
COUGH: 0

## 2025-03-03 NOTE — PROGRESS NOTES
Subsequent Progress Note  Patient: Armando Arias  YOB: 1945  MRN: [id number]    Chief Complaint: CP cad pad htn Dizzy   Chief Complaint   Patient presents with    Follow-up     5 month  Left edema       CV Data:  CAD 5-6 stents in total  10/2017 Left Fem Distal bypass- Dr. Maldonado  Remote- RIGHT  LE bypass  6/19/2018 Cath:  LAD, CXand RCA stents all patent with Mild ISR EF 55  8/2015 CUS DARIEL 50-69- followed by Dr. Maldonado  2/2018 echo ef 65  2/2019 spect negative  2019 LCEA  9/20/2019: cath LAD CX and RCA prior stents mild ISR but patent. Septal  occluded and fills from RCA. EF 60 EDP 9  11/2019 HM negative.   10/2020 CUS - Erica. -LCEA patent RCIA - mild   9/21 SPECT negative   9/21 Echo EF 65 1+ MR  RVSP 56   3/22 cath LAD RCA nad CX all stents patent.   1/23 PVR- B/L Abn R>L  1/23 CUS Mild   2/8/23 PeripheralLeft to right femorofemoral bypass is widely patent. Right lower extremity: Common femoral artery patent, profunda patent, SFA heavily calcified, mid to distal stent is patent with 50% in-stent restenosis without any significant gradient.  Two-vessel runoff via the posterior tib artery as well as the peroneal.  100% anterior artery stenosis  8/7/23 Peripheral angio - Rec Med Rx.   10/23 Echo EF 55-60   6/24 CUS - mild     Subjective/HPI: recently had 2 angina ( chest pressure heavy no radiation no sob but + diaphoresis moderate) took NTG w relief.   Also having dizzy spells     9/27/2019: no cp no osb no falls no bleed takes meds. Had Pig tail catheter induced VT causing CP during cath     12/30/2019 no cp no sob no falls no bleed occ leg edema due to sleeping in a recliner. He falls a sleep every night wahching tv.     7/10/2020 no cp no sob no falls no bleed. Eats well. Sleep well.     1/15/21  Doing well no cp no sob some edema no falls n bleed.   coouple months ago started Eliquis 5 bid by Dr. Hall( Etiology unclear)     8/6/21 recent 3 episodes of angina ( heavy pressure) requiring

## 2025-03-27 ENCOUNTER — HOSPITAL ENCOUNTER (OUTPATIENT)
Dept: RADIATION ONCOLOGY | Age: 80
Discharge: HOME | End: 2025-03-27
Payer: MEDICARE

## 2025-03-27 VITALS
RESPIRATION RATE: 16 BRPM | DIASTOLIC BLOOD PRESSURE: 68 MMHG | SYSTOLIC BLOOD PRESSURE: 128 MMHG | HEART RATE: 62 BPM | TEMPERATURE: 97.3 F | BODY MASS INDEX: 30.28 KG/M2 | WEIGHT: 211 LBS | OXYGEN SATURATION: 98 %

## 2025-03-27 DIAGNOSIS — C61 PROSTATE CANCER (HCC): Primary | ICD-10-CM

## 2025-03-27 PROCEDURE — 99212 OFFICE O/P EST SF 10 MIN: CPT | Performed by: RADIOLOGY

## 2025-03-27 NOTE — PROGRESS NOTES
NURSING ASSESSMENT     Date: 3/27/2025        Patient Name: Armando Arias     YOB: 1945      Age:  79 y.o.      MRN: [id number]       Chaperone [] Yes   [x] No      Advance Directives:   Do you currently have completed advance directives (living will)? [x] Yes   [] No         *If yes, please bring us a copy for your records.  *If no, would you like info or assistance in completing advance directives (living will)?   [] Yes   [x] No    Pain Score:   Pain Score (1-10): Denies   Pain Location: NA   Pain Duration: NA   Pain Management/Control: NA      Is pain affecting your ability to take care of yourself or move throughout your home?                        [] Yes   [x] No    General: Ongoing mild fatigue  Patient has gained weight [x] Yes   [] No Up 8 lbs since last visit.  Patient has lost weight [] Yes   [] No  How much weight in pounds and over what length of time:     Eyes (Ophthalmic): Wears reading glasses.     Skin (Dermatological): Dry skin     ENT: No Problems     Respiratory: No Problems     Cardiovascular: See Dr. FUNG for h/o CAD, f/u appointment on 7/21/25      Device   [] Yes   [x] No   Copy of Card Obtained [] Yes   [x] No    Gastrointestinal: No Problems, having regular daily BM's    Genito-Urinary: IPSS is 13, improved from 21 on 10/23/25   Nocturia X 2    Weak stream          Breast: No Problems     Musculoskeletal: No Problems    Neurological: Ongoing baseline numbness and tingling to LE .      Hematological and Lymphatic: Ongoing LE 2+ pitting edema, takes lasix 20 mg as needed.     Endocrine: Diabetes Mellitus        A 10-point review of systems  has been conducted and pertinent positives have been   recorded. All other review of systems are negative    Was the patient admitted during the course of treatment OR within 30 days of treatment? No

## 2025-05-27 ENCOUNTER — HOSPITAL ENCOUNTER (OUTPATIENT)
Dept: ULTRASOUND IMAGING | Age: 80
Discharge: HOME | End: 2025-05-29
Payer: MEDICARE

## 2025-05-27 ENCOUNTER — HOSPITAL ENCOUNTER (OUTPATIENT)
Dept: LAB | Age: 80
End: 2025-05-27
Payer: MEDICARE

## 2025-05-27 DIAGNOSIS — M79.606 PAIN OF LOWER EXTREMITY, UNSPECIFIED LATERALITY: ICD-10-CM

## 2025-05-27 PROCEDURE — 93970 EXTREMITY STUDY: CPT

## 2025-05-29 ENCOUNTER — TRANSCRIBE ORDERS (OUTPATIENT)
Dept: ADMINISTRATIVE | Age: 80
End: 2025-05-29

## 2025-05-29 DIAGNOSIS — R07.9 CHEST PAIN, UNSPECIFIED TYPE: Primary | ICD-10-CM

## 2025-06-03 ENCOUNTER — HOSPITAL ENCOUNTER (OUTPATIENT)
Dept: RADIATION ONCOLOGY | Age: 80
Discharge: HOME | End: 2025-06-03
Payer: MEDICARE

## 2025-06-03 ENCOUNTER — HOSPITAL ENCOUNTER (OUTPATIENT)
Dept: NON INVASIVE DIAGNOSTICS | Age: 80
Discharge: HOME | End: 2025-06-03
Payer: MEDICARE

## 2025-06-03 VITALS
DIASTOLIC BLOOD PRESSURE: 65 MMHG | BODY MASS INDEX: 29.41 KG/M2 | RESPIRATION RATE: 18 BRPM | OXYGEN SATURATION: 99 % | HEART RATE: 72 BPM | WEIGHT: 205 LBS | SYSTOLIC BLOOD PRESSURE: 139 MMHG | TEMPERATURE: 96.8 F

## 2025-06-03 DIAGNOSIS — R07.9 CHEST PAIN, UNSPECIFIED TYPE: ICD-10-CM

## 2025-06-03 DIAGNOSIS — C61 PROSTATE CANCER (HCC): Primary | ICD-10-CM

## 2025-06-03 PROCEDURE — 93005 ELECTROCARDIOGRAM TRACING: CPT

## 2025-06-03 PROCEDURE — G2211 COMPLEX E/M VISIT ADD ON: HCPCS | Performed by: RADIOLOGY

## 2025-06-03 PROCEDURE — 99212 OFFICE O/P EST SF 10 MIN: CPT | Performed by: RADIOLOGY

## 2025-06-03 PROCEDURE — 99214 OFFICE O/P EST MOD 30 MIN: CPT | Performed by: RADIOLOGY

## 2025-06-03 RX ORDER — TAMSULOSIN HYDROCHLORIDE 0.4 MG/1
0.4 CAPSULE ORAL 2 TIMES DAILY
Qty: 60 CAPSULE | Refills: 2 | Status: SHIPPED | OUTPATIENT
Start: 2025-06-03

## 2025-06-03 RX ORDER — OXYBUTYNIN CHLORIDE 10 MG/1
10 TABLET, EXTENDED RELEASE ORAL DAILY
Qty: 30 TABLET | Refills: 3 | Status: SHIPPED | OUTPATIENT
Start: 2025-06-03

## 2025-06-03 NOTE — PROGRESS NOTES
NURSING ASSESSMENT     Date: 6/3/2025        Patient Name: Armando Arias     YOB: 1945      Age:  79 y.o.      MRN: [id number]       Chaperone [] Yes   [x] No      Advance Directives:   Do you currently have completed advance directives (living will)? [x] Yes   [] No         *If yes, please bring us a copy for your records.  *If no, would you like info or assistance in completing advance directives (living will)?   [] Yes   [x] No    Pain Score:   Pain Score (1-10): 0  Pain Location: n/a  Pain Duration: n/a   Pain Management/Control: n/a      Is pain affecting your ability to take care of yourself or move throughout your home?                        [] Yes   [x] No    General: No Problems  Patient has gained weight [] Yes   [x] No  Patient has lost weight [x] Yes   [] No  How much weight in pounds and over what length of time: has lost 6 lb over the past 3 months, making better choices and eating when hungry, has good appetite    Eyes (Ophthalmic): wears glasses     Skin (Dermatological): dry skin     ENT: No Problems     Respiratory: No Problems     Cardiovascular: Edema in LE, takes lasix, pt is having an EKG today as requested by Dr Hall, had CP and had to use NTG two weeks ago      Device   [] Yes   [x] No   Copy of Card Obtained [] Yes   [x] No    Gastrointestinal: has daily formed bowel movements without pain, blood or mucous in stool    Genito-Urinary: IPSS 5-6, urgency, weak stream and straining, nocturia 1-2x     Breast: No Problems     Musculoskeletal: No Problems    Neurological: baseline numbness and tingling in hands on occasion at night      Hematological and Lymphatic: takes eliquis     Endocrine: last lupron injection was 1/2025      A 10-point review of systems  has been conducted and pertinent positives have been   recorded. All other review of systems are negative    Was the patient admitted during the course of treatment OR within 30 days of treatment? no    Additional Comments-will

## 2025-06-04 LAB
EKG ATRIAL RATE: 71 BPM
EKG DIAGNOSIS: NORMAL
EKG P AXIS: 48 DEGREES
EKG P-R INTERVAL: 168 MS
EKG Q-T INTERVAL: 438 MS
EKG QRS DURATION: 98 MS
EKG QTC CALCULATION (BAZETT): 475 MS
EKG R AXIS: 14 DEGREES
EKG T AXIS: 34 DEGREES
EKG VENTRICULAR RATE: 71 BPM

## 2025-06-11 ENCOUNTER — OFFICE VISIT (OUTPATIENT)
Age: 80
End: 2025-06-11
Payer: MEDICARE

## 2025-06-11 VITALS
BODY MASS INDEX: 29.7 KG/M2 | WEIGHT: 207 LBS | HEART RATE: 68 BPM | SYSTOLIC BLOOD PRESSURE: 128 MMHG | DIASTOLIC BLOOD PRESSURE: 64 MMHG

## 2025-06-11 DIAGNOSIS — Z98.890 HISTORY OF LEFT-SIDED CAROTID ENDARTERECTOMY: ICD-10-CM

## 2025-06-11 DIAGNOSIS — Z86.73 HISTORY OF STROKE: Primary | ICD-10-CM

## 2025-06-11 DIAGNOSIS — I73.9 PERIPHERAL VASCULAR DISEASE, UNSPECIFIED: ICD-10-CM

## 2025-06-11 DIAGNOSIS — I70.209 FEMORAL ARTERY STENOSIS: ICD-10-CM

## 2025-06-11 DIAGNOSIS — G54.1 LUMBOSACRAL PLEXOPATHY: ICD-10-CM

## 2025-06-11 DIAGNOSIS — I63.89 OTHER CEREBRAL INFARCTION (HCC): ICD-10-CM

## 2025-06-11 PROCEDURE — 3074F SYST BP LT 130 MM HG: CPT | Performed by: PSYCHIATRY & NEUROLOGY

## 2025-06-11 PROCEDURE — 99214 OFFICE O/P EST MOD 30 MIN: CPT | Performed by: PSYCHIATRY & NEUROLOGY

## 2025-06-11 PROCEDURE — 1123F ACP DISCUSS/DSCN MKR DOCD: CPT | Performed by: PSYCHIATRY & NEUROLOGY

## 2025-06-11 PROCEDURE — 3078F DIAST BP <80 MM HG: CPT | Performed by: PSYCHIATRY & NEUROLOGY

## 2025-06-11 PROCEDURE — 1159F MED LIST DOCD IN RCRD: CPT | Performed by: PSYCHIATRY & NEUROLOGY

## 2025-06-11 NOTE — PROGRESS NOTES
Subjective:      Patient ID: Armando Arias is a 79 y.o. male who presents today for:  Chief Complaint   Patient presents with    Follow-up     Pt states things are ok. Pt states he is getting a place where he starts to staggered. Pt has to take the nitroglycerin        HPI 79-year-old right-handed gentleman with history of stroke.  Patient also has carotid stenosis on the left.  Patient has peripheral vascular disease and a history of carotid endarterectomy.  Patient actually doing well without any recurrent symptoms of cerebral ischemia.  Patient is being treated for prostate cancer.  Patient had 1 episode of loss of consciousness and syncope in February.  There is no other sequelae to this .  Patient has lower extremity vascular evaluation duplex showing femoral artery stenosis on the right    Patient is having proximal weakness in the right lower extremity.  Denies any back pain has not any fall  Past Medical History:   Diagnosis Date    Anemia, normocytic normochromic     CAD (coronary artery disease)     multiple PCI    Cancer (HCC)     prostate    Carotid artery stenosis     Diabetes mellitus (HCC)     Gout 10/02/2017    Hyperlipidemia     Hypertension     Neuropathy     Occlusive disease of artery of lower extremity 10/02/2017    Type 2 diabetes mellitus without complication (HCC)      Past Surgical History:   Procedure Laterality Date    ARTERIOGRAM Right 10/6/2017    RIGHT ARM AORTO-FEMORAL DIGITAL SUBTRACTION ARTERIOGRAPHY performed by Alex Maldonado MD at [facility] [place]    CAROTID ENDARTERECTOMY Left 9/8/2020    LEFT CAROTID ENDARTERECTOMY performed by Alex Maldonado MD at [facility] [place] OR    COLONOSCOPY N/A 9/9/2024    COLORECTAL CANCER SCREENING, NOT HIGH RISK with polypectomy performed by Oanh Connor MD at [facility] CENTER    CORONARY ANGIOPLASTY WITH STENT PLACEMENT N/A 11/7/11    xience stent to RAC and CIRC    CORONARY ANGIOPLASTY WITH STENT PLACEMENT Left 4/19/13    xience stent to LAD    CORONARY

## 2025-07-24 NOTE — PLAN OF CARE
Problem: Risk for Impaired Skin Integrity  Goal: Tissue integrity - skin and mucous membranes  Structural intactness and normal physiological function of skin and  mucous membranes.    Outcome: Ongoing Updated referral signed

## 2025-08-12 RX ORDER — ISOSORBIDE MONONITRATE 30 MG/1
30 TABLET, EXTENDED RELEASE ORAL DAILY
Qty: 90 TABLET | Refills: 1 | Status: SHIPPED | OUTPATIENT
Start: 2025-08-12

## (undated) DEVICE — INTENDED USED TO PROTECT, TAG AND HELP LOCATED SUTURES DURING SURGERY: Brand: STERION®SUTURE AID BOOTIES

## (undated) DEVICE — 3M™ STERI-DRAPE™ INCISE DRAPE 1050 (60CM X 45CM): Brand: STERI-DRAPE™

## (undated) DEVICE — COVER LT HNDL BLU PLAS

## (undated) DEVICE — SUCKER CARD L9IN 0.25IN CONN MINI RIG SFT TIP W/ SIDE PRT

## (undated) DEVICE — Device

## (undated) DEVICE — SUTURE PERMA-HAND SZ 2 L60IN NONABSORBABLE BLK SILK BRAID SA8H

## (undated) DEVICE — TOWEL,OR,DSP,ST,BLUE,STD,4/PK,20PK/CS: Brand: MEDLINE

## (undated) DEVICE — TRAP POLYP BALEEN

## (undated) DEVICE — GUIDEWIRE VASC L180CM DIA0.035IN 3MM PTFE J TIP EXCHG FIX

## (undated) DEVICE — Device: Brand: MEDEX

## (undated) DEVICE — SUTURE PERMAHAND SZ 3-0 L18IN NONABSORBABLE BLK SILK BRAID A184H

## (undated) DEVICE — BRUSH ENDO CLN L90.5IN SHTH DIA1.7MM BRIST DIA5-7MM 2-6MM

## (undated) DEVICE — SPONGE,LAP,18"X18",DLX,XR,ST,5/PK,40/PK: Brand: MEDLINE

## (undated) DEVICE — SYRINGE MED 10ML LUERLOCK TIP W/O SFTY DISP

## (undated) DEVICE — GAUZE,SPONGE,4"X4",12PLY,STERILE,LF,2'S: Brand: MEDLINE

## (undated) DEVICE — PIN SFTY LG ST

## (undated) DEVICE — STOCKINETTE: Brand: DEROYAL

## (undated) DEVICE — YANKAUER,BULB TIP,W/O VENT,RIGID,STERILE: Brand: MEDLINE

## (undated) DEVICE — LABEL MED MED CHPOR

## (undated) DEVICE — FOGARTY THRU-LUMEN EMBOLECTOMY CATHETER 4F 80CM: Brand: FOGARTY

## (undated) DEVICE — INTRODUCER SHTH 0.018 IN 4 FRX40 CM KT SFT TIP NIT VSI 7266V

## (undated) DEVICE — GAUZE,SPONGE,4"X4",16PLY,XRAY,STRL,LF: Brand: MEDLINE

## (undated) DEVICE — INTENDED FOR TISSUE SEPARATION, AND OTHER PROCEDURES THAT REQUIRE A SHARP SURGICAL BLADE TO PUNCTURE OR CUT.: Brand: BARD-PARKER ® CARBON RIB-BACK BLADES

## (undated) DEVICE — PTA BALLOON DILATATION CATHETER: Brand: MUSTANG™

## (undated) DEVICE — DEVICE INFL ENCORE MEDI 26

## (undated) DEVICE — PACK,LAPAROTOMY,NO GOWNS: Brand: MEDLINE

## (undated) DEVICE — DRAPE SURG C-ARM MOBILE XRAY LF

## (undated) DEVICE — SUTURE NABSORBABLE PRONOVA L24IN SZ 5-0 BLU C-1 L13MM 3/8 CIR REV PN3725H

## (undated) DEVICE — LABEL MED MINI W/ MARKER

## (undated) DEVICE — INTRODUCER SHTH 4FR CANN L11CM DIL TIP 25MM RED TUNGSTEN

## (undated) DEVICE — 1842 FOAM BLOCK NEEDLE COUNTER: Brand: DEVON

## (undated) DEVICE — Z DISCONTINUED APPLICATOR SURG PREP 0.35OZ 2% CHG 70% ISO ALC W/ HI LT

## (undated) DEVICE — SHEATH INTRO 6FR L7CM HEMSTAT VLV DIL SIDE PRT RADPQ W/O

## (undated) DEVICE — SET BLD COLL 21GA TB L12IN NDL L0.75IN WNG GRN SIMP EZ TO

## (undated) DEVICE — ENDO CARRY-ON PROCEDURE KIT: Brand: ENDO CARRY-ON PROCEDURE KIT

## (undated) DEVICE — PLEDGET SURG W3.5XL7MM THK1.5MM WHT PTFE RECT FIRM TFE

## (undated) DEVICE — CANNULA PERF L2IN BLNT TIP 3MM VES CLR RADPQ BODY FEM LUER D

## (undated) DEVICE — 4-PORT MANIFOLD: Brand: NEPTUNE 2

## (undated) DEVICE — INTENDED TO BE USED TO OCCLUDE, RETRACT AND IDENTIFY ARTERIES, VEINS, TENDONS AND NERVES IN SURGICAL PROCEDURES: Brand: STERION®  VESSEL LOOP

## (undated) DEVICE — DRAIN SURG PENROSE 0.25X12 IN CLOSED WND DRAINAGE PREM SIL

## (undated) DEVICE — MAGNETIC INSTR DRAPE 20X16: Brand: MEDLINE INDUSTRIES, INC.

## (undated) DEVICE — SYRINGE MED 30ML STD CLR PLAS LUERLOCK TIP N CTRL DISP

## (undated) DEVICE — APPLICATOR MEDICATED 26 CC SOLUTION HI LT ORNG CHLORAPREP

## (undated) DEVICE — GLOVE SURG SZ 85 L12IN FNGR THK94MIL TRNSLUC YEL LTX

## (undated) DEVICE — SKIN MARKER,REGULAR TIP WITH RULER: Brand: DEVON

## (undated) DEVICE — FOGARTY THRU-LUMEN EMBOLECTOMY CATHETER 5.5F 80CM: Brand: FOGARTY

## (undated) DEVICE — COUNTER NDL 40 COUNT HLD 70 FOAM BLK ADH W/ MAG

## (undated) DEVICE — SUTURE VCRL SZ 4-0 L18IN ABSRB UD L19MM PS-2 3/8 CIR PRIM J496H

## (undated) DEVICE — 3M™ STERI-DRAPE™ ISOLATION BAG, 10 PER CARTON / 4 CARTONS PER CASE, 1003: Brand: 3M™ STERI-DRAPE™

## (undated) DEVICE — RADIFOCUS GLIDEWIRE: Brand: GLIDEWIRE

## (undated) DEVICE — 3M™ TEGADERM™ TRANSPARENT FILM DRESSING FRAME STYLE, 1626, 4 IN X 4-3/4 IN (10 CM X 12 CM), 50/CT 4CT/CASE: Brand: 3M™ TEGADERM™

## (undated) DEVICE — TUBE SET 96 MM 64 MM H2O PERISTALTIC STD AUX CHANNEL

## (undated) DEVICE — SUTURE VCRL + SZ 4-0 L18IN ABSRB UD L19MM PS-2 3/8 CIR PRIM VCP496H

## (undated) DEVICE — SYRINGE IRRIG 60ML SFT PLIABLE BLB EZ TO GRP 1 HND USE W/

## (undated) DEVICE — MARKER SURG SKIN GENTIAN VLT REG TIP W/ 6IN RUL

## (undated) DEVICE — ELECTRODE PT RET AD L9FT HI MOIST COND ADH HYDRGEL CORDED

## (undated) DEVICE — LOOP VES L12IN DIA0.066IN WHT SIL THN WALL AIR CUSH

## (undated) DEVICE — 2000CC GUARDIAN II: Brand: GUARDIAN

## (undated) DEVICE — SHEET,DRAPE,53X77,STERILE: Brand: MEDLINE

## (undated) DEVICE — VIAL ACCESS CANNULA,SMART TIP: Brand: MONOJECT

## (undated) DEVICE — DEVICE INFLATION ANGIO 30ATM

## (undated) DEVICE — KIT CATH 16FR 5ML URIN M INDWL INDWL STR TIP INF CTRL

## (undated) DEVICE — X-RAY DETECTABLE SPONGES,16 PLY: Brand: VISTEC

## (undated) DEVICE — NEEDLE HYPO 25GA L1.5IN BLU POLYPR HUB S STL REG BVL STR

## (undated) DEVICE — SUTURE VCRL SZ 2-0 L27IN ABSRB UD L26MM SH 1/2 CIR J417H

## (undated) DEVICE — RADIFOCUS GLIDEWIRE ADVANTAGE GUIDEWIRE: Brand: GLIDEWIRE ADVANTAGE

## (undated) DEVICE — APPLIER CLP L9.38IN M LIG TI DISP STR RNG HNDL LIGACLP

## (undated) DEVICE — SINGLE PORT MANIFOLD: Brand: NEPTUNE 2

## (undated) DEVICE — SET IV ADMINISTRATION WING 21GA 12IN LF

## (undated) DEVICE — APPLIER CLP L9.375IN APER 2.1MM CLS L3.8MM 20 SM TI CLP

## (undated) DEVICE — GLOW 'N TELL 30CM TAPE (50 STRIPS): Brand: VASCUTAPE RADIOPAQUE TAPE

## (undated) DEVICE — 3M™ STERI-DRAPE™ INSTRUMENT POUCH 1018: Brand: STERI-DRAPE™

## (undated) DEVICE — PENCIL SMK EVAC 10 FT BLADE ELECTRD ROCKER FOR TELSCP

## (undated) DEVICE — MEDI-VAC YANKAUER SUCTION HANDLE W/BULBOUS TIP: Brand: CARDINAL HEALTH

## (undated) DEVICE — COVER,MAYO STAND,STERILE: Brand: MEDLINE

## (undated) DEVICE — SPONGE GZ W4XL4IN RAYON POLY FILL CVR W/ NONWOVEN FAB

## (undated) DEVICE — SHUNT CV L30CM DIA3X5MM SIL EXT LOOP FULL SPR REINF SUNDT

## (undated) DEVICE — NEEDLE ANGIO COURNAND THN 3 PART 18GAX5.1CM MAJESTIK

## (undated) DEVICE — INTENDED FOR TISSUE SEPARATION, AND OTHER PROCEDURES THAT REQUIRE A SHARP SURGICAL BLADE TO PUNCTURE OR CUT.: Brand: BARD-PARKER ®  SAFETY SCALPED

## (undated) DEVICE — TUBING, SUCTION, 1/4" X 10', STRAIGHT: Brand: MEDLINE

## (undated) DEVICE — KIT ANGIO W/ AT P65 PREM HND CTRL FOR CNTRST DEL ANGIOTOUCH

## (undated) DEVICE — CATHETER ANGIO L100CM OD5FR 0.035IN POLYUR H1 CEREB BRAID

## (undated) DEVICE — MEDI-VAC NON-CONDUCTIVE SUCTION TUBING: Brand: CARDINAL HEALTH

## (undated) DEVICE — FOGARTY - HYDRAGRIP SURGICAL - CLAMP INSERTS: Brand: FOGARTY SAFEJAW

## (undated) DEVICE — 3 ML SYRINGE LUER-LOCK TIP: Brand: MONOJECT

## (undated) DEVICE — SYRINGE BLB 50CC IRRIG PLIABLE FNGR FLNG GRAD FLSK DISP

## (undated) DEVICE — DRAPE SURG W41XL74IN CLR FULL SZ C ARM 3 ADH POLY STRP E

## (undated) DEVICE — DECANTER BAG 9": Brand: MEDLINE INDUSTRIES, INC.

## (undated) DEVICE — DRAPE EQUIP TRNSPRT CONTAINMENT FOR BK TAB

## (undated) DEVICE — SECTO® DISSECTOR, KITTNER, 5/16 IN DIAMETER, (5 EA/POUCH, 24 POUCHES/PK, 4 PK/BX): Brand: SYMMETRY SURGICAL

## (undated) DEVICE — PACK ORTHOPEDIC 1 TBL CVR 50X90 REINF 1 MAYO STD CVR 24X53 REINF 4 UTIL

## (undated) DEVICE — HI-TORQUE SUPRA CORE .035 PERIPHERAL GUIDE WIRE .035 X 190 CM: Brand: HI-TORQUE SUPRA CORE

## (undated) DEVICE — SHEATH INTRO 5FR L11CM HEMSTAT VLV DIL SIDE PRT RADPQ W/O

## (undated) DEVICE — CATHETER ANGIO L65CM OD5FR .035IN RACKET PERIPH 10 SIDE H

## (undated) DEVICE — SUTURE VCRL SZ 2-0 L36IN ABSRB UD L36MM CT-1 1/2 CIR J945H

## (undated) DEVICE — BEACON TIP CENTIMETER SIZING CATHETER: Brand: BEACON

## (undated) DEVICE — 3M™ TEGADERM™ TRANSPARENT FILM DRESSING FRAME STYLE, 1626W, 4 IN X 4-3/4 IN (10 CM X 12 CM), 50/CT 4CT/CASE: Brand: 3M™ TEGADERM™

## (undated) DEVICE — CHLORAPREP 26ML ORANGE

## (undated) DEVICE — TUBING IRRIGATION 140/160/180/190 SER GI ENDOSCP SMARTCAP

## (undated) DEVICE — SNARE ENDOSCP AD L240CM LOOP W10MM SHTH DIA2.4MM RND INSUL

## (undated) DEVICE — NAVICROSS SUPPORT CATHETER: Brand: NAVICROSS

## (undated) DEVICE — Z DISCONTINUED NO SUB IDED TAPE UMB W1/8XL36IN WHT COT STRND NONRADIOPAQUE

## (undated) DEVICE — SUTURE PRONOVA DBL ARMED 6-0 BV-1 24IN N ABSRB MFIL BLU PN3805H

## (undated) DEVICE — 3M™ STERI-STRIP™ REINFORCED ADHESIVE SKIN CLOSURES, R1547, 1/2 IN X 4 IN (12 MM X 100 MM), 6 STRIPS/ENVELOPE: Brand: 3M™ STERI-STRIP™

## (undated) DEVICE — TIBURON TOP SHEET: Brand: CONVERTORS

## (undated) DEVICE — SPONGE DRN W4XL4IN RAYON/POLYESTER 6 PLY NONWOVEN PRECUT

## (undated) DEVICE — SUTURE PERMAHAND SZ 2-0 L12X18IN NONABSORBABLE BLK SILK A185H

## (undated) DEVICE — MICROPUNCTURE INTRODUCER SET SILHOUETTE TRANSITIONLESS WITH NITINOL WIRE GUIDE: Brand: MICROPUNCTURE

## (undated) DEVICE — PENCIL ES L3M BTTN SWCH HOLSTER W/ BLDE ELECTRD EDGE